# Patient Record
Sex: FEMALE | Race: WHITE | NOT HISPANIC OR LATINO | Employment: OTHER | URBAN - METROPOLITAN AREA
[De-identification: names, ages, dates, MRNs, and addresses within clinical notes are randomized per-mention and may not be internally consistent; named-entity substitution may affect disease eponyms.]

---

## 2017-07-11 ENCOUNTER — HISTORICAL (OUTPATIENT)
Dept: ADMINISTRATIVE | Facility: HOSPITAL | Age: 57
End: 2017-07-11

## 2017-07-11 LAB
ABS NEUT (OLG): 7.18 X10(3)/MCL (ref 2.1–9.2)
ALBUMIN SERPL-MCNC: 3.9 GM/DL (ref 3.4–5)
ALBUMIN/GLOB SERPL: 1.1 RATIO (ref 1.1–2)
ALP SERPL-CCNC: 88 UNIT/L (ref 38–126)
ALT SERPL-CCNC: 22 UNIT/L (ref 12–78)
AST SERPL-CCNC: 12 UNIT/L (ref 15–37)
BASOPHILS # BLD AUTO: 0 X10(3)/MCL (ref 0–0.2)
BASOPHILS NFR BLD AUTO: 0 %
BILIRUB SERPL-MCNC: 0.4 MG/DL (ref 0.2–1)
BILIRUBIN DIRECT+TOT PNL SERPL-MCNC: 0.1 MG/DL (ref 0–0.5)
BILIRUBIN DIRECT+TOT PNL SERPL-MCNC: 0.3 MG/DL (ref 0–0.8)
BUN SERPL-MCNC: 19 MG/DL (ref 7–18)
CALCIUM SERPL-MCNC: 9.5 MG/DL (ref 8.5–10.1)
CHLORIDE SERPL-SCNC: 103 MMOL/L (ref 98–107)
CHOLEST SERPL-MCNC: 184 MG/DL (ref 0–200)
CHOLEST/HDLC SERPL: 4.6 {RATIO} (ref 0–4)
CO2 SERPL-SCNC: 31 MMOL/L (ref 21–32)
CREAT SERPL-MCNC: 0.72 MG/DL (ref 0.55–1.02)
EOSINOPHIL # BLD AUTO: 0.1 X10(3)/MCL (ref 0–0.9)
EOSINOPHIL NFR BLD AUTO: 1 %
ERYTHROCYTE [DISTWIDTH] IN BLOOD BY AUTOMATED COUNT: 13.4 % (ref 11.5–17)
GLOBULIN SER-MCNC: 3.6 GM/DL (ref 2.4–3.5)
GLUCOSE SERPL-MCNC: 111 MG/DL (ref 74–106)
HCT VFR BLD AUTO: 44.3 % (ref 37–47)
HDLC SERPL-MCNC: 40 MG/DL (ref 35–60)
HGB BLD-MCNC: 14.6 GM/DL (ref 12–16)
LDLC SERPL CALC-MCNC: 103 MG/DL (ref 0–129)
LYMPHOCYTES # BLD AUTO: 2.5 X10(3)/MCL (ref 0.6–4.6)
LYMPHOCYTES NFR BLD AUTO: 24 %
MCH RBC QN AUTO: 31 PG (ref 27–31)
MCHC RBC AUTO-ENTMCNC: 33 GM/DL (ref 33–36)
MCV RBC AUTO: 94.1 FL (ref 80–94)
MONOCYTES # BLD AUTO: 0.8 X10(3)/MCL (ref 0.1–1.3)
MONOCYTES NFR BLD AUTO: 7 %
NEUTROPHILS # BLD AUTO: 7.18 X10(3)/MCL (ref 1.4–7.9)
NEUTROPHILS NFR BLD AUTO: 67 %
PLATELET # BLD AUTO: 277 X10(3)/MCL (ref 130–400)
PMV BLD AUTO: 9.4 FL (ref 9.4–12.4)
POTASSIUM SERPL-SCNC: 3.8 MMOL/L (ref 3.5–5.1)
PROT SERPL-MCNC: 7.5 GM/DL (ref 6.4–8.2)
RBC # BLD AUTO: 4.71 X10(6)/MCL (ref 4.2–5.4)
SODIUM SERPL-SCNC: 145 MMOL/L (ref 136–145)
TRIGL SERPL-MCNC: 203 MG/DL (ref 30–150)
VLDLC SERPL CALC-MCNC: 41 MG/DL
WBC # SPEC AUTO: 10.6 X10(3)/MCL (ref 4.5–11.5)

## 2017-10-25 ENCOUNTER — HISTORICAL (OUTPATIENT)
Dept: RADIOLOGY | Facility: HOSPITAL | Age: 57
End: 2017-10-25

## 2017-11-08 ENCOUNTER — HISTORICAL (OUTPATIENT)
Dept: RADIOLOGY | Facility: HOSPITAL | Age: 57
End: 2017-11-08

## 2018-04-06 ENCOUNTER — HISTORICAL (OUTPATIENT)
Dept: RADIOLOGY | Facility: HOSPITAL | Age: 58
End: 2018-04-06

## 2019-02-05 ENCOUNTER — HISTORICAL (OUTPATIENT)
Dept: RADIOLOGY | Facility: HOSPITAL | Age: 59
End: 2019-02-05

## 2019-03-19 ENCOUNTER — HISTORICAL (OUTPATIENT)
Dept: ADMINISTRATIVE | Facility: HOSPITAL | Age: 59
End: 2019-03-19

## 2019-03-19 LAB
ABS NEUT (OLG): 5.68 X10(3)/MCL (ref 2.1–9.2)
ALBUMIN SERPL-MCNC: 3.9 GM/DL (ref 3.4–5)
ALBUMIN/GLOB SERPL: 1.1 RATIO (ref 1.1–2)
ALP SERPL-CCNC: 104 UNIT/L (ref 38–126)
ALT SERPL-CCNC: 23 UNIT/L (ref 12–78)
AST SERPL-CCNC: 12 UNIT/L (ref 15–37)
BASOPHILS # BLD AUTO: 0 X10(3)/MCL (ref 0–0.2)
BASOPHILS NFR BLD AUTO: 0 %
BILIRUB SERPL-MCNC: 0.4 MG/DL (ref 0.2–1)
BILIRUBIN DIRECT+TOT PNL SERPL-MCNC: 0.1 MG/DL (ref 0–0.5)
BILIRUBIN DIRECT+TOT PNL SERPL-MCNC: 0.3 MG/DL (ref 0–0.8)
BUN SERPL-MCNC: 16 MG/DL (ref 7–18)
CALCIUM SERPL-MCNC: 9.4 MG/DL (ref 8.5–10.1)
CHLORIDE SERPL-SCNC: 99 MMOL/L (ref 98–107)
CHOLEST SERPL-MCNC: 184 MG/DL (ref 0–200)
CHOLEST/HDLC SERPL: 4.4 {RATIO} (ref 0–4)
CO2 SERPL-SCNC: 32 MMOL/L (ref 21–32)
CREAT SERPL-MCNC: 0.72 MG/DL (ref 0.55–1.02)
EOSINOPHIL # BLD AUTO: 0.1 X10(3)/MCL (ref 0–0.9)
EOSINOPHIL NFR BLD AUTO: 1 %
ERYTHROCYTE [DISTWIDTH] IN BLOOD BY AUTOMATED COUNT: 13.5 % (ref 11.5–17)
GLOBULIN SER-MCNC: 3.4 GM/DL (ref 2.4–3.5)
GLUCOSE SERPL-MCNC: 115 MG/DL (ref 74–106)
HCT VFR BLD AUTO: 44 % (ref 37–47)
HDLC SERPL-MCNC: 42 MG/DL (ref 35–60)
HGB BLD-MCNC: 15.1 GM/DL (ref 12–16)
LDLC SERPL CALC-MCNC: 104 MG/DL (ref 0–129)
LYMPHOCYTES # BLD AUTO: 2.7 X10(3)/MCL (ref 0.6–4.6)
LYMPHOCYTES NFR BLD AUTO: 29 %
MCH RBC QN AUTO: 31.1 PG (ref 27–31)
MCHC RBC AUTO-ENTMCNC: 34.3 GM/DL (ref 33–36)
MCV RBC AUTO: 90.7 FL (ref 80–94)
MONOCYTES # BLD AUTO: 0.8 X10(3)/MCL (ref 0.1–1.3)
MONOCYTES NFR BLD AUTO: 8 %
NEUTROPHILS # BLD AUTO: 5.68 X10(3)/MCL (ref 2.1–9.2)
NEUTROPHILS NFR BLD AUTO: 61 %
PLATELET # BLD AUTO: 277 X10(3)/MCL (ref 130–400)
PMV BLD AUTO: 9.3 FL (ref 9.4–12.4)
POTASSIUM SERPL-SCNC: 3.4 MMOL/L (ref 3.5–5.1)
PROT SERPL-MCNC: 7.3 GM/DL (ref 6.4–8.2)
RBC # BLD AUTO: 4.85 X10(6)/MCL (ref 4.2–5.4)
SODIUM SERPL-SCNC: 139 MMOL/L (ref 136–145)
TRIGL SERPL-MCNC: 188 MG/DL (ref 30–150)
VLDLC SERPL CALC-MCNC: 38 MG/DL
WBC # SPEC AUTO: 9.3 X10(3)/MCL (ref 4.5–11.5)

## 2019-09-04 ENCOUNTER — HISTORICAL (OUTPATIENT)
Dept: RADIOLOGY | Facility: HOSPITAL | Age: 59
End: 2019-09-04

## 2019-09-04 LAB — POC CREATININE: 0.7 MG/DL (ref 0.6–1.3)

## 2019-10-07 LAB
ABS NEUT (OLG): 6.1 X10(3)/MCL (ref 1.5–6.9)
ALBUMIN SERPL-MCNC: 3.5 GM/DL (ref 3.4–5)
ALBUMIN/GLOB SERPL: 0.8 RATIO
ALP SERPL-CCNC: 98 UNIT/L (ref 30–113)
ALT SERPL-CCNC: 16 UNIT/L (ref 10–45)
APTT PPP: 26 SECOND(S) (ref 24.5–32.8)
AST SERPL-CCNC: 11 UNIT/L (ref 15–37)
BASOPHILS # BLD AUTO: 0 X10(3)/MCL (ref 0–0.1)
BASOPHILS NFR BLD AUTO: 0 % (ref 0–1)
BILIRUB SERPL-MCNC: 0.3 MG/DL (ref 0.1–0.9)
BILIRUBIN DIRECT+TOT PNL SERPL-MCNC: 0.1 MG/DL (ref 0–0.3)
BILIRUBIN DIRECT+TOT PNL SERPL-MCNC: 0.2 MG/DL
BUN SERPL-MCNC: 13 MG/DL (ref 10–20)
CALCIUM SERPL-MCNC: 9.4 MG/DL (ref 8–10.5)
CHLORIDE SERPL-SCNC: 105 MMOL/L (ref 100–108)
CO2 SERPL-SCNC: 30 MMOL/L (ref 21–35)
CREAT SERPL-MCNC: 0.66 MG/DL (ref 0.7–1.3)
EOSINOPHIL # BLD AUTO: 0 X10(3)/MCL (ref 0–0.6)
EOSINOPHIL NFR BLD AUTO: 0 % (ref 0–5)
ERYTHROCYTE [DISTWIDTH] IN BLOOD BY AUTOMATED COUNT: 13.2 % (ref 11.5–17)
GLOBULIN SER-MCNC: 4.2 GM/DL
GLUCOSE SERPL-MCNC: 110 MG/DL (ref 75–116)
HCT VFR BLD AUTO: 42.7 % (ref 36–48)
HGB BLD-MCNC: 14.2 GM/DL (ref 12–16)
IMM GRANULOCYTES # BLD AUTO: 0.03 10*3/UL (ref 0–0.02)
IMM GRANULOCYTES NFR BLD AUTO: 0.3 % (ref 0–0.43)
INR PPP: 1
LYMPHOCYTES # BLD AUTO: 2.7 X10(3)/MCL (ref 0.5–4.1)
LYMPHOCYTES NFR BLD AUTO: 28 % (ref 15–40)
MCH RBC QN AUTO: 31 PG (ref 27–34)
MCHC RBC AUTO-ENTMCNC: 33 GM/DL (ref 31–36)
MCV RBC AUTO: 93 FL (ref 80–99)
MONOCYTES # BLD AUTO: 0.8 X10(3)/MCL (ref 0–1.1)
MONOCYTES NFR BLD AUTO: 8 % (ref 4–12)
NEUTROPHILS # BLD AUTO: 6.1 X10(3)/MCL (ref 1.5–6.9)
NEUTROPHILS NFR BLD AUTO: 63 % (ref 43–75)
PLATELET # BLD AUTO: 274 X10(3)/MCL (ref 140–400)
PMV BLD AUTO: 9 FL (ref 6.8–10)
POTASSIUM SERPL-SCNC: 3.9 MMOL/L (ref 3.6–5.2)
PROT SERPL-MCNC: 7.7 GM/DL (ref 6.4–8.2)
PROTHROMBIN TIME: 9.7 SECOND(S) (ref 8.6–10.1)
RBC # BLD AUTO: 4.59 X10(6)/MCL (ref 4.2–5.4)
SODIUM SERPL-SCNC: 143 MMOL/L (ref 135–145)
WBC # SPEC AUTO: 9.7 X10(3)/MCL (ref 4.5–11.5)

## 2019-10-14 ENCOUNTER — HISTORICAL (OUTPATIENT)
Dept: ANESTHESIOLOGY | Facility: HOSPITAL | Age: 59
End: 2019-10-14

## 2019-10-28 ENCOUNTER — HISTORICAL (OUTPATIENT)
Dept: ANESTHESIOLOGY | Facility: HOSPITAL | Age: 59
End: 2019-10-28

## 2019-11-11 LAB
ABS NEUT (OLG): 5.9 X10(3)/MCL (ref 1.5–6.9)
ALBUMIN SERPL-MCNC: 3.4 GM/DL (ref 3.4–5)
ALBUMIN/GLOB SERPL: 0.8 RATIO
ALP SERPL-CCNC: 115 UNIT/L (ref 30–113)
ALT SERPL-CCNC: 19 UNIT/L (ref 10–45)
APTT PPP: 29.2 SECOND(S) (ref 25–35)
AST SERPL-CCNC: 11 UNIT/L (ref 15–37)
BASOPHILS # BLD AUTO: 0 X10(3)/MCL (ref 0–0.1)
BASOPHILS NFR BLD AUTO: 0 % (ref 0–1)
BILIRUB SERPL-MCNC: 0.2 MG/DL (ref 0.1–0.9)
BILIRUBIN DIRECT+TOT PNL SERPL-MCNC: 0.1 MG/DL
BILIRUBIN DIRECT+TOT PNL SERPL-MCNC: 0.1 MG/DL (ref 0–0.3)
BUN SERPL-MCNC: 14 MG/DL (ref 10–20)
CALCIUM SERPL-MCNC: 9.3 MG/DL (ref 8–10.5)
CHLORIDE SERPL-SCNC: 105 MMOL/L (ref 100–108)
CO2 SERPL-SCNC: 32 MMOL/L (ref 21–35)
CREAT SERPL-MCNC: 0.89 MG/DL (ref 0.7–1.3)
EOSINOPHIL # BLD AUTO: 0.1 X10(3)/MCL (ref 0–0.6)
EOSINOPHIL NFR BLD AUTO: 1 % (ref 0–5)
ERYTHROCYTE [DISTWIDTH] IN BLOOD BY AUTOMATED COUNT: 13.2 % (ref 11.5–17)
GLOBULIN SER-MCNC: 4.2 GM/DL
GLUCOSE SERPL-MCNC: 126 MG/DL (ref 75–116)
HCT VFR BLD AUTO: 44.7 % (ref 36–48)
HGB BLD-MCNC: 14.5 GM/DL (ref 12–16)
IMM GRANULOCYTES # BLD AUTO: 0.03 10*3/UL (ref 0–0.02)
IMM GRANULOCYTES NFR BLD AUTO: 0.3 % (ref 0–0.43)
INR PPP: 0.9 (ref 0–1.2)
LYMPHOCYTES # BLD AUTO: 3 X10(3)/MCL (ref 0.5–4.1)
LYMPHOCYTES NFR BLD AUTO: 30 % (ref 15–40)
MCH RBC QN AUTO: 30 PG (ref 27–34)
MCHC RBC AUTO-ENTMCNC: 32 GM/DL (ref 31–36)
MCV RBC AUTO: 92 FL (ref 80–99)
MONOCYTES # BLD AUTO: 0.7 X10(3)/MCL (ref 0–1.1)
MONOCYTES NFR BLD AUTO: 7 % (ref 4–12)
NEUTROPHILS # BLD AUTO: 5.9 X10(3)/MCL (ref 1.5–6.9)
NEUTROPHILS NFR BLD AUTO: 61 % (ref 43–75)
PLATELET # BLD AUTO: 294 X10(3)/MCL (ref 140–400)
PMV BLD AUTO: 8.9 FL (ref 6.8–10)
POTASSIUM SERPL-SCNC: 4.3 MMOL/L (ref 3.6–5.2)
PROT SERPL-MCNC: 7.6 GM/DL (ref 6.4–8.2)
PROTHROMBIN TIME: 9.7 SECOND(S) (ref 9–12)
RBC # BLD AUTO: 4.85 X10(6)/MCL (ref 4.2–5.4)
SODIUM SERPL-SCNC: 144 MMOL/L (ref 135–145)
WBC # SPEC AUTO: 9.8 X10(3)/MCL (ref 4.5–11.5)

## 2019-11-21 ENCOUNTER — HISTORICAL (OUTPATIENT)
Dept: ANESTHESIOLOGY | Facility: HOSPITAL | Age: 59
End: 2019-11-21

## 2020-02-03 ENCOUNTER — HISTORICAL (OUTPATIENT)
Dept: ADMINISTRATIVE | Facility: HOSPITAL | Age: 60
End: 2020-02-03

## 2020-02-03 LAB
ABS NEUT (OLG): 4.69 X10(3)/MCL (ref 2.1–9.2)
ALBUMIN SERPL-MCNC: 3.7 GM/DL (ref 3.4–5)
ALBUMIN/GLOB SERPL: 1.1 RATIO (ref 1.1–2)
ALP SERPL-CCNC: 110 UNIT/L (ref 38–126)
ALT SERPL-CCNC: 14 UNIT/L (ref 12–78)
AST SERPL-CCNC: 9 UNIT/L (ref 15–37)
BASOPHILS # BLD AUTO: 0 X10(3)/MCL (ref 0–0.2)
BASOPHILS NFR BLD AUTO: 1 %
BILIRUB SERPL-MCNC: 0.6 MG/DL (ref 0.2–1)
BILIRUBIN DIRECT+TOT PNL SERPL-MCNC: 0.2 MG/DL (ref 0–0.5)
BILIRUBIN DIRECT+TOT PNL SERPL-MCNC: 0.4 MG/DL (ref 0–0.8)
BUN SERPL-MCNC: 12 MG/DL (ref 7–18)
CALCIUM SERPL-MCNC: 8.8 MG/DL (ref 8.5–10.1)
CHLORIDE SERPL-SCNC: 108 MMOL/L (ref 98–107)
CHOLEST SERPL-MCNC: 155 MG/DL (ref 0–200)
CHOLEST/HDLC SERPL: 3.4 {RATIO} (ref 0–4)
CO2 SERPL-SCNC: 29 MMOL/L (ref 21–32)
CREAT SERPL-MCNC: 0.72 MG/DL (ref 0.55–1.02)
DEPRECATED CALCIDIOL+CALCIFEROL SERPL-MC: 24.68 NG/ML (ref 30–80)
EOSINOPHIL # BLD AUTO: 0.1 X10(3)/MCL (ref 0–0.9)
EOSINOPHIL NFR BLD AUTO: 1 %
ERYTHROCYTE [DISTWIDTH] IN BLOOD BY AUTOMATED COUNT: 14.5 % (ref 11.5–17)
EST. AVERAGE GLUCOSE BLD GHB EST-MCNC: 123 MG/DL
GLOBULIN SER-MCNC: 3.4 GM/DL (ref 2.4–3.5)
GLUCOSE SERPL-MCNC: 103 MG/DL (ref 74–106)
HBA1C MFR BLD: 5.9 % (ref 4.2–6.3)
HCT VFR BLD AUTO: 43.3 % (ref 37–47)
HDLC SERPL-MCNC: 46 MG/DL (ref 35–60)
HGB BLD-MCNC: 13.9 GM/DL (ref 12–16)
LDLC SERPL CALC-MCNC: 91 MG/DL (ref 0–129)
LYMPHOCYTES # BLD AUTO: 2 X10(3)/MCL (ref 0.6–4.6)
LYMPHOCYTES NFR BLD AUTO: 27 %
MCH RBC QN AUTO: 29.3 PG (ref 27–31)
MCHC RBC AUTO-ENTMCNC: 32.1 GM/DL (ref 33–36)
MCV RBC AUTO: 91.4 FL (ref 80–94)
MONOCYTES # BLD AUTO: 0.6 X10(3)/MCL (ref 0.1–1.3)
MONOCYTES NFR BLD AUTO: 8 %
NEUTROPHILS # BLD AUTO: 4.69 X10(3)/MCL (ref 2.1–9.2)
NEUTROPHILS NFR BLD AUTO: 63 %
PLATELET # BLD AUTO: 267 X10(3)/MCL (ref 130–400)
PMV BLD AUTO: 9.1 FL (ref 9.4–12.4)
POTASSIUM SERPL-SCNC: 3.9 MMOL/L (ref 3.5–5.1)
PROT SERPL-MCNC: 7.1 GM/DL (ref 6.4–8.2)
RBC # BLD AUTO: 4.74 X10(6)/MCL (ref 4.2–5.4)
SODIUM SERPL-SCNC: 143 MMOL/L (ref 136–145)
T3FREE SERPL-MCNC: 2.87 PG/ML (ref 2.18–3.98)
T4 FREE SERPL-MCNC: 1.14 NG/DL (ref 0.76–1.46)
TRIGL SERPL-MCNC: 91 MG/DL (ref 30–150)
TSH SERPL-ACNC: 1.09 MIU/L (ref 0.36–3.74)
VLDLC SERPL CALC-MCNC: 18 MG/DL
WBC # SPEC AUTO: 7.4 X10(3)/MCL (ref 4.5–11.5)

## 2020-02-06 ENCOUNTER — HISTORICAL (OUTPATIENT)
Dept: RADIOLOGY | Facility: HOSPITAL | Age: 60
End: 2020-02-06

## 2020-02-18 ENCOUNTER — HISTORICAL (OUTPATIENT)
Dept: RADIOLOGY | Facility: HOSPITAL | Age: 60
End: 2020-02-18

## 2020-09-24 ENCOUNTER — HISTORICAL (OUTPATIENT)
Dept: RADIOLOGY | Facility: HOSPITAL | Age: 60
End: 2020-09-24

## 2020-11-18 ENCOUNTER — HISTORICAL (OUTPATIENT)
Dept: ADMINISTRATIVE | Facility: HOSPITAL | Age: 60
End: 2020-11-18

## 2020-11-18 LAB
ABS NEUT (OLG): 3.84 X10(3)/MCL (ref 2.1–9.2)
ALBUMIN SERPL-MCNC: 3.9 GM/DL (ref 3.4–4.8)
ALBUMIN/GLOB SERPL: 1.2 RATIO (ref 1.1–2)
ALP SERPL-CCNC: 113 UNIT/L (ref 40–150)
ALT SERPL-CCNC: 14 UNIT/L (ref 0–55)
AST SERPL-CCNC: 15 UNIT/L (ref 5–34)
BASOPHILS # BLD AUTO: 0 X10(3)/MCL (ref 0–0.2)
BASOPHILS NFR BLD AUTO: 0 %
BILIRUB SERPL-MCNC: 0.6 MG/DL
BILIRUBIN DIRECT+TOT PNL SERPL-MCNC: 0.3 MG/DL (ref 0–0.5)
BILIRUBIN DIRECT+TOT PNL SERPL-MCNC: 0.3 MG/DL (ref 0–0.8)
BUN SERPL-MCNC: 14.7 MG/DL (ref 9.8–20.1)
CALCIUM SERPL-MCNC: 9.6 MG/DL (ref 8.4–10.2)
CHLORIDE SERPL-SCNC: 100 MMOL/L (ref 98–107)
CHOLEST SERPL-MCNC: 168 MG/DL
CHOLEST/HDLC SERPL: 4 {RATIO} (ref 0–5)
CO2 SERPL-SCNC: 31 MMOL/L (ref 23–31)
CREAT SERPL-MCNC: 0.79 MG/DL (ref 0.55–1.02)
EOSINOPHIL # BLD AUTO: 0.1 X10(3)/MCL (ref 0–0.9)
EOSINOPHIL NFR BLD AUTO: 1 %
ERYTHROCYTE [DISTWIDTH] IN BLOOD BY AUTOMATED COUNT: 16.7 % (ref 11.5–17)
EST. AVERAGE GLUCOSE BLD GHB EST-MCNC: 114 MG/DL
GLOBULIN SER-MCNC: 3.2 GM/DL (ref 2.4–3.5)
GLUCOSE SERPL-MCNC: 102 MG/DL (ref 82–115)
HBA1C MFR BLD: 5.6 %
HCT VFR BLD AUTO: 49.1 % (ref 37–47)
HDLC SERPL-MCNC: 48 MG/DL (ref 35–60)
HGB BLD-MCNC: 15.9 GM/DL (ref 12–16)
LDLC SERPL CALC-MCNC: 98 MG/DL (ref 50–140)
LYMPHOCYTES # BLD AUTO: 2.1 X10(3)/MCL (ref 0.6–4.6)
LYMPHOCYTES NFR BLD AUTO: 32 %
MCH RBC QN AUTO: 28.4 PG (ref 27–31)
MCHC RBC AUTO-ENTMCNC: 32.4 GM/DL (ref 33–36)
MCV RBC AUTO: 87.7 FL (ref 80–94)
MONOCYTES # BLD AUTO: 0.6 X10(3)/MCL (ref 0.1–1.3)
MONOCYTES NFR BLD AUTO: 9 %
NEUTROPHILS # BLD AUTO: 3.84 X10(3)/MCL (ref 2.1–9.2)
NEUTROPHILS NFR BLD AUTO: 58 %
PLATELET # BLD AUTO: 200 X10(3)/MCL (ref 130–400)
PMV BLD AUTO: 9.4 FL (ref 9.4–12.4)
POTASSIUM SERPL-SCNC: 4.7 MMOL/L (ref 3.5–5.1)
PROT SERPL-MCNC: 7.1 GM/DL (ref 5.8–7.6)
RBC # BLD AUTO: 5.6 X10(6)/MCL (ref 4.2–5.4)
SODIUM SERPL-SCNC: 141 MMOL/L (ref 136–145)
TRIGL SERPL-MCNC: 109 MG/DL (ref 37–140)
TSH SERPL-ACNC: 1.59 UIU/ML (ref 0.35–4.94)
VLDLC SERPL CALC-MCNC: 22 MG/DL
WBC # SPEC AUTO: 6.6 X10(3)/MCL (ref 4.5–11.5)

## 2020-11-20 LAB — FINAL CULTURE: NO GROWTH

## 2021-02-22 ENCOUNTER — HISTORICAL (OUTPATIENT)
Dept: ADMINISTRATIVE | Facility: HOSPITAL | Age: 61
End: 2021-02-22

## 2021-02-25 ENCOUNTER — HISTORICAL (OUTPATIENT)
Dept: RADIOLOGY | Facility: HOSPITAL | Age: 61
End: 2021-02-25

## 2022-04-27 ENCOUNTER — HISTORICAL (OUTPATIENT)
Dept: RADIOLOGY | Facility: HOSPITAL | Age: 62
End: 2022-04-27

## 2022-04-27 ENCOUNTER — HISTORICAL (OUTPATIENT)
Dept: ADMINISTRATIVE | Facility: HOSPITAL | Age: 62
End: 2022-04-27
Payer: MEDICAID

## 2022-04-29 NOTE — OP NOTE
ADMITTING DIAGNOSIS:  Ventral incisional hernia, incarcerated, not reducible, not obstructed.    PROCEDURE:  Laparoscopic ventral incisional hernia repair with a 9 cm Symbotex mesh.  The hernia defect measured about 2 cm in size.    BRIEF HISTORY:  The patient is a 59-year-old  female with a history of hypertension, hypercholesterolemia, morbidly obese at 5 feet 10 inches, __________ pounds.  Smokes about 1-1/2 packs a day for about 35 years.  The patient had a ventral supraumbilical hernia that was not reducible, but not obstructed.  It had incarcerated omentum within it.  It appeared to be incisional from previous trocar sites, probably from a laparoscopic appendectomy or from previous C-sections.    DESCRIPTION OF PROCEDURE:  The patient was brought to the OR in supine position, prepped and draped in a sterile fashion.  Had a Visiport placed in the left upper quadrant with insertion of a 5 mm trocar along the left mid abdomen and another 5 mm trocar just to the right of falciform ligament.  The incarcerated omentum that was within the abdomen was reduced with lysis of adhesions and then the peritoneum was released and  from the posterior rectus fascia.  The patient had a 9 cm Symbotex mesh used to cover about a 2 cm defect.  It was centered around the defect.  The nonadhesive side was placed toward the bowel.  It was, of course, soaked before placement of the mesh.  The patient had the adhesive side toward the fascia.  The patient underwent tacking with Securestrap initially and then a Bard more permanent tacker was used for the final circumferential tacking.  The patient did have part of the peritoneum reapproximated over the mesh for a faster incorporation and further adhesional protection against the bowel.  Overall, the patient did very well, had no problems or difficulties, was awakened and     sent to Recovery in good condition.  I appreciate the consultation referral from   Giovanni and will notify him of my findings.      BBS/MODL   DD: 11/21/2019 1203   DT: 11/21/2019 1228  Job # [id number]    cc: Clifford Davila M.D.

## 2022-04-29 NOTE — OP NOTE
REFERRING PHYSICIAN:  Dr. Murphy    ADMITTING DIAGNOSIS:  Need for age-appropriate screening colonoscopy.    PROCEDURE:  Colonoscopy to the cecum with examination of ileocecal valve.    POSTOPERATIVE DIAGNOSES:    1. Normal ileocecal valve without intubation of the terminal ileum.  2. Normal cecum, ascending colon, transverse colon, and descending colon.  3. Diverticulosis of the sigmoid colon.  4. Grade 2 internal hemorrhoids with good tone.  No masses.  No other abnormalities seen.    PLAN:    1. Follow up in the office to discuss results.  2. Follow up in 2 years to recheck stools for blood.  3. Follow up in 10 years for repeat screening colonoscopy.    INDICATIONS:  The patient is a 59-year-old  female with anxiety, hypertension, hypercholesterolemia, morbidly obese at 5 foot 10, 172 pounds, smokes about 1-1/2 packs a day for 35 years.  The patient had open appendectomy at [facility], a  and never had a colonoscopy before.  She had a ventral hernia that was supraumbilical with associated incarceration of the omentum, no obstruction.  The patient was scheduled for a colonoscopy screening on the .  She also, on that CT, had gallstones.  The patient underwent colonoscopy to the cecum with examination of ileocecal valve under sedation using a flexible Olympus scope.    FINDINGS:  The ileocecal valve was normal.  I could not intubate the terminal ileum after multiple passes.  Cecum, ascending colon, transverse colon, and descending colon were normal with multiple passes noted.  The patient had no polyps or masses noted.  The patient's rectosigmoid colon had some diverticulosis with no polyps seen, and digital exam revealed good tone, no masses.  Grade 2 internal hemorrhoids were seen.  No other pathology was visualized.     Overall, the patient did very well and had no problems or difficulties.  Was awakened and sent to recovery in good condition.    PLAN:  My plan is cardiac check and  clearance for ventral hernia repair.  Prior to this, laparoscopic cholecystectomy will be done.  A set of procedures will be staged.  I appreciate the consultation referral from Dr. Murphy and will notify him of my findings.        BBS/SUDHEER   DD: 10/14/2019 1313   DT: 10/14/2019 1339  Job # [id number]    cc: Dr. Murphy

## 2022-04-29 NOTE — OP NOTE
ADMITTING DIAGNOSIS:  Chronic cholecystitis.    PROCEDURE:  Laparoscopic cholecystectomy with intraoperative cholangiography under fluoroscopy.    POSTOPERATIVE DIAGNOSIS:    1. Chronic cholecystitis with gallstones in the gallbladder.  2. No pancreatitis.  3. Common duct without any obstruction.    INDICATIONS:  The patient is a 59-year-old  female with a history of hypertension, hypercholesterolemia.  Morbidly obese at 5 feet 10 inches, 172 pounds.  Smokes 1-1/2 packs a day for about 35 years and has anxiety issues.  The patient was noted to have a previous appendectomy and .  She has a ventral hernia that is noted and needs to be repaired.  It is supraumbilical, incarcerated with omentum and not obstructed.  Patient had a colonoscopy that was consistent with diverticulosis on 10/14/2019, and a CT scan verified gallstones within the gallbladder.  I wanted to do a cholecystectomy before performing her ventral hernia repair.    DESCRIPTION OF PROCEDURE:  The patient was brought to the operating room in supine position.  General anesthetic given.  Prepped and draped in a sterile fashion.  A supraumbilical incision was made with insertion of Chetan needle, insufflation of abdomen to 14 mmHg with insertion of a 5 mm trocar.  The patient then underwent insertion of two lateral 5 mm trocars under direct vision and one 5 mm trocar just to the right of falciform ligament.  The gallbladder was grasped and mobilized.  Cystic duct and artery were isolated.  [place] was clearly dissected.  Intraoperative cholangiogram was obtained.  The patient underwent clipping of the cystic duct and artery, cauterization of gallbladder off the liver bed and removed through the epigastric trocar site.  The 5 mm trocar site aponeurosis was closed with 0 Vicryl on a  needle.  Subcu was closed with 4-0 plain gut suture.  Dermabond was used for the skin.  Sterile dressings were applied.  No problems were  encountered.  Patient tolerated the procedure well.  Local anesthetic was injected.       I appreciate the consultation referral from Dr. Santos out of [place] and will notify him of my findings.        NICOLAS/SUDHEER   DD: 10/28/2019 1328   DT: 10/28/2019 1348  Job # [id number]    cc: Dr. Santos

## 2022-07-27 DIAGNOSIS — Z87.891 PERSONAL HISTORY OF TOBACCO USE, PRESENTING HAZARDS TO HEALTH: Primary | ICD-10-CM

## 2022-08-12 ENCOUNTER — HOSPITAL ENCOUNTER (OUTPATIENT)
Dept: RADIOLOGY | Facility: HOSPITAL | Age: 62
Discharge: HOME | End: 2022-08-12
Attending: NURSE PRACTITIONER
Payer: MEDICAID

## 2022-08-12 DIAGNOSIS — Z87.891 PERSONAL HISTORY OF TOBACCO USE, PRESENTING HAZARDS TO HEALTH: ICD-10-CM

## 2022-08-12 PROCEDURE — 71271 CT THORAX LUNG CANCER SCR C-: CPT | Mod: TC

## 2023-08-17 ENCOUNTER — HOSPITAL ENCOUNTER (OUTPATIENT)
Dept: RADIOLOGY | Facility: HOSPITAL | Age: 63
Discharge: HOME | End: 2023-08-17
Payer: MEDICAID

## 2023-08-17 DIAGNOSIS — Z12.31 BREAST CANCER SCREENING BY MAMMOGRAM: ICD-10-CM

## 2023-08-17 PROCEDURE — 77067 MAMMO DIGITAL SCREENING BILAT WITH TOMO: ICD-10-PCS | Mod: 26,,, | Performed by: STUDENT IN AN ORGANIZED HEALTH CARE EDUCATION/TRAINING PROGRAM

## 2023-08-17 PROCEDURE — 77063 MAMMO DIGITAL SCREENING BILAT WITH TOMO: ICD-10-PCS | Mod: 26,,, | Performed by: STUDENT IN AN ORGANIZED HEALTH CARE EDUCATION/TRAINING PROGRAM

## 2023-08-17 PROCEDURE — 77067 SCR MAMMO BI INCL CAD: CPT | Mod: 26,,, | Performed by: STUDENT IN AN ORGANIZED HEALTH CARE EDUCATION/TRAINING PROGRAM

## 2023-08-17 PROCEDURE — 77067 SCR MAMMO BI INCL CAD: CPT | Mod: TC

## 2023-08-17 PROCEDURE — 77063 BREAST TOMOSYNTHESIS BI: CPT | Mod: 26,,, | Performed by: STUDENT IN AN ORGANIZED HEALTH CARE EDUCATION/TRAINING PROGRAM

## 2023-08-31 DIAGNOSIS — Z87.891 HISTORY OF TOBACCO USE: Primary | ICD-10-CM

## 2023-09-11 ENCOUNTER — HOSPITAL ENCOUNTER (OUTPATIENT)
Dept: RADIOLOGY | Facility: HOSPITAL | Age: 63
Discharge: HOME | End: 2023-09-11
Payer: MEDICAID

## 2023-09-11 DIAGNOSIS — Z87.891 HISTORY OF TOBACCO USE: ICD-10-CM

## 2023-09-11 PROCEDURE — 71271 CT THORAX LUNG CANCER SCR C-: CPT | Mod: TC

## 2023-09-14 ENCOUNTER — DOCUMENTATION ONLY (OUTPATIENT)
Dept: HEMATOLOGY/ONCOLOGY | Facility: CLINIC | Age: 63
End: 2023-09-14
Payer: MEDICAID

## 2023-09-14 NOTE — PROGRESS NOTES
Spoke with Deanna at Tristen Montoya's NP office regarding the LDCT scan results. She stated that Tristen will order a follow up scan when patient is due in 3 months.

## 2023-11-07 DIAGNOSIS — R19.5 POSITIVE COLORECTAL CANCER SCREENING USING COLOGUARD TEST: Primary | ICD-10-CM

## 2023-11-09 DIAGNOSIS — R91.1 LUNG NODULE: Primary | ICD-10-CM

## 2023-11-27 ENCOUNTER — OFFICE VISIT (OUTPATIENT)
Dept: PULMONOLOGY | Facility: CLINIC | Age: 63
End: 2023-11-27
Payer: MEDICAID

## 2023-11-27 VITALS
BODY MASS INDEX: 30.95 KG/M2 | SYSTOLIC BLOOD PRESSURE: 108 MMHG | HEART RATE: 75 BPM | OXYGEN SATURATION: 98 % | DIASTOLIC BLOOD PRESSURE: 69 MMHG | HEIGHT: 60 IN | RESPIRATION RATE: 18 BRPM | TEMPERATURE: 98 F | WEIGHT: 157.63 LBS

## 2023-11-27 DIAGNOSIS — R91.8 MULTIPLE LUNG NODULES: Primary | ICD-10-CM

## 2023-11-27 DIAGNOSIS — R91.1 LUNG NODULE: ICD-10-CM

## 2023-11-27 DIAGNOSIS — R91.1 SOLITARY PULMONARY NODULE: ICD-10-CM

## 2023-11-27 PROCEDURE — 3008F BODY MASS INDEX DOCD: CPT | Mod: CPTII,,, | Performed by: HOSPITALIST

## 2023-11-27 PROCEDURE — 99204 OFFICE O/P NEW MOD 45 MIN: CPT | Mod: S$PBB,,, | Performed by: HOSPITALIST

## 2023-11-27 PROCEDURE — 99204 PR OFFICE/OUTPT VISIT, NEW, LEVL IV, 45-59 MIN: ICD-10-PCS | Mod: S$PBB,,, | Performed by: HOSPITALIST

## 2023-11-27 PROCEDURE — 1159F MED LIST DOCD IN RCRD: CPT | Mod: CPTII,,, | Performed by: HOSPITALIST

## 2023-11-27 PROCEDURE — 3008F PR BODY MASS INDEX (BMI) DOCUMENTED: ICD-10-PCS | Mod: CPTII,,, | Performed by: HOSPITALIST

## 2023-11-27 PROCEDURE — 3074F SYST BP LT 130 MM HG: CPT | Mod: CPTII,,, | Performed by: HOSPITALIST

## 2023-11-27 PROCEDURE — 3078F PR MOST RECENT DIASTOLIC BLOOD PRESSURE < 80 MM HG: ICD-10-PCS | Mod: CPTII,,, | Performed by: HOSPITALIST

## 2023-11-27 PROCEDURE — 4010F PR ACE/ARB THEARPY RXD/TAKEN: ICD-10-PCS | Mod: CPTII,,, | Performed by: HOSPITALIST

## 2023-11-27 PROCEDURE — 3078F DIAST BP <80 MM HG: CPT | Mod: CPTII,,, | Performed by: HOSPITALIST

## 2023-11-27 PROCEDURE — 99214 OFFICE O/P EST MOD 30 MIN: CPT | Mod: PBBFAC

## 2023-11-27 PROCEDURE — 4010F ACE/ARB THERAPY RXD/TAKEN: CPT | Mod: CPTII,,, | Performed by: HOSPITALIST

## 2023-11-27 PROCEDURE — 3074F PR MOST RECENT SYSTOLIC BLOOD PRESSURE < 130 MM HG: ICD-10-PCS | Mod: CPTII,,, | Performed by: HOSPITALIST

## 2023-11-27 PROCEDURE — 1159F PR MEDICATION LIST DOCUMENTED IN MEDICAL RECORD: ICD-10-PCS | Mod: CPTII,,, | Performed by: HOSPITALIST

## 2023-11-27 RX ORDER — ERGOCALCIFEROL 1.25 MG/1
50000 CAPSULE ORAL
COMMUNITY
Start: 2023-11-05

## 2023-11-27 RX ORDER — IBUPROFEN 200 MG
1 TABLET ORAL DAILY
Status: ON HOLD | COMMUNITY
Start: 2023-10-31 | End: 2024-03-12 | Stop reason: HOSPADM

## 2023-11-27 RX ORDER — ASPIRIN 81 MG/1
81 TABLET ORAL DAILY
COMMUNITY

## 2023-11-27 RX ORDER — PANTOPRAZOLE SODIUM 40 MG/1
40 TABLET, DELAYED RELEASE ORAL DAILY
COMMUNITY
Start: 2023-11-15

## 2023-11-27 RX ORDER — FUROSEMIDE 40 MG/1
40 TABLET ORAL DAILY
COMMUNITY

## 2023-11-27 RX ORDER — MIRTAZAPINE 30 MG/1
30 TABLET, FILM COATED ORAL NIGHTLY
COMMUNITY

## 2023-11-27 RX ORDER — IBUPROFEN 800 MG/1
TABLET ORAL
COMMUNITY

## 2023-11-27 RX ORDER — CARVEDILOL 12.5 MG/1
12.5 TABLET ORAL 2 TIMES DAILY
COMMUNITY

## 2023-11-27 RX ORDER — SACUBITRIL AND VALSARTAN 24; 26 MG/1; MG/1
1 TABLET, FILM COATED ORAL 2 TIMES DAILY
COMMUNITY

## 2023-11-27 RX ORDER — ATORVASTATIN CALCIUM 40 MG/1
40 TABLET, FILM COATED ORAL DAILY
COMMUNITY
Start: 2023-09-20 | End: 2024-03-18

## 2023-11-27 RX ORDER — MECLIZINE HYDROCHLORIDE 25 MG/1
25 TABLET ORAL DAILY
COMMUNITY
Start: 2023-11-05

## 2023-11-27 RX ORDER — POTASSIUM CHLORIDE 1500 MG/1
20 TABLET, EXTENDED RELEASE ORAL DAILY
COMMUNITY

## 2023-11-27 RX ORDER — MONTELUKAST SODIUM 10 MG/1
10 TABLET ORAL DAILY
COMMUNITY

## 2023-11-27 RX ORDER — NORTRIPTYLINE HYDROCHLORIDE 25 MG/1
25 CAPSULE ORAL DAILY
COMMUNITY

## 2023-11-27 RX ORDER — GABAPENTIN 800 MG/1
800 TABLET ORAL 2 TIMES DAILY
COMMUNITY

## 2023-11-27 NOTE — PROGRESS NOTES
Subjective:     Chief Complaint: New pt/ lung nodule; LDCT 2023      HPI: Nadja Geronimo is a 63 y.o. female referred to [facility] lung mass clinic 2023 for abnormal CT chest.    Diagnostics:  CT Chest:  . 3mm LETI nodule and 5mm RUL  . 3mm LETI nodule and 7mm RUL    Today's visit:  Patient comes to the office today for initial visit.  She has no respiratory complaints.  No shortness of breath.  No chest pain.  No hemoptysis.  No lack of appetite or weight loss.  No other malignancy.    Past Medical History:   Diagnosis Date    Acid reflux     Dizziness     High cholesterol     Hypertension     Low vitamin D level     Neuropathy     Seasonal allergies          Review of Systems   Constitutional:  Negative for chills, fever and malaise/fatigue.   HENT:  Negative for sinus pain.    Respiratory:  Negative for cough, hemoptysis, sputum production, shortness of breath, wheezing and stridor.    Cardiovascular:  Negative for chest pain, palpitations, orthopnea, claudication and leg swelling.   Gastrointestinal:  Negative for constipation, heartburn and vomiting.   Musculoskeletal:  Negative for falls, joint pain, myalgias and neck pain.   Skin:  Negative for rash.   Neurological:  Negative for dizziness, speech change, focal weakness, seizures, loss of consciousness and weakness.   Psychiatric/Behavioral:  Negative for depression and suicidal ideas. The patient is not nervous/anxious.            Social History     Socioeconomic History    Marital status:    Tobacco Use    Smoking status: Former     Types: Cigarettes     Start date: 2023     Quit date: 1972     Years since quittin.9    Smokeless tobacco: Never    Tobacco comments:     Smoked since age 12; quit 2023   Substance and Sexual Activity    Alcohol use: Not Currently    Drug use: Never         Current Outpatient Medications   Medication Instructions    aspirin (ECOTRIN) 81 mg, Oral, Daily    atorvastatin (LIPITOR) 40 mg,  Oral, Daily    carvediloL (COREG) 12.5 mg, Oral, 2 times daily    ENTRESTO 24-26 mg per tablet 1 tablet, Oral, 2 times daily    ergocalciferol (ERGOCALCIFEROL) 50,000 Units, Oral, Every 7 days    furosemide (LASIX) 40 mg, Oral, Daily    gabapentin (NEURONTIN) 800 mg, Oral, 2 times daily    ibuprofen (ADVIL,MOTRIN) 800 MG tablet 1 tablet with food or milk as needed Orally Three times a day    linaCLOtide (LINZESS) 290 mcg, Oral, As needed (PRN)    meclizine (ANTIVERT) 25 mg, Oral, Daily PRN    mirtazapine (REMERON) 30 mg, Oral, Nightly    montelukast (SINGULAIR) 10 mg, Oral, Daily    nicotine (NICODERM CQ) 21 mg/24 hr 1 patch, Transdermal, Daily    nortriptyline (PAMELOR) 25 mg, Oral, Daily    pantoprazole (PROTONIX) 40 mg, Oral, Daily    potassium chloride (K-TAB) 20 mEq 20 mEq, Oral, Daily             Objective:   /69 (BP Location: Left arm)   Pulse 75   Temp 98 °F (36.7 °C) (Oral)   Resp 18   Ht 5' (1.524 m)   Wt 71.5 kg (157 lb 9.6 oz)   SpO2 98% Comment: room air  BMI 30.78 kg/m²     Physical Exam  Constitutional:       General: She is not in acute distress.     Appearance: Normal appearance. She is normal weight. She is not ill-appearing, toxic-appearing or diaphoretic.   HENT:      Head: Normocephalic and atraumatic.      Right Ear: External ear normal.      Left Ear: External ear normal.      Nose: No congestion or rhinorrhea.      Mouth/Throat:      Mouth: Mucous membranes are moist.      Pharynx: Oropharynx is clear. No oropharyngeal exudate or posterior oropharyngeal erythema.   Eyes:      General: No scleral icterus.        Right eye: No discharge.         Left eye: No discharge.      Extraocular Movements: Extraocular movements intact.      Pupils: Pupils are equal, round, and reactive to light.   Neck:      Vascular: No carotid bruit.   Cardiovascular:      Rate and Rhythm: Normal rate and regular rhythm.      Heart sounds: Normal heart sounds. No murmur heard.     No gallop.   Pulmonary:       Effort: No respiratory distress.      Breath sounds: Normal breath sounds. No stridor. No wheezing, rhonchi or rales.   Chest:      Chest wall: No tenderness.   Abdominal:      General: Abdomen is flat. Bowel sounds are normal. There is no distension.      Palpations: Abdomen is soft. There is no mass.      Tenderness: There is no abdominal tenderness. There is no guarding or rebound.   Musculoskeletal:         General: No swelling, tenderness, deformity or signs of injury. Normal range of motion.      Cervical back: No rigidity or tenderness.      Right lower leg: No edema.      Left lower leg: No edema.   Lymphadenopathy:      Cervical: No cervical adenopathy.   Skin:     Coloration: Skin is not jaundiced.      Findings: No bruising, lesion or rash.   Neurological:      General: No focal deficit present.      Mental Status: She is alert and oriented to person, place, and time.      Cranial Nerves: No cranial nerve deficit.      Sensory: No sensory deficit.      Motor: No weakness.      Coordination: Coordination normal.      Gait: Gait normal.      Deep Tendon Reflexes: Reflexes normal.   Psychiatric:         Mood and Affect: Mood normal.         Behavior: Behavior normal.         Thought Content: Thought content normal.         Judgment: Judgment normal.           Imaging:  I have personally reviewed the pertinent recent imaging.  CT 9/23 Lungs: There is a punctate 3 mm nodule in the left upper lobe which is nearly  pleural based on image number 54 series 2.  It is stable.  There is some calcified granulomas seen bilaterally.  There is a lobulated nodule seen in the right upper lobe on image number 119 series 2.  It measures 7 mm x 6.7 mm.  It is larger than the prior examination.  Previously the lesion and only measured 5 mm and was not lobulated.  No other nodules are seen.   Assessment:     3mm pleural based LETI nodule.  Unchanged in comparison to August of 2022.  7mm RUL nearly pleural based nodule where  as in August of 2022 it measured 5 mm.  Chronic tobacco use    Plan:     CT of the chest in 3 months.  Follow-up lung mass clinic in 3 months    Deonte Méndez MD

## 2023-12-06 DIAGNOSIS — R91.8 LUNG NODULES: Primary | ICD-10-CM

## 2023-12-06 DIAGNOSIS — R09.89 DECREASED PEDAL PULSES: Primary | ICD-10-CM

## 2023-12-28 ENCOUNTER — TELEPHONE (OUTPATIENT)
Dept: PULMONOLOGY | Facility: CLINIC | Age: 63
End: 2023-12-28
Payer: MEDICAID

## 2023-12-28 DIAGNOSIS — R91.1 LUNG NODULE: Primary | ICD-10-CM

## 2023-12-28 NOTE — TELEPHONE ENCOUNTER
Attempted to return Aiyana from [facility]'s call. No answer. No option to leave voicemail.

## 2023-12-28 NOTE — TELEPHONE ENCOUNTER
----- Message from Chinyere Morfin sent at 12/28/2023 11:51 AM CST -----  Aiyana with [facility] ( [phone])left voicemail needing to get sooner appt. because pt had CT and it showed findings.  Please advise

## 2024-01-03 ENCOUNTER — TELEPHONE (OUTPATIENT)
Dept: PULMONOLOGY | Facility: CLINIC | Age: 64
End: 2024-01-03
Payer: MEDICAID

## 2024-01-03 NOTE — TELEPHONE ENCOUNTER
Dr. Marquez reviewed CT chest results that were done on 12/26/2023. He recommended for patient to complete PET/CT for further evaluation. Understanding voiced.    Called and spoke with patient to inform her of Dr. Marquez's recommendation. Patient voiced understanding and will await Scheduling's call to set up PET/CT appt. Patient appreciative of the call.     Called [facility]. Informed of what Dr. Marquez recommended. Understanding voiced. Message will be sent to provider to inform of the update.

## 2024-01-29 ENCOUNTER — HOSPITAL ENCOUNTER (OUTPATIENT)
Dept: RADIOLOGY | Facility: HOSPITAL | Age: 64
Discharge: HOME | End: 2024-01-29
Attending: INTERNAL MEDICINE
Payer: MEDICAID

## 2024-01-29 DIAGNOSIS — R91.1 LUNG NODULE: ICD-10-CM

## 2024-01-29 PROCEDURE — A9552 F18 FDG: HCPCS

## 2024-01-29 PROCEDURE — 78815 PET IMAGE W/CT SKULL-THIGH: CPT | Mod: TC

## 2024-01-30 ENCOUNTER — TELEPHONE (OUTPATIENT)
Dept: PULMONOLOGY | Facility: CLINIC | Age: 64
End: 2024-01-30
Payer: MEDICAID

## 2024-01-30 NOTE — TELEPHONE ENCOUNTER
----- Message from Chinyere Morfin sent at 1/29/2024  3:08 PM CST -----  Regarding: Ct results  Emilia with [facility] called to get CT results faxed to them.  Fax # [phone]  Phone # [phone]

## 2024-01-30 NOTE — TELEPHONE ENCOUNTER
Returned Emilia's call. She asked if the PET/CT results could be faxed to their office. Informed Emilia that patient completed PET/CT yesterday 01/29/2024 and the results have not been released yet. Emilia voiced understanding and will reach out to the clinic later on in the week to see if the results have been released.

## 2024-02-01 ENCOUNTER — HOSPITAL ENCOUNTER (OUTPATIENT)
Dept: RADIOLOGY | Facility: HOSPITAL | Age: 64
Discharge: HOME | End: 2024-02-01
Attending: HOSPITALIST
Payer: MEDICAID

## 2024-02-01 DIAGNOSIS — R91.8 MULTIPLE LUNG NODULES: ICD-10-CM

## 2024-02-01 PROCEDURE — 71250 CT THORAX DX C-: CPT | Mod: TC

## 2024-02-06 NOTE — TELEPHONE ENCOUNTER
Called [facility]. Informed that results were faxed to their office and if they do not receive them to call the clinic back so they can be re-faxed. Also informed that patient is scheduled for 02/19/2024 at 11am for results of the PET/CT scans. Understanding voiced.

## 2024-02-19 ENCOUNTER — OFFICE VISIT (OUTPATIENT)
Dept: PULMONOLOGY | Facility: CLINIC | Age: 64
End: 2024-02-19
Payer: MEDICAID

## 2024-02-19 VITALS
TEMPERATURE: 98 F | DIASTOLIC BLOOD PRESSURE: 67 MMHG | HEIGHT: 60 IN | BODY MASS INDEX: 32.91 KG/M2 | RESPIRATION RATE: 18 BRPM | OXYGEN SATURATION: 97 % | WEIGHT: 167.63 LBS | SYSTOLIC BLOOD PRESSURE: 109 MMHG | HEART RATE: 74 BPM

## 2024-02-19 DIAGNOSIS — R91.1 LUNG NODULE: Primary | ICD-10-CM

## 2024-02-19 DIAGNOSIS — R91.8 MULTIPLE LUNG NODULES: Primary | ICD-10-CM

## 2024-02-19 PROCEDURE — 3074F SYST BP LT 130 MM HG: CPT | Mod: CPTII,,, | Performed by: HOSPITALIST

## 2024-02-19 PROCEDURE — 3078F DIAST BP <80 MM HG: CPT | Mod: CPTII,,, | Performed by: HOSPITALIST

## 2024-02-19 PROCEDURE — 99214 OFFICE O/P EST MOD 30 MIN: CPT | Mod: PBBFAC

## 2024-02-19 PROCEDURE — 99214 OFFICE O/P EST MOD 30 MIN: CPT | Mod: S$PBB,,, | Performed by: HOSPITALIST

## 2024-02-19 PROCEDURE — 4010F ACE/ARB THERAPY RXD/TAKEN: CPT | Mod: CPTII,,, | Performed by: HOSPITALIST

## 2024-02-19 PROCEDURE — 1159F MED LIST DOCD IN RCRD: CPT | Mod: CPTII,,, | Performed by: HOSPITALIST

## 2024-02-19 PROCEDURE — 3008F BODY MASS INDEX DOCD: CPT | Mod: CPTII,,, | Performed by: HOSPITALIST

## 2024-02-19 RX ORDER — TIOTROPIUM BROMIDE INHALATION SPRAY 3.12 UG/1
5 SPRAY, METERED RESPIRATORY (INHALATION) DAILY
Qty: 4 G | Refills: 11 | Status: SHIPPED | OUTPATIENT
Start: 2024-02-19 | End: 2024-02-19

## 2024-02-19 NOTE — PROGRESS NOTES
Subjective:     Chief Complaint: Est pt/ lung nodule; CT 2024, PET 2024 (LV 2023)      HPI: Nadja Geronimo is a 63 y.o. female  referred to [facility] lung mass clinic 2023 for abnormal CT chest.     Diagnostics:  PFT:  :   FEV1 (%),  FVC (%).  Ratio . TLC .  RV . DL/VA .       CT Chest:  8/. 3mm LETI nodule and 5mm RUL  . 3mm LETI nodule and 7mm RUL  :  1.2 x 1.1 cm subpleural right upper lung nodule with an SUV of 14    Today's visit:  Patient comes to the office today for three-month follow-up.  She is essentially asymptomatic from a pulmonary standpoint.  She is having some difficulty with her hands and skin. She denies shortness of breath.  No chest pain.  No hemoptysis.    Past Medical History:   Diagnosis Date    Acid reflux     Dizziness     High cholesterol     Hypertension     Low vitamin D level     Neuropathy     Seasonal allergies          Review of Systems   Constitutional:  Negative for chills, fever and malaise/fatigue.   HENT:  Negative for sinus pain.    Respiratory:  Negative for cough, hemoptysis, sputum production, shortness of breath, wheezing and stridor.    Cardiovascular:  Negative for chest pain, palpitations, orthopnea, claudication and leg swelling.   Gastrointestinal:  Negative for constipation, heartburn and vomiting.   Musculoskeletal:  Negative for falls, joint pain, myalgias and neck pain.   Skin:  Positive for rash.   Neurological:  Negative for dizziness, speech change, focal weakness, seizures, loss of consciousness and weakness.   Psychiatric/Behavioral:  Negative for depression and suicidal ideas. The patient is not nervous/anxious.            Social History     Socioeconomic History    Marital status:    Tobacco Use    Smoking status: Former     Types: Cigarettes     Start date: 2023     Quit date: 1972     Years since quittin.1    Smokeless tobacco: Never    Tobacco comments:     Smoked since age 12; quit 2023    Substance and Sexual Activity    Alcohol use: Not Currently    Drug use: Never         Current Outpatient Medications   Medication Instructions    aspirin (ECOTRIN) 81 mg, Oral, Daily    atorvastatin (LIPITOR) 40 mg, Oral, Daily    carvediloL (COREG) 12.5 mg, Oral, 2 times daily    ENTRESTO 24-26 mg per tablet 1 tablet, Oral, 2 times daily    ergocalciferol (ERGOCALCIFEROL) 50,000 Units, Oral, Every 7 days    furosemide (LASIX) 40 mg, Oral, Daily    gabapentin (NEURONTIN) 800 mg, Oral, 2 times daily    ibuprofen (ADVIL,MOTRIN) 800 MG tablet 1 tablet with food or milk as needed Orally Three times a day    linaCLOtide (LINZESS) 290 mcg, Oral, As needed (PRN)    meclizine (ANTIVERT) 25 mg, Oral, Daily PRN    mirtazapine (REMERON) 30 mg, Oral, Nightly    montelukast (SINGULAIR) 10 mg, Oral, Daily    nicotine (NICODERM CQ) 21 mg/24 hr 1 patch, Transdermal, Daily    nortriptyline (PAMELOR) 25 mg, Oral, Daily    pantoprazole (PROTONIX) 40 mg, Oral, Daily    potassium chloride (K-TAB) 20 mEq 20 mEq, Oral, Daily             Objective:   /67 (BP Location: Right arm)   Pulse 74   Temp 97.6 °F (36.4 °C) (Oral)   Resp 18   Ht 5' (1.524 m)   Wt 76 kg (167 lb 9.6 oz)   SpO2 97% Comment: room air  BMI 32.73 kg/m²     Physical Exam  Constitutional:       General: She is not in acute distress.     Appearance: Normal appearance. She is normal weight. She is not ill-appearing, toxic-appearing or diaphoretic.   HENT:      Head: Normocephalic and atraumatic.      Right Ear: External ear normal.      Left Ear: External ear normal.      Nose: No congestion or rhinorrhea.      Mouth/Throat:      Mouth: Mucous membranes are moist.      Pharynx: Oropharynx is clear. No oropharyngeal exudate or posterior oropharyngeal erythema.   Eyes:      General: No scleral icterus.        Right eye: No discharge.         Left eye: No discharge.      Extraocular Movements: Extraocular movements intact.      Pupils: Pupils are equal, round,  and reactive to light.   Neck:      Vascular: No carotid bruit.   Cardiovascular:      Rate and Rhythm: Normal rate and regular rhythm.      Heart sounds: Normal heart sounds. No murmur heard.     No gallop.   Pulmonary:      Effort: No respiratory distress.      Breath sounds: Normal breath sounds. No stridor. No wheezing, rhonchi or rales.   Chest:      Chest wall: No tenderness.   Abdominal:      General: Abdomen is flat. Bowel sounds are normal. There is no distension.      Palpations: Abdomen is soft. There is no mass.      Tenderness: There is no abdominal tenderness. There is no guarding or rebound.   Musculoskeletal:         General: No swelling, tenderness, deformity or signs of injury. Normal range of motion.      Cervical back: No rigidity or tenderness.      Right lower leg: No edema.      Left lower leg: No edema.   Lymphadenopathy:      Cervical: No cervical adenopathy.   Skin:     Coloration: Skin is not jaundiced.      Findings: No bruising, lesion or rash.   Neurological:      General: No focal deficit present.      Mental Status: She is alert and oriented to person, place, and time.      Cranial Nerves: No cranial nerve deficit.      Sensory: No sensory deficit.      Motor: No weakness.      Coordination: Coordination normal.      Gait: Gait normal.      Deep Tendon Reflexes: Reflexes normal.   Psychiatric:         Mood and Affect: Mood normal.         Behavior: Behavior normal.         Thought Content: Thought content normal.         Judgment: Judgment normal.           Imaging:  I have personally reviewed the pertinent recent imaging.  CT and PET shows highly avid right upper lung nodule which is increasing in size.    Assessment:     3mm pleural based LETI nodule.  Unchanged in comparison to August of 2022.  RUL nearly pleural based nodule. In  August of 2022 it measured 5 mm.  In February 2024 measures 1.2 x 1.1 cm.  SUV is at 14.  This is high-risk of underlying primary pulmonary  malignancy.  Chronic tobacco use    Plan:     Encouraged tobacco cessation.  Refer to CT surgery for consideration of surgical resection.  Baseline pulmonary function tests.    Deonte Méndez MD

## 2024-02-27 ENCOUNTER — HOSPITAL ENCOUNTER (OUTPATIENT)
Dept: PULMONOLOGY | Facility: HOSPITAL | Age: 64
Discharge: HOME | End: 2024-02-27
Attending: HOSPITALIST
Payer: MEDICAID

## 2024-02-27 DIAGNOSIS — R91.1 LUNG NODULE: ICD-10-CM

## 2024-02-27 PROCEDURE — 94726 PLETHYSMOGRAPHY LUNG VOLUMES: CPT

## 2024-02-27 PROCEDURE — 94060 EVALUATION OF WHEEZING: CPT

## 2024-02-27 PROCEDURE — 94640 AIRWAY INHALATION TREATMENT: CPT | Mod: 59

## 2024-02-27 PROCEDURE — 94729 DIFFUSING CAPACITY: CPT

## 2024-02-28 ENCOUNTER — OFFICE VISIT (OUTPATIENT)
Dept: CARDIAC SURGERY | Facility: CLINIC | Age: 64
End: 2024-02-28
Payer: MEDICAID

## 2024-02-28 VITALS
SYSTOLIC BLOOD PRESSURE: 121 MMHG | HEART RATE: 76 BPM | OXYGEN SATURATION: 98 % | BODY MASS INDEX: 32.79 KG/M2 | HEIGHT: 60 IN | WEIGHT: 167 LBS | DIASTOLIC BLOOD PRESSURE: 64 MMHG

## 2024-02-28 DIAGNOSIS — R91.1 LUNG NODULE: ICD-10-CM

## 2024-02-28 LAB
DLCO SINGLE BREATH LLN: 14.09
DLCO SINGLE BREATH PRE REF: 33.5 %
DLCO SINGLE BREATH REF: 19.83
DLCOC SBVA LLN: 2.92
DLCOC SBVA REF: 4.64
DLCOC SINGLE BREATH LLN: 14.09
DLCOC SINGLE BREATH REF: 19.83
DLCOVA LLN: 2.92
DLCOVA PRE REF: 50 %
DLCOVA PRE: 2.32 ML/(MIN*MMHG*L) (ref 2.92–6.37)
DLCOVA REF: 4.64
ERV LLN: -16449.29
ERV PRE REF: 117.7 %
ERV REF: 0.71
FEF 25 75 CHG: -21.2 %
FEF 25 75 LLN: 0.96
FEF 25 75 POST REF: 37.4 %
FEF 25 75 PRE REF: 47.5 %
FEF 25 75 REF: 1.94
FET100 CHG: 5.1 %
FEV1 CHG: -2.7 %
FEV1 FVC CHG: -5.8 %
FEV1 FVC LLN: 67
FEV1 FVC POST REF: 86.6 %
FEV1 FVC PRE REF: 91.9 %
FEV1 FVC REF: 79
FEV1 LLN: 1.56
FEV1 POST REF: 58.8 %
FEV1 PRE REF: 60.4 %
FEV1 REF: 2.09
FRCPLETH LLN: 1.65
FRCPLETH PREREF: 114.7 %
FRCPLETH REF: 2.48
FVC CHG: 3.4 %
FVC LLN: 1.98
FVC POST REF: 67.6 %
FVC PRE REF: 65.4 %
FVC REF: 2.65
IVC PRE: 1.91 L (ref 1.98–3.34)
IVC SINGLE BREATH LLN: 1.98
IVC SINGLE BREATH PRE REF: 72.2 %
IVC SINGLE BREATH REF: 2.65
MVV LLN: 63
MVV PRE REF: 69.4 %
MVV REF: 74
PEF CHG: -10.7 %
PEF LLN: 4.02
PEF POST REF: 39.5 %
PEF PRE REF: 44.2 %
PEF REF: 5.53
POST FEF 25 75: 0.73 L/S (ref 0.96–3.29)
POST FET 100: 6.69 SEC
POST FEV1 FVC: 68.84 % (ref 67–90.19)
POST FEV1: 1.23 L (ref 1.56–2.6)
POST FVC: 1.79 L (ref 1.98–3.34)
POST PEF: 2.18 L/S (ref 4.02–7.03)
PRE DLCO: 6.64 ML/(MIN*MMHG) (ref 14.09–25.56)
PRE ERV: 0.84 L (ref -16449.29–16450.71)
PRE FEF 25 75: 0.92 L/S (ref 0.96–3.29)
PRE FET 100: 6.37 SEC
PRE FEV1 FVC: 73.09 % (ref 67–90.19)
PRE FEV1: 1.26 L (ref 1.56–2.6)
PRE FRC PL: 2.84 L (ref 1.65–3.3)
PRE FVC: 1.73 L (ref 1.98–3.34)
PRE MVV: 51.21 L/MIN (ref 62.72–84.86)
PRE PEF: 2.44 L/S (ref 4.02–7.03)
PRE RV: 2 L (ref 1.19–2.34)
PRE TLC: 3.73 L (ref 3.28–5.26)
RAW LLN: 3.06
RAW PRE REF: 107.3 %
RAW PRE: 3.28 CMH2O*S/L (ref 3.06–3.06)
RAW REF: 3.06
RV LLN: 1.19
RV PRE REF: 113.4 %
RV REF: 1.77
RVTLC LLN: 31
RVTLC PRE REF: 132.9 %
RVTLC PRE: 53.67 % (ref 30.79–49.97)
RVTLC REF: 40
TLC LLN: 3.28
TLC PRE REF: 87.5 %
TLC REF: 4.27
VA PRE: 2.86 L (ref 4.12–4.12)
VA SINGLE BREATH LLN: 4.12
VA SINGLE BREATH PRE REF: 69.5 %
VA SINGLE BREATH REF: 4.12
VC LLN: 1.98
VC PRE REF: 65.4 %
VC PRE: 1.73 L (ref 1.98–3.34)
VC REF: 2.65

## 2024-02-28 PROCEDURE — 3008F BODY MASS INDEX DOCD: CPT | Mod: CPTII,,, | Performed by: THORACIC SURGERY (CARDIOTHORACIC VASCULAR SURGERY)

## 2024-02-28 PROCEDURE — 94726 PLETHYSMOGRAPHY LUNG VOLUMES: CPT | Mod: 26,,, | Performed by: INTERNAL MEDICINE

## 2024-02-28 PROCEDURE — 1159F MED LIST DOCD IN RCRD: CPT | Mod: CPTII,,, | Performed by: THORACIC SURGERY (CARDIOTHORACIC VASCULAR SURGERY)

## 2024-02-28 PROCEDURE — 94729 DIFFUSING CAPACITY: CPT | Mod: 26,,, | Performed by: INTERNAL MEDICINE

## 2024-02-28 PROCEDURE — 4010F ACE/ARB THERAPY RXD/TAKEN: CPT | Mod: CPTII,,, | Performed by: THORACIC SURGERY (CARDIOTHORACIC VASCULAR SURGERY)

## 2024-02-28 PROCEDURE — 3074F SYST BP LT 130 MM HG: CPT | Mod: CPTII,,, | Performed by: THORACIC SURGERY (CARDIOTHORACIC VASCULAR SURGERY)

## 2024-02-28 PROCEDURE — 3078F DIAST BP <80 MM HG: CPT | Mod: CPTII,,, | Performed by: THORACIC SURGERY (CARDIOTHORACIC VASCULAR SURGERY)

## 2024-02-28 PROCEDURE — 94060 EVALUATION OF WHEEZING: CPT | Mod: 26,,, | Performed by: INTERNAL MEDICINE

## 2024-02-28 PROCEDURE — 99204 OFFICE O/P NEW MOD 45 MIN: CPT | Mod: ,,, | Performed by: THORACIC SURGERY (CARDIOTHORACIC VASCULAR SURGERY)

## 2024-02-28 RX ORDER — ALLOPURINOL 100 MG/1
100 TABLET ORAL DAILY
COMMUNITY

## 2024-02-28 RX ORDER — COLCHICINE 0.6 MG/1
0.6 TABLET ORAL DAILY
COMMUNITY

## 2024-02-28 NOTE — PROGRESS NOTES
History & Physical    SUBJECTIVE:     History of Present Illness:  The patient is presenting for evaluation for right upper lobe lung mass found on surveillance CT.  Workup have included a nuclear scan with a PET scan revealing uptake only in the mass.  She is presenting here for possible biopsies resection.      Chief Complaint   Patient presents with    Pre-op Exam     REFERRAL-DR. HAYS-CUONG LUNG NODULE 1.2 X 1.1 CM, SUV 14, FROM RECENT PET 1/29/24,POSSIBLE MALIGNANCY, PREVIOUS 5-7 MM. PMH: CHRONIC TOBACCO USE,HTN, HIGH CHOL, REFLUX, VIT D DEF, NEUROPATHY. PFTS ORDERED 2/27/24-NEED RESULTS FOR APPT.       Review of patient's allergies indicates:   Allergen Reactions    Codeine Anxiety and Other (See Comments)       Current Outpatient Medications   Medication Sig Dispense Refill    aspirin (ECOTRIN) 81 MG EC tablet Take 81 mg by mouth once daily.      atorvastatin (LIPITOR) 40 MG tablet Take 40 mg by mouth once daily.      carvediloL (COREG) 12.5 MG tablet Take 12.5 mg by mouth 2 (two) times daily.      ENTRESTO 24-26 mg per tablet Take 1 tablet by mouth 2 (two) times daily.      ergocalciferol (ERGOCALCIFEROL) 50,000 unit Cap Take 50,000 Units by mouth every 7 days.      furosemide (LASIX) 40 MG tablet Take 40 mg by mouth once daily.      gabapentin (NEURONTIN) 800 MG tablet Take 800 mg by mouth 2 (two) times daily.      ibuprofen (ADVIL,MOTRIN) 800 MG tablet 1 tablet with food or milk as needed Orally Three times a day      linaCLOtide (LINZESS) 145 mcg Cap capsule Take 290 mcg by mouth as needed.      meclizine (ANTIVERT) 25 mg tablet Take 25 mg by mouth daily as needed for Dizziness.      mirtazapine (REMERON) 30 MG tablet Take 30 mg by mouth every evening.      montelukast (SINGULAIR) 10 mg tablet Take 10 mg by mouth once daily.      nicotine (NICODERM CQ) 21 mg/24 hr Place 1 patch onto the skin once daily.      nortriptyline (PAMELOR) 25 MG capsule Take 25 mg by mouth Daily.      pantoprazole (PROTONIX) 40 MG  tablet Take 40 mg by mouth once daily.      potassium chloride (K-TAB) 20 mEq Take 20 mEq by mouth Daily.       No current facility-administered medications for this visit.       Past Medical History:   Diagnosis Date    Acid reflux     Dizziness     High cholesterol     Hypertension     Low vitamin D level     Neuropathy     Seasonal allergies      Past Surgical History:   Procedure Laterality Date    APPENDECTOMY       SECTION      CHOLECYSTECTOMY       Family History   Problem Relation Age of Onset    Cancer Mother     Heart disease Mother     Breast cancer Mother     Cancer Father      Social History     Tobacco Use    Smoking status: Former     Types: Cigarettes     Start date: 2023     Quit date: 1972     Years since quittin.1    Smokeless tobacco: Never    Tobacco comments:     Smoked since age 12; quit 2023   Substance Use Topics    Alcohol use: Not Currently    Drug use: Never        Review of Systems:  Review of Systems   Constitutional: Negative.    HENT: Negative.     Eyes: Negative.    Respiratory: Negative.     Cardiovascular: Negative.    Gastrointestinal: Negative.    Endocrine: Negative.    Genitourinary: Negative.    Musculoskeletal: Negative.         Claudications   Skin: Negative.    Allergic/Immunologic: Negative.    Neurological: Negative.    Hematological: Negative.    Psychiatric/Behavioral: Negative.         OBJECTIVE:     Vital Signs (Most Recent)  BP: (!) 130/59 (24 1042)  SpO2: 98 % (24 1042)  5' (1.524 m)  75.8 kg (167 lb)     Physical Exam:  Physical Exam  Vitals reviewed.   Constitutional:       Appearance: Normal appearance.   HENT:      Head: Normocephalic and atraumatic.      Nose: Nose normal.      Mouth/Throat:      Mouth: Mucous membranes are dry.      Pharynx: Oropharynx is clear.   Eyes:      Extraocular Movements: Extraocular movements intact.      Conjunctiva/sclera: Conjunctivae normal.      Pupils: Pupils are equal, round, and reactive to  light.   Cardiovascular:      Rate and Rhythm: Normal rate and regular rhythm.      Pulses: Normal pulses.   Pulmonary:      Effort: Pulmonary effort is normal.      Breath sounds: Normal breath sounds.   Abdominal:      General: Abdomen is flat.      Palpations: Abdomen is soft.   Musculoskeletal:         General: Normal range of motion.      Cervical back: Neck supple.   Skin:     General: Skin is warm and dry.   Neurological:      General: No focal deficit present.   Psychiatric:         Mood and Affect: Mood normal.         Laboratory:  None      Diagnostic Results:  Chest CT and nuclear scan reviewed.  PFTs pending.      ASSESSMENT/PLAN:     Right upper lobe lung mass PET positive.  Suspicious for cancer.  The patient will benefit from right VATS wedge resection possible lobectomy if turns out to be malignant.  We will get pulmonary function tests and cardiac clearance.  The risks and benefits of the procedure have been explained to the patient including risks of bleeding infection and prolonged air leak.  She elected to proceed

## 2024-03-04 ENCOUNTER — HOSPITAL ENCOUNTER (OUTPATIENT)
Dept: RADIOLOGY | Facility: HOSPITAL | Age: 64
Discharge: HOME | End: 2024-03-04
Attending: THORACIC SURGERY (CARDIOTHORACIC VASCULAR SURGERY)
Payer: MEDICAID

## 2024-03-04 ENCOUNTER — ANESTHESIA EVENT (OUTPATIENT)
Dept: SURGERY | Facility: HOSPITAL | Age: 64
DRG: 165 | End: 2024-03-04
Payer: MEDICAID

## 2024-03-04 DIAGNOSIS — R91.1 LUNG NODULE: ICD-10-CM

## 2024-03-04 PROCEDURE — 71046 X-RAY EXAM CHEST 2 VIEWS: CPT | Mod: TC

## 2024-03-06 NOTE — PRE-PROCEDURE INSTRUCTIONS
"[facility]: Outpatient Surgery  Preprocedure Instructions    Your physician's office will be calling you with your arrival time.      Expectations: "Because of inconsistent procedure completion times, an unexpected wait may occur. The Physicians would like you to be here to prepare in the event they run ahead of time. We will make you as comfortable as possible and keep you informed. We apologize in advance if this happens."    You will arrive at [facility], [address], [place].  Enter through the [address] entrance next to the Emergency Room, and come to the 6th floor to the Outpatient Surgery Department.     Visitory Policy:  You are allowed 2 adult visitors to be with you in the hospital. Please, no switching visitors in pre-op area. All hospital visitors should be in good current health.  No small children.     What to Bring:  Please have your ID, insurance cards, and all home medication bottles with you at check in.  Bring your CPAP machine if one is used at home.     Fasting:  Nothing to eat or drink after midnight the night before your procedure. This includes no ice, gum, hard candies, and/or tobacco products.    Medications:  Follow your doctor's instructions for taking any medications on the morning of your procedure.  If no instructions for taking medications were given, do not take any medications but bring your medications in their bottles to your procedure check in.     Follow your doctor's preoperative instructions regarding skin prep, bowel prep, bathing, or showering prior to your procedure.  If any special soaps were provided to you, please use according to your doctor's instructions. If no instructions were given from your doctor, take a good bath or shower with antibacterial soap the night before and the morning of your procedure.  On the morning of procedure, wear loose, comfortable clothing.  No lotions, makeup, perfumes, colognes, deodorant, or " jewelry to your procedure.  Removable items (glasses, contact lenses, dentures, retainers, hearing aids) need to be removed for your procedure.  Bring your storage containers for these items if you wear them.     Artificial nails, body jewelry, eyelash extensions, and/or hair extensions with metal clips are not allowed during your surgery.  If you currently wear any of these items, please arrange for them to be removed prior to your arrival to the hospital.     Outpatient or Same Day Surgeries:  Any patients receiving sedation/anesthesia are advised not to drive for 24 hours after their procedure.  We do not allow patients to drive themselves home after discharge.  If you are going home after your procedure, please have someone available to drive you home from the hospital.        You may call the Outpatient Surgery Department at [phone] with any questions or concerns.  We are looking forward to meeting you and taking great care of you for your procedure.  Thank you for choosing [facility] for your surgical needs.

## 2024-03-07 ENCOUNTER — HOSPITAL ENCOUNTER (INPATIENT)
Facility: HOSPITAL | Age: 64
LOS: 5 days | Discharge: HOME HEALTH | DRG: 165 | End: 2024-03-12
Attending: THORACIC SURGERY (CARDIOTHORACIC VASCULAR SURGERY) | Admitting: THORACIC SURGERY (CARDIOTHORACIC VASCULAR SURGERY)
Payer: MEDICAID

## 2024-03-07 ENCOUNTER — ANESTHESIA (OUTPATIENT)
Dept: SURGERY | Facility: HOSPITAL | Age: 64
DRG: 165 | End: 2024-03-07
Payer: MEDICAID

## 2024-03-07 DIAGNOSIS — R91.1 LUNG NODULE: ICD-10-CM

## 2024-03-07 PROCEDURE — 37000008 HC ANESTHESIA 1ST 15 MINUTES: Performed by: THORACIC SURGERY (CARDIOTHORACIC VASCULAR SURGERY)

## 2024-03-07 PROCEDURE — 71000033 HC RECOVERY, INTIAL HOUR: Performed by: THORACIC SURGERY (CARDIOTHORACIC VASCULAR SURGERY)

## 2024-03-07 PROCEDURE — 36000710: Performed by: THORACIC SURGERY (CARDIOTHORACIC VASCULAR SURGERY)

## 2024-03-07 PROCEDURE — 32674 THORACOSCOPY LYMPH NODE EXC: CPT | Mod: RT,,, | Performed by: THORACIC SURGERY (CARDIOTHORACIC VASCULAR SURGERY)

## 2024-03-07 PROCEDURE — 63600175 PHARM REV CODE 636 W HCPCS: Mod: JZ,JG | Performed by: ANESTHESIOLOGY

## 2024-03-07 PROCEDURE — 32670 THORACOSCOPY BILOBECTOMY: CPT | Mod: RT,,, | Performed by: THORACIC SURGERY (CARDIOTHORACIC VASCULAR SURGERY)

## 2024-03-07 PROCEDURE — 88341 IMHCHEM/IMCYTCHM EA ADD ANTB: CPT

## 2024-03-07 PROCEDURE — 0BTD0ZZ RESECTION OF RIGHT MIDDLE LUNG LOBE, OPEN APPROACH: ICD-10-PCS | Performed by: THORACIC SURGERY (CARDIOTHORACIC VASCULAR SURGERY)

## 2024-03-07 PROCEDURE — 71000039 HC RECOVERY, EACH ADD'L HOUR: Performed by: THORACIC SURGERY (CARDIOTHORACIC VASCULAR SURGERY)

## 2024-03-07 PROCEDURE — 36000711: Performed by: THORACIC SURGERY (CARDIOTHORACIC VASCULAR SURGERY)

## 2024-03-07 PROCEDURE — 27201423 OPTIME MED/SURG SUP & DEVICES STERILE SUPPLY: Performed by: THORACIC SURGERY (CARDIOTHORACIC VASCULAR SURGERY)

## 2024-03-07 PROCEDURE — 99900031 HC PATIENT EDUCATION (STAT)

## 2024-03-07 PROCEDURE — 88305 TISSUE EXAM BY PATHOLOGIST: CPT | Performed by: THORACIC SURGERY (CARDIOTHORACIC VASCULAR SURGERY)

## 2024-03-07 PROCEDURE — 32670 THORACOSCOPY BILOBECTOMY: CPT | Mod: AS,RT,, | Performed by: PHYSICIAN ASSISTANT

## 2024-03-07 PROCEDURE — 25000003 PHARM REV CODE 250: Performed by: PHYSICIAN ASSISTANT

## 2024-03-07 PROCEDURE — 32674 THORACOSCOPY LYMPH NODE EXC: CPT | Mod: AS,RT,, | Performed by: PHYSICIAN ASSISTANT

## 2024-03-07 PROCEDURE — C1894 INTRO/SHEATH, NON-LASER: HCPCS | Performed by: THORACIC SURGERY (CARDIOTHORACIC VASCULAR SURGERY)

## 2024-03-07 PROCEDURE — P9047 ALBUMIN (HUMAN), 25%, 50ML: HCPCS | Mod: JZ,JG | Performed by: ANESTHESIOLOGY

## 2024-03-07 PROCEDURE — 37000009 HC ANESTHESIA EA ADD 15 MINS: Performed by: THORACIC SURGERY (CARDIOTHORACIC VASCULAR SURGERY)

## 2024-03-07 PROCEDURE — 0BTC0ZZ RESECTION OF RIGHT UPPER LUNG LOBE, OPEN APPROACH: ICD-10-PCS | Performed by: THORACIC SURGERY (CARDIOTHORACIC VASCULAR SURGERY)

## 2024-03-07 PROCEDURE — 27000221 HC OXYGEN, UP TO 24 HOURS

## 2024-03-07 PROCEDURE — 63600175 PHARM REV CODE 636 W HCPCS

## 2024-03-07 PROCEDURE — 32668 THORACOSCOPY W/W RESECT DIAG: CPT | Mod: AS,RT,, | Performed by: PHYSICIAN ASSISTANT

## 2024-03-07 PROCEDURE — 99900035 HC TECH TIME PER 15 MIN (STAT)

## 2024-03-07 PROCEDURE — 63600175 PHARM REV CODE 636 W HCPCS: Performed by: THORACIC SURGERY (CARDIOTHORACIC VASCULAR SURGERY)

## 2024-03-07 PROCEDURE — D9220A PRA ANESTHESIA: Mod: CRNA,,,

## 2024-03-07 PROCEDURE — 0BBC4ZZ EXCISION OF RIGHT UPPER LUNG LOBE, PERCUTANEOUS ENDOSCOPIC APPROACH: ICD-10-PCS | Performed by: THORACIC SURGERY (CARDIOTHORACIC VASCULAR SURGERY)

## 2024-03-07 PROCEDURE — 32668 THORACOSCOPY W/W RESECT DIAG: CPT | Mod: RT,,, | Performed by: THORACIC SURGERY (CARDIOTHORACIC VASCULAR SURGERY)

## 2024-03-07 PROCEDURE — C1729 CATH, DRAINAGE: HCPCS | Performed by: THORACIC SURGERY (CARDIOTHORACIC VASCULAR SURGERY)

## 2024-03-07 PROCEDURE — 71000015 HC POSTOP RECOV 1ST HR: Performed by: THORACIC SURGERY (CARDIOTHORACIC VASCULAR SURGERY)

## 2024-03-07 PROCEDURE — 88307 TISSUE EXAM BY PATHOLOGIST: CPT

## 2024-03-07 PROCEDURE — 21400001 HC TELEMETRY ROOM

## 2024-03-07 PROCEDURE — A4306 DRUG DELIVERY SYSTEM <=50 ML: HCPCS | Performed by: THORACIC SURGERY (CARDIOTHORACIC VASCULAR SURGERY)

## 2024-03-07 PROCEDURE — 88309 TISSUE EXAM BY PATHOLOGIST: CPT

## 2024-03-07 PROCEDURE — 25000003 PHARM REV CODE 250

## 2024-03-07 PROCEDURE — 11000001 HC ACUTE MED/SURG PRIVATE ROOM

## 2024-03-07 PROCEDURE — 25000003 PHARM REV CODE 250: Performed by: THORACIC SURGERY (CARDIOTHORACIC VASCULAR SURGERY)

## 2024-03-07 PROCEDURE — 36620 INSERTION CATHETER ARTERY: CPT | Performed by: ANESTHESIOLOGY

## 2024-03-07 PROCEDURE — D9220A PRA ANESTHESIA: Mod: ANES,,, | Performed by: ANESTHESIOLOGY

## 2024-03-07 PROCEDURE — 88331 PATH CONSLTJ SURG 1 BLK 1SPC: CPT

## 2024-03-07 PROCEDURE — 88342 IMHCHEM/IMCYTCHM 1ST ANTB: CPT

## 2024-03-07 PROCEDURE — 63600175 PHARM REV CODE 636 W HCPCS: Mod: JZ,JG | Performed by: THORACIC SURGERY (CARDIOTHORACIC VASCULAR SURGERY)

## 2024-03-07 RX ORDER — HYDROCODONE BITARTRATE AND ACETAMINOPHEN 5; 325 MG/1; MG/1
1 TABLET ORAL EVERY 4 HOURS PRN
Status: DISCONTINUED | OUTPATIENT
Start: 2024-03-07 | End: 2024-03-12 | Stop reason: HOSPADM

## 2024-03-07 RX ORDER — HYDROMORPHONE HYDROCHLORIDE 2 MG/ML
INJECTION, SOLUTION INTRAMUSCULAR; INTRAVENOUS; SUBCUTANEOUS
Status: DISCONTINUED | OUTPATIENT
Start: 2024-03-07 | End: 2024-03-07

## 2024-03-07 RX ORDER — BUPIVACAINE HYDROCHLORIDE 5 MG/ML
INJECTION, SOLUTION EPIDURAL; INTRACAUDAL
Status: DISCONTINUED | OUTPATIENT
Start: 2024-03-07 | End: 2024-03-07 | Stop reason: HOSPADM

## 2024-03-07 RX ORDER — MIDAZOLAM HYDROCHLORIDE 1 MG/ML
INJECTION INTRAMUSCULAR; INTRAVENOUS
Status: COMPLETED
Start: 2024-03-07 | End: 2024-03-07

## 2024-03-07 RX ORDER — METOCLOPRAMIDE HYDROCHLORIDE 5 MG/ML
5 INJECTION INTRAMUSCULAR; INTRAVENOUS EVERY 6 HOURS PRN
Status: DISCONTINUED | OUTPATIENT
Start: 2024-03-07 | End: 2024-03-12 | Stop reason: HOSPADM

## 2024-03-07 RX ORDER — ALBUMIN HUMAN 250 G/1000ML
25 SOLUTION INTRAVENOUS ONCE
Status: COMPLETED | OUTPATIENT
Start: 2024-03-07 | End: 2024-03-07

## 2024-03-07 RX ORDER — MIDAZOLAM HYDROCHLORIDE 1 MG/ML
1 INJECTION INTRAMUSCULAR; INTRAVENOUS ONCE
Status: COMPLETED | OUTPATIENT
Start: 2024-03-07 | End: 2024-03-07

## 2024-03-07 RX ORDER — ONDANSETRON HYDROCHLORIDE 2 MG/ML
4 INJECTION, SOLUTION INTRAVENOUS DAILY PRN
Status: DISCONTINUED | OUTPATIENT
Start: 2024-03-07 | End: 2024-03-07 | Stop reason: HOSPADM

## 2024-03-07 RX ORDER — HYDROMORPHONE HYDROCHLORIDE 2 MG/ML
0.4 INJECTION, SOLUTION INTRAMUSCULAR; INTRAVENOUS; SUBCUTANEOUS EVERY 5 MIN PRN
Status: DISCONTINUED | OUTPATIENT
Start: 2024-03-07 | End: 2024-03-07 | Stop reason: HOSPADM

## 2024-03-07 RX ORDER — ROCURONIUM BROMIDE 10 MG/ML
INJECTION, SOLUTION INTRAVENOUS
Status: DISCONTINUED | OUTPATIENT
Start: 2024-03-07 | End: 2024-03-07

## 2024-03-07 RX ORDER — VASOPRESSIN 20 [USP'U]/ML
INJECTION, SOLUTION INTRAMUSCULAR; SUBCUTANEOUS
Status: DISCONTINUED | OUTPATIENT
Start: 2024-03-07 | End: 2024-03-07

## 2024-03-07 RX ORDER — ONDANSETRON 4 MG/1
8 TABLET, ORALLY DISINTEGRATING ORAL EVERY 8 HOURS PRN
Status: DISCONTINUED | OUTPATIENT
Start: 2024-03-07 | End: 2024-03-12 | Stop reason: HOSPADM

## 2024-03-07 RX ORDER — MIDAZOLAM HYDROCHLORIDE 1 MG/ML
INJECTION INTRAMUSCULAR; INTRAVENOUS
Status: DISCONTINUED | OUTPATIENT
Start: 2024-03-07 | End: 2024-03-07

## 2024-03-07 RX ORDER — HALOPERIDOL 5 MG/ML
0.5 INJECTION INTRAMUSCULAR EVERY 10 MIN PRN
Status: DISCONTINUED | OUTPATIENT
Start: 2024-03-07 | End: 2024-03-07 | Stop reason: HOSPADM

## 2024-03-07 RX ORDER — ETOMIDATE 2 MG/ML
INJECTION INTRAVENOUS
Status: DISCONTINUED | OUTPATIENT
Start: 2024-03-07 | End: 2024-03-07

## 2024-03-07 RX ORDER — EPHEDRINE SULFATE 50 MG/ML
INJECTION, SOLUTION INTRAVENOUS
Status: DISCONTINUED | OUTPATIENT
Start: 2024-03-07 | End: 2024-03-07

## 2024-03-07 RX ORDER — PHENYLEPHRINE HCL IN 0.9% NACL 1 MG/10 ML
SYRINGE (ML) INTRAVENOUS
Status: DISCONTINUED | OUTPATIENT
Start: 2024-03-07 | End: 2024-03-07

## 2024-03-07 RX ORDER — SODIUM CHLORIDE 0.9 % (FLUSH) 0.9 %
10 SYRINGE (ML) INJECTION
Status: DISCONTINUED | OUTPATIENT
Start: 2024-03-07 | End: 2024-03-07 | Stop reason: HOSPADM

## 2024-03-07 RX ORDER — LIDOCAINE HYDROCHLORIDE 20 MG/ML
INJECTION INTRAVENOUS
Status: DISCONTINUED | OUTPATIENT
Start: 2024-03-07 | End: 2024-03-07

## 2024-03-07 RX ORDER — DIPHENHYDRAMINE HYDROCHLORIDE 50 MG/ML
25 INJECTION INTRAMUSCULAR; INTRAVENOUS EVERY 6 HOURS PRN
Status: DISCONTINUED | OUTPATIENT
Start: 2024-03-07 | End: 2024-03-07 | Stop reason: HOSPADM

## 2024-03-07 RX ORDER — MUPIROCIN 20 MG/G
1 OINTMENT TOPICAL 2 TIMES DAILY
Status: DISPENSED | OUTPATIENT
Start: 2024-03-07 | End: 2024-03-12

## 2024-03-07 RX ORDER — FENTANYL CITRATE 50 UG/ML
INJECTION, SOLUTION INTRAMUSCULAR; INTRAVENOUS
Status: DISCONTINUED | OUTPATIENT
Start: 2024-03-07 | End: 2024-03-07

## 2024-03-07 RX ORDER — DEXAMETHASONE SODIUM PHOSPHATE 4 MG/ML
INJECTION, SOLUTION INTRA-ARTICULAR; INTRALESIONAL; INTRAMUSCULAR; INTRAVENOUS; SOFT TISSUE
Status: DISCONTINUED | OUTPATIENT
Start: 2024-03-07 | End: 2024-03-07

## 2024-03-07 RX ADMIN — ALBUMIN (HUMAN) 25 G: 0.25 INJECTION, SOLUTION INTRAVENOUS at 12:03

## 2024-03-07 RX ADMIN — HYDROCODONE BITARTRATE AND ACETAMINOPHEN 1 TABLET: 5; 325 TABLET ORAL at 10:03

## 2024-03-07 RX ADMIN — VASOPRESSIN 1 UNITS: 20 INJECTION INTRAVENOUS at 11:03

## 2024-03-07 RX ADMIN — HYDROMORPHONE HYDROCHLORIDE 0.5 MG: 2 INJECTION, SOLUTION INTRAMUSCULAR; INTRAVENOUS; SUBCUTANEOUS at 10:03

## 2024-03-07 RX ADMIN — MIDAZOLAM HYDROCHLORIDE 1 MG: 1 INJECTION INTRAMUSCULAR; INTRAVENOUS at 08:03

## 2024-03-07 RX ADMIN — PHENYLEPHRINE HYDROCHLORIDE 0.2 MCG/KG/MIN: 10 INJECTION INTRAVENOUS at 09:03

## 2024-03-07 RX ADMIN — ROCURONIUM BROMIDE 25 MG: 10 SOLUTION INTRAVENOUS at 10:03

## 2024-03-07 RX ADMIN — VASOPRESSIN 1 UNITS: 20 INJECTION INTRAVENOUS at 10:03

## 2024-03-07 RX ADMIN — EPHEDRINE SULFATE 10 MG: 50 INJECTION INTRAVENOUS at 11:03

## 2024-03-07 RX ADMIN — HYDROMORPHONE HYDROCHLORIDE 0.4 MG: 2 INJECTION INTRAMUSCULAR; INTRAVENOUS; SUBCUTANEOUS at 12:03

## 2024-03-07 RX ADMIN — ROCURONIUM BROMIDE 50 MG: 10 SOLUTION INTRAVENOUS at 08:03

## 2024-03-07 RX ADMIN — DEXAMETHASONE SODIUM PHOSPHATE 8 MG: 4 INJECTION, SOLUTION INTRA-ARTICULAR; INTRALESIONAL; INTRAMUSCULAR; INTRAVENOUS; SOFT TISSUE at 09:03

## 2024-03-07 RX ADMIN — SUGAMMADEX 200 MG: 100 INJECTION, SOLUTION INTRAVENOUS at 11:03

## 2024-03-07 RX ADMIN — HYDROCODONE BITARTRATE AND ACETAMINOPHEN 1 TABLET: 5; 325 TABLET ORAL at 05:03

## 2024-03-07 RX ADMIN — SODIUM CHLORIDE, SODIUM GLUCONATE, SODIUM ACETATE, POTASSIUM CHLORIDE AND MAGNESIUM CHLORIDE: 526; 502; 368; 37; 30 INJECTION, SOLUTION INTRAVENOUS at 08:03

## 2024-03-07 RX ADMIN — MIDAZOLAM HYDROCHLORIDE 1 MG: 1 INJECTION, SOLUTION INTRAMUSCULAR; INTRAVENOUS at 08:03

## 2024-03-07 RX ADMIN — DEXTROSE MONOHYDRATE 1.5 G: 5 INJECTION INTRAVENOUS at 09:03

## 2024-03-07 RX ADMIN — Medication 100 MCG: at 09:03

## 2024-03-07 RX ADMIN — ROCURONIUM BROMIDE 25 MG: 10 SOLUTION INTRAVENOUS at 09:03

## 2024-03-07 RX ADMIN — FENTANYL CITRATE 100 MCG: 50 INJECTION, SOLUTION INTRAMUSCULAR; INTRAVENOUS at 08:03

## 2024-03-07 RX ADMIN — HYDROMORPHONE HYDROCHLORIDE 0.5 MG: 2 INJECTION, SOLUTION INTRAMUSCULAR; INTRAVENOUS; SUBCUTANEOUS at 09:03

## 2024-03-07 RX ADMIN — ETOMIDATE 20 MG: 2 INJECTION INTRAVENOUS at 08:03

## 2024-03-07 RX ADMIN — MIDAZOLAM HYDROCHLORIDE 2 MG: 1 INJECTION, SOLUTION INTRAMUSCULAR; INTRAVENOUS at 08:03

## 2024-03-07 RX ADMIN — Medication 100 MCG: at 10:03

## 2024-03-07 RX ADMIN — LIDOCAINE HYDROCHLORIDE 80 MG: 20 INJECTION INTRAVENOUS at 08:03

## 2024-03-07 NOTE — ANESTHESIA POSTPROCEDURE EVALUATION
Anesthesia Post Evaluation    Patient: Nadja Geronimo    Procedure(s) Performed: Procedure(s) (LRB):  VATS, WITH WEDGE RESECTION, LUNG (Right)  THORACOTOMY (Right)  LOBECTOMY (Right)    Final Anesthesia Type: general      Patient location during evaluation: PACU  Patient participation: Yes- Able to Participate  Level of consciousness: responds to stimulation  Post-procedure vital signs: reviewed and stable  Pain management: adequate  Airway patency: patent  MACY mitigation strategies: Extubation while patient is awake  PONV status at discharge: No PONV  Anesthetic complications: no      Cardiovascular status: blood pressure returned to baseline  Respiratory status: unassisted  Hydration status: euvolemic  Follow-up not needed.              Vitals Value Taken Time   BP 95/47 03/07/24 1241   Temp 36.3 °C (97.3 °F) 03/07/24 1220   Pulse 62 03/07/24 1243   Resp 11 03/07/24 1243   SpO2 94 % 03/07/24 1243   Vitals shown include unvalidated device data.      No case tracking events are documented in the log.      Pain/Solitario Score: Solitario Score: 8 (3/7/2024 12:30 PM)

## 2024-03-07 NOTE — ANESTHESIA PREPROCEDURE EVALUATION
03/07/2024  Nadja Geronimo is a 63 y.o., female.    The patient is presenting for evaluation for right upper lobe lung mass found on surveillance CT. Workup have included a nuclear scan with a PET scan revealing uptake only in the mass. She is presenting here for possible biopsies resection.     Pre-op Assessment    I have reviewed the Patient Summary Reports.     I have reviewed the Nursing Notes. I have reviewed the NPO Status.   I have reviewed the Medications.     Review of Systems  Social:  Former Smoker       Hematology/Oncology:       -- Anemia (Hgb 11.5):                                  Cardiovascular:  Exercise tolerance: good              hyperlipidemia    9/11/20 Cardiac cath:  1.  The left main coronary artery is normal.   2.  The left anterior descending artery is normal-caliber vessel.   3.  The left circumflex artery is normal.   4.  The right coronary artery is normal.   5.  Left ventricle is mildly dilated.  Ejection fraction 30%, consistent   with severe       nonischemic dilated cardiomyopathy.                            Pulmonary:        Sleep Apnea RUL mass on surveillance CT               Hepatic/GI:     GERD             Neurological:   CVA              Peripheral Neuropathy                              Physical Exam  General: Cooperative, Alert and Oriented    Airway:  Mallampati: II   Mouth Opening: Small, but > 3cm  TM Distance: > 6 cm  Tongue: Normal  Neck ROM: Extension Decreased    Dental:  Dentures        Anesthesia Plan  Type of Anesthesia, risks & benefits discussed:    Anesthesia Type: Gen ETT  Intra-op Monitoring Plan: Standard ASA Monitors and Art Line  Post Op Pain Control Plan: multimodal analgesia  Induction:  IV  Airway Plan: Direct, Post-Induction  Informed Consent: Informed consent signed with the Patient and all parties understand the risks and agree with  anesthesia plan.  All questions answered. Patient consented to blood products? Yes  ASA Score: 3  Day of Surgery Review of History & Physical: H&P Update referred to the surgeon/provider.I have interviewed and examined the patient. I have reviewed the patient's H&P dated: There are no significant changes.     Ready For Surgery From Anesthesia Perspective.     .

## 2024-03-07 NOTE — TRANSFER OF CARE
Anesthesia Transfer of Care Note    Patient: Nadja Geronimo    Procedure(s) Performed: Procedure(s) (LRB):  VATS, WITH WEDGE RESECTION, LUNG (Right)  THORACOTOMY (Right)  LOBECTOMY (Right)    Patient location: PACU    Anesthesia Type: general    Transport from OR: Transported from OR on room air with adequate spontaneous ventilation    Post pain: adequate analgesia    Post assessment: no apparent anesthetic complications    Post vital signs: stable    Level of consciousness: awake, alert and oriented    Nausea/Vomiting: no nausea/vomiting    Complications: none    Transfer of care protocol was followed      Last vitals: Visit Vitals  BP (!) 122/94 (BP Location: Right arm, Patient Position: Lying)   Pulse 68   Temp 36.4 °C (97.5 °F) (Oral)   Resp 17   Ht 5' (1.524 m)   Wt 75 kg (165 lb 5.5 oz)   SpO2 97%   Breastfeeding No   BMI 32.29 kg/m²

## 2024-03-07 NOTE — OP NOTE
[facility]                       [address] [address]    PATIENT NAME:      CHAPINCITO KUHN  YOB: 1960  CSN:               [id number]  MRN:               [id number]  ADMIT DATE:        03/07/2024 07:04:00  PHYSICIAN:         Naty Middleton MD                          OPERATIVE REPORT      DATE OF SURGERY:    03/07/2024 00:00:00    SURGEON:  Naty Middleton MD    PREOPERATIVE DIAGNOSIS:  Right upper lobe lung mass.    PROCEDURE:    1. Right VATS, right upper lobe wedge resection.  2. Right upper lobectomy, right middle lobectomy.  3. Regional lymph node dissection.  4. On-Q pump placement.    POSTOPERATIVE DIAGNOSIS:  The patient has no anatomic middle lobe.  No middle   lobe artery necessitating removal of both upper and middle lobes for this   procedure.    ASSISTANT:  ASHLY Aiken    BLOOD LOSS:  Minimal.    ANESTHESIA:  General.    TECHNIQUE:  Under informed consent, the patient was taken to the OR in supine   position.  General anesthesia was induced and therefore maintained for remainder   of procedure.  Double-lumen endotracheal tube was placed.  The patient was   turned in lateral decubitus position exposing the right chest skin.  The right   chest was prepped and draped in usual sterile fashion and IV antibiotics   administered.  Eighth intercostal space anterior axillary line incision was made   followed by introduction of a trocar.  Two working ports were placed in the   subscapular space and anterior in the 4th intercostal space.  I was able to feel   the mass and a wedge resection was done.  This came back as non-small cell lung   cancer favor squamous.  At this time, I elected to proceed with a lobectomy.  A   right muscle sparing thoracotomy incision was performed.  Small piece of rib #6   was resected.  Thorough examination revealed no other big mass in the chest.    No masses  on the pleura.  The fissures were completed using stapler.  I started   dissection of the pulmonary artery around fissure and there is really no right   middle lobe pulmonary artery and most of the middle lobe is part of the upper   lobe with very minimal minor fissure posteriorly.  I did not think there was   good way to preserve any of that small middle lobe.  The apical posterior   segment of the pulmonary artery was cut between staples.  The posterior   ascending branch was cut between staples.  The superior pulmonary vein was cut   between staples.  At this time, the right middle lobe and right upper lobe   bronchi were both cut using endoscopic staplers, making sure the right lower   lobe came up very nicely prior to firing the staplers.  Regional lymph node   dissection was performed from level 4R, 7 and 8R, 11R, and 12R.  Chest was   filled with water and there was no evidence of any type of air leak.  Chest tube   was placed and positioned in the apex.  On-Q pump was placed in the subpleural   space.  This was secured to skin.  Pericostal sutures were placed and wounds   were closed in layers of absorbable sutures.  The patient tolerated procedure   well.        ______________________________  MD ESTEPHANIA Gudino/CHELSY  DD:  03/07/2024  Time:  11:45AM  DT:  03/07/2024  Time:  01:46PM  Job #:  [id number]      OPERATIVE REPORT

## 2024-03-07 NOTE — NURSING
Nurses Note -- 4 Eyes      3/7/2024   4:08 PM      Skin assessed during: Admit      [x] No Altered Skin Integrity Present    [x]Prevention Measures Documented      [] Yes- Altered Skin Integrity Present or Discovered   [] LDA Added if Not in Epic (Describe Wound)   [] New Altered Skin Integrity was Present on Admit and Documented in LDA   [] Wound Image Taken    Wound Care Consulted? No    Attending Nurse:  Amalia Santamaria RN/Staff Member:   nilsa

## 2024-03-07 NOTE — ANESTHESIA PROCEDURE NOTES
Arterial    Diagnosis: Lung mass  Doctor requesting consult: Emi    Patient location during procedure: pre-op  Timeout: 3/7/2024 8:20 AM  Procedure end time: 3/7/2024 8:30 AM    Staffing  Authorizing Provider: Dale Yu MD  Performing Provider: Dale Yu MD    Staffing  Performed by: Dale Yu MD  Authorized by: Dale Yu MD    Anesthesiologist was present at the time of the procedure.    Preanesthetic Checklist  Completed: patient identified, IV checked, site marked, risks and benefits discussed, surgical consent, monitors and equipment checked, pre-op evaluation, timeout performed and anesthesia consent givenArterial  Skin Prep: chlorhexidine gluconate  Local Infiltration: lidocaine  Orientation: left  Location: radial    Catheter Size: 20 G  Catheter placement by Ultrasound guidance. Heme positive aspiration all ports.   Vessel Caliber: small, patent, compressibility poor  Needle advanced into vessel with real time Ultrasound guidance.  Guidewire confirmed in vessel.Insertion Attempts: 1  Assessment  Dressing: secured with tape and tegaderm  Patient: Tolerated well

## 2024-03-08 PROCEDURE — 25000003 PHARM REV CODE 250: Performed by: PHYSICIAN ASSISTANT

## 2024-03-08 PROCEDURE — 63600175 PHARM REV CODE 636 W HCPCS

## 2024-03-08 PROCEDURE — 99024 POSTOP FOLLOW-UP VISIT: CPT | Mod: ,,, | Performed by: PHYSICIAN ASSISTANT

## 2024-03-08 PROCEDURE — 27000221 HC OXYGEN, UP TO 24 HOURS

## 2024-03-08 PROCEDURE — 11000001 HC ACUTE MED/SURG PRIVATE ROOM

## 2024-03-08 PROCEDURE — 94760 N-INVAS EAR/PLS OXIMETRY 1: CPT

## 2024-03-08 PROCEDURE — 21400001 HC TELEMETRY ROOM

## 2024-03-08 PROCEDURE — 25000003 PHARM REV CODE 250

## 2024-03-08 RX ORDER — FUROSEMIDE 40 MG/1
40 TABLET ORAL DAILY
Status: DISCONTINUED | OUTPATIENT
Start: 2024-03-08 | End: 2024-03-12 | Stop reason: HOSPADM

## 2024-03-08 RX ORDER — CARVEDILOL 12.5 MG/1
12.5 TABLET ORAL 2 TIMES DAILY
Status: DISCONTINUED | OUTPATIENT
Start: 2024-03-08 | End: 2024-03-12 | Stop reason: HOSPADM

## 2024-03-08 RX ORDER — MECLIZINE HYDROCHLORIDE 25 MG/1
25 TABLET ORAL DAILY
Status: DISCONTINUED | OUTPATIENT
Start: 2024-03-08 | End: 2024-03-12 | Stop reason: HOSPADM

## 2024-03-08 RX ORDER — MIRTAZAPINE 15 MG/1
30 TABLET, FILM COATED ORAL NIGHTLY
Status: DISCONTINUED | OUTPATIENT
Start: 2024-03-08 | End: 2024-03-12 | Stop reason: HOSPADM

## 2024-03-08 RX ORDER — ALLOPURINOL 100 MG/1
100 TABLET ORAL DAILY
Status: DISCONTINUED | OUTPATIENT
Start: 2024-03-08 | End: 2024-03-12 | Stop reason: HOSPADM

## 2024-03-08 RX ORDER — MONTELUKAST SODIUM 10 MG/1
10 TABLET ORAL DAILY
Status: DISCONTINUED | OUTPATIENT
Start: 2024-03-08 | End: 2024-03-12 | Stop reason: HOSPADM

## 2024-03-08 RX ORDER — GABAPENTIN 400 MG/1
800 CAPSULE ORAL 2 TIMES DAILY
Status: DISCONTINUED | OUTPATIENT
Start: 2024-03-08 | End: 2024-03-12 | Stop reason: HOSPADM

## 2024-03-08 RX ORDER — PANTOPRAZOLE SODIUM 40 MG/1
40 TABLET, DELAYED RELEASE ORAL DAILY
Status: DISCONTINUED | OUTPATIENT
Start: 2024-03-08 | End: 2024-03-12 | Stop reason: HOSPADM

## 2024-03-08 RX ORDER — ATORVASTATIN CALCIUM 40 MG/1
40 TABLET, FILM COATED ORAL DAILY
Status: DISCONTINUED | OUTPATIENT
Start: 2024-03-08 | End: 2024-03-12 | Stop reason: HOSPADM

## 2024-03-08 RX ORDER — ALUMINUM HYDROXIDE, MAGNESIUM HYDROXIDE, AND SIMETHICONE 1200; 120; 1200 MG/30ML; MG/30ML; MG/30ML
30 SUSPENSION ORAL
Status: DISCONTINUED | OUTPATIENT
Start: 2024-03-08 | End: 2024-03-12 | Stop reason: HOSPADM

## 2024-03-08 RX ORDER — NORTRIPTYLINE HYDROCHLORIDE 25 MG/1
25 CAPSULE ORAL DAILY
Status: DISCONTINUED | OUTPATIENT
Start: 2024-03-08 | End: 2024-03-12 | Stop reason: HOSPADM

## 2024-03-08 RX ORDER — ASPIRIN 81 MG/1
81 TABLET ORAL DAILY
Status: DISCONTINUED | OUTPATIENT
Start: 2024-03-08 | End: 2024-03-12 | Stop reason: HOSPADM

## 2024-03-08 RX ORDER — POTASSIUM CHLORIDE 20 MEQ/1
20 TABLET, EXTENDED RELEASE ORAL DAILY
Status: DISCONTINUED | OUTPATIENT
Start: 2024-03-08 | End: 2024-03-12 | Stop reason: HOSPADM

## 2024-03-08 RX ORDER — ENOXAPARIN SODIUM 100 MG/ML
40 INJECTION SUBCUTANEOUS EVERY 24 HOURS
Status: DISCONTINUED | OUTPATIENT
Start: 2024-03-08 | End: 2024-03-12 | Stop reason: HOSPADM

## 2024-03-08 RX ADMIN — GABAPENTIN 800 MG: 400 CAPSULE ORAL at 08:03

## 2024-03-08 RX ADMIN — PANTOPRAZOLE SODIUM 40 MG: 40 TABLET, DELAYED RELEASE ORAL at 12:03

## 2024-03-08 RX ADMIN — CARVEDILOL 12.5 MG: 12.5 TABLET, FILM COATED ORAL at 08:03

## 2024-03-08 RX ADMIN — MONTELUKAST 10 MG: 10 TABLET, FILM COATED ORAL at 12:03

## 2024-03-08 RX ADMIN — MUPIROCIN 1 G: 20 OINTMENT TOPICAL at 08:03

## 2024-03-08 RX ADMIN — NORTRIPTYLINE HYDROCHLORIDE 25 MG: 25 CAPSULE ORAL at 04:03

## 2024-03-08 RX ADMIN — ASPIRIN 81 MG: 81 TABLET, COATED ORAL at 12:03

## 2024-03-08 RX ADMIN — HYDROCODONE BITARTRATE AND ACETAMINOPHEN 1 TABLET: 5; 325 TABLET ORAL at 08:03

## 2024-03-08 RX ADMIN — POTASSIUM CHLORIDE 20 MEQ: 1500 TABLET, EXTENDED RELEASE ORAL at 04:03

## 2024-03-08 RX ADMIN — ENOXAPARIN SODIUM 40 MG: 40 INJECTION SUBCUTANEOUS at 04:03

## 2024-03-08 RX ADMIN — SACUBITRIL AND VALSARTAN 1 TABLET: 24; 26 TABLET, FILM COATED ORAL at 08:03

## 2024-03-08 RX ADMIN — ALLOPURINOL 100 MG: 100 TABLET ORAL at 12:03

## 2024-03-08 RX ADMIN — ALUMINUM HYDROXIDE, MAGNESIUM HYDROXIDE, AND SIMETHICONE 30 ML: 200; 200; 20 SUSPENSION ORAL at 08:03

## 2024-03-08 RX ADMIN — MIRTAZAPINE 30 MG: 15 TABLET, FILM COATED ORAL at 08:03

## 2024-03-08 RX ADMIN — MECLIZINE HYDROCHLORIDE 25 MG: 25 TABLET ORAL at 04:03

## 2024-03-08 RX ADMIN — FUROSEMIDE 40 MG: 40 TABLET ORAL at 12:03

## 2024-03-08 RX ADMIN — HYDROCODONE BITARTRATE AND ACETAMINOPHEN 1 TABLET: 5; 325 TABLET ORAL at 12:03

## 2024-03-08 RX ADMIN — ATORVASTATIN CALCIUM 40 MG: 40 TABLET, FILM COATED ORAL at 12:03

## 2024-03-08 NOTE — PROGRESS NOTES
Pod 1  Pain controlled  Vss  Heart sinus  Wounds c/d/I  R ct 80cc no air leak  Waterseal, ambulate

## 2024-03-08 NOTE — PLAN OF CARE
03/08/24 1353   Discharge Assessment   Assessment Type Discharge Planning Assessment   Confirmed/corrected address, phone number and insurance Yes   Source of Information patient;family   When was your last doctors appointment?   (PCP is NATHANIEL Starr)   Reason For Admission Rt VATS   People in Home grandchild(katelyn);spouse;child(katelyn), adult   Do you expect to return to your current living situation? Yes   Do you have help at home or someone to help you manage your care at home? Yes   Who are your caregiver(s) and their phone number(s)? Joseluis/Spouse/[phone]   Current cognitive status: Alert/Oriented   Walking or Climbing Stairs Difficulty no   Dressing/Bathing Difficulty no   Home Accessibility stairs to enter home   Number of Stairs, Main Entrance seven   Home Layout Able to live on 1st floor   Equipment Currently Used at Home none   Readmission within 30 days? No   Do you currently have service(s) that help you manage your care at home? No   Do you take prescription medications? Yes   Do you have prescription coverage? Yes   Do you have any problems affording any of your prescribed medications? No   Who is going to help you get home at discharge? Spouse/Family   How do you get to doctors appointments? family or friend will provide   Are you on dialysis? No   Do you take coumadin? No   Discharge Plan A Home Health   Discharge Plan B Home Health   Discharge Plan discussed with: Spouse/sig other;Patient     Spouse/daughters will assist during recovery. Medicaid home health referral will go to [facility]. Patient is in agreement. FOC obtianed. Referral sent via [facility].

## 2024-03-09 PROCEDURE — 21400001 HC TELEMETRY ROOM

## 2024-03-09 PROCEDURE — 25000003 PHARM REV CODE 250

## 2024-03-09 PROCEDURE — 25000003 PHARM REV CODE 250: Performed by: PHYSICIAN ASSISTANT

## 2024-03-09 PROCEDURE — 63600175 PHARM REV CODE 636 W HCPCS

## 2024-03-09 PROCEDURE — 27000221 HC OXYGEN, UP TO 24 HOURS

## 2024-03-09 PROCEDURE — 11000001 HC ACUTE MED/SURG PRIVATE ROOM

## 2024-03-09 PROCEDURE — 94760 N-INVAS EAR/PLS OXIMETRY 1: CPT

## 2024-03-09 RX ADMIN — MECLIZINE HYDROCHLORIDE 25 MG: 25 TABLET ORAL at 04:03

## 2024-03-09 RX ADMIN — CARVEDILOL 12.5 MG: 12.5 TABLET, FILM COATED ORAL at 09:03

## 2024-03-09 RX ADMIN — FUROSEMIDE 40 MG: 40 TABLET ORAL at 10:03

## 2024-03-09 RX ADMIN — MIRTAZAPINE 30 MG: 15 TABLET, FILM COATED ORAL at 09:03

## 2024-03-09 RX ADMIN — GABAPENTIN 800 MG: 400 CAPSULE ORAL at 09:03

## 2024-03-09 RX ADMIN — NORTRIPTYLINE HYDROCHLORIDE 25 MG: 25 CAPSULE ORAL at 04:03

## 2024-03-09 RX ADMIN — SACUBITRIL AND VALSARTAN 1 TABLET: 24; 26 TABLET, FILM COATED ORAL at 09:03

## 2024-03-09 RX ADMIN — MUPIROCIN 1 G: 20 OINTMENT TOPICAL at 09:03

## 2024-03-09 RX ADMIN — HYDROCODONE BITARTRATE AND ACETAMINOPHEN 1 TABLET: 5; 325 TABLET ORAL at 07:03

## 2024-03-09 RX ADMIN — MONTELUKAST 10 MG: 10 TABLET, FILM COATED ORAL at 09:03

## 2024-03-09 RX ADMIN — ATORVASTATIN CALCIUM 40 MG: 40 TABLET, FILM COATED ORAL at 09:03

## 2024-03-09 RX ADMIN — ALLOPURINOL 100 MG: 100 TABLET ORAL at 09:03

## 2024-03-09 RX ADMIN — ALUMINUM HYDROXIDE, MAGNESIUM HYDROXIDE, AND SIMETHICONE 30 ML: 200; 200; 20 SUSPENSION ORAL at 04:03

## 2024-03-09 RX ADMIN — ALUMINUM HYDROXIDE, MAGNESIUM HYDROXIDE, AND SIMETHICONE 30 ML: 200; 200; 20 SUSPENSION ORAL at 11:03

## 2024-03-09 RX ADMIN — ENOXAPARIN SODIUM 40 MG: 40 INJECTION SUBCUTANEOUS at 04:03

## 2024-03-09 RX ADMIN — PANTOPRAZOLE SODIUM 40 MG: 40 TABLET, DELAYED RELEASE ORAL at 09:03

## 2024-03-09 RX ADMIN — ASPIRIN 81 MG: 81 TABLET, COATED ORAL at 09:03

## 2024-03-09 RX ADMIN — POTASSIUM CHLORIDE 20 MEQ: 1500 TABLET, EXTENDED RELEASE ORAL at 04:03

## 2024-03-09 NOTE — PROGRESS NOTES
Nadja Geronimo is a 63 y.o. female patient.   1. Lung nodule      Past Medical History:   Diagnosis Date    Acid reflux     Dizziness     Gout     High cholesterol     Insomnia     Irregular heart rhythm     Low vitamin D level     Lung nodule     Neuropathy     Obesity     Seasonal allergies     Sleep apnea     cipap    Stroke 2012    Vertigo      No past surgical history pertinent negatives on file.  Scheduled Meds:   allopurinoL  100 mg Oral Daily    aluminum-magnesium hydroxide-simethicone  30 mL Oral QID (AC & HS)    aspirin  81 mg Oral Daily    atorvastatin  40 mg Oral Daily    carvediloL  12.5 mg Oral BID    enoxparin  40 mg Subcutaneous Q24H (prophylaxis, 1700)    furosemide  40 mg Oral Daily    gabapentin  800 mg Oral BID    meclizine  25 mg Oral Daily    mirtazapine  30 mg Oral QHS    montelukast  10 mg Oral Daily    mupirocin  1 g Nasal BID    nortriptyline  25 mg Oral Daily    pantoprazole  40 mg Oral Daily    potassium chloride  20 mEq Oral Daily    sacubitriL-valsartan  1 tablet Oral BID     Continuous Infusions:   ON-Q PAIN PUMP 1 each with BUPivacaine 2,000 mg infusion       PRN Meds:HYDROcodone-acetaminophen, metoclopramide, ondansetron    Review of patient's allergies indicates:   Allergen Reactions    Codeine Anxiety and Other (See Comments)     There are no hospital problems to display for this patient.    Blood pressure (!) 103/59, pulse 72, temperature 97.8 °F (36.6 °C), temperature source Oral, resp. rate 18, height 5' (1.524 m), weight 83.6 kg (184 lb 4.9 oz), SpO2 96 %, not currently breastfeeding.    Subjective:    POD #2  Awake. Alert.  Sitting up in chair      Objective:   AFVSS. 96% on 3L NC  Heart: RRR  Lungs: respirations nonlabored, clear  Incision: c/d/I  CT draining, no air leak appreciated  Cxr:  stable    Assesment/Plan:    S/p RUL, RML  CT clamped  Final path pending  Ambulate  IS  Plan d/w ASHLY Soto  3/9/2024

## 2024-03-10 PROCEDURE — 25000003 PHARM REV CODE 250: Performed by: PHYSICIAN ASSISTANT

## 2024-03-10 PROCEDURE — 63600175 PHARM REV CODE 636 W HCPCS

## 2024-03-10 PROCEDURE — 25000003 PHARM REV CODE 250

## 2024-03-10 PROCEDURE — 21400001 HC TELEMETRY ROOM

## 2024-03-10 RX ADMIN — MONTELUKAST 10 MG: 10 TABLET, FILM COATED ORAL at 09:03

## 2024-03-10 RX ADMIN — HYDROCODONE BITARTRATE AND ACETAMINOPHEN 1 TABLET: 5; 325 TABLET ORAL at 10:03

## 2024-03-10 RX ADMIN — SACUBITRIL AND VALSARTAN 1 TABLET: 24; 26 TABLET, FILM COATED ORAL at 08:03

## 2024-03-10 RX ADMIN — SACUBITRIL AND VALSARTAN 1 TABLET: 24; 26 TABLET, FILM COATED ORAL at 09:03

## 2024-03-10 RX ADMIN — MECLIZINE HYDROCHLORIDE 25 MG: 25 TABLET ORAL at 04:03

## 2024-03-10 RX ADMIN — ALLOPURINOL 100 MG: 100 TABLET ORAL at 09:03

## 2024-03-10 RX ADMIN — CARVEDILOL 12.5 MG: 12.5 TABLET, FILM COATED ORAL at 09:03

## 2024-03-10 RX ADMIN — PANTOPRAZOLE SODIUM 40 MG: 40 TABLET, DELAYED RELEASE ORAL at 09:03

## 2024-03-10 RX ADMIN — GABAPENTIN 800 MG: 400 CAPSULE ORAL at 08:03

## 2024-03-10 RX ADMIN — ASPIRIN 81 MG: 81 TABLET, COATED ORAL at 09:03

## 2024-03-10 RX ADMIN — FUROSEMIDE 40 MG: 40 TABLET ORAL at 09:03

## 2024-03-10 RX ADMIN — HYDROCODONE BITARTRATE AND ACETAMINOPHEN 1 TABLET: 5; 325 TABLET ORAL at 08:03

## 2024-03-10 RX ADMIN — POTASSIUM CHLORIDE 20 MEQ: 1500 TABLET, EXTENDED RELEASE ORAL at 04:03

## 2024-03-10 RX ADMIN — MUPIROCIN 1 G: 20 OINTMENT TOPICAL at 09:03

## 2024-03-10 RX ADMIN — ALUMINUM HYDROXIDE, MAGNESIUM HYDROXIDE, AND SIMETHICONE 30 ML: 200; 200; 20 SUSPENSION ORAL at 08:03

## 2024-03-10 RX ADMIN — MUPIROCIN 1 G: 20 OINTMENT TOPICAL at 08:03

## 2024-03-10 RX ADMIN — ATORVASTATIN CALCIUM 40 MG: 40 TABLET, FILM COATED ORAL at 09:03

## 2024-03-10 RX ADMIN — MIRTAZAPINE 30 MG: 15 TABLET, FILM COATED ORAL at 08:03

## 2024-03-10 RX ADMIN — NORTRIPTYLINE HYDROCHLORIDE 25 MG: 25 CAPSULE ORAL at 04:03

## 2024-03-10 RX ADMIN — CARVEDILOL 12.5 MG: 12.5 TABLET, FILM COATED ORAL at 08:03

## 2024-03-10 RX ADMIN — GABAPENTIN 800 MG: 400 CAPSULE ORAL at 09:03

## 2024-03-10 RX ADMIN — ENOXAPARIN SODIUM 40 MG: 40 INJECTION SUBCUTANEOUS at 04:03

## 2024-03-10 NOTE — PROGRESS NOTES
Nadja Geronimo is a 63 y.o. female patient.   1. Lung nodule      Past Medical History:   Diagnosis Date    Acid reflux     Dizziness     Gout     High cholesterol     Insomnia     Irregular heart rhythm     Low vitamin D level     Lung nodule     Neuropathy     Obesity     Seasonal allergies     Sleep apnea     cipap    Stroke 2012    Vertigo      No past surgical history pertinent negatives on file.  Scheduled Meds:   allopurinoL  100 mg Oral Daily    aluminum-magnesium hydroxide-simethicone  30 mL Oral QID (AC & HS)    aspirin  81 mg Oral Daily    atorvastatin  40 mg Oral Daily    carvediloL  12.5 mg Oral BID    enoxparin  40 mg Subcutaneous Q24H (prophylaxis, 1700)    furosemide  40 mg Oral Daily    gabapentin  800 mg Oral BID    meclizine  25 mg Oral Daily    mirtazapine  30 mg Oral QHS    montelukast  10 mg Oral Daily    mupirocin  1 g Nasal BID    nortriptyline  25 mg Oral Daily    pantoprazole  40 mg Oral Daily    potassium chloride  20 mEq Oral Daily    sacubitriL-valsartan  1 tablet Oral BID     Continuous Infusions:   ON-Q PAIN PUMP 1 each with BUPivacaine 2,000 mg infusion       PRN Meds:HYDROcodone-acetaminophen, metoclopramide, ondansetron    Review of patient's allergies indicates:   Allergen Reactions    Codeine Anxiety and Other (See Comments)     There are no hospital problems to display for this patient.    Blood pressure (!) 93/53, pulse 68, temperature 98.2 °F (36.8 °C), temperature source Oral, resp. rate 18, height 5' (1.524 m), weight 83.6 kg (184 lb 4.9 oz), SpO2 96 %, not currently breastfeeding.    Subjective:    POD #3  Awake. Alert.  Sitting up in chair     Objective:   AFVSS. 96% on 2L NC  Heart: RRR  Lungs: respirations nonlabored, clear  Incision: c/d/I  CT clamped  Cxr: . Right chest tube in place without pneumothorax or significant pleural fluid identified.      Assesment/Plan:    S/p RUL, RML    D/c CT  Final path pending  Ambulate  IS  Wean O2 as tolerated    Paula Lagos,  PA  3/10/2024

## 2024-03-11 LAB
ANION GAP SERPL CALC-SCNC: 11 MEQ/L
BASOPHILS # BLD AUTO: 0.04 X10(3)/MCL
BASOPHILS NFR BLD AUTO: 0.4 %
BUN SERPL-MCNC: 16.9 MG/DL (ref 9.8–20.1)
CALCIUM SERPL-MCNC: 9.9 MG/DL (ref 8.4–10.2)
CHLORIDE SERPL-SCNC: 95 MMOL/L (ref 98–107)
CO2 SERPL-SCNC: 37 MMOL/L (ref 23–31)
CREAT SERPL-MCNC: 0.76 MG/DL (ref 0.55–1.02)
CREAT/UREA NIT SERPL: 22
EOSINOPHIL # BLD AUTO: 0.11 X10(3)/MCL (ref 0–0.9)
EOSINOPHIL NFR BLD AUTO: 1.1 %
ERYTHROCYTE [DISTWIDTH] IN BLOOD BY AUTOMATED COUNT: 13.6 % (ref 11.5–17)
GFR SERPLBLD CREATININE-BSD FMLA CKD-EPI: >60 MLS/MIN/1.73/M2
GLUCOSE SERPL-MCNC: 179 MG/DL (ref 82–115)
HCT VFR BLD AUTO: 35.3 % (ref 37–47)
HGB BLD-MCNC: 11.1 G/DL (ref 12–16)
IMM GRANULOCYTES # BLD AUTO: 0.08 X10(3)/MCL (ref 0–0.04)
IMM GRANULOCYTES NFR BLD AUTO: 0.8 %
LYMPHOCYTES # BLD AUTO: 1.82 X10(3)/MCL (ref 0.6–4.6)
LYMPHOCYTES NFR BLD AUTO: 17.8 %
MCH RBC QN AUTO: 29.9 PG (ref 27–31)
MCHC RBC AUTO-ENTMCNC: 31.4 G/DL (ref 33–36)
MCV RBC AUTO: 95.1 FL (ref 80–94)
MONOCYTES # BLD AUTO: 0.62 X10(3)/MCL (ref 0.1–1.3)
MONOCYTES NFR BLD AUTO: 6.1 %
NEUTROPHILS # BLD AUTO: 7.57 X10(3)/MCL (ref 2.1–9.2)
NEUTROPHILS NFR BLD AUTO: 73.8 %
NRBC BLD AUTO-RTO: 0 %
PLATELET # BLD AUTO: 349 X10(3)/MCL (ref 130–400)
PMV BLD AUTO: 8.7 FL (ref 7.4–10.4)
POTASSIUM SERPL-SCNC: 4.1 MMOL/L (ref 3.5–5.1)
RBC # BLD AUTO: 3.71 X10(6)/MCL (ref 4.2–5.4)
SODIUM SERPL-SCNC: 143 MMOL/L (ref 136–145)
WBC # SPEC AUTO: 10.24 X10(3)/MCL (ref 4.5–11.5)

## 2024-03-11 PROCEDURE — 63600175 PHARM REV CODE 636 W HCPCS

## 2024-03-11 PROCEDURE — 99024 POSTOP FOLLOW-UP VISIT: CPT | Mod: ,,, | Performed by: PHYSICIAN ASSISTANT

## 2024-03-11 PROCEDURE — 25000003 PHARM REV CODE 250

## 2024-03-11 PROCEDURE — 94760 N-INVAS EAR/PLS OXIMETRY 1: CPT

## 2024-03-11 PROCEDURE — 80048 BASIC METABOLIC PNL TOTAL CA: CPT | Performed by: PHYSICIAN ASSISTANT

## 2024-03-11 PROCEDURE — 85025 COMPLETE CBC W/AUTO DIFF WBC: CPT | Performed by: PHYSICIAN ASSISTANT

## 2024-03-11 PROCEDURE — 0W9930Z DRAINAGE OF RIGHT PLEURAL CAVITY WITH DRAINAGE DEVICE, PERCUTANEOUS APPROACH: ICD-10-PCS | Performed by: THORACIC SURGERY (CARDIOTHORACIC VASCULAR SURGERY)

## 2024-03-11 PROCEDURE — 27000221 HC OXYGEN, UP TO 24 HOURS

## 2024-03-11 PROCEDURE — 25000003 PHARM REV CODE 250: Performed by: PHYSICIAN ASSISTANT

## 2024-03-11 PROCEDURE — 99900035 HC TECH TIME PER 15 MIN (STAT)

## 2024-03-11 PROCEDURE — 21400001 HC TELEMETRY ROOM

## 2024-03-11 PROCEDURE — 63600175 PHARM REV CODE 636 W HCPCS: Performed by: PHYSICIAN ASSISTANT

## 2024-03-11 RX ORDER — FUROSEMIDE 10 MG/ML
20 INJECTION INTRAMUSCULAR; INTRAVENOUS ONCE
Status: COMPLETED | OUTPATIENT
Start: 2024-03-11 | End: 2024-03-11

## 2024-03-11 RX ADMIN — ATORVASTATIN CALCIUM 40 MG: 40 TABLET, FILM COATED ORAL at 10:03

## 2024-03-11 RX ADMIN — MONTELUKAST 10 MG: 10 TABLET, FILM COATED ORAL at 10:03

## 2024-03-11 RX ADMIN — MUPIROCIN 1 G: 20 OINTMENT TOPICAL at 10:03

## 2024-03-11 RX ADMIN — HYDROCODONE BITARTRATE AND ACETAMINOPHEN 1 TABLET: 5; 325 TABLET ORAL at 04:03

## 2024-03-11 RX ADMIN — PANTOPRAZOLE SODIUM 40 MG: 40 TABLET, DELAYED RELEASE ORAL at 10:03

## 2024-03-11 RX ADMIN — FUROSEMIDE 20 MG: 10 INJECTION, SOLUTION INTRAMUSCULAR; INTRAVENOUS at 04:03

## 2024-03-11 RX ADMIN — SACUBITRIL AND VALSARTAN 1 TABLET: 24; 26 TABLET, FILM COATED ORAL at 08:03

## 2024-03-11 RX ADMIN — CARVEDILOL 12.5 MG: 12.5 TABLET, FILM COATED ORAL at 10:03

## 2024-03-11 RX ADMIN — ALLOPURINOL 100 MG: 100 TABLET ORAL at 10:03

## 2024-03-11 RX ADMIN — FUROSEMIDE 40 MG: 40 TABLET ORAL at 10:03

## 2024-03-11 RX ADMIN — CARVEDILOL 12.5 MG: 12.5 TABLET, FILM COATED ORAL at 08:03

## 2024-03-11 RX ADMIN — NORTRIPTYLINE HYDROCHLORIDE 25 MG: 25 CAPSULE ORAL at 04:03

## 2024-03-11 RX ADMIN — ASPIRIN 81 MG: 81 TABLET, COATED ORAL at 10:03

## 2024-03-11 RX ADMIN — SACUBITRIL AND VALSARTAN 1 TABLET: 24; 26 TABLET, FILM COATED ORAL at 10:03

## 2024-03-11 RX ADMIN — GABAPENTIN 800 MG: 400 CAPSULE ORAL at 10:03

## 2024-03-11 RX ADMIN — MECLIZINE HYDROCHLORIDE 25 MG: 25 TABLET ORAL at 04:03

## 2024-03-11 RX ADMIN — MIRTAZAPINE 30 MG: 15 TABLET, FILM COATED ORAL at 08:03

## 2024-03-11 RX ADMIN — HYDROCODONE BITARTRATE AND ACETAMINOPHEN 1 TABLET: 5; 325 TABLET ORAL at 05:03

## 2024-03-11 RX ADMIN — POTASSIUM CHLORIDE 20 MEQ: 1500 TABLET, EXTENDED RELEASE ORAL at 04:03

## 2024-03-11 RX ADMIN — MUPIROCIN 1 G: 20 OINTMENT TOPICAL at 09:03

## 2024-03-11 RX ADMIN — GABAPENTIN 800 MG: 400 CAPSULE ORAL at 08:03

## 2024-03-11 RX ADMIN — ENOXAPARIN SODIUM 40 MG: 40 INJECTION SUBCUTANEOUS at 04:03

## 2024-03-11 NOTE — PROGRESS NOTES
Pod 4  Doing good  Vss  Heart sinus  Wounds c/d/I  Cxr shows an effusion this am  Will discuss c dr ayala

## 2024-03-12 VITALS
HEART RATE: 75 BPM | WEIGHT: 184.31 LBS | RESPIRATION RATE: 18 BRPM | OXYGEN SATURATION: 97 % | TEMPERATURE: 98 F | BODY MASS INDEX: 36.18 KG/M2 | HEIGHT: 60 IN | DIASTOLIC BLOOD PRESSURE: 56 MMHG | SYSTOLIC BLOOD PRESSURE: 99 MMHG

## 2024-03-12 PROCEDURE — 99024 POSTOP FOLLOW-UP VISIT: CPT | Mod: ,,, | Performed by: PHYSICIAN ASSISTANT

## 2024-03-12 PROCEDURE — 63600175 PHARM REV CODE 636 W HCPCS

## 2024-03-12 PROCEDURE — 27000221 HC OXYGEN, UP TO 24 HOURS

## 2024-03-12 PROCEDURE — 25000003 PHARM REV CODE 250: Performed by: PHYSICIAN ASSISTANT

## 2024-03-12 PROCEDURE — 94760 N-INVAS EAR/PLS OXIMETRY 1: CPT

## 2024-03-12 PROCEDURE — 25000003 PHARM REV CODE 250

## 2024-03-12 RX ORDER — HYDROCODONE BITARTRATE AND ACETAMINOPHEN 5; 325 MG/1; MG/1
1 TABLET ORAL EVERY 4 HOURS PRN
Qty: 30 TABLET | Refills: 0 | Status: ON HOLD | OUTPATIENT
Start: 2024-03-12 | End: 2024-04-26 | Stop reason: HOSPADM

## 2024-03-12 RX ADMIN — MECLIZINE HYDROCHLORIDE 25 MG: 25 TABLET ORAL at 05:03

## 2024-03-12 RX ADMIN — ATORVASTATIN CALCIUM 40 MG: 40 TABLET, FILM COATED ORAL at 09:03

## 2024-03-12 RX ADMIN — ENOXAPARIN SODIUM 40 MG: 40 INJECTION SUBCUTANEOUS at 05:03

## 2024-03-12 RX ADMIN — PANTOPRAZOLE SODIUM 40 MG: 40 TABLET, DELAYED RELEASE ORAL at 09:03

## 2024-03-12 RX ADMIN — NORTRIPTYLINE HYDROCHLORIDE 25 MG: 25 CAPSULE ORAL at 05:03

## 2024-03-12 RX ADMIN — CARVEDILOL 12.5 MG: 12.5 TABLET, FILM COATED ORAL at 09:03

## 2024-03-12 RX ADMIN — GABAPENTIN 800 MG: 400 CAPSULE ORAL at 09:03

## 2024-03-12 RX ADMIN — ASPIRIN 81 MG: 81 TABLET, COATED ORAL at 09:03

## 2024-03-12 RX ADMIN — MONTELUKAST 10 MG: 10 TABLET, FILM COATED ORAL at 09:03

## 2024-03-12 RX ADMIN — HYDROCODONE BITARTRATE AND ACETAMINOPHEN 1 TABLET: 5; 325 TABLET ORAL at 11:03

## 2024-03-12 RX ADMIN — HYDROCODONE BITARTRATE AND ACETAMINOPHEN 1 TABLET: 5; 325 TABLET ORAL at 12:03

## 2024-03-12 RX ADMIN — HYDROCODONE BITARTRATE AND ACETAMINOPHEN 1 TABLET: 5; 325 TABLET ORAL at 06:03

## 2024-03-12 RX ADMIN — FUROSEMIDE 40 MG: 40 TABLET ORAL at 11:03

## 2024-03-12 RX ADMIN — POTASSIUM CHLORIDE 20 MEQ: 1500 TABLET, EXTENDED RELEASE ORAL at 05:03

## 2024-03-12 RX ADMIN — ALLOPURINOL 100 MG: 100 TABLET ORAL at 09:03

## 2024-03-12 NOTE — PLAN OF CARE
03/12/24 1635   Discharge Reassessment   Assessment Type Discharge Planning Reassessment   Discharge Plan discussed with: Spouse/sig other;Patient   Discharge Plan A Home Health   Discharge Plan B Home Health   DME Needed Upon Discharge  oxygen   Post-Acute Status   Post-Acute Authorization Home Health     Spouse/family will assist during recovery. Plan [facility]. List of home oxygen providers given. FOC obtained for [facility]. Referral sent via [employer]. Portable oxygen will be delivered to patient's home.

## 2024-03-12 NOTE — NURSING
Patient's O2 sat on room air at rest while sitting up in bedside chair measured at 85% with shortness of breath reported. Placed on O2 at 2L nasal cannula with O2 sat increase to 96-98%.

## 2024-03-13 PROCEDURE — G0180 MD CERTIFICATION HHA PATIENT: HCPCS | Mod: ,,, | Performed by: THORACIC SURGERY (CARDIOTHORACIC VASCULAR SURGERY)

## 2024-03-13 NOTE — PLAN OF CARE
03/13/24 0853   Final Note   Assessment Type Final Discharge Note   Anticipated Discharge Disposition Home-Health   Post-Acute Status   Post-Acute Authorization Home Health     Patient discharged last night. Nurse contacted office to notify discharge and faxed paperwork. Discharge documentation sent to [facility] via "[employer].

## 2024-03-14 NOTE — DISCHARGE SUMMARY
[facility] 6th Floor Medical Telemetry  Cardiothoracic Surgery  Discharge Summary      Patient Name: Nadja Geronimo  MRN: [id number]  Admission Date: 3/7/2024  Hospital Length of Stay: 5 days  Discharge Date and Time: 3/12/2024  6:32 PM  Attending Physician: No att. providers found   Discharging Provider: ASHLY Zamorano  Primary Care Provider: Tristen Montoya FNP    HPI:   No notes on file    Procedure(s) (LRB):  VATS, WITH WEDGE RESECTION, LUNG (Right)  THORACOTOMY (Right)  LOBECTOMY (Right)      Indwelling Lines/Drains at time of discharge:   Lines/Drains/Airways       None                 Hospital Course: No notes on file    Goals of Care Treatment Preferences:             Significant Diagnostic Studies: Labs: All labs within the past 24 hours have been reviewed    Pending Diagnostic Studies:       Procedure Component Value Units Date/Time    Specimen to Pathology [[id number]] Collected: 03/07/24 1010    Order Status: Sent Lab Status: In process Updated: 03/07/24 1544    Specimen: Tissue from Lung, RUL; Tissue from Lung, RUL; Tissue from Lung, RUL; Tissue from Lung, RUL; Tissue from Lung, RUL; Tissue from Lung, RUL; Tissue from Lung, RUL             No new Assessment & Plan notes have been filed under this hospital service since the last note was generated.  Service: Cardiothoracic Surgery    Final Active Diagnoses:    Diagnosis Date Noted POA    PRINCIPAL PROBLEM:  Lung nodule [R91.1] 02/27/2024 Yes      Problems Resolved During this Admission:      Discharged Condition: good    Disposition: Home or Self Care    Follow Up:   Follow-up Information       Naty Middleton MD Follow up on 4/3/2024.    Specialty: Cardiothoracic Surgery  Why: @11:50  Contact information:  [address] [place] [address] [address]  [facility] [address]  [phone]               [facility] Follow up.    Specialties: Home Health Services, Home Therapy Services, Home Living Aide Services  Why: This is your home  health agency.  Contact information:  [place] [place] [address]  [phone]             [facility] Follow up.    Why: This is your home oxygen providers.  Contact information:  [address]  [place] [address] [phone]                           Patient Instructions:      OXYGEN FOR HOME USE   Order Comments: Dx:R91.1 S/P Right Upper Lobectomy/Right Middle Lobectomy     Order Specific Question Answer Comments   Liter Flow 2    Duration Continuous    Qualifying Test Performed at: Rest    Oxygen saturation: 85    Portable mode: pulse dose acceptable    Mode: Portable concentrator    Route nasal cannula    Device: home concentrator with portable concentrator    Length of need (in months): 99 mos    Patient condition with qualifying saturation Other - List qualifying diagnosis and code    Select a diagnosis & list the code in the comments Lung mass [100698]    Height: 5' (1.524 m)    Weight: 83.6 kg (184 lb 4.9 oz)    Alternative treatment measures have been tried or considered and deemed clinically ineffective. Yes      HOME HEALTH ORDERS   Order Comments: Home Health:Eval and Treat All Services     Order Specific Question Answer Comments   What Home Health Agency is the patient currently using? Other/External      Medications:  Reconciled Home Medications:      Medication List        START taking these medications      HYDROcodone-acetaminophen 5-325 mg per tablet  Commonly known as: NORCO  Take 1 tablet by mouth every 4 (four) hours as needed for Pain.            CONTINUE taking these medications      allopurinoL 100 MG tablet  Commonly known as: ZYLOPRIM  Take 100 mg by mouth once daily.     aspirin 81 MG EC tablet  Commonly known as: ECOTRIN  Take 81 mg by mouth once daily.     atorvastatin 40 MG tablet  Commonly known as: LIPITOR  Take 40 mg by mouth once daily.     carvediloL 12.5 MG tablet  Commonly known as: COREG  Take 12.5 mg by mouth 2 (two) times daily.     colchicine 0.6  mg tablet  Commonly known as: COLCRYS  Take 0.6 mg by mouth once daily.     ENTRESTO 24-26 mg per tablet  Generic drug: sacubitriL-valsartan  Take 1 tablet by mouth 2 (two) times daily.     ergocalciferol 50,000 unit Cap  Commonly known as: ERGOCALCIFEROL  Take 50,000 Units by mouth every 7 days.     furosemide 40 MG tablet  Commonly known as: LASIX  Take 40 mg by mouth once daily.     gabapentin 800 MG tablet  Commonly known as: NEURONTIN  Take 800 mg by mouth 2 (two) times daily.     ibuprofen 800 MG tablet  Commonly known as: ADVIL,MOTRIN  1 tablet with food or milk as needed Orally Three times a day     LINZESS 145 mcg Cap capsule  Generic drug: linaCLOtide  Take 290 mcg by mouth as needed.     meclizine 25 mg tablet  Commonly known as: ANTIVERT  Take 25 mg by mouth Daily.     mirtazapine 30 MG tablet  Commonly known as: REMERON  Take 30 mg by mouth every evening.     montelukast 10 mg tablet  Commonly known as: SINGULAIR  Take 10 mg by mouth once daily.     nortriptyline 25 MG capsule  Commonly known as: PAMELOR  Take 25 mg by mouth Daily.     pantoprazole 40 MG tablet  Commonly known as: PROTONIX  Take 40 mg by mouth once daily.     potassium chloride 20 mEq  Commonly known as: K-TAB  Take 20 mEq by mouth Daily.            STOP taking these medications      nicotine 21 mg/24 hr  Commonly known as: NICODERM CECILIA            Time spent on the discharge of patient: 33 minutes    ASHLY Zamorano  Cardiothoracic Surgery  [facility] - 6th Floor Medical Telemetry

## 2024-03-18 DIAGNOSIS — C34.11 ADENOCARCINOMA OF UPPER LOBE OF RIGHT LUNG: Primary | ICD-10-CM

## 2024-03-30 LAB
DNA RANGE(S) EXAMINED NAR: NORMAL
GENE DIS ANL INTERP-IMP: NORMAL
GENE DIS ASSESSED: NORMAL
GENE MUT TESTED BLD/T: 41.6 M/MB
MSI CA SPEC-IMP: NORMAL
PD-L1 BY 22C3 TISS IMSTN DOC: NORMAL
REASON FOR STUDY: NORMAL
TEMPUS FUSIONADDENDUM: NORMAL
TEMPUS LCA: NORMAL
TEMPUS PD-L1 (22C3) COMBINED POSITIVE SCORE: 100
TEMPUS PD-L1 (22C3) TUMOR PROPORTION SCORE: 95 %
TEMPUS PERTINENTNEGATIVES: NORMAL
TEMPUS PORTAL: NORMAL
TEMPUS TRIAL1: NORMAL
TEMPUS TRIAL2: NORMAL
TEMPUS TRIAL3: NORMAL
TEMPUS TRIALCOUNT: 3

## 2024-04-01 LAB — PSYCHE PATHOLOGY RESULT: NORMAL

## 2024-04-03 ENCOUNTER — TELEPHONE (OUTPATIENT)
Dept: CARDIAC SURGERY | Facility: CLINIC | Age: 64
End: 2024-04-03

## 2024-04-03 ENCOUNTER — OFFICE VISIT (OUTPATIENT)
Dept: CARDIAC SURGERY | Facility: CLINIC | Age: 64
End: 2024-04-03
Payer: MEDICAID

## 2024-04-03 VITALS
HEIGHT: 60 IN | SYSTOLIC BLOOD PRESSURE: 123 MMHG | WEIGHT: 163 LBS | HEART RATE: 72 BPM | OXYGEN SATURATION: 98 % | RESPIRATION RATE: 20 BRPM | DIASTOLIC BLOOD PRESSURE: 65 MMHG | BODY MASS INDEX: 32 KG/M2

## 2024-04-03 DIAGNOSIS — R91.1 LUNG NODULE: Primary | ICD-10-CM

## 2024-04-03 PROCEDURE — 3078F DIAST BP <80 MM HG: CPT | Mod: CPTII,,, | Performed by: THORACIC SURGERY (CARDIOTHORACIC VASCULAR SURGERY)

## 2024-04-03 PROCEDURE — 3074F SYST BP LT 130 MM HG: CPT | Mod: CPTII,,, | Performed by: THORACIC SURGERY (CARDIOTHORACIC VASCULAR SURGERY)

## 2024-04-03 PROCEDURE — 1159F MED LIST DOCD IN RCRD: CPT | Mod: CPTII,,, | Performed by: THORACIC SURGERY (CARDIOTHORACIC VASCULAR SURGERY)

## 2024-04-03 PROCEDURE — 4010F ACE/ARB THERAPY RXD/TAKEN: CPT | Mod: CPTII,,, | Performed by: THORACIC SURGERY (CARDIOTHORACIC VASCULAR SURGERY)

## 2024-04-03 PROCEDURE — 99024 POSTOP FOLLOW-UP VISIT: CPT | Mod: ,,, | Performed by: THORACIC SURGERY (CARDIOTHORACIC VASCULAR SURGERY)

## 2024-04-03 PROCEDURE — 1160F RVW MEDS BY RX/DR IN RCRD: CPT | Mod: CPTII,,, | Performed by: THORACIC SURGERY (CARDIOTHORACIC VASCULAR SURGERY)

## 2024-04-03 NOTE — TELEPHONE ENCOUNTER
Alma and NIOKLAIM for [facility] Pulmonology for Dr. Méndez office to call pt to schedule f/u appt per request of Dr. Middleton.

## 2024-04-03 NOTE — PROGRESS NOTES
Nadja Geronimo is a 63 y.o. female patient.   No diagnosis found.  Past Medical History:   Diagnosis Date    Acid reflux     Dizziness     Gout     High cholesterol     Insomnia     Irregular heart rhythm     Low vitamin D level     Lung nodule     Neuropathy     Obesity     Seasonal allergies     Sleep apnea     cipap    Stroke 2012    Vertigo      No past surgical history pertinent negatives on file.  Scheduled Meds:  Continuous Infusions:  PRN Meds:    Review of patient's allergies indicates:   Allergen Reactions    Codeine Anxiety and Other (See Comments)     There are no hospital problems to display for this patient.    Blood pressure 123/65, pulse 72, resp. rate 20, height 5' (1.524 m), weight 73.9 kg (163 lb), SpO2 98 %.    Subjective:  The patient has clinic status post right upper lobectomy.  She has been doing well and is still on oxygen.      Objective:  Her wounds have healed well.  Final pathology T3 N0 M0      Assessment & Plan:  Overall doing very well.  We will arrange for her to see oncology and pulmonary      Naty Middleton MD  4/3/2024

## 2024-04-10 ENCOUNTER — EXTERNAL HOME HEALTH (OUTPATIENT)
Dept: HOME HEALTH SERVICES | Facility: HOSPITAL | Age: 64
End: 2024-04-10
Payer: MEDICAID

## 2024-04-15 ENCOUNTER — TELEPHONE (OUTPATIENT)
Dept: HEMATOLOGY/ONCOLOGY | Facility: CLINIC | Age: 64
End: 2024-04-15
Payer: MEDICAID

## 2024-04-15 ENCOUNTER — DOCUMENTATION ONLY (OUTPATIENT)
Dept: HEMATOLOGY/ONCOLOGY | Facility: CLINIC | Age: 64
End: 2024-04-15
Payer: MEDICAID

## 2024-04-15 PROBLEM — C34.11 PRIMARY ADENOCARCINOMA OF UPPER LOBE OF RIGHT LUNG: Status: ACTIVE | Noted: 2024-04-15

## 2024-04-15 PROBLEM — R19.5 POSITIVE COLORECTAL CANCER SCREENING USING COLOGUARD TEST: Status: ACTIVE | Noted: 2024-04-15

## 2024-04-15 NOTE — NURSING
HERO Goodwin spoke with patient for pre-visit introduction and to assist with any questions or concerns. Discussed arrival time, location of clinic, and what to expect at consult visit. Provided contact number to call. No concerns or needs at this time. Confirmed that patient plans to attend appointment for 4/16/24 at 9:40 am.

## 2024-04-16 ENCOUNTER — OFFICE VISIT (OUTPATIENT)
Dept: HEMATOLOGY/ONCOLOGY | Facility: CLINIC | Age: 64
End: 2024-04-16
Attending: INTERNAL MEDICINE
Payer: MEDICAID

## 2024-04-16 ENCOUNTER — DOCUMENTATION ONLY (OUTPATIENT)
Dept: HEMATOLOGY/ONCOLOGY | Facility: CLINIC | Age: 64
End: 2024-04-16

## 2024-04-16 VITALS
WEIGHT: 166.38 LBS | HEART RATE: 81 BPM | HEIGHT: 60 IN | TEMPERATURE: 98 F | SYSTOLIC BLOOD PRESSURE: 118 MMHG | RESPIRATION RATE: 19 BRPM | BODY MASS INDEX: 32.67 KG/M2 | OXYGEN SATURATION: 97 % | DIASTOLIC BLOOD PRESSURE: 65 MMHG

## 2024-04-16 DIAGNOSIS — R91.1 LUNG NODULE: ICD-10-CM

## 2024-04-16 DIAGNOSIS — C34.11 PRIMARY ADENOCARCINOMA OF UPPER LOBE OF RIGHT LUNG: Primary | ICD-10-CM

## 2024-04-16 DIAGNOSIS — R19.5 POSITIVE COLORECTAL CANCER SCREENING USING COLOGUARD TEST: ICD-10-CM

## 2024-04-16 DIAGNOSIS — C34.11 PRIMARY ADENOCARCINOMA OF UPPER LOBE OF RIGHT LUNG: ICD-10-CM

## 2024-04-16 LAB
ALBUMIN SERPL-MCNC: 3.9 G/DL (ref 3.4–4.8)
ALBUMIN/GLOB SERPL: 1 RATIO (ref 1.1–2)
ALP SERPL-CCNC: 130 UNIT/L (ref 40–150)
ALT SERPL-CCNC: 10 UNIT/L (ref 0–55)
AST SERPL-CCNC: 12 UNIT/L (ref 5–34)
BASOPHILS # BLD AUTO: 0.03 X10(3)/MCL
BASOPHILS NFR BLD AUTO: 0.3 %
BILIRUB SERPL-MCNC: 0.5 MG/DL
BUN SERPL-MCNC: 10.5 MG/DL (ref 9.8–20.1)
CALCIUM SERPL-MCNC: 10 MG/DL (ref 8.4–10.2)
CHLORIDE SERPL-SCNC: 97 MMOL/L (ref 98–107)
CO2 SERPL-SCNC: 35 MMOL/L (ref 23–31)
CREAT SERPL-MCNC: 0.84 MG/DL (ref 0.55–1.02)
EOSINOPHIL # BLD AUTO: 0.09 X10(3)/MCL (ref 0–0.9)
EOSINOPHIL NFR BLD AUTO: 1 %
ERYTHROCYTE [DISTWIDTH] IN BLOOD BY AUTOMATED COUNT: 12.9 % (ref 11.5–17)
GFR SERPLBLD CREATININE-BSD FMLA CKD-EPI: >60 MLS/MIN/1.73/M2
GLOBULIN SER-MCNC: 3.9 GM/DL (ref 2.4–3.5)
GLUCOSE SERPL-MCNC: 89 MG/DL (ref 82–115)
HCT VFR BLD AUTO: 35.3 % (ref 37–47)
HGB BLD-MCNC: 11.8 G/DL (ref 12–16)
IMM GRANULOCYTES # BLD AUTO: 0.03 X10(3)/MCL (ref 0–0.04)
IMM GRANULOCYTES NFR BLD AUTO: 0.3 %
LYMPHOCYTES # BLD AUTO: 1.71 X10(3)/MCL (ref 0.6–4.6)
LYMPHOCYTES NFR BLD AUTO: 18.2 %
MCH RBC QN AUTO: 30 PG (ref 27–31)
MCHC RBC AUTO-ENTMCNC: 33.4 G/DL (ref 33–36)
MCV RBC AUTO: 89.8 FL (ref 80–94)
MONOCYTES # BLD AUTO: 0.86 X10(3)/MCL (ref 0.1–1.3)
MONOCYTES NFR BLD AUTO: 9.2 %
NEUTROPHILS # BLD AUTO: 6.65 X10(3)/MCL (ref 2.1–9.2)
NEUTROPHILS NFR BLD AUTO: 71 %
NRBC BLD AUTO-RTO: 0 %
PLATELET # BLD AUTO: 301 X10(3)/MCL (ref 130–400)
PMV BLD AUTO: 9.1 FL (ref 7.4–10.4)
POTASSIUM SERPL-SCNC: 3.7 MMOL/L (ref 3.5–5.1)
PROT SERPL-MCNC: 7.8 GM/DL (ref 5.8–7.6)
RBC # BLD AUTO: 3.93 X10(6)/MCL (ref 4.2–5.4)
SODIUM SERPL-SCNC: 142 MMOL/L (ref 136–145)
WBC # SPEC AUTO: 9.37 X10(3)/MCL (ref 4.5–11.5)

## 2024-04-16 PROCEDURE — 4010F ACE/ARB THERAPY RXD/TAKEN: CPT | Mod: CPTII,,, | Performed by: INTERNAL MEDICINE

## 2024-04-16 PROCEDURE — 3078F DIAST BP <80 MM HG: CPT | Mod: CPTII,,, | Performed by: INTERNAL MEDICINE

## 2024-04-16 PROCEDURE — 85025 COMPLETE CBC W/AUTO DIFF WBC: CPT | Performed by: INTERNAL MEDICINE

## 2024-04-16 PROCEDURE — 99215 OFFICE O/P EST HI 40 MIN: CPT | Mod: PBBFAC | Performed by: INTERNAL MEDICINE

## 2024-04-16 PROCEDURE — 1159F MED LIST DOCD IN RCRD: CPT | Mod: CPTII,,, | Performed by: INTERNAL MEDICINE

## 2024-04-16 PROCEDURE — 99205 OFFICE O/P NEW HI 60 MIN: CPT | Mod: S$PBB,,, | Performed by: INTERNAL MEDICINE

## 2024-04-16 PROCEDURE — 3074F SYST BP LT 130 MM HG: CPT | Mod: CPTII,,, | Performed by: INTERNAL MEDICINE

## 2024-04-16 PROCEDURE — 36415 COLL VENOUS BLD VENIPUNCTURE: CPT | Performed by: INTERNAL MEDICINE

## 2024-04-16 PROCEDURE — 1160F RVW MEDS BY RX/DR IN RCRD: CPT | Mod: CPTII,,, | Performed by: INTERNAL MEDICINE

## 2024-04-16 PROCEDURE — 3008F BODY MASS INDEX DOCD: CPT | Mod: CPTII,,, | Performed by: INTERNAL MEDICINE

## 2024-04-16 PROCEDURE — 80053 COMPREHEN METABOLIC PANEL: CPT | Performed by: INTERNAL MEDICINE

## 2024-04-16 NOTE — Clinical Note
Check CBC and CMP  Stage with contrast-enhanced CT scans of C/A/P Stage with brain MRI with and without contrast Leads adjuvant chemotherapy; orders are in  Chemo teaching with nursing staff within a week In 6 months, repeat contrast-enhanced CT scan of chest for surveillance Needs colonoscopy for positive Cologuard test 08/17/2023 Chemo teaching with nursing staff within a week  Follow-up visit with me in 2 weeks, with scans and labs

## 2024-04-16 NOTE — PROGRESS NOTES
History:  Past Medical History:   Diagnosis Date    Acid reflux     Dizziness     Gout     High cholesterol     Insomnia     Irregular heart rhythm     Low vitamin D level     Lung nodule     Neuropathy     Obesity     Seasonal allergies     Sleep apnea     cipap    Stroke 2012    Vertigo    Social history:  .  Lives in [place].  Has 3 children.  Does not work.  Has been smoking 1-2 pack of cigarettes daily for 51 years, since age 12; discontinued recently.  No alcohol or illicit drug abuse.      Family history:   Mother experienced some kind of intrathoracic malignancy at age 83;  from MI at age 83   Father  from prostate cancer) experienced at age 83 and probably, sarcoma) experienced at age 83)    Health maintenance:   -PCP in [place]  -says that she had screening colonoscopy performed in [place] in , and that it was unremarkable  -says that now, for positive Cologuard test, she is scheduled for colonoscopy in May 2024  -2023:  Cologuard positive  -2023:  Bilateral digital screening mammogram with tomosynthesis (comparison:  2022 mammogram, etc.):  BI-RADS: 1 negative  Past Surgical History:   Procedure Laterality Date    APPENDECTOMY       SECTION      CHOLECYSTECTOMY      COLONOSCOPY      HERNIA REPAIR      LOBECTOMY Right 3/7/2024    Procedure: LOBECTOMY;  Surgeon: Naty Middleton MD;  Location: [facility];  Service: Thoracic;  Laterality: Right;  upper and middle lobectomy    THORACOSCOPIC WEDGE RESECTION OF LUNG Right 3/7/2024    Procedure: VATS, WITH WEDGE RESECTION, LUNG;  Surgeon: Naty Middleton MD;  Location: [facility] OR;  Service: Thoracic;  Laterality: Right;  RIGHT VATS // POSS LOBECTOMY    THORACOTOMY Right 3/7/2024    Procedure: THORACOTOMY;  Surgeon: Naty Middleton MD;  Location: [facility] OR;  Service: Thoracic;  Laterality: Right;  converted to open at 1011      Social History     Socioeconomic History    Marital status:    Tobacco  Use    Smoking status: Former     Types: Cigarettes     Start date: 2023     Quit date: 1972     Years since quittin.3    Smokeless tobacco: Never    Tobacco comments:     Smoked since age 12; quit 2023   Substance and Sexual Activity    Alcohol use: Not Currently    Drug use: Never    Sexual activity: Yes     Partners: Male      Family History   Problem Relation Name Age of Onset    Cancer Mother      Heart disease Mother      Breast cancer Mother      Cancer Father          Reason for Follow-up:  Reason for consultation:  -adenocarcinoma right upper lung lobe, right VATS/right upper lobe wedge resection/completion bright upper lobectomy and right middle lobectomy and regional lymph node dissection 2024, G3, pT3 pN0  -Cologuard positive    History of Present Illness:   Poorly differentiated adenocarcinoma of right lung        Oncologic/Hematologic History:  Oncology History   Primary adenocarcinoma of upper lobe of right lung   3/7/2024 Cancer Staged    Staging form: Lung, AJCC 8th Edition  - Pathologic stage from 3/7/2024: Stage IIB (pT3, pN0, cM0)     4/15/2024 Initial Diagnosis    Primary adenocarcinoma of upper lobe of right lung     2024 -  Chemotherapy    Treatment Summary   Plan Name: OP NSCLC PEMETREXED + CISPLATIN Q3W  Treatment Goal: Curative  Status: Active  Start Date: 2024 (Planned)  End Date: 2024 (Planned)  Provider: Emery Mahoney MD  Chemotherapy: CISPLATIN CHEMO INFUSION, 75 mg/m2, Intravenous, Clinic/HOD 1 time, 0 of 6 cycles  PEMETREXED CHEMO INFUSION, 500 mg/m2, Intravenous, Clinic/HOD 1 time, 0 of 6 cycles     63-year-old female, referred from [facility] Cardiovascular surgery, Dr. Arnulfo Middleton, with poorly-differentiated adenocarcinoma of right lung.    -2022: CT chest lung screening low-dose (comparison:  2020):  Lung rads 2: Benign appearance or behavior:  Continue annual screening with LDCT in 12 months  -2023:  CT chest lung screening  low-dose without contrast (comparison:  08/12/2022):  Lung rads category 4A: Suspicious; enlarging nodule right upper lobe, lobulated, < 8 mm, i.e., 7 x 6.7 mm, previously 5 mm, previously not lobulated, too small for adequate detection on PET-CT; recommend three-month follow-up)  -12/26/2023: CT chest with contrast (comparison:  09/11/2023):  Lung rads 4A: Very suspicious; continued enlargement of right upper lobe lung nodule of concern (9.5 x 8.9 mm)  -01/29/2024: FDG PET-CT (comparison:  Chest CT 12/26/2023; CT abdomen pelvis 09/04/2019):  1. Markedly FDG-avid right upper lobe soft tissue nodule, increased in size in the interval, raising concern for malignancy (smoothly marginated noncalcified soft tissue nodule lateral subpleural right upper lobe, 1.2 x 1.1 cm, maximum SUV 14, previously 1 cm x 0.9 cm).  2. No definite PET-CT findings to suggest additional right hemithoracic or more distant metastatic disease.  -02/01/2024: CT chest without contrast (comparison:  12/26/2023): Lateral right upper lobe nodule continues to enlarge in size currently measuring 11 mm concerning for malignancy. Recommend further evaluation.   -03/07/2024:  Right VATS, right upper lobe wedge resection; right upper lobectomy, right middle lobectomy, regional lymph node dissection:  1. Right lung, upper lobe, wedge resection:  Poorly-differentiated adenocarcinoma, 0.6 cm, clear margins of resection  2. Level 8R lymph node (paraesophageal), biopsy:  3 lymph nodes, negative for metastatic carcinoma    3. Level 4R lymph node (lower paratracheal), biopsy: 2 lymph nodes, negative for metastatic carcinoma  4. Level 7R lymph node (subcarinal), biopsy:  1 lymph node, negative for metastatic carcinoma  5. Level 11R lymph node (interlobar), biopsy:  2 lymph nodes, negative for metastatic carcinoma    6. Right lung, upper lobe, completion lobectomy:  -poorly-differentiated adenocarcinoma, 0.8 cm  -bronchial and vascular margins of resection  negative   -1 hilar lymph node with no evidence of metastatic carcinoma  7. Level 12R lymph node (lobar), lymphadenectomy:  1 lymph node, negative for metastatic carcinoma    Synoptic report:  Total number of primary tumors: 2  S/P wedge resection  Completion lobectomy  Right lung  Separate tumor nodules (metastases) in same lobe, therefore, pT3  number of intrapulmonary metastases:  2   Tumor site:  Upper lobe of lung  Tumor size:  Total tumor size:  Greatest dimension:  0.8 cm  Invasive acinar adenocarcinoma; G3, poorly-differentiated; spread through airspaces (CHAMP), present; no visceral pleural invasion; no adjacent structures present; no known pre-surgical therapy; no LVI  All margins negative for invasive carcinoma; closest margin invasive carcinoma, bronchial vascular; distance from invasive carcinoma closest margin,> 2 cm; margins status for noninvasive tumor, all margins negative for noninvasive tumor  Number of lymph nodes examined, 10; all regional lymph nodes negative for tumor  >>>  pT3 pN0    PD-L1 expression:  Positive: High (TPS 95%; )  Negative for EGFR, KRAS, BRAF, AL K, ROS1, RET, MET, HER2  No gene rearrangement no reportable altered splicing events identified from RNA sequencing  No reportable pathogenic variants found  MSI stable  TMB:  41.6m/MB  Fusions: Negative    04/16/2024:  Pleasant lady who presents for initial medical oncology consultation, accompanied by .  In no acute discomfort.  After lobectomy, has required supplemental oxygen via nasal cannula at 2 liters/minute.  This is being handled by home healthcare team.  Prior to surgery, she never required supplemental oxygen.  Prior to surgery, she never experienced significant cough, sputum production, or dyspnea.  Great appetite.  Mild exertional dyspnea.  ECOG 1.    No unusual headaches or focal neurological symptoms.  No hemoptysis, fevers, or chills.  No abdominal pain, nausea, vomiting, change in bowel habits, or GI  bleeding.  No anorexia or unintentional weight loss.  Smoked 1-2 packs of cigarettes daily for 51 years since age 12; says that she discontinued smoking after lung surgery.    Interval History:  [No matching plan found]   OP NSCLC PEMETREXED + CISPLATIN Q3W     Medications:  Current Outpatient Medications on File Prior to Visit   Medication Sig Dispense Refill    allopurinoL (ZYLOPRIM) 100 MG tablet Take 100 mg by mouth once daily.      aspirin (ECOTRIN) 81 MG EC tablet Take 81 mg by mouth once daily.      carvediloL (COREG) 12.5 MG tablet Take 12.5 mg by mouth 2 (two) times daily.      colchicine (COLCRYS) 0.6 mg tablet Take 0.6 mg by mouth once daily.      ENTRESTO 24-26 mg per tablet Take 1 tablet by mouth 2 (two) times daily.      ergocalciferol (ERGOCALCIFEROL) 50,000 unit Cap Take 50,000 Units by mouth every 7 days.      furosemide (LASIX) 40 MG tablet Take 40 mg by mouth once daily.      gabapentin (NEURONTIN) 800 MG tablet Take 800 mg by mouth 2 (two) times daily.      HYDROcodone-acetaminophen (NORCO) 5-325 mg per tablet Take 1 tablet by mouth every 4 (four) hours as needed for Pain. 30 tablet 0    ibuprofen (ADVIL,MOTRIN) 800 MG tablet 1 tablet with food or milk as needed Orally Three times a day      linaCLOtide (LINZESS) 145 mcg Cap capsule Take 290 mcg by mouth as needed.      meclizine (ANTIVERT) 25 mg tablet Take 25 mg by mouth Daily.      mirtazapine (REMERON) 30 MG tablet Take 30 mg by mouth every evening.      montelukast (SINGULAIR) 10 mg tablet Take 10 mg by mouth once daily.      nortriptyline (PAMELOR) 25 MG capsule Take 25 mg by mouth Daily.      pantoprazole (PROTONIX) 40 MG tablet Take 40 mg by mouth once daily.      potassium chloride (K-TAB) 20 mEq Take 20 mEq by mouth Daily.      atorvastatin (LIPITOR) 40 MG tablet Take 40 mg by mouth once daily.       No current facility-administered medications on file prior to visit.       Review of Systems:   All systems reviewed and found to be  negative except for the symptoms detailed above    Physical Examination:   VITAL SIGNS:   Vitals:    04/16/24 1005   BP: 118/65   Pulse: 81   Resp: 19   Temp: 98.3 °F (36.8 °C)     GENERAL:  In no apparent distress.    HEAD:  No signs of head trauma.  EYES:  Pupils are equal.  Extraocular motions intact.    EARS:  Hearing grossly intact.  MOUTH:  Oropharynx is normal.   NECK:  No adenopathy, no JVD.     CHEST:  Chest with clear breath sounds bilaterally.  No wheezes, rales, rhonchi.    CARDIAC:  Regular rate and rhythm.  S1 and S2, without murmurs, gallops, rubs.  VASCULAR:  No Edema.  Peripheral pulses normal and equal in all extremities.  ABDOMEN:  Soft, without detectable tenderness.  No sign of distention.  No   rebound or guarding, and no masses palpated.   Bowel Sounds normal.  MUSCULOSKELETAL:  Good range of motion of all major joints. Extremities without clubbing, cyanosis or edema.    NEUROLOGIC EXAM:  Alert and oriented x 3.  No focal sensory or strength deficits.   Speech normal.  Follows commands.  PSYCHIATRIC:  Mood normal.  -04/16/2024:  In no acute discomfort.  On supplemental oxygen via nasal cannula at 2 liters/minute.  Has required supplemental oxygen after lobectomy.  Prior to lobectomy, never required supplemental oxygen.  Oxygen is being handled by home healthcare team.  No cervical lymphadenopathy.    Results for orders placed or performed during the hospital encounter of 03/07/24   CBC Auto Differential    Narrative    The following orders were created for panel order CBC Auto Differential.  Procedure                               Abnormality         Status                     ---------                               -----------         ------                     CBC with Differential[[id number]]       Abnormal            Final result                 Please view results for these tests on the individual orders.   CBC with Differential   Result Value Ref Range    WBC 10.24 4.50 - 11.50  x10(3)/mcL    RBC 3.71 (L) 4.20 - 5.40 x10(6)/mcL    Hgb 11.1 (L) 12.0 - 16.0 g/dL    Hct 35.3 (L) 37.0 - 47.0 %    MCV 95.1 (H) 80.0 - 94.0 fL    MCH 29.9 27.0 - 31.0 pg    MCHC 31.4 (L) 33.0 - 36.0 g/dL    RDW 13.6 11.5 - 17.0 %    Platelet 349 130 - 400 x10(3)/mcL    MPV 8.7 7.4 - 10.4 fL    Neut % 73.8 %    Lymph % 17.8 %    Mono % 6.1 %    Eos % 1.1 %    Basophil % 0.4 %    Lymph # 1.82 0.6 - 4.6 x10(3)/mcL    Neut # 7.57 2.1 - 9.2 x10(3)/mcL    Mono # 0.62 0.1 - 1.3 x10(3)/mcL    Eos # 0.11 0 - 0.9 x10(3)/mcL    Baso # 0.04 <=0.2 x10(3)/mcL    IG# 0.08 (H) 0 - 0.04 x10(3)/mcL    IG% 0.8 %    NRBC% 0.0 %     Results for orders placed or performed in visit on 03/04/24   Comprehensive metabolic panel   Result Value Ref Range    Sodium Level 141 136 - 145 mmol/L    Potassium Level 3.1 (L) 3.5 - 5.1 mmol/L    Chloride 98 98 - 107 mmol/L    Carbon Dioxide 31 23 - 31 mmol/L    Glucose Level 159 (H) 82 - 115 mg/dL    Blood Urea Nitrogen 11.6 9.8 - 20.1 mg/dL    Creatinine 0.85 0.55 - 1.02 mg/dL    Calcium Level Total 9.2 8.4 - 10.2 mg/dL    Protein Total 6.8 5.8 - 7.6 gm/dL    Albumin Level 3.4 3.4 - 4.8 g/dL    Globulin 3.4 2.4 - 3.5 gm/dL    Albumin/Globulin Ratio 1.0 (L) 1.1 - 2.0 ratio    Bilirubin Total 0.5 <=1.5 mg/dL    Alkaline Phosphatase 135 40 - 150 unit/L    Alanine Aminotransferase 9 0 - 55 unit/L    Aspartate Aminotransferase 10 5 - 34 unit/L    eGFR >60 mls/min/1.73/m2       Assessment:  Problem List Items Addressed This Visit          Pulmonary    Lung nodule       Oncology    Primary adenocarcinoma of upper lobe of right lung - Primary    Positive colorectal cancer screening using Cologuard test     Adenocarcinoma right upper lung lobe:   -LDCT chest without contrast 08/12/2022:  Lung rads 2  -LDCT chest without contrast 09/11/2023:  Lung rads 4A  -noncontrast chest CT 12/26/2023:  Lung rads 4A  -FDG PET-CT 01/29/2024:  Hypermetabolic right upper lobe nodule, 1.2 x 1.1 cm, maximum SUV 14, previously 1  cm x 0.9 cm; no distant metastases   -noncontrast chest CT 02/01/2024:  Right upper lobe nodule continues to enlarge, now 11 mm   -03/07/2024:  Right VATS, right upper lobe wedge resection, right upper lobectomy, right middle lobectomy, regional lymph node dissection:  -separate tumor nodules (metastases) in same lobe, right upper lung lobe, therefore, pT3 (0.8 cm and 0.6 cm, respectively); invasive acinar adenocarcinoma; G3, poorly-differentiated; no visceral pleural invasion; no LVI; margins negative; 10 regional lymph nodes negative  -pT3 pN0  -PD-L1 expression high positive (TPS 95%; )  -negative for EGFR, K-kathleen, BRAF, AL K, ROS1, RET, met, HER2  >>>  -pT3 pN0, most likely stage IIB (pending restaging imaging studies including PET-CT, or CTs C/A/P with contrast, whole-body nuclear medicine bone scan, and brain MRI with contrast)      Plan:   Check CBC and CMP   Stage with contrast-enhanced CT scans of C/A/P  Stage with brain MRI with and without contrast  Leads adjuvant chemotherapy; orders are in   Chemo teaching with nursing staff within a week  In 6 months, repeat contrast-enhanced CT scan of chest for surveillance  Needs colonoscopy for positive Cologuard test 08/17/2023  Chemo teaching with nursing staff within a week   Refer to surgery for MediPort placement for chemotherapy.  Follow-up visit with me in 2 weeks, with scans and labs  ----------------------------------      -check CBC and CMP  -she was last staged with PET-CT 01/29/2024, almost 3 months prior  -at this time, stage with contrast-enhanced CT scans of C/A/P  -also need brain MRI with contrast to rule out brain metastases    If no metastases on imaging studies, then, pT3 pN0 M0, stage IIB    Adjuvant therapy:  (Patient did not receive neoadjuvant systemic therapy)  -for stage IIB (pT3 pN0 M0), adjuvant chemotherapy is recommended as category 1  -PD-L1 positive (TPS 95%; )  - mutations negative    Preferred adjuvant  chemotherapy regimens for adenocarcinoma:  -cisplatin +pemetrexed every 21 days x4 cycles    Cisplatin 75 mg per m2 day 1   Pemetrexed 500 mg per m2 day 1   Every 21 days x4 cycles  >>>  Subsequently:  Atezolizumab 840 mg every 2 weeks, 1200 mg every 3 weeks, or 1680 mg every 4 weeks for up to 1 year    Refer to surgery for MediPort placement for chemotherapy.     Cisplatin +pemetrexed:  -emesis risk: High  -premedicate with dexamethasone 4 mg oral every twice daily x3 days starting the day before drug administration reduced cutaneous toxicity  -primary prophylaxis with hematopoietic growth factor is not recommended) incidence of neutropenic fever is approximately 2%)  -with pemetrexed, avoid use if creatinine clearance is < 45 mL per minute  -vitamin supplementation with folic acid and intramuscular B12 prior to administration of pemetrexed and during treatment reduced both hematologic and non hematologic side effects  -check CBC weekly during treatment  -check CMP weekly during treatment  -monitor for hearing loss prior to each dose of cisplatin; audiometry as clinically indicated  -Each cycle should not begin until the ANC count is >1500/microL and platelet count is >100,000/microL.  During treatment, if devyn ANC is <500/microL and deyvn platelet count is ?50,000/microL, reduce dose of both pemetrexed and cisplatin by 25%.   Regardless of the ANC, reduce dose of both pemetrexed and cisplatin by 25% if devyn platelet count is <50,000/microL without bleeding.   If devyn platelet count is <50,000/microL with bleeding, reduce dose of both pemetrexed and cisplatin by 50%  -Neuropathy usually is seen only after cumulative doses of cisplatin beyond 400 mg/m2, although there is marked interindividual variation.  -The dose of pemetrexed should be reduced 50% for grade 3 or 4 mucositis.   -The doses of pemetrexed and cisplatin should be decreased to 75% of previous level for any other grade 3 or 4 toxicities or for any  diarrhea requiring hospitalization.    Surveillance  -history and physical and chest CT +/- contrast every 6 months X 3 years (03/2024-03/2027), then history and physical and low-dose noncontrast chest CT annually  -smoking cessation advice and counseling   -FDG PET-CT or brain MRI is not routinely indicated    -08/17/2023: Cologuard positive  -needs evaluation with colonoscopy rule out colon cancer    Chemo teaching with nursing staff within a week   Follow-up visit with me in 2 weeks, with scans and labs    Above discussed at length with the patient.  All questions answered.    Discussed labs, scans, pathology report, and staging of lung cancer, and gave her copies of relevant records.  Plan of investigations discussed in detail.  Plan of adjuvant therapy including chemotherapy, followed by immunotherapy, discussed.    Potential side effects of chemotherapy discussed.  Formal chemotherapy teaching with nursing staff to follow.           Follow-up:  No follow-ups on file.

## 2024-04-16 NOTE — NURSING
"Met with patient today after her consult appointment with Dr. Mahoney. Patient attended appointment accompanied by her spouse. Provided patient with RN Buddy contact and explained role in patient's care. NCCN Distress Screen completed with a reported distress score of 0. Social support reported as good.Provided information to apply for SSDI. Patient is on supplemental oxygen provided through [employer] [facility]. Patient says she also has home health services with [facility].  Answered all patient questions regarding treatment and expectations. Provided cancer center packet with clinic team, contact numbers, and information on cancer treatment. Patient verbalized understanding agrees to contact [facility]win if any needs or concerns arise.     Oncology Navigation   Intake  Cancer Type: LDCT/Incidental Lung Finding  Initial Nurse Navigator Contact: 04/15/24  Date Worked: 24  Appointment Date: 24     Treatment  Current Status: Active    Type of Surgery: right VATS/right upper lobe wedge resection/completion bright upper lobectomy and right middle lobectomy and regional lymph node dissection  Surgery Schedule Date: 24    Medical Oncologist: Emery Mahoney MD  Consult Date: 24  Chemotherapy: Planned  Chemotherapy Regimen: Cisplatin 75 mg per m2 day 1   Pemetrexed 500 mg per m2 day 1   Every 21 days x4 cycles  Immunotherapy: Planned  Immunotherapy Name: >>> Subsequently: Atezolizumab 840 mg every 2 weeks, 1200 mg every 3 weeks, or 1680 mg every 4 weeks for up to 1 year                   Support Systems: Spouse/significant other; Family members; Friends / neighbors; Home care staff  Barriers of Care: Barriers to Care "Assessment completed-no barriers noted"     Acuity  Stage: 1  Systemic Treatment - predicted or initiated: Chemotherapy Regimen with Multiple drugs (+1)  Treatment Tolerability: Has not started treatment yet/treatment fully completed and side effects resolved  ECO  Comorbidities in " Medical History: 0  Hospitalization Within the Past Month: 1  Other Medical Factors (+2 each): Home medical equipment required   Needed: 0  Support: 0  Verbalizes Financial Concerns: 1  Transportation: 0  Mental Health: PHQ Score: 0  History of noncompliance/frequent no shows and cancellations: 0  Verbalizes the need for more education: 0  Navigation Acuity: 6     Follow Up  No follow-ups on file.

## 2024-04-18 ENCOUNTER — ANESTHESIA EVENT (OUTPATIENT)
Dept: SURGERY | Facility: HOSPITAL | Age: 64
End: 2024-04-18
Payer: MEDICAID

## 2024-04-18 ENCOUNTER — OFFICE VISIT (OUTPATIENT)
Dept: SURGERY | Facility: CLINIC | Age: 64
End: 2024-04-18
Attending: INTERNAL MEDICINE
Payer: MEDICAID

## 2024-04-18 ENCOUNTER — TELEPHONE (OUTPATIENT)
Dept: PULMONOLOGY | Facility: CLINIC | Age: 64
End: 2024-04-18
Payer: MEDICAID

## 2024-04-18 VITALS
DIASTOLIC BLOOD PRESSURE: 75 MMHG | SYSTOLIC BLOOD PRESSURE: 123 MMHG | OXYGEN SATURATION: 96 % | HEIGHT: 60 IN | WEIGHT: 166 LBS | HEART RATE: 82 BPM | BODY MASS INDEX: 32.59 KG/M2 | TEMPERATURE: 98 F | RESPIRATION RATE: 18 BRPM

## 2024-04-18 DIAGNOSIS — C34.11 PRIMARY ADENOCARCINOMA OF UPPER LOBE OF RIGHT LUNG: ICD-10-CM

## 2024-04-18 DIAGNOSIS — R91.1 LUNG NODULE: ICD-10-CM

## 2024-04-18 DIAGNOSIS — R19.5 POSITIVE COLORECTAL CANCER SCREENING USING COLOGUARD TEST: ICD-10-CM

## 2024-04-18 DIAGNOSIS — C34.11 PRIMARY ADENOCARCINOMA OF UPPER LOBE OF RIGHT LUNG: Primary | ICD-10-CM

## 2024-04-18 PROCEDURE — 99215 OFFICE O/P EST HI 40 MIN: CPT | Mod: PBBFAC

## 2024-04-18 RX ORDER — SODIUM CHLORIDE 9 MG/ML
INJECTION, SOLUTION INTRAVENOUS CONTINUOUS
Status: CANCELLED | OUTPATIENT
Start: 2024-04-18

## 2024-04-18 RX ORDER — HEPARIN SODIUM 5000 [USP'U]/ML
5000 INJECTION, SOLUTION INTRAVENOUS; SUBCUTANEOUS
Status: CANCELLED | OUTPATIENT
Start: 2024-04-18 | End: 2024-04-19

## 2024-04-18 NOTE — PROGRESS NOTES
Patient seen by Dr. Mal Hamptno. Surgery scheduled for 4/26/2024 pending cardiac clearance. RTC PRN

## 2024-04-18 NOTE — TELEPHONE ENCOUNTER
----- Message from Bertin Curran sent at 4/18/2024 11:44 AM CDT -----  Regarding: Follow up appt.  Pt was seen on 2/19 and was to follow up after surgery. Pt is requesting follow up appt. [phone]

## 2024-04-18 NOTE — ANESTHESIA PREPROCEDURE EVALUATION
Nadja Geronimo is a 63 y.o. female PRESENTING FOR [id number] (Chest) with a history of   -Primary adenocarcinoma of the right lung Stage IIB (pT3, pN0, cM0) s/p right upper lobe wedge resection, right upper lobectomy, right middle lobectomy and regional lymph node dissection 3/7/24  -GERD  -HLD  -NEUROPATHY  -SLEEP APNEA  -H/O CVA  -OBESITY, BMI 32  -CHF W/ EF 30% ON LHC 9/2020  -POSSIBLE EMPHYSEMA 2/2024 PFTS--ON HOME O2 SINCE ABOVE PROCEDURE  -SMOKER 1-2 PPD X 51 Y      BETA-BLOCKER: COREG  Last dose: 4/26/24 @0315    New Orders for Anesthesia: NONE      Patient Active Problem List   Diagnosis    Lung nodule    Primary adenocarcinoma of upper lobe of right lung    Positive colorectal cancer screening using Cologuard test       Pre-op Assessment    I have reviewed the NPO Status.      Review of Systems  Social:  Smoker, Former Smoker       Hematology/Oncology:                      Current/Recent Cancer.  Other (see Oncology comments)       surgery       Cardiovascular:            CHF    hyperlipidemia                             Pulmonary:   COPD, severe     Sleep Apnea, CPAP                Renal/:  Renal/ Normal                 Hepatic/GI:     GERD, well controlled             Neurological:   CVA, no residual symptoms                                    Endocrine:        Obesity / BMI > 30    Vitals:    04/26/24 0503 04/26/24 0505 04/26/24 0606   BP: 109/69  110/72   Pulse: 75  72   Resp:   20   Temp: 36.6 °C (97.8 °F)  36 °C (96.8 °F)   TempSrc: Oral  Temporal   SpO2: (!) 91%  100%  Comment: 2 liters Oxygen in progress   Weight:  74.1 kg (163 lb 6.4 oz)          Physical Exam  General: Alert, Cooperative and Well nourished    Airway:  Mallampati: I   Mouth Opening: Normal  TM Distance: Normal  Tongue: Normal  Neck ROM: Normal ROM    Dental:  Dentures    Chest/Lungs:  Normal Respiratory Rate    Heart:  Rate: Normal  Rhythm: Regular Rhythm  Sounds: Normal      Lab Results   Component Value Date     WBC 9.37 04/16/2024    HGB 11.8 (L) 04/16/2024    HCT 35.3 (L) 04/16/2024    MCV 89.8 04/16/2024     04/16/2024       CMP  Sodium   Date Value Ref Range Status   09/11/2020 141 136 - 145 mmol/L Final     Sodium Level   Date Value Ref Range Status   04/16/2024 142 136 - 145 mmol/L Final     Potassium   Date Value Ref Range Status   09/11/2020 3.9 3.5 - 5.1 mmol/L Final     Potassium Level   Date Value Ref Range Status   04/16/2024 3.7 3.5 - 5.1 mmol/L Final     Chloride   Date Value Ref Range Status   09/11/2020 100 100 - 109 mmol/L Final     Carbon Dioxide   Date Value Ref Range Status   04/16/2024 35 (H) 23 - 31 mmol/L Final   09/11/2020 30 22 - 33 mmol/L Final     Blood Urea Nitrogen   Date Value Ref Range Status   04/16/2024 10.5 9.8 - 20.1 mg/dL Final   09/11/2020 11 5 - 25 mg/dL Final     Creatinine   Date Value Ref Range Status   04/16/2024 0.84 0.55 - 1.02 mg/dL Final   09/11/2020 0.73 0.57 - 1.25 mg/dL Final     Calcium   Date Value Ref Range Status   09/11/2020 8.9 8.8 - 10.6 mg/dL Final     Calcium Level Total   Date Value Ref Range Status   04/16/2024 10.0 8.4 - 10.2 mg/dL Final     Albumin Level   Date Value Ref Range Status   04/16/2024 3.9 3.4 - 4.8 g/dL Final     Bilirubin Total   Date Value Ref Range Status   04/16/2024 0.5 <=1.5 mg/dL Final     Alkaline Phosphatase   Date Value Ref Range Status   04/16/2024 130 40 - 150 unit/L Final     Aspartate Aminotransferase   Date Value Ref Range Status   04/16/2024 12 5 - 34 unit/L Final     Alanine Aminotransferase   Date Value Ref Range Status   04/16/2024 10 0 - 55 unit/L Final     Anion Gap   Date Value Ref Range Status   09/11/2020 11 8 - 16 mmol/L Final     eGFR   Date Value Ref Range Status   04/16/2024 >60 mls/min/1.73/m2 Final     EKG 3/4/24    ECHO 2/15/24    CARDIAC CATH 9/11/20    CARDIAC PROGRESS NOTE 2/26/24      PFTS 2/28/24    Past anesthesia records 3/7/24:           Anesthesia Plan  Type of Anesthesia, risks & benefits  discussed:    Anesthesia Type: MAC  Intra-op Monitoring Plan: Standard ASA Monitors  Post Op Pain Control Plan: IV/PO Opioids PRN  Induction:  IV  Informed Consent: Informed consent signed with the Patient and all parties understand the risks and agree with anesthesia plan.  All questions answered.   ASA Score: 3  Day of Surgery Review of History & Physical: H&P Update referred to the surgeon/provider.    Ready For Surgery From Anesthesia Perspective.     .

## 2024-04-18 NOTE — H&P (VIEW-ONLY)
[facility] General Surgery Service  Clinic Note  Date: 2024    CC:   Mediport placement    HPI:   Nadja Geronimo is a 63 y.o. female with history of primary adenocarcinoma of the right lung Stage IIB (pT3, pN0, cM0) s/p right upper lobe wedge resection, right upper lobectomy, right middle lobectomy and regional lymph node dissection 3/7 (Dr. Middleton). Patient is being referred to Surgery clinic for Mediport placement by Dr. Mahoney who plans to start chemotherapy on . Patient has not been able to follow up with her Pulmonologist since her procedure but will plan to follow up with them as soon as possible. Since her procedure patient has required home oxygen for exertional dyspnea. She reports oxygen saturations dropping when she walks to the kitchen. Patient has never had any lines before and takes ASA daily. Denies additional blood thinning medication.     PMH:   Past Medical History:   Diagnosis Date    Acid reflux     Dizziness     Gout     High cholesterol     Insomnia     Irregular heart rhythm     Low vitamin D level     Lung nodule     Neuropathy     Obesity     Seasonal allergies     Sleep apnea     cipap    Stroke 2012    Vertigo        PSH:   Past Surgical History:   Procedure Laterality Date    APPENDECTOMY       SECTION      CHOLECYSTECTOMY      COLONOSCOPY      HERNIA REPAIR      LOBECTOMY Right 3/7/2024    Procedure: LOBECTOMY;  Surgeon: Naty Middleton MD;  Location: [facility] OR;  Service: Thoracic;  Laterality: Right;  upper and middle lobectomy    THORACOSCOPIC WEDGE RESECTION OF LUNG Right 3/7/2024    Procedure: VATS, WITH WEDGE RESECTION, LUNG;  Surgeon: Naty Middleton MD;  Location: [facility] OR;  Service: Thoracic;  Laterality: Right;  RIGHT VATS // POSS LOBECTOMY    THORACOTOMY Right 3/7/2024    Procedure: THORACOTOMY;  Surgeon: Naty Middleton MD;  Location: [facility] OR;  Service: Thoracic;  Laterality: Right;  converted to open at 1011       FamHx:   Family History   Problem Relation  Name Age of Onset    Cancer Mother      Heart disease Mother      Breast cancer Mother      Cancer Father         SocHx:  Social History     Socioeconomic History    Marital status:    Tobacco Use    Smoking status: Former     Types: Cigarettes     Start date: 2023     Quit date: 1972     Years since quittin.3    Smokeless tobacco: Never    Tobacco comments:     Smoked since age 12; quit 2023   Substance and Sexual Activity    Alcohol use: Not Currently    Drug use: Never    Sexual activity: Yes     Partners: Male       Allergies:   Review of patient's allergies indicates:   Allergen Reactions    Codeine Anxiety and Other (See Comments)       Medications:  Current Outpatient Medications on File Prior to Visit   Medication Sig Dispense Refill    allopurinoL (ZYLOPRIM) 100 MG tablet Take 100 mg by mouth once daily.      aspirin (ECOTRIN) 81 MG EC tablet Take 81 mg by mouth once daily.      carvediloL (COREG) 12.5 MG tablet Take 12.5 mg by mouth 2 (two) times daily.      colchicine (COLCRYS) 0.6 mg tablet Take 0.6 mg by mouth once daily.      ENTRESTO 24-26 mg per tablet Take 1 tablet by mouth 2 (two) times daily.      ergocalciferol (ERGOCALCIFEROL) 50,000 unit Cap Take 50,000 Units by mouth every 7 days.      furosemide (LASIX) 40 MG tablet Take 40 mg by mouth once daily.      gabapentin (NEURONTIN) 800 MG tablet Take 800 mg by mouth 2 (two) times daily.      HYDROcodone-acetaminophen (NORCO) 5-325 mg per tablet Take 1 tablet by mouth every 4 (four) hours as needed for Pain. 30 tablet 0    ibuprofen (ADVIL,MOTRIN) 800 MG tablet 1 tablet with food or milk as needed Orally Three times a day      linaCLOtide (LINZESS) 145 mcg Cap capsule Take 290 mcg by mouth as needed.      meclizine (ANTIVERT) 25 mg tablet Take 25 mg by mouth Daily.      mirtazapine (REMERON) 30 MG tablet Take 30 mg by mouth every evening.      montelukast (SINGULAIR) 10 mg tablet Take 10 mg by mouth once daily.       nortriptyline (PAMELOR) 25 MG capsule Take 25 mg by mouth Daily.      pantoprazole (PROTONIX) 40 MG tablet Take 40 mg by mouth once daily.      potassium chloride (K-TAB) 20 mEq Take 20 mEq by mouth Daily.      atorvastatin (LIPITOR) 40 MG tablet Take 40 mg by mouth once daily.       No current facility-administered medications on file prior to visit.         ROS:   Gen: Denies any weight change, fatigue, fever, or chills  Skin: No new rashes or other skin changes  Head: No new headaches  Eyes: Denies any recent changes in vision or blurry vision  Ears: Denies any changes in hearing, tinnitus, or vertigo  Throat: Denies any dysphagia or hoarseness  Cardiac: Denies any orthopnea or palpitations  Resp: Denies any cough, wheezing  Urinary: Denies any pain or difficulty urinating  Neuro: No pain or tingling in extremities.  No new onset weakness.  Psychiatric: No changes in mood or memory    Objective:    VITAL SIGNS: 24 HR MIN & MAX LAST    @FLOWSTAT(6:24::1)@  97.9 °F (36.6 °C)        @FLOWSTAT(5:24::1)@  123/75     @FLOWSTAT(8:24::1)@  82     @FLOWSTAT(9:24::1)@  18    @FLOWSTAT(10:24::1)@  96 %      HT: 5' (152.4 cm)  WT: 75.3 kg (166 lb)  BMI: 32.4       Physical Exam:  GENERAL: NAD  NEURO: awake, alert, oriented x3  HEENT: Atraumatic,   NECK: trachea midline, no masses  CARDIO: regular rate,   CHEST: Non-labored breathing on home oxygen   ABD: S/NT/ND,  SKIN: clean dry and intact right back incisions     Results  None new    Imaging:  None new    A/P:   63 y.o. female with history of primary adenocarcinoma of the right lung s/p right lobectomy and wedge resection 3/7 (Dr. Middleton). Patient is being referred to Surgery clinic for Mediport placement by Dr. Mahoney who plans to start chemotherapy on 4/29. Ok to continue ASA prior to procedure. No other blood thinners. No history of previous lines or central access.     - Plan for mediport placement 4/26  - Will need cardiac clearance prior to procedure, last seen by  Nelson Charles with [facility] 10/2022  - Continue to follow up with Pulmonology     Mal Hampton MD MPH  [employer] General Surgery PGY1  04/18/2024

## 2024-04-18 NOTE — TELEPHONE ENCOUNTER
Attempted to return patient's call. No answer. Not able to leave voicemail due to mailbox is not set up.

## 2024-04-18 NOTE — PROGRESS NOTES
I have reviewed the notes, assessments, and/or procedures performed by the resident, I concur with her/his documentation of Nadja Geronimo.     Luz Elena Elder MD

## 2024-04-18 NOTE — TELEPHONE ENCOUNTER
Called and spoke with patient. Informed that the Pulm MD will have to review her chart and determine as to when she will need to be seen in clinic. Patient voiced understanding and stated that surgery is needing a clearance for her mediport placement on 04/26/2024. Informed her that the Pulm MD will review her chart to make the determination. Patient voiced understanding.

## 2024-04-18 NOTE — PROGRESS NOTES
[facility] General Surgery Service  Clinic Note  Date: 2024    CC:   Mediport placement    HPI:   Nadja Geronimo is a 63 y.o. female with history of primary adenocarcinoma of the right lung Stage IIB (pT3, pN0, cM0) s/p right upper lobe wedge resection, right upper lobectomy, right middle lobectomy and regional lymph node dissection 3/7 (Dr. Middleton). Patient is being referred to Surgery clinic for Mediport placement by Dr. Mahoney who plans to start chemotherapy on . Patient has not been able to follow up with her Pulmonologist since her procedure but will plan to follow up with them as soon as possible. Since her procedure patient has required home oxygen for exertional dyspnea. She reports oxygen saturations dropping when she walks to the kitchen. Patient has never had any lines before and takes ASA daily. Denies additional blood thinning medication.     PMH:   Past Medical History:   Diagnosis Date    Acid reflux     Dizziness     Gout     High cholesterol     Insomnia     Irregular heart rhythm     Low vitamin D level     Lung nodule     Neuropathy     Obesity     Seasonal allergies     Sleep apnea     cipap    Stroke 2012    Vertigo        PSH:   Past Surgical History:   Procedure Laterality Date    APPENDECTOMY       SECTION      CHOLECYSTECTOMY      COLONOSCOPY      HERNIA REPAIR      LOBECTOMY Right 3/7/2024    Procedure: LOBECTOMY;  Surgeon: Naty Middleton MD;  Location: [facility] OR;  Service: Thoracic;  Laterality: Right;  upper and middle lobectomy    THORACOSCOPIC WEDGE RESECTION OF LUNG Right 3/7/2024    Procedure: VATS, WITH WEDGE RESECTION, LUNG;  Surgeon: Naty Middleton MD;  Location: [facility] OR;  Service: Thoracic;  Laterality: Right;  RIGHT VATS // POSS LOBECTOMY    THORACOTOMY Right 3/7/2024    Procedure: THORACOTOMY;  Surgeon: Naty Middleton MD;  Location: [facility] OR;  Service: Thoracic;  Laterality: Right;  converted to open at 1011       FamHx:   Family History   Problem Relation  Name Age of Onset    Cancer Mother      Heart disease Mother      Breast cancer Mother      Cancer Father         SocHx:  Social History     Socioeconomic History    Marital status:    Tobacco Use    Smoking status: Former     Types: Cigarettes     Start date: 2023     Quit date: 1972     Years since quittin.3    Smokeless tobacco: Never    Tobacco comments:     Smoked since age 12; quit 2023   Substance and Sexual Activity    Alcohol use: Not Currently    Drug use: Never    Sexual activity: Yes     Partners: Male       Allergies:   Review of patient's allergies indicates:   Allergen Reactions    Codeine Anxiety and Other (See Comments)       Medications:  Current Outpatient Medications on File Prior to Visit   Medication Sig Dispense Refill    allopurinoL (ZYLOPRIM) 100 MG tablet Take 100 mg by mouth once daily.      aspirin (ECOTRIN) 81 MG EC tablet Take 81 mg by mouth once daily.      carvediloL (COREG) 12.5 MG tablet Take 12.5 mg by mouth 2 (two) times daily.      colchicine (COLCRYS) 0.6 mg tablet Take 0.6 mg by mouth once daily.      ENTRESTO 24-26 mg per tablet Take 1 tablet by mouth 2 (two) times daily.      ergocalciferol (ERGOCALCIFEROL) 50,000 unit Cap Take 50,000 Units by mouth every 7 days.      furosemide (LASIX) 40 MG tablet Take 40 mg by mouth once daily.      gabapentin (NEURONTIN) 800 MG tablet Take 800 mg by mouth 2 (two) times daily.      HYDROcodone-acetaminophen (NORCO) 5-325 mg per tablet Take 1 tablet by mouth every 4 (four) hours as needed for Pain. 30 tablet 0    ibuprofen (ADVIL,MOTRIN) 800 MG tablet 1 tablet with food or milk as needed Orally Three times a day      linaCLOtide (LINZESS) 145 mcg Cap capsule Take 290 mcg by mouth as needed.      meclizine (ANTIVERT) 25 mg tablet Take 25 mg by mouth Daily.      mirtazapine (REMERON) 30 MG tablet Take 30 mg by mouth every evening.      montelukast (SINGULAIR) 10 mg tablet Take 10 mg by mouth once daily.       nortriptyline (PAMELOR) 25 MG capsule Take 25 mg by mouth Daily.      pantoprazole (PROTONIX) 40 MG tablet Take 40 mg by mouth once daily.      potassium chloride (K-TAB) 20 mEq Take 20 mEq by mouth Daily.      atorvastatin (LIPITOR) 40 MG tablet Take 40 mg by mouth once daily.       No current facility-administered medications on file prior to visit.         ROS:   Gen: Denies any weight change, fatigue, fever, or chills  Skin: No new rashes or other skin changes  Head: No new headaches  Eyes: Denies any recent changes in vision or blurry vision  Ears: Denies any changes in hearing, tinnitus, or vertigo  Throat: Denies any dysphagia or hoarseness  Cardiac: Denies any orthopnea or palpitations  Resp: Denies any cough, wheezing  Urinary: Denies any pain or difficulty urinating  Neuro: No pain or tingling in extremities.  No new onset weakness.  Psychiatric: No changes in mood or memory    Objective:    VITAL SIGNS: 24 HR MIN & MAX LAST    @FLOWSTAT(6:24::1)@  97.9 °F (36.6 °C)        @FLOWSTAT(5:24::1)@  123/75     @FLOWSTAT(8:24::1)@  82     @FLOWSTAT(9:24::1)@  18    @FLOWSTAT(10:24::1)@  96 %      HT: 5' (152.4 cm)  WT: 75.3 kg (166 lb)  BMI: 32.4       Physical Exam:  GENERAL: NAD  NEURO: awake, alert, oriented x3  HEENT: Atraumatic,   NECK: trachea midline, no masses  CARDIO: regular rate,   CHEST: Non-labored breathing on home oxygen   ABD: S/NT/ND,  SKIN: clean dry and intact right back incisions     Results  None new    Imaging:  None new    A/P:   63 y.o. female with history of primary adenocarcinoma of the right lung s/p right lobectomy and wedge resection 3/7 (Dr. Middleton). Patient is being referred to Surgery clinic for Mediport placement by Dr. Mahoney who plans to start chemotherapy on 4/29. Ok to continue ASA prior to procedure. No other blood thinners. No history of previous lines or central access.     - Plan for mediport placement 4/26  - Will need cardiac clearance prior to procedure, last seen by  Nelson Charles with [facility] 10/2022  - Continue to follow up with Pulmonology     Mal Hampton MD MPH  [employer] General Surgery PGY1  04/18/2024

## 2024-04-22 NOTE — TELEPHONE ENCOUNTER
Missed patient's return call.    Called and spoke with patient. Informed her of what Dr. Geronimo stated and recommended. Also informed that Pulm clearance is not needed at this time for her mediport procedure, just cardiac clearance. Patient voiced understanding and stated that they had wanted her to follow with Pulm because she continues to be on 2L oxygen post surgery. Informed patient that I will let Dr. Geronimo know and see if he would like her to be scheduled in clinic. Patient voiced understanding.

## 2024-04-22 NOTE — TELEPHONE ENCOUNTER
Dr. Geronimo reviewed patient's chart. He stated that Pulmonology follow up is not needed at this time. She needs to continue to follow with Oncology and they will let us know if she needs to follow with Pulm clinic in the future. He also stated that he would like the surgery staff physician to contact him directly if Pulm clearance is needed for upcoming procedure. Understanding voiced.    Spoke with LEON Ordoñez LPN in surgery clinic to verify if pulm clearance is needed for upcoming procedure for mediport placement. She stated that only cardiac clearance was requested, not Pulmonary clearance. Understanding voiced.    Attempted to contact patient. Person that answered stated that she is unavailable at this time. Message left for patient to call the clinic.

## 2024-04-23 ENCOUNTER — PATIENT MESSAGE (OUTPATIENT)
Dept: PREADMISSION TESTING | Facility: HOSPITAL | Age: 64
End: 2024-04-23
Payer: MEDICAID

## 2024-04-23 RX ORDER — DEXAMETHASONE 4 MG/1
TABLET ORAL
Qty: 120 TABLET | Refills: 0 | Status: CANCELLED | OUTPATIENT
Start: 2024-04-23

## 2024-04-23 NOTE — TELEPHONE ENCOUNTER
Spoke with Dr. Geronimo to inform him that patient is on home oxygen after surgery. Dr. Geronimo stated that she will need to be scheduled an appt next available. Understanding voiced.     Called and spoke with patient. Informed her that Dr. Geronimo would like for her to be scheduled next available. Patient in agreement to be scheduled on 11/26/2024 at 1:30pm in Pulmonology clinic.

## 2024-04-24 ENCOUNTER — OFFICE VISIT (OUTPATIENT)
Dept: HEMATOLOGY/ONCOLOGY | Facility: CLINIC | Age: 64
End: 2024-04-24
Payer: MEDICAID

## 2024-04-24 VITALS
RESPIRATION RATE: 20 BRPM | BODY MASS INDEX: 32.12 KG/M2 | HEIGHT: 60 IN | WEIGHT: 163.63 LBS | OXYGEN SATURATION: 96 % | SYSTOLIC BLOOD PRESSURE: 121 MMHG | TEMPERATURE: 98 F | DIASTOLIC BLOOD PRESSURE: 76 MMHG | HEART RATE: 84 BPM

## 2024-04-24 DIAGNOSIS — R11.2 NAUSEA AND VOMITING, UNSPECIFIED VOMITING TYPE: ICD-10-CM

## 2024-04-24 DIAGNOSIS — Z71.9 ENCOUNTER FOR EDUCATION: ICD-10-CM

## 2024-04-24 DIAGNOSIS — C34.11 PRIMARY ADENOCARCINOMA OF UPPER LOBE OF RIGHT LUNG: Primary | ICD-10-CM

## 2024-04-24 PROCEDURE — 1159F MED LIST DOCD IN RCRD: CPT | Mod: CPTII,,,

## 2024-04-24 PROCEDURE — 3008F BODY MASS INDEX DOCD: CPT | Mod: CPTII,,,

## 2024-04-24 PROCEDURE — 3074F SYST BP LT 130 MM HG: CPT | Mod: CPTII,,,

## 2024-04-24 PROCEDURE — 99215 OFFICE O/P EST HI 40 MIN: CPT | Mod: S$PBB,,,

## 2024-04-24 PROCEDURE — 4010F ACE/ARB THERAPY RXD/TAKEN: CPT | Mod: CPTII,,,

## 2024-04-24 PROCEDURE — 99213 OFFICE O/P EST LOW 20 MIN: CPT | Mod: PBBFAC

## 2024-04-24 PROCEDURE — 3078F DIAST BP <80 MM HG: CPT | Mod: CPTII,,,

## 2024-04-24 PROCEDURE — 1160F RVW MEDS BY RX/DR IN RCRD: CPT | Mod: CPTII,,,

## 2024-04-24 RX ORDER — ONDANSETRON 4 MG/1
4 TABLET, FILM COATED ORAL DAILY PRN
Qty: 30 TABLET | Refills: 1 | Status: SHIPPED | OUTPATIENT
Start: 2024-04-24 | End: 2024-05-06 | Stop reason: SDUPTHER

## 2024-04-24 RX ORDER — HEPARIN 100 UNIT/ML
500 SYRINGE INTRAVENOUS
Status: CANCELLED | OUTPATIENT
Start: 2024-05-01

## 2024-04-24 RX ORDER — HEPARIN 100 UNIT/ML
500 SYRINGE INTRAVENOUS
Status: CANCELLED | OUTPATIENT
Start: 2024-04-30

## 2024-04-24 RX ORDER — SODIUM CHLORIDE 0.9 % (FLUSH) 0.9 %
10 SYRINGE (ML) INJECTION
Status: CANCELLED | OUTPATIENT
Start: 2024-05-01

## 2024-04-24 RX ORDER — DEXAMETHASONE 4 MG/1
TABLET ORAL
Qty: 120 TABLET | Refills: 3 | Status: SHIPPED | OUTPATIENT
Start: 2024-04-24

## 2024-04-24 RX ORDER — SODIUM CHLORIDE 0.9 % (FLUSH) 0.9 %
10 SYRINGE (ML) INJECTION
Status: CANCELLED | OUTPATIENT
Start: 2024-04-30

## 2024-04-24 NOTE — PROGRESS NOTES
Reason for Follow-up:  Reason for consultation:  -adenocarcinoma right upper lung lobe, right VATS/right upper lobe wedge resection/completion bright upper lobectomy and right middle lobectomy and regional lymph node dissection 2024, G3, pT3 pN0  -Cologuard positive    History:  Social history:  .  Lives in [place].  Has 3 children.  Does not work.  Has been smoking 1-2 pack of cigarettes daily for 51 years, since age 12; discontinued recently.  No alcohol or illicit drug abuse.      Family history:   Mother experienced some kind of intrathoracic malignancy at age 83;  from MI at age 83   Father  from prostate cancer) experienced at age 83 and probably, sarcoma) experienced at age 83)    Health maintenance:   -PCP in [place]  -says that she had screening colonoscopy performed in [place] in , and that it was unremarkable  -says that now, for positive Cologuard test, she is scheduled for colonoscopy in May 2024  -2023:  Cologuard positive  -2023:  Bilateral digital screening mammogram with tomosynthesis (comparison:  2022 mammogram, etc.):  BI-RADS: 1 negative        History of Present Illness:   No chief complaint on file.        Oncologic/Hematologic History:  Oncology History   Primary adenocarcinoma of upper lobe of right lung   3/7/2024 Cancer Staged    Staging form: Lung, AJCC 8th Edition  - Pathologic stage from 3/7/2024: Stage IIB (pT3, pN0, cM0)     4/15/2024 Initial Diagnosis    Primary adenocarcinoma of upper lobe of right lung     2024 -  Chemotherapy    Treatment Summary   Plan Name: OP NSCLC PEMETREXED + CISPLATIN Q3W  Treatment Goal: Curative  Status: Active  Start Date: 2024 (Planned)  End Date: 2024 (Planned)  Provider: Emery Mahoney MD  Chemotherapy: CISplatin (Platinol) 75 mg/m2 = 141 mg in sodium chloride 0.9% 641 mL chemo infusion, 75 mg/m2 = 141 mg, Intravenous, Clinic/HOD 1 time, 0 of 4 cycles  PEMEtrexed disodium  (ALIMTA) 950 mg in sodium chloride 0.9% SolP 100 mL chemo infusion, 500 mg/m2 = 950 mg, Intravenous, Clinic/HOD 1 time, 0 of 4 cycles     63-year-old female, referred from [facility], Dr. Arnulfo Middleton, with poorly-differentiated adenocarcinoma of right lung.    -08/12/2022: CT chest lung screening low-dose (comparison:  09/24/2020):  Lung rads 2: Benign appearance or behavior:  Continue annual screening with LDCT in 12 months  -09/11/2023:  CT chest lung screening low-dose without contrast (comparison:  08/12/2022):  Lung rads category 4A: Suspicious; enlarging nodule right upper lobe, lobulated, < 8 mm, i.e., 7 x 6.7 mm, previously 5 mm, previously not lobulated, too small for adequate detection on PET-CT; recommend three-month follow-up)  -12/26/2023: CT chest with contrast (comparison:  09/11/2023):  Lung rads 4A: Very suspicious; continued enlargement of right upper lobe lung nodule of concern (9.5 x 8.9 mm)  -01/29/2024: FDG PET-CT (comparison:  Chest CT 12/26/2023; CT abdomen pelvis 09/04/2019):  1. Markedly FDG-avid right upper lobe soft tissue nodule, increased in size in the interval, raising concern for malignancy (smoothly marginated noncalcified soft tissue nodule lateral subpleural right upper lobe, 1.2 x 1.1 cm, maximum SUV 14, previously 1 cm x 0.9 cm).  2. No definite PET-CT findings to suggest additional right hemithoracic or more distant metastatic disease.  -02/01/2024: CT chest without contrast (comparison:  12/26/2023): Lateral right upper lobe nodule continues to enlarge in size currently measuring 11 mm concerning for malignancy. Recommend further evaluation.   -03/07/2024:  Right VATS, right upper lobe wedge resection; right upper lobectomy, right middle lobectomy, regional lymph node dissection:  1. Right lung, upper lobe, wedge resection:  Poorly-differentiated adenocarcinoma, 0.6 cm, clear margins of resection  2. Level 8R lymph node (paraesophageal), biopsy:  3 lymph  nodes, negative for metastatic carcinoma    3. Level 4R lymph node (lower paratracheal), biopsy: 2 lymph nodes, negative for metastatic carcinoma  4. Level 7R lymph node (subcarinal), biopsy:  1 lymph node, negative for metastatic carcinoma  5. Level 11R lymph node (interlobar), biopsy:  2 lymph nodes, negative for metastatic carcinoma    6. Right lung, upper lobe, completion lobectomy:  -poorly-differentiated adenocarcinoma, 0.8 cm  -bronchial and vascular margins of resection negative   -1 hilar lymph node with no evidence of metastatic carcinoma  7. Level 12R lymph node (lobar), lymphadenectomy:  1 lymph node, negative for metastatic carcinoma    Synoptic report:  Total number of primary tumors: 2  S/P wedge resection  Completion lobectomy  Right lung  Separate tumor nodules (metastases) in same lobe, therefore, pT3  number of intrapulmonary metastases:  2   Tumor site:  Upper lobe of lung  Tumor size:  Total tumor size:  Greatest dimension:  0.8 cm  Invasive acinar adenocarcinoma; G3, poorly-differentiated; spread through airspaces (CHAMP), present; no visceral pleural invasion; no adjacent structures present; no known pre-surgical therapy; no LVI  All margins negative for invasive carcinoma; closest margin invasive carcinoma, bronchial vascular; distance from invasive carcinoma closest margin,> 2 cm; margins status for noninvasive tumor, all margins negative for noninvasive tumor  Number of lymph nodes examined, 10; all regional lymph nodes negative for tumor  >>>  pT3 pN0    PD-L1 expression:  Positive: High (TPS 95%; )  Negative for EGFR, KRAS, BRAF, AL K, ROS1, RET, MET, HER2  No gene rearrangement no reportable altered splicing events identified from RNA sequencing  No reportable pathogenic variants found  MSI stable  TMB:  41.6m/MB  Fusions: Negative      Interval History 4/24/24:  Patient presented to the clinic today for a scheduled chemotherapy teaching on Cisplatin and Che. All risk and  benefits were discussed with the patient.     Consented the patient to the treatment plan and the patient was educated on the planned duration of the treatment and schedule of the treatment administration.       Review of Systems:   All systems reviewed and found to be negative except for the symptoms detailed above  Review of Systems   Constitutional: Negative.    HENT: Negative.     Eyes: Negative.    Respiratory: Negative.          On 2 L NS    Cardiovascular: Negative.    Gastrointestinal: Negative.    Genitourinary: Negative.    Musculoskeletal: Negative.    Skin: Negative.    Neurological: Negative.    Endo/Heme/Allergies: Negative.    Psychiatric/Behavioral: Negative.     All other systems reviewed and are negative.         Physical Examination:   VITAL SIGNS:   Vitals:    04/24/24 1315   BP: 121/76   Pulse: 84   Resp: 20   Temp: 98.1 °F (36.7 °C)             Assessment:  Adenocarcinoma right upper lung lobe:   -LDCT chest without contrast 08/12/2022:  Lung rads 2  -LDCT chest without contrast 09/11/2023:  Lung rads 4A  -noncontrast chest CT 12/26/2023:  Lung rads 4A  -FDG PET-CT 01/29/2024:  Hypermetabolic right upper lobe nodule, 1.2 x 1.1 cm, maximum SUV 14, previously 1 cm x 0.9 cm; no distant metastases   -noncontrast chest CT 02/01/2024:  Right upper lobe nodule continues to enlarge, now 11 mm   -03/07/2024:  Right VATS, right upper lobe wedge resection, right upper lobectomy, right middle lobectomy, regional lymph node dissection:  -separate tumor nodules (metastases) in same lobe, right upper lung lobe, therefore, pT3 (0.8 cm and 0.6 cm, respectively); invasive acinar adenocarcinoma; G3, poorly-differentiated; no visceral pleural invasion; no LVI; margins negative; 10 regional lymph nodes negative  -pT3 pN0  -PD-L1 expression high positive (TPS 95%; )  -negative for EGFR, K-kathleen, BRAF, AL K, ROS1, RET, met, HER2  >>>  -pT3 pN0, most likely stage IIB (pending restaging imaging studies including  PET-CT, or CTs C/A/P with contrast, whole-body nuclear medicine bone scan, and brain MRI with contrast)      Plan:   Primary Adenocarcinoma of upper lobe right lung:   Stage with contrast-enhanced CT scans of C/A/P  Stage with brain MRI with and without contrast  In 6 months, repeat contrast-enhanced CT scan of chest for surveillance  Needs colonoscopy for positive Cologuard test 08/17/2023  Refer to surgery for MediPort placement for chemotherapy- scheduled for 4/26  Start chemotherapy on Monday 4/29 with labs prior (CBC/CMP/Mag level)   RTC with me for a toxicity check on 5/6 with labs prior (CBC/CMP)   ----------------------------------      -check CBC and CMP  -she was last staged with PET-CT 01/29/2024, almost 3 months prior  -at this time, stage with contrast-enhanced CT scans of C/A/P  -also need brain MRI with contrast to rule out brain metastases    If no metastases on imaging studies, then, pT3 pN0 M0, stage IIB    Adjuvant therapy:  (Patient did not receive neoadjuvant systemic therapy)  -for stage IIB (pT3 pN0 M0), adjuvant chemotherapy is recommended as category 1  -PD-L1 positive (TPS 95%; )  - mutations negative    Preferred adjuvant chemotherapy regimens for adenocarcinoma:  -cisplatin +pemetrexed every 21 days x4 cycles    Cisplatin 75 mg per m2 day 1   Pemetrexed 500 mg per m2 day 1   Every 21 days x4 cycles  >>>  Subsequently:  Atezolizumab 840 mg every 2 weeks, 1200 mg every 3 weeks, or 1680 mg every 4 weeks for up to 1 year    Refer to surgery for MediPort placement for chemotherapy.     Cisplatin +pemetrexed:  -emesis risk: High  -premedicate with dexamethasone 4 mg oral every twice daily x3 days starting the day before drug administration reduced cutaneous toxicity  -primary prophylaxis with hematopoietic growth factor is not recommended) incidence of neutropenic fever is approximately 2%)  -with pemetrexed, avoid use if creatinine clearance is < 45 mL per minute  -vitamin  supplementation with folic acid and intramuscular B12 prior to administration of pemetrexed and during treatment reduced both hematologic and non hematologic side effects  -check CBC weekly during treatment  -check CMP weekly during treatment  -monitor for hearing loss prior to each dose of cisplatin; audiometry as clinically indicated  -Each cycle should not begin until the ANC count is >1500/microL and platelet count is >100,000/microL.  During treatment, if devyn ANC is <500/microL and devyn platelet count is ?50,000/microL, reduce dose of both pemetrexed and cisplatin by 25%.   Regardless of the ANC, reduce dose of both pemetrexed and cisplatin by 25% if devyn platelet count is <50,000/microL without bleeding.   If devyn platelet count is <50,000/microL with bleeding, reduce dose of both pemetrexed and cisplatin by 50%  -Neuropathy usually is seen only after cumulative doses of cisplatin beyond 400 mg/m2, although there is marked interindividual variation.  -The dose of pemetrexed should be reduced 50% for grade 3 or 4 mucositis.   -The doses of pemetrexed and cisplatin should be decreased to 75% of previous level for any other grade 3 or 4 toxicities or for any diarrhea requiring hospitalization.    Surveillance  -history and physical and chest CT +/- contrast every 6 months X 3 years (03/2024-03/2027), then history and physical and low-dose noncontrast chest CT annually  -smoking cessation advice and counseling   -FDG PET-CT or brain MRI is not routinely indicated    -08/17/2023: Cologuard positive  -needs evaluation with colonoscopy rule out colon cancer    Above discussed at length with the patient.  All questions answered.    Discussed labs, scans, pathology report, and staging of lung cancer, and gave her copies of relevant records.  Plan of investigations discussed in detail.  Plan of adjuvant therapy including chemotherapy, followed by immunotherapy, discussed.    Potential side effects of chemotherapy  discussed.  Formal chemotherapy teaching with nursing staff to follow.           Follow-up:  No follow-ups on file.    DISCUSSION:    1.  A total of 60 minutes were spent in counseling today, in which 100% were face-to-face.  At today's chemotherapy teaching session, we discussed the patient's cancer diagnosis as well as planned therapy regimen, protocol, side effects and toxicities.  A handout of each therapeutic agent in the regimen was provided and reviewed in detail.    2.  The following side effects for chemotherapy were discussed but not limited to:                a.  Discussed the risk of infection while on chemotherapy related to pancytopenia, specifically a decrease in their white blood cell count.  Instructed to contact our office for temperature >100.5 F, chills, sudden onset cough or shortness of breath, symptoms of a urinary tract infection.                b.  Discussed the risk of anemia. Instructed to contact our office for dizziness, heart palpitations, or extreme or sudden changes in weakness.                c.  Discussed the risk of thrombocytopenia, which increases the risk of bruising or bleeding.  Instructed the patient to contact our office for spontaneous signs of bleeding, including nose bleeds, bleeding from the gums or mouth, blood in sputum, urine or stool and unusual or excessive bruising or rash.                d.  Discussed GI side effects including weight changes, changes in appetite, altered sense of taste, stomatitis, nausea, vomiting, diarrhea, constipation, and heartburn.                e.  Discussed  side effects including painful urination, changes in the amount of urination, possible urine color changes.  Discussed fertility issues and to prevent  pregnancy if of child bearing age.                f.  Discussed neurological side effects including the risk of peripheral neuropathy, either temporary or permanent.                g.  Discussed the potential for skin, hair, and nail  changes.       3.  Instructed to contact our office for discussion of medication changes, the addition of vitamin and/or herbal supplementation as they may interact with some chemotherapy agents.    4.  Discussed dietary modifications and the need to maintain adequate caloric intake and proper oral hydration.  Recommended 64 ounces of fluid per day.    5.  Discussed anti-emetic protocol and bowel regimen protocol.      6. The following side effects for immunotherapy were discussed but not limited to:    ·       We reviewed that side effects and toxicities typically occur after 4-10 weeks of treatment, and include but are not limited to:    ·       Discussed the risk of immune-mediated pneumonitis, including interstitial lung disease.  Instructed to contact our office for new or worsening chest pain, severe cough or shortness of breath.    ·       Discussed the risk of immune-mediated colitis and diarrhea. Instructed to contact our office for diarrhea she cannot control or that results in =4 bowel movements per day.    ·       Discussed the risk of immune mediated hepatitis. Instructed to contact office for right upper quadrant pain, yellowing of skin or eyes, concentrated yellow urine, severe nausea and vomiting, or severe abdominal pain.    ·       Discussed the risk of immune-mediated nephritis. Instructed patient on possible symptoms of decreased urine output, blood in the urine or very dark urine, overall swelling, or very high blood pressure.    ·       Discussed the risk of immune-mediated cardiomyopathy, including symptoms of edema, including periorbital edema, generalized edema, peripheral edema, and pulmonary edema.    ·       Discussed the risk of immune-mediated adrenal insufficiency or hypophysitis, including symptoms of headache, visual disturbances, fatigue, weight loss, and hypotension.    ·       Discussed immune-mediated thyroiditis, which can result in either hypothyroid or hyperthyroid. These will  be managed according to symptoms, without a dose adjustment needed.    ·       Discussed the risk of rashes including an immune-mediated rash, Morales-Sujit syndrome (SJS), and toxic epidermal necrolysis (TENS). Also possible are vitiligo/skin hypopigmentation.  Instructed to contact our office for any new onset moderate to severe rash.    ·       Discussed the risk of immune-mediated encephalitis, including symptoms of altered mental status, headache, fever, confusion, agitation or hallucinations, seizures, loss of sensation or paralysis, muscle weakness, double vision, problems with speech or hearing, or loss on consciousness.    ·       Discussed musculoskeletal side effects of muscle and joint pain.    ·       Discussed respiratory side effects of cough and dyspnea, increased risk of upper respiratory infections.    ·       Discussed GI side effects of nausea and vomiting, and to a lesser extent abdominal pain, decreased appetite, wt loss, and constipation.    ·       Discussed possible side effects of edema, including periorbital edema, generalized edema, peripheral edema, and pulmonary edema.    ·       Discussed general side effects of fatigue and fever.    ·       Discussed possible electrolyte abnormalities.    ·       Discussed possible hematologic toxicities.    ·       Instructed to contact our office for discussion of medication changes, vaccination, the addition of vitamin and/or herbal supplementation as they may interact with some immunotherapy agents.    ·       Discussed dietary modifications and the need to maintain adequate caloric intake and proper oral hydration.  Recommended at least 64-80 oz of fluid per day.    ·       Discussed anti-emetic protocol and bowel regimen protocol.    ·       Office contact information given including after hours number.  Discussed there is an oncologist on call 24/7, 365 days, including weekends.  Provided business card with direct telephone number  to use as  necessary during business hours and after hours    ·       In summary, the patient is in agreement with the treatment plan.  Questions appeared to be answered to their satisfaction. Consented the patient to the treatment plan and the patient was educated on the planned duration of the treatment and schedule of the treatment administration.  Copy scanned into the chart.    Office contact information given including after hours number.  Discussed there is an oncologist on call 24/7, 365 days including weekends.  Provided primary nurse's information .    All questions answered to the satisfaction of the patient and family.     Follow up appointments given to patient.

## 2024-04-24 NOTE — Clinical Note
Schedule C1D1 of Cisplatin/Alimta in infusion on 4/29 Schedule C1D2 of Fluids on 4/30 RTC with me for toxicity check on 5/6 with labs prior (CBC/CMP/Mag level)  Keep appt with MD on 5/9

## 2024-04-25 ENCOUNTER — CLINICAL SUPPORT (OUTPATIENT)
Dept: AUDIOLOGY | Facility: HOSPITAL | Age: 64
End: 2024-04-25
Payer: MEDICAID

## 2024-04-25 DIAGNOSIS — R19.5 POSITIVE COLORECTAL CANCER SCREENING USING COLOGUARD TEST: ICD-10-CM

## 2024-04-25 DIAGNOSIS — H90.3 SENSORINEURAL HEARING LOSS (SNHL) OF BOTH EARS: Primary | ICD-10-CM

## 2024-04-25 DIAGNOSIS — C34.11 PRIMARY ADENOCARCINOMA OF UPPER LOBE OF RIGHT LUNG: ICD-10-CM

## 2024-04-25 PROCEDURE — 92567 TYMPANOMETRY: CPT | Performed by: AUDIOLOGIST-HEARING AID FITTER

## 2024-04-25 PROCEDURE — 92557 COMPREHENSIVE HEARING TEST: CPT | Performed by: AUDIOLOGIST-HEARING AID FITTER

## 2024-04-25 NOTE — PROGRESS NOTES
Audiological Evaluation    Patient History:    Patient evaluated today to assess hearing for the purpose of ototoxic monitoring. She reportedly does not perceive any difficulties with hearing at the present time.  Tinnitus, otalgia, otorrhea and a history of middle ear involvement/otologic procedures have been denied at this time.  Patient's medical history has reportedly not changed since most recent medical evaluation.       Pure Tone Testing:    Right ear:       Normal to mild, SNHL    Left ear:          Normal to mild, SNHL        Tympanometry:      Right ear:   Type 'A' tympanogram    Left ear: Type 'A' tympanogram           Acoustic Reflex Testing    Right ear:   Did not test     Left ear: Did not test        Interpretations:    Pure tone testing revealed normal to mild, sensorineural hearing loss, bilaterally. Speech reception thresholds were obtained at 20 dB HL consistent with pure tone results, bilaterally.  Word recognition scores were excellent, bilaterally.  Immittance testing revealed Type A tympanograms, bilaterally, indicative of normal middle ear function. DPOAE testing revealed present emissions for the test frequencies noted above indicative of normal cochlear physiology, bilaterally.  Otoscopy revealed clear EACs, bilaterally.      Recommendations:     Audiological testing as needed per chemotherapy schedule/oncologist recommendation  ENT evaluation per ENT  Hearing protection when exposed to hazardous noise    Caryn Currie.  Clinical Audiologist

## 2024-04-26 ENCOUNTER — ANESTHESIA (OUTPATIENT)
Dept: SURGERY | Facility: HOSPITAL | Age: 64
End: 2024-04-26
Payer: MEDICAID

## 2024-04-26 ENCOUNTER — HOSPITAL ENCOUNTER (OUTPATIENT)
Facility: HOSPITAL | Age: 64
Discharge: HOME | End: 2024-04-26
Attending: SURGERY | Admitting: SURGERY
Payer: MEDICAID

## 2024-04-26 DIAGNOSIS — C34.11 PRIMARY ADENOCARCINOMA OF UPPER LOBE OF RIGHT LUNG: ICD-10-CM

## 2024-04-26 PROCEDURE — 37000009 HC ANESTHESIA EA ADD 15 MINS: Performed by: SURGERY

## 2024-04-26 PROCEDURE — 63600175 PHARM REV CODE 636 W HCPCS: Performed by: ANESTHESIOLOGY

## 2024-04-26 PROCEDURE — 63600175 PHARM REV CODE 636 W HCPCS

## 2024-04-26 PROCEDURE — 25000003 PHARM REV CODE 250: Performed by: NURSE ANESTHETIST, CERTIFIED REGISTERED

## 2024-04-26 PROCEDURE — 63600175 PHARM REV CODE 636 W HCPCS: Performed by: NURSE ANESTHETIST, CERTIFIED REGISTERED

## 2024-04-26 PROCEDURE — 71000015 HC POSTOP RECOV 1ST HR: Performed by: SURGERY

## 2024-04-26 PROCEDURE — 71000016 HC POSTOP RECOV ADDL HR: Performed by: SURGERY

## 2024-04-26 PROCEDURE — 36000707: Performed by: SURGERY

## 2024-04-26 PROCEDURE — 36000706: Performed by: SURGERY

## 2024-04-26 PROCEDURE — 63600175 PHARM REV CODE 636 W HCPCS: Mod: JZ,JG | Performed by: SURGERY

## 2024-04-26 PROCEDURE — C1788 PORT, INDWELLING, IMP: HCPCS | Performed by: SURGERY

## 2024-04-26 PROCEDURE — D9220A PRA ANESTHESIA: Mod: ,,, | Performed by: NURSE ANESTHETIST, CERTIFIED REGISTERED

## 2024-04-26 PROCEDURE — 37000008 HC ANESTHESIA 1ST 15 MINUTES: Performed by: SURGERY

## 2024-04-26 DEVICE — PORT POWER CLEAR VIEW: Type: IMPLANTABLE DEVICE | Site: CHEST | Status: FUNCTIONAL

## 2024-04-26 RX ORDER — MIDAZOLAM HYDROCHLORIDE 2 MG/2ML
2 INJECTION, SOLUTION INTRAMUSCULAR; INTRAVENOUS ONCE AS NEEDED
Status: COMPLETED | OUTPATIENT
Start: 2024-04-26 | End: 2024-04-26

## 2024-04-26 RX ORDER — LIDOCAINE HYDROCHLORIDE 20 MG/ML
INJECTION INTRAVENOUS
Status: DISCONTINUED | OUTPATIENT
Start: 2024-04-26 | End: 2024-04-26

## 2024-04-26 RX ORDER — KETAMINE HCL IN 0.9 % NACL 50 MG/5 ML
SYRINGE (ML) INTRAVENOUS
Status: DISCONTINUED | OUTPATIENT
Start: 2024-04-26 | End: 2024-04-26

## 2024-04-26 RX ORDER — CEFAZOLIN SODIUM 1 G/3ML
2 INJECTION, POWDER, FOR SOLUTION INTRAMUSCULAR; INTRAVENOUS
Status: COMPLETED | OUTPATIENT
Start: 2024-04-26 | End: 2024-04-26

## 2024-04-26 RX ORDER — HYDROCODONE BITARTRATE AND ACETAMINOPHEN 5; 325 MG/1; MG/1
1 TABLET ORAL EVERY 6 HOURS PRN
Qty: 6 TABLET | Refills: 0 | Status: SHIPPED | OUTPATIENT
Start: 2024-04-26

## 2024-04-26 RX ORDER — GLYCOPYRROLATE 0.2 MG/ML
INJECTION INTRAMUSCULAR; INTRAVENOUS
Status: DISCONTINUED | OUTPATIENT
Start: 2024-04-26 | End: 2024-04-26

## 2024-04-26 RX ORDER — HEPARIN SOD,PORCINE/0.9 % NACL 1000/500ML
INTRAVENOUS SOLUTION INTRAVENOUS
Status: DISCONTINUED | OUTPATIENT
Start: 2024-04-26 | End: 2024-04-26 | Stop reason: HOSPADM

## 2024-04-26 RX ORDER — HEPARIN 100 UNIT/ML
SYRINGE INTRAVENOUS
Status: DISCONTINUED | OUTPATIENT
Start: 2024-04-26 | End: 2024-04-26 | Stop reason: HOSPADM

## 2024-04-26 RX ORDER — HEPARIN SODIUM 5000 [USP'U]/ML
5000 INJECTION, SOLUTION INTRAVENOUS; SUBCUTANEOUS
Status: COMPLETED | OUTPATIENT
Start: 2024-04-26 | End: 2024-04-26

## 2024-04-26 RX ORDER — PHENYLEPHRINE HYDROCHLORIDE 10 MG/ML
INJECTION INTRAVENOUS
Status: DISCONTINUED | OUTPATIENT
Start: 2024-04-26 | End: 2024-04-26

## 2024-04-26 RX ORDER — SODIUM CHLORIDE, SODIUM LACTATE, POTASSIUM CHLORIDE, CALCIUM CHLORIDE 600; 310; 30; 20 MG/100ML; MG/100ML; MG/100ML; MG/100ML
INJECTION, SOLUTION INTRAVENOUS CONTINUOUS
Status: ACTIVE | OUTPATIENT
Start: 2024-04-26

## 2024-04-26 RX ORDER — PROPOFOL 10 MG/ML
VIAL (ML) INTRAVENOUS CONTINUOUS PRN
Status: DISCONTINUED | OUTPATIENT
Start: 2024-04-26 | End: 2024-04-26

## 2024-04-26 RX ORDER — BUPIVACAINE HYDROCHLORIDE 2.5 MG/ML
INJECTION, SOLUTION EPIDURAL; INFILTRATION; INTRACAUDAL
Status: DISCONTINUED | OUTPATIENT
Start: 2024-04-26 | End: 2024-04-26 | Stop reason: HOSPADM

## 2024-04-26 RX ORDER — SODIUM CHLORIDE 9 MG/ML
INJECTION, SOLUTION INTRAVENOUS CONTINUOUS
Status: DISCONTINUED | OUTPATIENT
Start: 2024-04-26 | End: 2024-04-26 | Stop reason: HOSPADM

## 2024-04-26 RX ADMIN — Medication 10 MG: at 07:04

## 2024-04-26 RX ADMIN — LIDOCAINE HYDROCHLORIDE 50 MG: 20 INJECTION INTRAVENOUS at 07:04

## 2024-04-26 RX ADMIN — PHENYLEPHRINE HYDROCHLORIDE 200 MCG: 10 INJECTION INTRAVENOUS at 07:04

## 2024-04-26 RX ADMIN — MIDAZOLAM HYDROCHLORIDE 2 MG: 1 INJECTION, SOLUTION INTRAMUSCULAR; INTRAVENOUS at 06:04

## 2024-04-26 RX ADMIN — PHENYLEPHRINE HYDROCHLORIDE 100 MCG: 10 INJECTION INTRAVENOUS at 07:04

## 2024-04-26 RX ADMIN — PROPOFOL 100 MCG/KG/MIN: 10 INJECTION, EMULSION INTRAVENOUS at 07:04

## 2024-04-26 RX ADMIN — CEFAZOLIN 2 G: 330 INJECTION, POWDER, FOR SOLUTION INTRAMUSCULAR; INTRAVENOUS at 07:04

## 2024-04-26 RX ADMIN — HEPARIN SODIUM 5000 UNITS: 5000 INJECTION, SOLUTION INTRAVENOUS; SUBCUTANEOUS at 05:04

## 2024-04-26 RX ADMIN — SODIUM CHLORIDE, POTASSIUM CHLORIDE, SODIUM LACTATE AND CALCIUM CHLORIDE: 600; 310; 30; 20 INJECTION, SOLUTION INTRAVENOUS at 06:04

## 2024-04-26 RX ADMIN — GLYCOPYRROLATE 0.2 MG: 0.2 INJECTION INTRAMUSCULAR; INTRAVENOUS at 07:04

## 2024-04-26 NOTE — DISCHARGE SUMMARY
[facility]  Discharge Note  Short Stay    Procedure(s) (LRB):  [id number] (N/A)      OUTCOME: Patient tolerated treatment/procedure well without complication and is now ready for discharge.    DISPOSITION: Home or Self Care    FINAL DIAGNOSIS:  <principal problem not specified>    FOLLOWUP: with your oncologist    DISCHARGE INSTRUCTIONS:    Discharge Procedure Orders   Diet Adult Regular     Notify your health care provider if you experience any of the following:  temperature >100.4     Notify your health care provider if you experience any of the following:  severe uncontrolled pain     Notify your health care provider if you experience any of the following:  redness, tenderness, or signs of infection (pain, swelling, redness, odor or green/yellow discharge around incision site)     No dressing needed     Leave dressing on - Keep it clean, dry, and intact until clinic visit     Activity as tolerated         Clinical Reference Documents Added to Patient Instructions         Document    [facility] DISCHARGE INSTRUCTIONS (ENGLISH)            TIME SPENT ON DISCHARGE: 15 minutes

## 2024-04-26 NOTE — TRANSFER OF CARE
Anesthesia Transfer of Care Note    Patient: Nadja Geronimo    Procedure(s) Performed: Procedure(s) (LRB):  [id number] (N/A)    Patient location: OPS    Anesthesia Type: MAC    Transport from OR: Transported from OR on room air with adequate spontaneous ventilation    Post pain: adequate analgesia    Post assessment: no apparent anesthetic complications and tolerated procedure well    Post vital signs: stable    Level of consciousness: awake    Nausea/Vomiting: no nausea/vomiting    Complications: none    Transfer of care protocol was followed

## 2024-04-26 NOTE — INTERVAL H&P NOTE
H&P Update    Patient seen and examined this morning. There are no changes to the documented H&P.    Patient has held home blood thinners for appropriate amount of time: N/A    Planned procedure: [id number]    Positioning: Supine    Pre-operative Heparin Ordered: Yes    Pre-operative Antibiotics Ordered: Yes: Ancef    Laterality Marked: N/A    Helga Rajan MD  5:50 AM  04/26/2024

## 2024-04-26 NOTE — OP NOTE
[facility] - Periop Services  Operative Note    Surgery Date: 4/26/2024     Surgeons and Role:     * Luz Elena Elder MD - Primary     * Solange Estevez MD - PGY-5     * Vivian Ha MD - PGY-3     * Helga Rajan MD - PGY-1    Assisting Surgeon: None    Pre-op Diagnosis:  mediport insertion for lung adenocarcinoma    Post-op Diagnosis:  same as above    Procedure(s) (LRB):  [id number] (N/A)    Anesthesia: General/MAC    Operative Findings:  Placement of Mediport to right deltopectoral groove   Intraoperative confirmation of appropriate central venous catheter placement by fluoroscopy     Technique:  Patient was evaluated and examined preoperatively by myself. Risks, benefits, and alternatives discussed with the patient. Questions answered and patient was consented for surgery. Patient was prepped and draped in the standard fashion. Timeout was completed.     The area over the right internal jugular vein as locally anesthetized. The right internal jugular vein was located and successfully accessed with 16 gauge micropuncture needle under ultrasound guidance. A guidewire was then advanced easily under fluoroscopy, the needle removed.      A 3 cm transverse incision at the deltopectoral groove two finger breadths inferior to the clavicle was made using 15 blade. Using blunt and electrocautery dissection, a [id number] subdermal tissue pocket was created in the inferior aspect of the incision. An 11 blade was then used to extend the the skin nick was made at the exit site of the guidewire. A harpoon with flushed vascath in-tow was tunneled just deep to the dermis from the deltopectoral incision in curvilinear fashion to the skin above the RIJ venotomy site. The vascath was secured with rubber shod at proximal end and securely attached to the Mediport distally.      Then using fluoroscopy, the appropriate length of catheter was determined using the guidewire and cavoatrial junction as  landmarks and subsequently trimmed. A peel-away sheath was gently advanced over the guidewire ensuring free movement of the guidewire within the sheath at each advancement. Once the sheath was hubbed, the wire and obturator were removed and the catheter was advanced through the sheath. Fluoroscopy confirmed appropriate placement within central venous system and no kinks in tunneled segment of the catheter. Then, the sheath was peeled away while simultaneously passing the remainder of the catheter through. Fluoroscopy again confirmed appropriate placement and no kinks. The Mediport was then flushed with heparinized saline.      The deltopectoral incision was closed with 3 interrupted 2-0 vicryl deep dermal sutures and a running subcuticular 4-0 monocryl suture. A single subcuticular 4-0 monocryl suture was used to close the skin nick over the venotomy site taking care not to puncture the cathter. The incision at the neck and the deltopectoral incision was coated with dermabond. Patient tolerated the procedure well without complications. Patient was extubated and taken to the postoperative care area.     Dr. Elder was present for critical portions and available throughout the case.     Estimated Blood Loss: * No values recorded between 4/26/2024  7:28 AM and 4/26/2024  8:18 AM *    Estimated Blood Loss has been documented.         Specimens:   Specimen (24h ago, onward)      None            [id number]    Helga Rajan MD  04/26/2024 8:18 AM

## 2024-04-26 NOTE — DISCHARGE INSTRUCTIONS
· Keep all scheduled appointments. Your port may be used immediately. If you have any problems with the site (redness, swelling, drainage, increasing pain, site hot to touch, port not working) CALL THE SURGERY CLINIC at [phone].    · No heavy lifting or straining for 2 weeks.    · You may take a shower tomorrow. Wash gently at incision sites- do not scrub or peel skin glue.    · Do not soak your wound in water for two weeks. Do not take baths, swim, or use a hot tub until your doctor says it is okay.    · Alternate Tylenol and Ibuprofen as needed for pain every 3-4 hours. Take NORCO in place of Tylenol for any severe pain. Do not take Tylenol (acetaminophen) with your NORCO, since NORCO contains Tylenol as well.    · You may use an ice pack as needed for 20 minutes at a time over your incision site to minimize swelling and help relieve pain.    · Do not drink alcohol or drive today, or as long as you are on pain medication.    · Notify MD of any moderate to severe pain unrelieved by pain medicine, if your incision opens and/or bleeds, or for any signs of infection including fever above 100.4, excessive redness or swelling, yellow/green foul- smelling drainage, nausea or vomiting. Clinics number is [phone]. If it is after business hours or emergency call [phone] and state Im having post op complications and need to speak to the surgeon on call.    · Thanks for choosing [facility]! Have a smooth recovery!

## 2024-04-26 NOTE — ANESTHESIA POSTPROCEDURE EVALUATION
Anesthesia Post Evaluation    Patient: Nadja Geronimo    Procedure(s) Performed: Procedure(s) (LRB):  [id number] (N/A)    Final Anesthesia Type: MAC      Patient location during evaluation: OPS  Patient participation: Yes- Able to Participate  Level of consciousness: awake and alert  Post-procedure vital signs: reviewed and stable  Pain management: adequate  Airway patency: patent    PONV status at discharge: No PONV  Anesthetic complications: no      Cardiovascular status: hemodynamically stable  Respiratory status: unassisted, room air and spontaneous ventilation  Hydration status: euvolemic  Follow-up not needed.                  Pain/Solitario Score: Solitario Score: 9 (4/26/2024  8:30 AM)

## 2024-04-29 ENCOUNTER — INFUSION (OUTPATIENT)
Dept: INFUSION THERAPY | Facility: HOSPITAL | Age: 64
End: 2024-04-29
Attending: INTERNAL MEDICINE
Payer: MEDICAID

## 2024-04-29 ENCOUNTER — DOCUMENTATION ONLY (OUTPATIENT)
Dept: HEMATOLOGY/ONCOLOGY | Facility: CLINIC | Age: 64
End: 2024-04-29
Payer: MEDICAID

## 2024-04-29 VITALS
RESPIRATION RATE: 20 BRPM | WEIGHT: 167.56 LBS | BODY MASS INDEX: 33.78 KG/M2 | HEART RATE: 89 BPM | SYSTOLIC BLOOD PRESSURE: 146 MMHG | DIASTOLIC BLOOD PRESSURE: 80 MMHG | OXYGEN SATURATION: 98 % | TEMPERATURE: 98 F | HEIGHT: 59 IN

## 2024-04-29 VITALS
HEART RATE: 71 BPM | TEMPERATURE: 97 F | RESPIRATION RATE: 20 BRPM | OXYGEN SATURATION: 99 % | SYSTOLIC BLOOD PRESSURE: 119 MMHG | DIASTOLIC BLOOD PRESSURE: 75 MMHG | WEIGHT: 163.38 LBS | BODY MASS INDEX: 32.08 KG/M2

## 2024-04-29 DIAGNOSIS — C34.11 PRIMARY ADENOCARCINOMA OF UPPER LOBE OF RIGHT LUNG: Primary | ICD-10-CM

## 2024-04-29 DIAGNOSIS — F41.9 ANXIETY IN CANCER PATIENT: ICD-10-CM

## 2024-04-29 PROCEDURE — 96367 TX/PROPH/DG ADDL SEQ IV INF: CPT

## 2024-04-29 PROCEDURE — 96375 TX/PRO/DX INJ NEW DRUG ADDON: CPT

## 2024-04-29 PROCEDURE — 96368 THER/DIAG CONCURRENT INF: CPT

## 2024-04-29 PROCEDURE — A4216 STERILE WATER/SALINE, 10 ML: HCPCS | Performed by: INTERNAL MEDICINE

## 2024-04-29 PROCEDURE — 25000003 PHARM REV CODE 250: Performed by: INTERNAL MEDICINE

## 2024-04-29 PROCEDURE — 96413 CHEMO IV INFUSION 1 HR: CPT

## 2024-04-29 PROCEDURE — 96372 THER/PROPH/DIAG INJ SC/IM: CPT | Mod: 59

## 2024-04-29 PROCEDURE — 63600175 PHARM REV CODE 636 W HCPCS: Mod: JZ,JG | Performed by: INTERNAL MEDICINE

## 2024-04-29 PROCEDURE — 96366 THER/PROPH/DIAG IV INF ADDON: CPT

## 2024-04-29 PROCEDURE — 96411 CHEMO IV PUSH ADDL DRUG: CPT

## 2024-04-29 RX ORDER — SODIUM CHLORIDE 0.9 % (FLUSH) 0.9 %
10 SYRINGE (ML) INJECTION
Status: DISCONTINUED | OUTPATIENT
Start: 2024-04-29 | End: 2024-04-29 | Stop reason: HOSPADM

## 2024-04-29 RX ORDER — ALPRAZOLAM 0.5 MG/1
0.5 TABLET ORAL ONCE AS NEEDED
COMMUNITY
End: 2024-04-29 | Stop reason: SDUPTHER

## 2024-04-29 RX ORDER — ALPRAZOLAM 0.5 MG/1
0.5 TABLET ORAL ONCE AS NEEDED
Qty: 1 TABLET | Refills: 0 | Status: SHIPPED | OUTPATIENT
Start: 2024-04-29 | End: 2024-05-06 | Stop reason: SDUPTHER

## 2024-04-29 RX ORDER — HEPARIN 100 UNIT/ML
500 SYRINGE INTRAVENOUS
Status: DISCONTINUED | OUTPATIENT
Start: 2024-04-29 | End: 2024-04-29 | Stop reason: HOSPADM

## 2024-04-29 RX ORDER — CYANOCOBALAMIN 1000 UG/ML
1000 INJECTION, SOLUTION INTRAMUSCULAR; SUBCUTANEOUS
Status: COMPLETED | OUTPATIENT
Start: 2024-04-29 | End: 2024-04-29

## 2024-04-29 RX ADMIN — MAGNESIUM SULFATE HEPTAHYDRATE 535 ML/HR: 500 INJECTION, SOLUTION INTRAMUSCULAR; INTRAVENOUS at 11:04

## 2024-04-29 RX ADMIN — DEXAMETHASONE SODIUM PHOSPHATE 0.25 MG: 4 INJECTION, SOLUTION INTRA-ARTICULAR; INTRALESIONAL; INTRAMUSCULAR; INTRAVENOUS; SOFT TISSUE at 08:04

## 2024-04-29 RX ADMIN — MAGNESIUM SULFATE HEPTAHYDRATE 535 ML/HR: 500 INJECTION, SOLUTION INTRAMUSCULAR; INTRAVENOUS at 09:04

## 2024-04-29 RX ADMIN — CISPLATIN 132 MG: 1 INJECTION, SOLUTION INTRAVENOUS at 11:04

## 2024-04-29 RX ADMIN — CYANOCOBALAMIN 1000 MCG: 1000 INJECTION, SOLUTION INTRAMUSCULAR; SUBCUTANEOUS at 08:04

## 2024-04-29 RX ADMIN — APREPITANT 130 MG: 130 INJECTION, EMULSION INTRAVENOUS at 08:04

## 2024-04-29 RX ADMIN — Medication 10 ML: at 01:04

## 2024-04-29 RX ADMIN — SODIUM CHLORIDE: 9 INJECTION, SOLUTION INTRAVENOUS at 08:04

## 2024-04-29 RX ADMIN — PEMETREXED DISODIUM 900 MG: 500 INJECTION, POWDER, LYOPHILIZED, FOR SOLUTION INTRAVENOUS at 11:04

## 2024-04-29 NOTE — NURSING
0806  Pt did labs and is here for C 1 D 1 cisplatin.  Pt accomp by her  and her sister.  Pt denies any pain or complaints.  Pt is on portable oxygen NC.  Pt had MP placed Friday and states it is very tender.  Rt breast is very bruised.  Pt scheduled to see nutritionist and nurse navigator today.  Pt scheduled for fluids tomorrow.

## 2024-04-30 ENCOUNTER — INFUSION (OUTPATIENT)
Dept: INFUSION THERAPY | Facility: HOSPITAL | Age: 64
End: 2024-04-30
Attending: INTERNAL MEDICINE
Payer: MEDICAID

## 2024-04-30 ENCOUNTER — DOCUMENTATION ONLY (OUTPATIENT)
Dept: HEMATOLOGY/ONCOLOGY | Facility: CLINIC | Age: 64
End: 2024-04-30
Payer: MEDICAID

## 2024-04-30 VITALS
RESPIRATION RATE: 20 BRPM | SYSTOLIC BLOOD PRESSURE: 136 MMHG | DIASTOLIC BLOOD PRESSURE: 63 MMHG | TEMPERATURE: 98 F | HEART RATE: 90 BPM | OXYGEN SATURATION: 97 %

## 2024-04-30 DIAGNOSIS — R19.5 POSITIVE COLORECTAL CANCER SCREENING USING COLOGUARD TEST: Primary | ICD-10-CM

## 2024-04-30 DIAGNOSIS — C34.11 PRIMARY ADENOCARCINOMA OF UPPER LOBE OF RIGHT LUNG: Primary | ICD-10-CM

## 2024-04-30 PROCEDURE — 63600175 PHARM REV CODE 636 W HCPCS: Performed by: INTERNAL MEDICINE

## 2024-04-30 PROCEDURE — 25000003 PHARM REV CODE 250: Performed by: INTERNAL MEDICINE

## 2024-04-30 PROCEDURE — 96360 HYDRATION IV INFUSION INIT: CPT

## 2024-04-30 RX ORDER — SODIUM CHLORIDE 0.9 % (FLUSH) 0.9 %
10 SYRINGE (ML) INJECTION
Status: DISCONTINUED | OUTPATIENT
Start: 2024-04-30 | End: 2024-04-30 | Stop reason: HOSPADM

## 2024-04-30 RX ORDER — POLYETHYLENE GLYCOL 3350, SODIUM SULFATE, SODIUM CHLORIDE, POTASSIUM CHLORIDE, SODIUM ASCORBATE, AND ASCORBIC ACID 7.5-2.691G
KIT ORAL
Qty: 1 KIT | Refills: 0 | Status: SHIPPED | OUTPATIENT
Start: 2024-04-30

## 2024-04-30 RX ORDER — HEPARIN 100 UNIT/ML
500 SYRINGE INTRAVENOUS
Status: DISCONTINUED | OUTPATIENT
Start: 2024-04-30 | End: 2024-04-30 | Stop reason: HOSPADM

## 2024-04-30 RX ADMIN — SODIUM CHLORIDE 1000 ML: 9 INJECTION, SOLUTION INTRAVENOUS at 10:04

## 2024-04-30 RX ADMIN — HEPARIN 500 UNITS: 100 SYRINGE at 11:04

## 2024-04-30 NOTE — NURSING
HERO Goodwin met with patient for resource session in Infusion Clinic. Reviewed RN Buddy contact information and role in patient's care. Patient reports she is not distressed. Reports she has resources of Medicaid and SSI/SSDI benefits. Social support reported as good with family. Provided information on community counseling services. Resource folder with written information of community and cancer resources, disability benefits, nutritional hints during cancer treatment, and chemo side effect guide given to patient. Spent additional time on signs of infection, infection prevention through good hand hygiene and wearing mask, adequate nutrition/hydration, skin care along with sunscreen, and oral care. Discussed communication process for some common scenarios in which patient should call provider for guidance vs. immediately report to the emergency room. Informed of [facility] and [employer] referral that she may need to utilize during the course of her treatment. Patient verbalized understanding and agreed to be referred. Patient agrees to contact HERO Goodwin if any needs or concerns arise.     Oncology Navigation   Intake  Cancer Type: LDCT/Incidental Lung Finding  Initial Nurse Navigator Contact: 04/15/24  Date Worked: 04/29/24  Appointment Date: 04/16/24  Start of Treatment: 04/29/24     Treatment  Current Status: Active    Type of Surgery: right VATS/right upper lobe wedge resection/completion bright upper lobectomy and right middle lobectomy and regional lymph node dissection  Surgery Schedule Date: 03/07/24    Medical Oncologist: Emery Mahoney MD  Consult Date: 04/16/24  Chemotherapy: Planned  Chemotherapy Regimen: Cisplatin 75 mg per m2 day 1   Pemetrexed 500 mg per m2 day 1   Every 21 days x4 cycles  Immunotherapy: Planned  Immunotherapy Name: >>> Subsequently: Atezolizumab 840 mg every 2 weeks, 1200 mg every 3 weeks, or 1680 mg every 4 weeks for up to 1 year                   Support  "Systems: Spouse/significant other; Family members; Friends / neighbors; Home care staff  Barriers of Care: Barriers to Care "Assessment completed-no barriers noted"     Acuity  Stage: 1  Systemic Treatment - predicted or initiated: Chemotherapy Regimen with Multiple drugs (+1)  Treatment Tolerability: Has not started treatment yet/treatment fully completed and side effects resolved  ECO  Comorbidities in Medical History: 0  Hospitalization Within the Past Month: 1  Other Medical Factors (+2 each): Home medical equipment required   Needed: 0  Support: 0  Verbalizes Financial Concerns: 1  Transportation: 0  Mental Health: PHQ Score: 0  History of noncompliance/frequent no shows and cancellations: 0  Verbalizes the need for more education: 0  Navigation Acuity: 6     Follow Up  No follow-ups on file.       "

## 2024-04-30 NOTE — NURSING
Met with patient in Infusion clinic for follow up. Patient reports that the pain to her mediport site on right chest wall has improved greatly. States that she does take IBU as needed. Informed patient of Rx for Xanax 0.5 mg one time use for scans on 5/6/24 sent to her pharmacy. [facility]. Provided directions and contact number. Patient voiced understanding. Encouraged patient to contact HERO Goodwin if has any other questions/needs.

## 2024-05-01 ENCOUNTER — TELEPHONE (OUTPATIENT)
Dept: PULMONOLOGY | Facility: CLINIC | Age: 64
End: 2024-05-01
Payer: MEDICAID

## 2024-05-01 NOTE — TELEPHONE ENCOUNTER
----- Message from Viridiana Cantrell sent at 4/30/2024  4:00 PM CDT -----  Pt caregiver Sosa called stating patient is having trouble with her oxygen and is requesting a call back, Sosa can be reached @ [phone]

## 2024-05-01 NOTE — TELEPHONE ENCOUNTER
Spoke with Dr. Dao in regards to patient's request for rollator walker. He recommended patient reach out to Dr. Mahoney's office for assistance. Understanding voiced.    Called and spoke with Makayla at [facility]. Informed her of what Dr. Dao stated and recommended. Makayla voiced understanding and recommended patient reach out to Anna at Dr. Mahoney's office. Understanding voiced.    Called and spoke with patient. Informed her that she will need to reach out to Anna at Dr. Mahoney's office for assistance with the rollator walker. Patient voiced understanding.    Jeremi Neville)

## 2024-05-01 NOTE — TELEPHONE ENCOUNTER
Returned patient's call. Patient stated that the oxygen tank she has is too heavy for her to carry and would like something smaller for her to carry. Patient stated that she already spoke with [facility] and that a letter is needed stating that she needs the portable oxygen sent to her Medicaid plan. Informed patient that Medicaid does not cover portable tanks but I will reach out to [facility] to see if I could assist. Patient voiced understanding.    Attempted to reach [facility]. No answer. Not able to leave voicemail.

## 2024-05-01 NOTE — TELEPHONE ENCOUNTER
Called and spoke with Sarita at [facility]. Informed her of what patient stated. Sarita stated that patient's insurance, Medicaid, will not approve for portable oxygen even if a PA would be completed. Sarita also stated that the M9 oxygen tank the patient has is the same weight as the portable oxygen that they would give the patient. Understanding voiced.    Called and spoke with patient. Informed her of what Sarita with [facility] stated. Patient mentioned that she spoke to Makayla at [facility] and they have a rollator walker available but a letter will need to be written stating that she is in need of it. Informed patient that I will call Makayla to see how I can assist. Patient voiced understanding.

## 2024-05-01 NOTE — TELEPHONE ENCOUNTER
Called and spoke with Makayla at [facility]. She stated the MD will have to complete a script for the rollator walker. Makayla stated that those types of scripts usually come from Dr. Mahoney but the Pulm MD can also complete if approved. Informed Makayla that Pulm MD will be available tomorrow to review request. Makayla voiced understanding.    Phone number is [phone]  Fax number is [phone]

## 2024-05-03 ENCOUNTER — INFUSION (OUTPATIENT)
Dept: INFUSION THERAPY | Facility: HOSPITAL | Age: 64
End: 2024-05-03
Attending: INTERNAL MEDICINE
Payer: MEDICAID

## 2024-05-03 ENCOUNTER — TELEPHONE (OUTPATIENT)
Dept: HEMATOLOGY/ONCOLOGY | Facility: CLINIC | Age: 64
End: 2024-05-03
Payer: MEDICAID

## 2024-05-03 VITALS
HEART RATE: 85 BPM | DIASTOLIC BLOOD PRESSURE: 69 MMHG | SYSTOLIC BLOOD PRESSURE: 111 MMHG | RESPIRATION RATE: 18 BRPM | OXYGEN SATURATION: 99 % | TEMPERATURE: 99 F

## 2024-05-03 DIAGNOSIS — R11.2 NAUSEA AND VOMITING, UNSPECIFIED VOMITING TYPE: ICD-10-CM

## 2024-05-03 DIAGNOSIS — E86.0 DEHYDRATION: Primary | ICD-10-CM

## 2024-05-03 DIAGNOSIS — R19.7 DIARRHEA, UNSPECIFIED TYPE: ICD-10-CM

## 2024-05-03 DIAGNOSIS — C34.11 PRIMARY ADENOCARCINOMA OF UPPER LOBE OF RIGHT LUNG: Primary | ICD-10-CM

## 2024-05-03 PROCEDURE — 96361 HYDRATE IV INFUSION ADD-ON: CPT

## 2024-05-03 PROCEDURE — 96374 THER/PROPH/DIAG INJ IV PUSH: CPT

## 2024-05-03 PROCEDURE — 63600175 PHARM REV CODE 636 W HCPCS

## 2024-05-03 PROCEDURE — 25000003 PHARM REV CODE 250

## 2024-05-03 PROCEDURE — 96375 TX/PRO/DX INJ NEW DRUG ADDON: CPT

## 2024-05-03 RX ORDER — ONDANSETRON HYDROCHLORIDE 2 MG/ML
8 INJECTION, SOLUTION INTRAVENOUS ONCE
Status: COMPLETED | OUTPATIENT
Start: 2024-05-03 | End: 2024-05-03

## 2024-05-03 RX ORDER — HEPARIN 100 UNIT/ML
500 SYRINGE INTRAVENOUS
Status: DISCONTINUED | OUTPATIENT
Start: 2024-05-03 | End: 2024-05-03 | Stop reason: HOSPADM

## 2024-05-03 RX ORDER — ATROPINE SULFATE 0.4 MG/ML
0.4 INJECTION, SOLUTION ENDOTRACHEAL; INTRAMEDULLARY; INTRAMUSCULAR; INTRAVENOUS; SUBCUTANEOUS
Status: CANCELLED
Start: 2024-05-03

## 2024-05-03 RX ORDER — ATROPINE SULFATE 0.4 MG/ML
0.4 INJECTION, SOLUTION ENDOTRACHEAL; INTRAMEDULLARY; INTRAMUSCULAR; INTRAVENOUS; SUBCUTANEOUS
Status: COMPLETED | OUTPATIENT
Start: 2024-05-03 | End: 2024-05-03

## 2024-05-03 RX ORDER — HEPARIN 100 UNIT/ML
500 SYRINGE INTRAVENOUS
Status: CANCELLED | OUTPATIENT
Start: 2024-05-03

## 2024-05-03 RX ORDER — SODIUM CHLORIDE 0.9 % (FLUSH) 0.9 %
10 SYRINGE (ML) INJECTION
Status: CANCELLED | OUTPATIENT
Start: 2024-05-03

## 2024-05-03 RX ORDER — ONDANSETRON HYDROCHLORIDE 2 MG/ML
8 INJECTION, SOLUTION INTRAVENOUS ONCE
Status: CANCELLED
Start: 2024-05-03 | End: 2024-05-03

## 2024-05-03 RX ORDER — SODIUM CHLORIDE 0.9 % (FLUSH) 0.9 %
10 SYRINGE (ML) INJECTION
Status: DISCONTINUED | OUTPATIENT
Start: 2024-05-03 | End: 2024-05-03 | Stop reason: HOSPADM

## 2024-05-03 RX ADMIN — ONDANSETRON 8 MG: 2 INJECTION INTRAMUSCULAR; INTRAVENOUS at 12:05

## 2024-05-03 RX ADMIN — SODIUM CHLORIDE 1000 ML: 9 INJECTION, SOLUTION INTRAVENOUS at 12:05

## 2024-05-03 RX ADMIN — ATROPINE SULFATE 0.4 MG: 0.4 INJECTION, SOLUTION INTRAVENOUS at 12:05

## 2024-05-03 NOTE — PROGRESS NOTES
Patient was called to confirm appointment today.  And she states that she has been nauseated and vomiting and having diarrhea since her last chemotherapy.  She states that she has not been eating or drinking well and she feels dehydrated.    Plan:     Instructed STEVE Jin to inform the patient that she can take Imodium AD over-the-counter 2 tablets with each diarrheal stool for a maximum of 8 tablets per day.  Instructed patient to take (2) Zofran 4 mg every 4-6 hours as needed for nausea  Instructed patient to report to infusion clinic today to receive 1 Liter of normal saline and Zofran IV and atropine.

## 2024-05-03 NOTE — TELEPHONE ENCOUNTER
Called patient to confirm appointment for 5/6/24. Patient confirmed and verbalized understanding. Patient stated she has been having diarrheal stool since treatment and nausea. The medication given for nausea is not working. Spoke with Ace Chaney NP patient can take Imodium AD 2 tablets with each diarrheal stool for a maximum of 8 tablets per day. Zofran 2 tablets for 8 mg every 4 to 6 hours. Per Ace Chaney, NP  Instructed patient to come in for IV fluids because patient was not eating or drinking.

## 2024-05-03 NOTE — PLAN OF CARE
1L NS/Atopine/Zofran given  C/O N/V and diarrhea since Monday 4/29/24    Patrient feeling much better after the meds and fluids

## 2024-05-06 ENCOUNTER — HOSPITAL ENCOUNTER (OUTPATIENT)
Dept: RADIOLOGY | Facility: HOSPITAL | Age: 64
Discharge: HOME | End: 2024-05-06
Attending: INTERNAL MEDICINE
Payer: MEDICAID

## 2024-05-06 ENCOUNTER — OFFICE VISIT (OUTPATIENT)
Dept: HEMATOLOGY/ONCOLOGY | Facility: CLINIC | Age: 64
End: 2024-05-06
Attending: INTERNAL MEDICINE
Payer: MEDICAID

## 2024-05-06 VITALS
HEART RATE: 89 BPM | BODY MASS INDEX: 32.22 KG/M2 | TEMPERATURE: 98 F | HEIGHT: 59 IN | OXYGEN SATURATION: 100 % | WEIGHT: 159.81 LBS | DIASTOLIC BLOOD PRESSURE: 75 MMHG | SYSTOLIC BLOOD PRESSURE: 115 MMHG

## 2024-05-06 DIAGNOSIS — F41.9 ANXIETY IN CANCER PATIENT: ICD-10-CM

## 2024-05-06 DIAGNOSIS — E87.6 HYPOKALEMIA: Primary | ICD-10-CM

## 2024-05-06 DIAGNOSIS — C34.11 PRIMARY ADENOCARCINOMA OF UPPER LOBE OF RIGHT LUNG: ICD-10-CM

## 2024-05-06 DIAGNOSIS — R11.2 NAUSEA AND VOMITING, UNSPECIFIED VOMITING TYPE: ICD-10-CM

## 2024-05-06 DIAGNOSIS — R91.1 LUNG NODULE: ICD-10-CM

## 2024-05-06 PROCEDURE — 99215 OFFICE O/P EST HI 40 MIN: CPT | Mod: PBBFAC,25

## 2024-05-06 PROCEDURE — A9698 NON-RAD CONTRAST MATERIALNOC: HCPCS

## 2024-05-06 PROCEDURE — 1159F MED LIST DOCD IN RCRD: CPT | Mod: CPTII,,,

## 2024-05-06 PROCEDURE — 74177 CT ABD & PELVIS W/CONTRAST: CPT | Mod: TC

## 2024-05-06 PROCEDURE — 3008F BODY MASS INDEX DOCD: CPT | Mod: CPTII,,,

## 2024-05-06 PROCEDURE — 25500020 PHARM REV CODE 255

## 2024-05-06 PROCEDURE — 1160F RVW MEDS BY RX/DR IN RCRD: CPT | Mod: CPTII,,,

## 2024-05-06 PROCEDURE — 4010F ACE/ARB THERAPY RXD/TAKEN: CPT | Mod: CPTII,,,

## 2024-05-06 PROCEDURE — 3074F SYST BP LT 130 MM HG: CPT | Mod: CPTII,,,

## 2024-05-06 PROCEDURE — 99215 OFFICE O/P EST HI 40 MIN: CPT | Mod: S$PBB,,,

## 2024-05-06 PROCEDURE — 3078F DIAST BP <80 MM HG: CPT | Mod: CPTII,,,

## 2024-05-06 RX ORDER — ONDANSETRON 4 MG/1
4 TABLET, FILM COATED ORAL DAILY PRN
Qty: 30 TABLET | Refills: 1 | Status: SHIPPED | OUTPATIENT
Start: 2024-05-06 | End: 2025-05-06

## 2024-05-06 RX ORDER — POTASSIUM CHLORIDE 1500 MG/1
20 TABLET, EXTENDED RELEASE ORAL 2 TIMES DAILY
Qty: 28 TABLET | Refills: 0 | Status: SHIPPED | OUTPATIENT
Start: 2024-05-06

## 2024-05-06 RX ORDER — ALPRAZOLAM 0.5 MG/1
0.5 TABLET ORAL ONCE AS NEEDED
Qty: 1 TABLET | Refills: 0 | Status: SHIPPED | OUTPATIENT
Start: 2024-05-06 | End: 2024-05-21

## 2024-05-06 RX ADMIN — BARIUM SULFATE 450 ML: 20 SUSPENSION ORAL at 11:05

## 2024-05-06 RX ADMIN — IOHEXOL 100 ML: 350 INJECTION, SOLUTION INTRAVENOUS at 11:05

## 2024-05-06 NOTE — PROGRESS NOTES
Reason for Follow-up:  Reason for consultation:  -adenocarcinoma right upper lung lobe, right VATS/right upper lobe wedge resection/completion bright upper lobectomy and right middle lobectomy and regional lymph node dissection 2024, G3, pT3 pN0  -Cologuard positive    History:  Social history:  .  Lives in [place].  Has 3 children.  Does not work.  Has been smoking 1-2 pack of cigarettes daily for 51 years, since age 12; discontinued recently.  No alcohol or illicit drug abuse.      Family history:   Mother experienced some kind of intrathoracic malignancy at age 83;  from MI at age 83   Father  from prostate cancer) experienced at age 83 and probably, sarcoma) experienced at age 83)    Health maintenance:   -PCP in [place]  -says that she had screening colonoscopy performed in [place] in , and that it was unremarkable  -says that now, for positive Cologuard test, she is scheduled for colonoscopy in May 2024  -2023:  Cologuard positive  -2023:  Bilateral digital screening mammogram with tomosynthesis (comparison:  2022 mammogram, etc.):  BI-RADS: 1 negative        History of Present Illness:   Follow-up (Follow-up/Diarrhea)        Oncologic/Hematologic History:  Oncology History   Primary adenocarcinoma of upper lobe of right lung   3/7/2024 Cancer Staged    Staging form: Lung, AJCC 8th Edition  - Pathologic stage from 3/7/2024: Stage IIB (pT3, pN0, cM0)     4/15/2024 Initial Diagnosis    Primary adenocarcinoma of upper lobe of right lung     2024 -  Chemotherapy    Treatment Summary   Plan Name: OP NSCLC PEMETREXED + CISPLATIN Q3W  Treatment Goal: Curative  Status: Active  Start Date: 2024  End Date: 7/3/2024 (Planned)  Provider: Emery Mahoney MD  Chemotherapy: CISplatin (Platinol) 132 mg in sodium chloride 0.9% 697 mL chemo infusion, 141 mg, Intravenous, Clinic/HOD 1 time, 1 of 4 cycles  Administration: 132 mg (2024)  PEMEtrexed  disodium (ALIMTA) 900 mg in sodium chloride 0.9% SolP 100 mL chemo infusion, 950 mg, Intravenous, Clinic/HOD 1 time, 1 of 4 cycles  Administration: 900 mg (4/29/2024)     63-year-old female, referred from [facility], Dr. Arnulfo Middleton, with poorly-differentiated adenocarcinoma of right lung.    -08/12/2022: CT chest lung screening low-dose (comparison:  09/24/2020):  Lung rads 2: Benign appearance or behavior:  Continue annual screening with LDCT in 12 months  -09/11/2023:  CT chest lung screening low-dose without contrast (comparison:  08/12/2022):  Lung rads category 4A: Suspicious; enlarging nodule right upper lobe, lobulated, < 8 mm, i.e., 7 x 6.7 mm, previously 5 mm, previously not lobulated, too small for adequate detection on PET-CT; recommend three-month follow-up)  -12/26/2023: CT chest with contrast (comparison:  09/11/2023):  Lung rads 4A: Very suspicious; continued enlargement of right upper lobe lung nodule of concern (9.5 x 8.9 mm)  -01/29/2024: FDG PET-CT (comparison:  Chest CT 12/26/2023; CT abdomen pelvis 09/04/2019):  1. Markedly FDG-avid right upper lobe soft tissue nodule, increased in size in the interval, raising concern for malignancy (smoothly marginated noncalcified soft tissue nodule lateral subpleural right upper lobe, 1.2 x 1.1 cm, maximum SUV 14, previously 1 cm x 0.9 cm).  2. No definite PET-CT findings to suggest additional right hemithoracic or more distant metastatic disease.  -02/01/2024: CT chest without contrast (comparison:  12/26/2023): Lateral right upper lobe nodule continues to enlarge in size currently measuring 11 mm concerning for malignancy. Recommend further evaluation.   -03/07/2024:  Right VATS, right upper lobe wedge resection; right upper lobectomy, right middle lobectomy, regional lymph node dissection:  1. Right lung, upper lobe, wedge resection:  Poorly-differentiated adenocarcinoma, 0.6 cm, clear margins of resection  2. Level 8R lymph node  (paraesophageal), biopsy:  3 lymph nodes, negative for metastatic carcinoma    3. Level 4R lymph node (lower paratracheal), biopsy: 2 lymph nodes, negative for metastatic carcinoma  4. Level 7R lymph node (subcarinal), biopsy:  1 lymph node, negative for metastatic carcinoma  5. Level 11R lymph node (interlobar), biopsy:  2 lymph nodes, negative for metastatic carcinoma    6. Right lung, upper lobe, completion lobectomy:  -poorly-differentiated adenocarcinoma, 0.8 cm  -bronchial and vascular margins of resection negative   -1 hilar lymph node with no evidence of metastatic carcinoma  7. Level 12R lymph node (lobar), lymphadenectomy:  1 lymph node, negative for metastatic carcinoma    Synoptic report:  Total number of primary tumors: 2  S/P wedge resection  Completion lobectomy  Right lung  Separate tumor nodules (metastases) in same lobe, therefore, pT3  number of intrapulmonary metastases:  2   Tumor site:  Upper lobe of lung  Tumor size:  Total tumor size:  Greatest dimension:  0.8 cm  Invasive acinar adenocarcinoma; G3, poorly-differentiated; spread through airspaces (CHAMP), present; no visceral pleural invasion; no adjacent structures present; no known pre-surgical therapy; no LVI  All margins negative for invasive carcinoma; closest margin invasive carcinoma, bronchial vascular; distance from invasive carcinoma closest margin,> 2 cm; margins status for noninvasive tumor, all margins negative for noninvasive tumor  Number of lymph nodes examined, 10; all regional lymph nodes negative for tumor  >>>  pT3 pN0    PD-L1 expression:  Positive: High (TPS 95%; )  Negative for EGFR, KRAS, BRAF, AL K, ROS1, RET, MET, HER2  No gene rearrangement no reportable altered splicing events identified from RNA sequencing  No reportable pathogenic variants found  MSI stable  TMB:  41.6m/MB  Fusions: Negative      Interval History 5/6/24:  Patient presents to the clinic today for a scheduled clinic visit to follow up  regarding her diagnosis of adenocarcinoma of the upper lobe of the right lung.  She received cycle 1 day 1 of Alimta/cisplatin in infusion on 04/29.  She reports having nausea in which she had not taking her Zofran that was prescribed to her on the 24th.  She reports having diarrhea in which she did not take over-the-counter Imodium AD. She reports not eating as well. She states that she only eats about 1 meal a day. Labwork was reviewed by the patient. All future appointments were discussed with the patient.       Review of Systems:   All systems reviewed and found to be negative except for the symptoms detailed above  Review of Systems   Constitutional: Negative.    HENT: Negative.     Eyes: Negative.    Respiratory: Negative.          On 2 L NS    Cardiovascular: Negative.    Gastrointestinal: Negative.    Genitourinary: Negative.    Musculoskeletal: Negative.    Skin: Negative.    Neurological: Negative.    Endo/Heme/Allergies: Negative.    Psychiatric/Behavioral: Negative.     All other systems reviewed and are negative.         Physical Examination:   VITAL SIGNS:   Vitals:    05/06/24 1327   BP: 115/75   Pulse: 89   Temp: 98.2 °F (36.8 °C)     Physical Exam  Vitals reviewed.   Constitutional:       Appearance: Normal appearance.   HENT:      Head: Normocephalic and atraumatic.      Mouth/Throat:      Mouth: Mucous membranes are moist.   Cardiovascular:      Rate and Rhythm: Normal rate and regular rhythm.      Pulses: Normal pulses.      Heart sounds: Normal heart sounds.   Pulmonary:      Effort: Pulmonary effort is normal.      Breath sounds: Normal breath sounds.   Abdominal:      General: Bowel sounds are normal.      Palpations: Abdomen is soft.   Skin:     General: Skin is warm and dry.   Neurological:      Mental Status: She is alert and oriented to person, place, and time.   Psychiatric:         Mood and Affect: Mood normal.         Behavior: Behavior normal.         Thought Content: Thought content  normal.         Judgment: Judgment normal.          Assessment:  Adenocarcinoma right upper lung lobe:   -LDCT chest without contrast 08/12/2022:  Lung rads 2  -LDCT chest without contrast 09/11/2023:  Lung rads 4A  -noncontrast chest CT 12/26/2023:  Lung rads 4A  -FDG PET-CT 01/29/2024:  Hypermetabolic right upper lobe nodule, 1.2 x 1.1 cm, maximum SUV 14, previously 1 cm x 0.9 cm; no distant metastases   -noncontrast chest CT 02/01/2024:  Right upper lobe nodule continues to enlarge, now 11 mm   -03/07/2024:  Right VATS, right upper lobe wedge resection, right upper lobectomy, right middle lobectomy, regional lymph node dissection:  -separate tumor nodules (metastases) in same lobe, right upper lung lobe, therefore, pT3 (0.8 cm and 0.6 cm, respectively); invasive acinar adenocarcinoma; G3, poorly-differentiated; no visceral pleural invasion; no LVI; margins negative; 10 regional lymph nodes negative  -pT3 pN0  -PD-L1 expression high positive (TPS 95%; )  -negative for EGFR, K-kathleen, BRAF, AL K, ROS1, RET, met, HER2  >>>  -pT3 pN0, most likely stage IIB (pending restaging imaging studies including PET-CT, or CTs C/A/P with contrast, whole-body nuclear medicine bone scan, and brain MRI with contrast)      Plan:   Primary Adenocarcinoma of upper lobe right lung:   Stage with contrast-enhanced CT scans of C/A/P-completed   Stage with brain MRI with and without contrast (R/S due to MRI machine being down at [facility])   In 6 months, repeat contrast-enhanced CT scan of chest for surveillance  Needs colonoscopy for positive Cologuard test 08/17/2023  Keep scheduled appt with MD to review scans on 5/9  RTC with me on 5/20 with labs prior (CBC/CMP/Mag level) followed by infusion C2D1 of Alimta/Cisplatin    Hypokalemia:   Potassium 3.4  Increase potassium to BID x 14 days   Recheck on 5/20 at the next clinic visit.     -check CBC and CMP  -she was last staged with PET-CT 01/29/2024, almost 3 months prior  -at this time,  stage with contrast-enhanced CT scans of C/A/P  -also need brain MRI with contrast to rule out brain metastases    If no metastases on imaging studies, then, pT3 pN0 M0, stage IIB    Adjuvant therapy:  (Patient did not receive neoadjuvant systemic therapy)  -for stage IIB (pT3 pN0 M0), adjuvant chemotherapy is recommended as category 1  -PD-L1 positive (TPS 95%; )  - mutations negative    Preferred adjuvant chemotherapy regimens for adenocarcinoma:  -cisplatin +pemetrexed every 21 days x4 cycles    Cisplatin 75 mg per m2 day 1   Pemetrexed 500 mg per m2 day 1   Every 21 days x4 cycles  >>>  Subsequently:  Atezolizumab 840 mg every 2 weeks, 1200 mg every 3 weeks, or 1680 mg every 4 weeks for up to 1 year    Refer to surgery for MediPort placement for chemotherapy.     Cisplatin +pemetrexed:  -emesis risk: High  -premedicate with dexamethasone 4 mg oral every twice daily x3 days starting the day before drug administration reduced cutaneous toxicity  -primary prophylaxis with hematopoietic growth factor is not recommended) incidence of neutropenic fever is approximately 2%)  -with pemetrexed, avoid use if creatinine clearance is < 45 mL per minute  -vitamin supplementation with folic acid and intramuscular B12 prior to administration of pemetrexed and during treatment reduced both hematologic and non hematologic side effects  -check CBC weekly during treatment  -check CMP weekly during treatment  -monitor for hearing loss prior to each dose of cisplatin; audiometry as clinically indicated  -Each cycle should not begin until the ANC count is >1500/microL and platelet count is >100,000/microL.  During treatment, if devyn ANC is <500/microL and devyn platelet count is ?50,000/microL, reduce dose of both pemetrexed and cisplatin by 25%.   Regardless of the ANC, reduce dose of both pemetrexed and cisplatin by 25% if devyn platelet count is <50,000/microL without bleeding.   If devyn platelet count is  <50,000/microL with bleeding, reduce dose of both pemetrexed and cisplatin by 50%  -Neuropathy usually is seen only after cumulative doses of cisplatin beyond 400 mg/m2, although there is marked interindividual variation.  -The dose of pemetrexed should be reduced 50% for grade 3 or 4 mucositis.   -The doses of pemetrexed and cisplatin should be decreased to 75% of previous level for any other grade 3 or 4 toxicities or for any diarrhea requiring hospitalization.    Surveillance  -history and physical and chest CT +/- contrast every 6 months X 3 years (03/2024-03/2027), then history and physical and low-dose noncontrast chest CT annually  -smoking cessation advice and counseling   -FDG PET-CT or brain MRI is not routinely indicated    -08/17/2023: Cologuard positive  -needs evaluation with colonoscopy rule out colon cancer    Above discussed at length with the patient.  All questions answered.    Discussed labs, scans, pathology report, and staging of lung cancer, and gave her copies of relevant records.  Plan of investigations discussed in detail.  Plan of adjuvant therapy including chemotherapy, followed by immunotherapy, discussed.    Potential side effects of chemotherapy discussed.  Formal chemotherapy teaching with nursing staff to follow.           Follow-up:  Follow up in about 2 weeks (around 5/20/2024) for Labs, With NP -Ace, Infusion C2D1  Cisplatin/Alimta.

## 2024-05-08 ENCOUNTER — HOSPITAL ENCOUNTER (OUTPATIENT)
Dept: RADIOLOGY | Facility: HOSPITAL | Age: 64
Discharge: HOME | End: 2024-05-08
Payer: MEDICAID

## 2024-05-08 DIAGNOSIS — E87.6 HYPOKALEMIA: Primary | ICD-10-CM

## 2024-05-08 DIAGNOSIS — C34.11 PRIMARY ADENOCARCINOMA OF UPPER LOBE OF RIGHT LUNG: ICD-10-CM

## 2024-05-08 PROCEDURE — 25500020 PHARM REV CODE 255

## 2024-05-08 PROCEDURE — A9577 INJ MULTIHANCE: HCPCS

## 2024-05-08 PROCEDURE — 70553 MRI BRAIN STEM W/O & W/DYE: CPT | Mod: TC

## 2024-05-08 RX ADMIN — GADOBENATE DIMEGLUMINE 14 ML: 529 INJECTION, SOLUTION INTRAVENOUS at 01:05

## 2024-05-09 ENCOUNTER — OFFICE VISIT (OUTPATIENT)
Dept: HEMATOLOGY/ONCOLOGY | Facility: CLINIC | Age: 64
End: 2024-05-09
Attending: INTERNAL MEDICINE
Payer: MEDICAID

## 2024-05-09 VITALS
RESPIRATION RATE: 19 BRPM | TEMPERATURE: 98 F | OXYGEN SATURATION: 100 % | HEIGHT: 59 IN | HEART RATE: 80 BPM | SYSTOLIC BLOOD PRESSURE: 103 MMHG | WEIGHT: 159.38 LBS | BODY MASS INDEX: 32.13 KG/M2 | DIASTOLIC BLOOD PRESSURE: 65 MMHG

## 2024-05-09 DIAGNOSIS — R63.0 ANOREXIA: ICD-10-CM

## 2024-05-09 DIAGNOSIS — R19.5 POSITIVE COLORECTAL CANCER SCREENING USING COLOGUARD TEST: ICD-10-CM

## 2024-05-09 DIAGNOSIS — Z90.2 STATUS POST LOBECTOMY OF LUNG: ICD-10-CM

## 2024-05-09 DIAGNOSIS — C34.11 PRIMARY ADENOCARCINOMA OF UPPER LOBE OF RIGHT LUNG: Primary | ICD-10-CM

## 2024-05-09 DIAGNOSIS — R63.0 ANOREXIA: Primary | ICD-10-CM

## 2024-05-09 DIAGNOSIS — K12.31 MUCOSITIS DUE TO CHEMOTHERAPY: ICD-10-CM

## 2024-05-09 DIAGNOSIS — E86.0 DEHYDRATION: ICD-10-CM

## 2024-05-09 DIAGNOSIS — C34.11 PRIMARY ADENOCARCINOMA OF UPPER LOBE OF RIGHT LUNG: ICD-10-CM

## 2024-05-09 DIAGNOSIS — E87.6 HYPOKALEMIA: ICD-10-CM

## 2024-05-09 DIAGNOSIS — R19.5 POSITIVE COLORECTAL CANCER SCREENING USING COLOGUARD TEST: Primary | ICD-10-CM

## 2024-05-09 DIAGNOSIS — K12.30 ORAL MUCOSITIS: ICD-10-CM

## 2024-05-09 DIAGNOSIS — R11.2 NAUSEA AND VOMITING, UNSPECIFIED VOMITING TYPE: ICD-10-CM

## 2024-05-09 LAB — MAGNESIUM SERPL-MCNC: 1.8 MG/DL (ref 1.6–2.6)

## 2024-05-09 PROCEDURE — 1160F RVW MEDS BY RX/DR IN RCRD: CPT | Mod: CPTII,,, | Performed by: INTERNAL MEDICINE

## 2024-05-09 PROCEDURE — 3078F DIAST BP <80 MM HG: CPT | Mod: CPTII,,, | Performed by: INTERNAL MEDICINE

## 2024-05-09 PROCEDURE — 83735 ASSAY OF MAGNESIUM: CPT | Performed by: INTERNAL MEDICINE

## 2024-05-09 PROCEDURE — 99215 OFFICE O/P EST HI 40 MIN: CPT | Mod: PBBFAC | Performed by: INTERNAL MEDICINE

## 2024-05-09 PROCEDURE — 3074F SYST BP LT 130 MM HG: CPT | Mod: CPTII,,, | Performed by: INTERNAL MEDICINE

## 2024-05-09 PROCEDURE — 4010F ACE/ARB THERAPY RXD/TAKEN: CPT | Mod: CPTII,,, | Performed by: INTERNAL MEDICINE

## 2024-05-09 PROCEDURE — 1159F MED LIST DOCD IN RCRD: CPT | Mod: CPTII,,, | Performed by: INTERNAL MEDICINE

## 2024-05-09 PROCEDURE — 99215 OFFICE O/P EST HI 40 MIN: CPT | Mod: S$PBB,,, | Performed by: INTERNAL MEDICINE

## 2024-05-09 PROCEDURE — 3008F BODY MASS INDEX DOCD: CPT | Mod: CPTII,,, | Performed by: INTERNAL MEDICINE

## 2024-05-09 RX ORDER — SODIUM CHLORIDE 0.9 % (FLUSH) 0.9 %
10 SYRINGE (ML) INJECTION
Status: CANCELLED | OUTPATIENT
Start: 2024-05-21

## 2024-05-09 RX ORDER — SODIUM CHLORIDE 0.9 % (FLUSH) 0.9 %
10 SYRINGE (ML) INJECTION
OUTPATIENT
Start: 2024-07-02

## 2024-05-09 RX ORDER — HEPARIN 100 UNIT/ML
500 SYRINGE INTRAVENOUS
Status: CANCELLED | OUTPATIENT
Start: 2024-05-22

## 2024-05-09 RX ORDER — HEPARIN 100 UNIT/ML
500 SYRINGE INTRAVENOUS
OUTPATIENT
Start: 2024-07-02

## 2024-05-09 RX ORDER — SODIUM CHLORIDE 0.9 % (FLUSH) 0.9 %
10 SYRINGE (ML) INJECTION
OUTPATIENT
Start: 2024-07-03

## 2024-05-09 RX ORDER — MEGESTROL ACETATE 40 MG/ML
800 SUSPENSION ORAL DAILY
Qty: 300 ML | Refills: 2 | Status: SHIPPED | OUTPATIENT
Start: 2024-05-09 | End: 2024-05-20

## 2024-05-09 RX ORDER — CYANOCOBALAMIN 1000 UG/ML
1000 INJECTION, SOLUTION INTRAMUSCULAR; SUBCUTANEOUS
Status: CANCELLED | OUTPATIENT
Start: 2024-06-11

## 2024-05-09 RX ORDER — CYANOCOBALAMIN 1000 UG/ML
1000 INJECTION, SOLUTION INTRAMUSCULAR; SUBCUTANEOUS
Start: 2024-07-02

## 2024-05-09 RX ORDER — SODIUM CHLORIDE 0.9 % (FLUSH) 0.9 %
10 SYRINGE (ML) INJECTION
Status: CANCELLED | OUTPATIENT
Start: 2024-05-22

## 2024-05-09 RX ORDER — HEPARIN 100 UNIT/ML
500 SYRINGE INTRAVENOUS
OUTPATIENT
Start: 2024-07-03

## 2024-05-09 RX ORDER — SODIUM CHLORIDE 0.9 % (FLUSH) 0.9 %
10 SYRINGE (ML) INJECTION
Status: CANCELLED | OUTPATIENT
Start: 2024-06-12

## 2024-05-09 RX ORDER — SODIUM CHLORIDE 0.9 % (FLUSH) 0.9 %
10 SYRINGE (ML) INJECTION
Status: CANCELLED | OUTPATIENT
Start: 2024-06-11

## 2024-05-09 RX ORDER — HEPARIN 100 UNIT/ML
500 SYRINGE INTRAVENOUS
Status: CANCELLED | OUTPATIENT
Start: 2024-05-21

## 2024-05-09 RX ORDER — HEPARIN 100 UNIT/ML
500 SYRINGE INTRAVENOUS
Status: CANCELLED | OUTPATIENT
Start: 2024-06-11

## 2024-05-09 RX ORDER — HEPARIN 100 UNIT/ML
500 SYRINGE INTRAVENOUS
Status: CANCELLED | OUTPATIENT
Start: 2024-06-12

## 2024-05-09 NOTE — PROGRESS NOTES
History:  Past Medical History:   Diagnosis Date    Acid reflux     Dizziness     Gout     High cholesterol     Insomnia     Irregular heart rhythm     Low vitamin D level     Lung nodule     Neuropathy     Obesity     Seasonal allergies     Sleep apnea     cipap    Stroke 2012    Vertigo    Social history:  .  Lives in [place].  Has 3 children.  Does not work.  Has been smoking 1-2 pack of cigarettes daily for 51 years, since age 12; discontinued recently.  No alcohol or illicit drug abuse.      Family history:   Mother experienced some kind of intrathoracic malignancy at age 83;  from MI at age 83   Father  from prostate cancer) experienced at age 83 and probably, sarcoma) experienced at age 83)    Health maintenance:   -PCP in [place]  -says that she had screening colonoscopy performed in [place] in , and that it was unremarkable  -says that now, for positive Cologuard test, she is scheduled for colonoscopy in May 2024  -2023:  Cologuard positive  -2023:  Bilateral digital screening mammogram with tomosynthesis (comparison:  2022 mammogram, etc.):  BI-RADS: 1 negative  Past Surgical History:   Procedure Laterality Date    APPENDECTOMY       SECTION      CHOLECYSTECTOMY      COLONOSCOPY      HERNIA REPAIR      INSERTION OF TUNNELED CENTRAL VENOUS CATHETER (CVC) WITH SUBCUTANEOUS PORT N/A 2024    Procedure: [id number];  Surgeon: Luz Elena Elder MD;  Location: [facility];  Service: General;  Laterality: N/A;    LOBECTOMY Right 3/7/2024    Procedure: LOBECTOMY;  Surgeon: Naty Middleton MD;  Location: [facility] OR;  Service: Thoracic;  Laterality: Right;  upper and middle lobectomy    THORACOSCOPIC WEDGE RESECTION OF LUNG Right 3/7/2024    Procedure: VATS, WITH WEDGE RESECTION, LUNG;  Surgeon: Naty Middleton MD;  Location: [facility] OR;  Service: Thoracic;  Laterality: Right;  RIGHT VATS // POSS LOBECTOMY    THORACOTOMY Right 3/7/2024    Procedure:  THORACOTOMY;  Surgeon: Naty Middleton MD;  Location: [facility] OR;  Service: Thoracic;  Laterality: Right;  converted to open at 1011      Social History     Socioeconomic History    Marital status:    Tobacco Use    Smoking status: Former     Types: Cigarettes     Start date: 2023     Quit date: 1972     Years since quittin.3    Smokeless tobacco: Never    Tobacco comments:     Smoked since age 12; quit 2023   Substance and Sexual Activity    Alcohol use: Not Currently    Drug use: Never    Sexual activity: Yes     Partners: Male      Family History   Problem Relation Name Age of Onset    Cancer Mother      Heart disease Mother      Breast cancer Mother      Cancer Father          Reason for Follow-up:  -adenocarcinoma right upper lung lobe, S/P right VATS/right upper lobe wedge resection/completion right upper lobectomy and right middle lobectomy and regional lymph node dissection 2024, G3, pT3 pN0  -Cologuard positive    History of Present Illness:   Primary adenocarcinoma of upper lobe of right lung        Oncologic/Hematologic History:  Oncology History   Primary adenocarcinoma of upper lobe of right lung   3/7/2024 Cancer Staged    Staging form: Lung, AJCC 8th Edition  - Pathologic stage from 3/7/2024: Stage IIB (pT3, pN0, cM0)     4/15/2024 Initial Diagnosis    Primary adenocarcinoma of upper lobe of right lung     2024 -  Chemotherapy    Treatment Summary   Plan Name: OP NSCLC PEMETREXED + CISPLATIN Q3W  Treatment Goal: Curative  Status: Active  Start Date: 2024  End Date: 7/3/2024 (Planned)  Provider: Emery Mahoney MD  Chemotherapy: CISplatin (Platinol) 132 mg in sodium chloride 0.9% 697 mL chemo infusion, 141 mg, Intravenous, Clinic/HOD 1 time, 1 of 4 cycles  Administration: 132 mg (2024)  PEMEtrexed disodium (ALIMTA) 900 mg in sodium chloride 0.9% SolP 100 mL chemo infusion, 950 mg, Intravenous, Clinic/HOD 1 time, 1 of 4 cycles  Administration: 900 mg  (4/29/2024)     63-year-old female, referred from [facility], Dr. Arnulfo Middleton, with poorly-differentiated adenocarcinoma of right lung.    -08/12/2022: CT chest lung screening low-dose (comparison:  09/24/2020):  Lung rads 2: Benign appearance or behavior:  Continue annual screening with LDCT in 12 months  -09/11/2023:  CT chest lung screening low-dose without contrast (comparison:  08/12/2022):  Lung rads category 4A: Suspicious; enlarging nodule right upper lobe, lobulated, < 8 mm, i.e., 7 x 6.7 mm, previously 5 mm, previously not lobulated, too small for adequate detection on PET-CT; recommend three-month follow-up)  -12/26/2023: CT chest with contrast (comparison:  09/11/2023):  Lung rads 4A: Very suspicious; continued enlargement of right upper lobe lung nodule of concern (9.5 x 8.9 mm)  -01/29/2024: FDG PET-CT (comparison:  Chest CT 12/26/2023; CT abdomen pelvis 09/04/2019):  1. Markedly FDG-avid right upper lobe soft tissue nodule, increased in size in the interval, raising concern for malignancy (smoothly marginated noncalcified soft tissue nodule lateral subpleural right upper lobe, 1.2 x 1.1 cm, maximum SUV 14, previously 1 cm x 0.9 cm).  2. No definite PET-CT findings to suggest additional right hemithoracic or more distant metastatic disease.  -02/01/2024: CT chest without contrast (comparison:  12/26/2023): Lateral right upper lobe nodule continues to enlarge in size currently measuring 11 mm concerning for malignancy. Recommend further evaluation.   -03/07/2024:  Right VATS, right upper lobe wedge resection; right upper lobectomy, right middle lobectomy, regional lymph node dissection:  1. Right lung, upper lobe, wedge resection:  Poorly-differentiated adenocarcinoma, 0.6 cm, clear margins of resection  2. Level 8R lymph node (paraesophageal), biopsy:  3 lymph nodes, negative for metastatic carcinoma    3. Level 4R lymph node (lower paratracheal), biopsy: 2 lymph nodes, negative for  metastatic carcinoma  4. Level 7R lymph node (subcarinal), biopsy:  1 lymph node, negative for metastatic carcinoma  5. Level 11R lymph node (interlobar), biopsy:  2 lymph nodes, negative for metastatic carcinoma    6. Right lung, upper lobe, completion lobectomy:  -poorly-differentiated adenocarcinoma, 0.8 cm  -bronchial and vascular margins of resection negative   -1 hilar lymph node with no evidence of metastatic carcinoma  7. Level 12R lymph node (lobar), lymphadenectomy:  1 lymph node, negative for metastatic carcinoma    Synoptic report:  Total number of primary tumors: 2  S/P wedge resection  Completion lobectomy  Right lung  Separate tumor nodules (metastases) in same lobe, therefore, pT3  number of intrapulmonary metastases:  2   Tumor site:  Upper lobe of lung  Tumor size:  Total tumor size:  Greatest dimension:  0.8 cm  Invasive acinar adenocarcinoma; G3, poorly-differentiated; spread through airspaces (CHAMP), present; no visceral pleural invasion; no adjacent structures present; no known pre-surgical therapy; no LVI  All margins negative for invasive carcinoma; closest margin invasive carcinoma, bronchial vascular; distance from invasive carcinoma closest margin,> 2 cm; margins status for noninvasive tumor, all margins negative for noninvasive tumor  Number of lymph nodes examined, 10; all regional lymph nodes negative for tumor  >>>  pT3 pN0    PD-L1 expression:  Positive: High (TPS 95%; )  Negative for EGFR, KRAS, BRAF, AL K, ROS1, RET, MET, HER2  No gene rearrangement no reportable altered splicing events identified from RNA sequencing  No reportable pathogenic variants found  MSI stable  TMB:  41.6m/MB  Fusions: Negative    04/16/2024:  Pleasant lady who presents for initial medical oncology consultation, accompanied by .  In no acute discomfort.  After lobectomy, has required supplemental oxygen via nasal cannula at 2 liters/minute.  This is being handled by home healthcare team.   Prior to surgery, she never required supplemental oxygen.  Prior to surgery, she never experienced significant cough, sputum production, or dyspnea.  Great appetite.  Mild exertional dyspnea.  ECOG 1.    No unusual headaches or focal neurological symptoms.  No hemoptysis, fevers, or chills.  No abdominal pain, nausea, vomiting, change in bowel habits, or GI bleeding.  No anorexia or unintentional weight loss.  Smoked 1-2 packs of cigarettes daily for 51 years since age 12; says that she discontinued smoking after lung surgery.    Interval History:  [No matching plan found]   OP NSCLC PEMETREXED + CISPLATIN Q3W     05/09/2024:   -right-sided Port-A-Cath placed 04/26/2024  -adjuvant chemotherapy with cisplatin/Alimta started 04/29/2024  -05/06/2024:  Staging CTs C/A/P with contrast (comparison: CT chest 02/01/2024):  No recurrent malignancy or metastatic disease in C/A/P  -05/08/2024:  Staging brain MRI with and without contrast:  No intracranial metastases  -05/09/2024:  Labs reviewed; WBC 2.94; hemoglobin 11.1; platelets 111 K; ANC 1.6, grade 1 neutropenia secondary to chemotherapy; potassium 3.3, low; CO2 34, elevated; albumin 3.3  Presents for a follow-up visit, accompanied by family.  On supplemental oxygen via nasal cannula.  Says that pulmonologist will not see her until 11/05/2024.  Experienced some nausea and vomiting with chemotherapy.  Appetite is down.  Some blisters in mouth.  No bleeding from mouth.  Hypokalemic.  No severe cytopenia.  No unusual headaches, focal neurological symptoms, chest pain, cough, hemoptysis, fevers, chills, any new lumps or lymphadenopathy, abdominal pain, nausea, vomiting change in bowel habits, etc..  ECOG 2.    Medications:  Current Outpatient Medications on File Prior to Visit   Medication Sig Dispense Refill    allopurinoL (ZYLOPRIM) 100 MG tablet Take 100 mg by mouth once daily.      aspirin (ECOTRIN) 81 MG EC tablet Take 81 mg by mouth once daily.      carvediloL (COREG)  12.5 MG tablet Take 12.5 mg by mouth 2 (two) times daily.      colchicine (COLCRYS) 0.6 mg tablet Take 0.6 mg by mouth once daily.      dexAMETHasone (DECADRON) 4 MG Tab Take Dexamethasone 8mg daily the day before treatment, treatment day, and 3 days after. 120 tablet 3    ENTRESTO 24-26 mg per tablet Take 1 tablet by mouth 2 (two) times daily.      ergocalciferol (ERGOCALCIFEROL) 50,000 unit Cap Take 50,000 Units by mouth every 7 days.      furosemide (LASIX) 40 MG tablet Take 40 mg by mouth once daily.      gabapentin (NEURONTIN) 800 MG tablet Take 800 mg by mouth 2 (two) times daily.      HYDROcodone-acetaminophen (NORCO) 5-325 mg per tablet Take 1 tablet by mouth every 6 (six) hours as needed for Pain. 6 tablet 0    ibuprofen (ADVIL,MOTRIN) 800 MG tablet 1 tablet with food or milk as needed Orally Three times a day      linaCLOtide (LINZESS) 145 mcg Cap capsule Take 290 mcg by mouth as needed.      meclizine (ANTIVERT) 25 mg tablet Take 25 mg by mouth Daily.      mirtazapine (REMERON) 30 MG tablet Take 30 mg by mouth every evening.      montelukast (SINGULAIR) 10 mg tablet Take 10 mg by mouth once daily.      nortriptyline (PAMELOR) 25 MG capsule Take 25 mg by mouth Daily.      ondansetron (ZOFRAN) 4 MG tablet Take 1 tablet (4 mg total) by mouth daily as needed for Nausea. 30 tablet 1    pantoprazole (PROTONIX) 40 MG tablet Take 40 mg by mouth once daily.      polyethylene glycol (MOVIPREP) 100-7.5-2.691 gram solution Take as directed prior to colonoscopy 1 kit 0    potassium chloride (K-TAB) 20 mEq Take 1 tablet (20 mEq total) by mouth 2 (two) times daily. 28 tablet 0    ALPRAZolam (XANAX) 0.5 MG tablet Take 1 tablet (0.5 mg total) by mouth once as needed for Anxiety. Take at 8:30 am on day of scans 1 tablet 0    atorvastatin (LIPITOR) 40 MG tablet Take 40 mg by mouth once daily.       Current Facility-Administered Medications on File Prior to Visit   Medication Dose Route Frequency Provider Last Rate Last  Admin    lactated ringers infusion   Intravenous Continuous Aline Muniz MD 10 mL/hr at 04/26/24 0608 New Bag at 04/26/24 0608     Review of Systems:   All systems reviewed and found to be negative except for the symptoms detailed above    Physical Examination:   VITAL SIGNS:   Vitals:    05/09/24 1355   BP: 103/65   Pulse: 80   Resp: 19   Temp: 98.3 °F (36.8 °C)       GENERAL:  In no apparent distress.    HEAD:  No signs of head trauma.  EYES:  Pupils are equal.  Extraocular motions intact.    EARS:  Hearing grossly intact.  MOUTH:  Oropharynx is normal.   NECK:  No adenopathy, no JVD.     CHEST:  Chest with clear breath sounds bilaterally.  No wheezes, rales, rhonchi.    CARDIAC:  Regular rate and rhythm.  S1 and S2, without murmurs, gallops, rubs.  VASCULAR:  No Edema.  Peripheral pulses normal and equal in all extremities.  ABDOMEN:  Soft, without detectable tenderness.  No sign of distention.  No   rebound or guarding, and no masses palpated.   Bowel Sounds normal.  MUSCULOSKELETAL:  Good range of motion of all major joints. Extremities without clubbing, cyanosis or edema.    NEUROLOGIC EXAM:  Alert and oriented x 3.  No focal sensory or strength deficits.   Speech normal.  Follows commands.  PSYCHIATRIC:  Mood normal.  -04/16/2024:  In no acute discomfort.  On supplemental oxygen via nasal cannula at 2 liters/minute.  Has required supplemental oxygen after lobectomy.  Prior to lobectomy, never required supplemental oxygen.  Oxygen is being handled by home healthcare team.  No cervical lymphadenopathy.    Results for orders placed or performed during the hospital encounter of 03/07/24   CBC Auto Differential    Narrative    The following orders were created for panel order CBC Auto Differential.  Procedure                               Abnormality         Status                     ---------                               -----------         ------                     CBC with Differential[[id number]]        Abnormal            Final result                 Please view results for these tests on the individual orders.   CBC with Differential   Result Value Ref Range    WBC 10.24 4.50 - 11.50 x10(3)/mcL    RBC 3.71 (L) 4.20 - 5.40 x10(6)/mcL    Hgb 11.1 (L) 12.0 - 16.0 g/dL    Hct 35.3 (L) 37.0 - 47.0 %    MCV 95.1 (H) 80.0 - 94.0 fL    MCH 29.9 27.0 - 31.0 pg    MCHC 31.4 (L) 33.0 - 36.0 g/dL    RDW 13.6 11.5 - 17.0 %    Platelet 349 130 - 400 x10(3)/mcL    MPV 8.7 7.4 - 10.4 fL    Neut % 73.8 %    Lymph % 17.8 %    Mono % 6.1 %    Eos % 1.1 %    Basophil % 0.4 %    Lymph # 1.82 0.6 - 4.6 x10(3)/mcL    Neut # 7.57 2.1 - 9.2 x10(3)/mcL    Mono # 0.62 0.1 - 1.3 x10(3)/mcL    Eos # 0.11 0 - 0.9 x10(3)/mcL    Baso # 0.04 <=0.2 x10(3)/mcL    IG# 0.08 (H) 0 - 0.04 x10(3)/mcL    IG% 0.8 %    NRBC% 0.0 %     Results for orders placed or performed in visit on 03/04/24   Comprehensive metabolic panel   Result Value Ref Range    Sodium Level 141 136 - 145 mmol/L    Potassium Level 3.1 (L) 3.5 - 5.1 mmol/L    Chloride 98 98 - 107 mmol/L    Carbon Dioxide 31 23 - 31 mmol/L    Glucose Level 159 (H) 82 - 115 mg/dL    Blood Urea Nitrogen 11.6 9.8 - 20.1 mg/dL    Creatinine 0.85 0.55 - 1.02 mg/dL    Calcium Level Total 9.2 8.4 - 10.2 mg/dL    Protein Total 6.8 5.8 - 7.6 gm/dL    Albumin Level 3.4 3.4 - 4.8 g/dL    Globulin 3.4 2.4 - 3.5 gm/dL    Albumin/Globulin Ratio 1.0 (L) 1.1 - 2.0 ratio    Bilirubin Total 0.5 <=1.5 mg/dL    Alkaline Phosphatase 135 40 - 150 unit/L    Alanine Aminotransferase 9 0 - 55 unit/L    Aspartate Aminotransferase 10 5 - 34 unit/L    eGFR >60 mls/min/1.73/m2       Assessment:  Problem List Items Addressed This Visit          Pulmonary    Status post lobectomy of lung       Oncology    Primary adenocarcinoma of upper lobe of right lung    Positive colorectal cancer screening using Cologuard test - Primary       Adenocarcinoma right upper lung lobe:   -LDCT chest without contrast 08/12/2022:  Lung rads  2  -LDCT chest without contrast 09/11/2023:  Lung rads 4A  -noncontrast chest CT 12/26/2023:  Lung rads 4A  -FDG PET-CT 01/29/2024:  Hypermetabolic right upper lobe nodule, 1.2 x 1.1 cm, maximum SUV 14, previously 1 cm x 0.9 cm; no distant metastases   -noncontrast chest CT 02/01/2024:  Right upper lobe nodule continues to enlarge, now 11 mm   -03/07/2024:  Right VATS, right upper lobe wedge resection, right upper lobectomy, right middle lobectomy, regional lymph node dissection:  -separate tumor nodules (metastases) in same lobe, right upper lung lobe, therefore, pT3 (0.8 cm and 0.6 cm, respectively); invasive acinar adenocarcinoma; G3, poorly-differentiated; no visceral pleural invasion; no LVI; margins negative; 10 regional lymph nodes negative  -pT3 pN0 M0, stage IIB  -PD-L1 expression high positive (TPS 95%; )  -negative for EGFR, KRAS, BRAF, AL K, ROS1, RET, met, HER2  -MediPort placed 04/25/2024  -Adjuvant cisplatin/Alimta started 04/29/2024  -restaging CTs C/A/P 05/06/2024:  No metastases   -staging brain MRI 05/08/2024: No metastases      Plan:  -check magnesium level  -start potassium chloride 40 mEq p.o. q.day x2 weeks; no refills  -in 2 weeks, recheck CMP and magnesium level  -continue chemotherapy every 3 weeks x4 cycles   Check CBC and CMP weekly  For surveillance, CT chest with contrast in September  Colonoscopy to rule out colon cancer (patient is Cologuard positive on 08/17/2023)  Anorexic; not eating a whole lot; refer to my) to enable her to get protein shakes  Start Megace 800 mg p.o. q.day to boost appetite   Oral mucositis secondary to chemotherapy; start Magic mouthwashes, swish and spit 5-6 X daily  Follow-up with NP in 3 weeks  --------------------------------------      -05/09/2024:  Potassium 3.3, low  >>>  -check magnesium level  -start potassium chloride 40 mEq p.o. q.day x2 weeks; no refills  -in 2 weeks, recheck CMP and magnesium level  Anorexic; not eating a whole lot; refer to  my) to enable her to get protein shakes  Start Megace 800 mg p.o. q.day to boost appetite   Oral mucositis secondary to chemotherapy; start Magic mouthwashes, swish and spit 5-6 X daily    -adenocarcinoma right upper lung lobe  -right VATS and right upper and middle lobectomy 03/07/2024  -pT3 pN0 M0, stage IIB  -PD-L1 expression high positive (TPS 95%; )  -negative for EGFR, KRAS, BRAF, AL K, ROS1, RET, met, HER2  -MediPort placed 04/25/2024  -Adjuvant cisplatin/Alimta started 04/29/2024  -restaging CTs 05/06/2024:  No metastases   -staging brain MRI 05/08/2024: No metastases  >>>  Plan:  (Patient did not receive neoadjuvant chemotherapy)  -cisplatin +pemetrexed (adjuvant) every 21 days x4 cycles; followed by  Atezolizumab 840 mg every 2 weeks, 1200 mg every 3 weeks, or 1680 mg every 4 weeks for up to 1 year  >>>  -adjuvant cisplatin/Alimta started 04/29/2024; continue every 3 weeks x4 cycles  -emesis prophylaxis per protocol  -vitamin supplementation with folic acid and intramuscular B12 while on pemetrexed  -check CBC and CMP weekly  -monitor for hearing loss prior to each dose of cisplatin; audiometry PRN  -after 4 cycles of chemotherapy, we will proceed with adjuvant atezolizumab (see below)  -for surveillance, CT chest with contrast 6 months (September) after lobectomy    Surveillance:  -history and physical and chest CT +/- contrast every 6 months X 3 years (03/2024-03/2027), then history and physical and low-dose noncontrast chest CT annually  -smoking cessation advice and counseling   -FDG PET-CT or brain MRI is not routinely indicated    -08/17/2023: Cologuard positive  -needs evaluation with colonoscopy rule out colon cancer    Follow-up with NP in 3 weeks    Above discussed at length with the patient.  All questions answered.    Discussed labs and scans and gave her copies of relevant records.  Lung cancer staging discussed.  Plan of adjuvant chemotherapy discussed once again.  Potential side  effects of adjuvant chemotherapy visited once again.  She understands and agrees with this plan.       Follow-up:  No follow-ups on file.

## 2024-05-09 NOTE — Clinical Note
Refer to my) to enable her to get protein shakes Start Megace 800 mg p.o. q.day to boost appetite  Magic mouthwashes, swish and spit 5-6 X daily

## 2024-05-09 NOTE — Clinical Note
-check magnesium level -start potassium chloride 40 mEq p.o. q.day x2 weeks; no refills -in 2 weeks, recheck CMP and magnesium level -continue chemotherapy every 3 weeks x4 cycles  Check CBC and CMP weekly For surveillance, CT chest with contrast in September Colonoscopy to rule out colon cancer (patient is Cologuard positive on 08/17/2023)  Follow-up with NP in 3 weeks

## 2024-05-17 DIAGNOSIS — C34.11 PRIMARY ADENOCARCINOMA OF UPPER LOBE OF RIGHT LUNG: Primary | ICD-10-CM

## 2024-05-20 ENCOUNTER — OFFICE VISIT (OUTPATIENT)
Dept: HEMATOLOGY/ONCOLOGY | Facility: CLINIC | Age: 64
End: 2024-05-20
Payer: MEDICAID

## 2024-05-20 ENCOUNTER — CLINICAL SUPPORT (OUTPATIENT)
Dept: HEMATOLOGY/ONCOLOGY | Facility: CLINIC | Age: 64
End: 2024-05-20
Payer: MEDICAID

## 2024-05-20 ENCOUNTER — INFUSION (OUTPATIENT)
Dept: INFUSION THERAPY | Facility: HOSPITAL | Age: 64
End: 2024-05-20
Attending: INTERNAL MEDICINE
Payer: MEDICAID

## 2024-05-20 VITALS
HEIGHT: 59 IN | HEART RATE: 84 BPM | SYSTOLIC BLOOD PRESSURE: 105 MMHG | DIASTOLIC BLOOD PRESSURE: 66 MMHG | WEIGHT: 161.19 LBS | OXYGEN SATURATION: 97 % | TEMPERATURE: 98 F | BODY MASS INDEX: 32.49 KG/M2

## 2024-05-20 DIAGNOSIS — E86.0 DEHYDRATION: ICD-10-CM

## 2024-05-20 DIAGNOSIS — R63.0 ANOREXIA: ICD-10-CM

## 2024-05-20 DIAGNOSIS — C34.11 ADENOCARCINOMA OF UPPER LOBE OF RIGHT LUNG: Primary | ICD-10-CM

## 2024-05-20 DIAGNOSIS — K12.30 ORAL MUCOSITIS: ICD-10-CM

## 2024-05-20 DIAGNOSIS — R63.0 DECREASED APPETITE: ICD-10-CM

## 2024-05-20 DIAGNOSIS — C34.11 PRIMARY ADENOCARCINOMA OF UPPER LOBE OF RIGHT LUNG: Primary | ICD-10-CM

## 2024-05-20 DIAGNOSIS — Z51.11 ENCOUNTER FOR CHEMOTHERAPY MANAGEMENT: ICD-10-CM

## 2024-05-20 DIAGNOSIS — R11.0 NAUSEA: ICD-10-CM

## 2024-05-20 PROCEDURE — 3078F DIAST BP <80 MM HG: CPT | Mod: CPTII,,,

## 2024-05-20 PROCEDURE — 96375 TX/PRO/DX INJ NEW DRUG ADDON: CPT

## 2024-05-20 PROCEDURE — 3074F SYST BP LT 130 MM HG: CPT | Mod: CPTII,,,

## 2024-05-20 PROCEDURE — 1159F MED LIST DOCD IN RCRD: CPT | Mod: CPTII,,,

## 2024-05-20 PROCEDURE — 99214 OFFICE O/P EST MOD 30 MIN: CPT | Mod: PBBFAC

## 2024-05-20 PROCEDURE — 25000003 PHARM REV CODE 250: Performed by: INTERNAL MEDICINE

## 2024-05-20 PROCEDURE — 96411 CHEMO IV PUSH ADDL DRUG: CPT

## 2024-05-20 PROCEDURE — 63600175 PHARM REV CODE 636 W HCPCS: Mod: JZ,JG | Performed by: INTERNAL MEDICINE

## 2024-05-20 PROCEDURE — 96366 THER/PROPH/DIAG IV INF ADDON: CPT

## 2024-05-20 PROCEDURE — 1160F RVW MEDS BY RX/DR IN RCRD: CPT | Mod: CPTII,,,

## 2024-05-20 PROCEDURE — 99215 OFFICE O/P EST HI 40 MIN: CPT | Mod: S$PBB,,,

## 2024-05-20 PROCEDURE — 4010F ACE/ARB THERAPY RXD/TAKEN: CPT | Mod: CPTII,,,

## 2024-05-20 PROCEDURE — 96413 CHEMO IV INFUSION 1 HR: CPT

## 2024-05-20 PROCEDURE — 3008F BODY MASS INDEX DOCD: CPT | Mod: CPTII,,,

## 2024-05-20 PROCEDURE — A4216 STERILE WATER/SALINE, 10 ML: HCPCS | Performed by: INTERNAL MEDICINE

## 2024-05-20 RX ORDER — HEPARIN 100 UNIT/ML
500 SYRINGE INTRAVENOUS
Status: CANCELLED | OUTPATIENT
Start: 2024-05-24

## 2024-05-20 RX ORDER — MEGESTROL ACETATE 40 MG/1
40 TABLET ORAL 4 TIMES DAILY
Qty: 120 TABLET | Refills: 2 | Status: SHIPPED | OUTPATIENT
Start: 2024-05-20

## 2024-05-20 RX ORDER — HEPARIN 100 UNIT/ML
500 SYRINGE INTRAVENOUS
Status: DISCONTINUED | OUTPATIENT
Start: 2024-05-20 | End: 2024-05-20 | Stop reason: HOSPADM

## 2024-05-20 RX ORDER — PROCHLORPERAZINE EDISYLATE 5 MG/ML
10 INJECTION INTRAMUSCULAR; INTRAVENOUS
Status: CANCELLED
Start: 2024-05-24

## 2024-05-20 RX ORDER — SODIUM CHLORIDE 0.9 % (FLUSH) 0.9 %
10 SYRINGE (ML) INJECTION
Status: CANCELLED | OUTPATIENT
Start: 2024-05-24

## 2024-05-20 RX ORDER — SODIUM CHLORIDE 0.9 % (FLUSH) 0.9 %
10 SYRINGE (ML) INJECTION
Status: DISCONTINUED | OUTPATIENT
Start: 2024-05-20 | End: 2024-05-20 | Stop reason: HOSPADM

## 2024-05-20 RX ORDER — PROCHLORPERAZINE MALEATE 10 MG
10 TABLET ORAL 4 TIMES DAILY
Qty: 30 TABLET | Refills: 1 | Status: SHIPPED | OUTPATIENT
Start: 2024-05-20 | End: 2025-05-20

## 2024-05-20 RX ADMIN — PEMETREXED DISODIUM 900 MG: 500 INJECTION, POWDER, LYOPHILIZED, FOR SOLUTION INTRAVENOUS at 12:05

## 2024-05-20 RX ADMIN — MAGNESIUM SULFATE HEPTAHYDRATE 500 ML/HR: 500 INJECTION, SOLUTION INTRAMUSCULAR; INTRAVENOUS at 01:05

## 2024-05-20 RX ADMIN — HEPARIN 500 UNITS: 100 SYRINGE at 03:05

## 2024-05-20 RX ADMIN — Medication 10 ML: at 03:05

## 2024-05-20 RX ADMIN — APREPITANT 130 MG: 130 INJECTION, EMULSION INTRAVENOUS at 11:05

## 2024-05-20 RX ADMIN — PALONOSETRON HYDROCHLORIDE 0.25 MG: 0.25 INJECTION, SOLUTION INTRAVENOUS at 11:05

## 2024-05-20 RX ADMIN — MAGNESIUM SULFATE HEPTAHYDRATE 500 ML/HR: 500 INJECTION, SOLUTION INTRAMUSCULAR; INTRAVENOUS at 11:05

## 2024-05-20 RX ADMIN — CISPLATIN 132 MG: 1 INJECTION, SOLUTION INTRAVENOUS at 01:05

## 2024-05-20 RX ADMIN — SODIUM CHLORIDE: 9 INJECTION, SOLUTION INTRAVENOUS at 11:05

## 2024-05-20 NOTE — NURSING
Patient arrived ambulatory to infusion from provider appt. Pt is alert and oriented with no acute complaints at this time.  Pt is here for   Cycle 2 Day 1 Alimta + Cisplatin, pt tolerated tx well.    Aimee Sahu RN

## 2024-05-20 NOTE — Clinical Note
Proceed with chemotherapy today in infusion Return to infusion on 05/24 for IV fluids and nausea meds  Weekly labs (CBC/CMP) (5/27 and 6/3) Nurse Huffman to review labs prior to patient leaving  Return to clinic with me in 3 weeks with labs prior (CBC/CMP) followed by next day treat in infusion

## 2024-05-20 NOTE — PROGRESS NOTES
Initial Nutrition Assessment    Nadja Geronimo is a 63 y.o. female.    DATE: 2024     Referral from: Oncology    Diagnosis: Right Lung Cancer     PAST MEDICAL HISTORY  Past Medical History:   Diagnosis Date    Acid reflux     Dizziness     Gout     High cholesterol     Insomnia     Irregular heart rhythm     Low vitamin D level     Lung nodule     Neuropathy     Obesity     Seasonal allergies     Sleep apnea     cipap    Stroke 2012    Vertigo      Past Surgical History:   Procedure Laterality Date    APPENDECTOMY       SECTION      CHOLECYSTECTOMY      COLONOSCOPY      HERNIA REPAIR      INSERTION OF TUNNELED CENTRAL VENOUS CATHETER (CVC) WITH SUBCUTANEOUS PORT N/A 2024    Procedure: [id number]-[id number];  Surgeon: Luz Elena Elder MD;  Location: [facility] OR;  Service: General;  Laterality: N/A;    LOBECTOMY Right 3/7/2024    Procedure: LOBECTOMY;  Surgeon: Naty Middleton MD;  Location: [facility] OR;  Service: Thoracic;  Laterality: Right;  upper and middle lobectomy    THORACOSCOPIC WEDGE RESECTION OF LUNG Right 3/7/2024    Procedure: VATS, WITH WEDGE RESECTION, LUNG;  Surgeon: Naty Middleton MD;  Location: [facility] OR;  Service: Thoracic;  Laterality: Right;  RIGHT VATS // POSS LOBECTOMY    THORACOTOMY Right 3/7/2024    Procedure: THORACOTOMY;  Surgeon: Naty Middleton MD;  Location: [facility] OR;  Service: Thoracic;  Laterality: Right;  converted to open at 1011     Family History   Problem Relation Name Age of Onset    Cancer Mother      Heart disease Mother      Breast cancer Mother      Cancer Father       Social History     Tobacco Use    Smoking status: Former     Types: Cigarettes     Start date: 2023     Quit date: 1972     Years since quittin.4    Smokeless tobacco: Never    Tobacco comments:     Smoked since age 12; quit 2023   Substance and Sexual Activity     "Alcohol use: Not Currently    Drug use: Never    Sexual activity: Yes     Partners: Male       TREATMENT PLAN:  Chemo: [x] Yes, OP NSCLC PEMETREXED + CISPLATIN Q3W       [] No  Radiation: [] Yes      [x] No    Review of patient's allergies indicates:   Allergen Reactions    Codeine Anxiety and Other (See Comments)       ANTHROPOMETRICS:  Height:  4'10"  Weight:   Wt Readings from Last 10 Encounters:   05/20/24 73.1 kg (161 lb 3.2 oz)   05/09/24 72.3 kg (159 lb 6.4 oz)   05/06/24 72.5 kg (159 lb 12.8 oz)   04/29/24 76 kg (167 lb 8.8 oz)   04/26/24 74.1 kg (163 lb 6.4 oz)   04/24/24 74.2 kg (163 lb 9.6 oz)   04/18/24 75.3 kg (166 lb)   04/16/24 75.5 kg (166 lb 6.4 oz)   04/03/24 73.9 kg (163 lb)   03/09/24 83.6 kg (184 lb 4.9 oz)   02/19/24 75.9 kg (167 lb)  11/27/23 71.36 kg (157 lb)      lb x > 1 year  Weight Changes:  Most recently with 3% wt loss x 1 month, not significant  BMI: 32.9 (obese)    INTAKE:  Current Diet: regular diet  Diet Texture: Regular  % PO consumed at meals: %  Dietary Patterns: Patient eats []0  []1  []2  [x]3  [] 4 meals/day  Skips [x]0  []1  []2  []3 meals  Cooks [] some meals  [x] most meals  Eats out [] Frequently  [x] occasionally    Current ONS Intake: []  Yes  X/day  [x] No    Enteral Nutrition     Patient not receiving enteral nutrition at this time.    Food Security  Is patient able to sufficiently able to prepare meals at home? [x] Yes  [] No []N/A  If no, does patient have help cooking, preparing, and serving meals at home? [] Yes  [] No [x] N/A    SYMPTOMS/COMPLAINTS:  decreased appetite and nausea    Current Medications:   Current Outpatient Medications:     allopurinoL (ZYLOPRIM) 100 MG tablet, Take 100 mg by mouth once daily., Disp: , Rfl:     ALPRAZolam (XANAX) 0.5 MG tablet, Take 1 tablet (0.5 mg total) by mouth once as needed for Anxiety. Take at 8:30 am on day of scans, Disp: 1 tablet, Rfl: 0    aspirin (ECOTRIN) 81 MG EC tablet, Take 81 mg by mouth once daily., " Disp: , Rfl:     atorvastatin (LIPITOR) 40 MG tablet, Take 40 mg by mouth once daily., Disp: , Rfl:     carvediloL (COREG) 12.5 MG tablet, Take 12.5 mg by mouth 2 (two) times daily., Disp: , Rfl:     colchicine (COLCRYS) 0.6 mg tablet, Take 0.6 mg by mouth once daily., Disp: , Rfl:     dexAMETHasone (DECADRON) 4 MG Tab, Take Dexamethasone 8mg daily the day before treatment, treatment day, and 3 days after., Disp: 120 tablet, Rfl: 3    diphenhydrAMINE-aluminum-magnesium hydroxide-simethicone-LIDOcaine viscous HCl 2%, Swish and spit 15 mLs every 4 (four) hours as needed (oral mucositis)., Disp: 300 each, Rfl: 2    ENTRESTO 24-26 mg per tablet, Take 1 tablet by mouth 2 (two) times daily., Disp: , Rfl:     ergocalciferol (ERGOCALCIFEROL) 50,000 unit Cap, Take 50,000 Units by mouth every 7 days., Disp: , Rfl:     furosemide (LASIX) 40 MG tablet, Take 40 mg by mouth once daily., Disp: , Rfl:     gabapentin (NEURONTIN) 800 MG tablet, Take 800 mg by mouth 2 (two) times daily., Disp: , Rfl:     HYDROcodone-acetaminophen (NORCO) 5-325 mg per tablet, Take 1 tablet by mouth every 6 (six) hours as needed for Pain., Disp: 6 tablet, Rfl: 0    ibuprofen (ADVIL,MOTRIN) 800 MG tablet, 1 tablet with food or milk as needed Orally Three times a day, Disp: , Rfl:     linaCLOtide (LINZESS) 145 mcg Cap capsule, Take 290 mcg by mouth as needed., Disp: , Rfl:     meclizine (ANTIVERT) 25 mg tablet, Take 25 mg by mouth Daily., Disp: , Rfl:     megestroL (MEGACE) 40 MG Tab, Take 1 tablet (40 mg total) by mouth 4 (four) times daily., Disp: 120 tablet, Rfl: 2    mirtazapine (REMERON) 30 MG tablet, Take 30 mg by mouth every evening., Disp: , Rfl:     montelukast (SINGULAIR) 10 mg tablet, Take 10 mg by mouth once daily., Disp: , Rfl:     nortriptyline (PAMELOR) 25 MG capsule, Take 25 mg by mouth Daily., Disp: , Rfl:     ondansetron (ZOFRAN) 4 MG tablet, Take 1 tablet (4 mg total) by mouth daily as needed for Nausea., Disp: 30 tablet, Rfl: 1     pantoprazole (PROTONIX) 40 MG tablet, Take 40 mg by mouth once daily., Disp: , Rfl:     polyethylene glycol (MOVIPREP) 100-7.5-2.691 gram solution, Take as directed prior to colonoscopy, Disp: 1 kit, Rfl: 0    potassium chloride (K-TAB) 20 mEq, Take 1 tablet (20 mEq total) by mouth 2 (two) times daily., Disp: 28 tablet, Rfl: 0    prochlorperazine (COMPAZINE) 10 MG tablet, Take 1 tablet (10 mg total) by mouth 4 (four) times daily., Disp: 30 tablet, Rfl: 1      Current labs reviewed:  24 -- NA:7,K:7,CALCIUM:7,PHOS:7,M,CHLORIDE:7,CO2:7,BUN:7,CREATININE:7,GLUCOSE:7,BILITOT:7,ALKPHOS:7,ALT:7,AST:7,ALBUMIN:7,PREALB:7,TRI,HGBA1C:7,AMMONIA:7,LIPASE:7,AMYLASE:7,WBC:7,HGB:7,HCT:7    RD Note:  24 -- Pt receiving chemo treatment for lung cancer; reports previously with decreased appetite, now improved, prescribed Megace tablets today noted; nausea managed with antiemetics; Constipation managed with Linzess; reports  lb > 1 year, most recently with 3% wt loss in last month, not significant; pt reports trying Ensure however disliked, will try Ensure Clear    Education Provided:  Maximizing Nutrition during cancer treatment, Food Safety  Teaching Method: explanation and printed materials  Comprehension: verbalizes understanding  Barriers to Learning: none evident  Expected Compliance: good  Comments: All questions were answered and dietitian's contact information was provided.     Estimated Nutrition Needs:  Calories : 1825 - 2044 kcal (25 - 28 kcal/kg)  Protein : 65 - 80 gm (0.9-1.1 gm/kg)  Fluid: 7334-9126 ml (1ml/kcal)      RD Plan/Goals:   [x] Weight stable [] Weight gain  [] Weight Loss [] Increase Kcal/protein [] Biochemical data improved  [] Adjust Tube-feeding Rx  [] Tolerate Tube Feedings   [] Increase tube feedings to goal   [x] Tolerate Supplements   [x] Symptoms Improved  [] Understand nutrition Education  [] offer supportive visits [] other, please specify        Interventions:  GI Soft diet, 6  small meals  Ensure Clear BID  Megace to stimulate appetite  Monitor Weight Weekly     Follow up:   []  Will continue to closely monitor throughout chemotherapy treatment regimen.  []  Will continue to monitor with MD follow up appointments or as needed per patient  [x]  Consult KEVIN melvinn    Amelie Laura RDN, LDN

## 2024-05-20 NOTE — PROGRESS NOTES
Reason for Follow-up:  Reason for consultation:  -adenocarcinoma right upper lung lobe, right VATS/right upper lobe wedge resection/completion bright upper lobectomy and right middle lobectomy and regional lymph node dissection 2024, G3, pT3 pN0  -Cologuard positive    History:  Social history:  .  Lives in [place].  Has 3 children.  Does not work.  Has been smoking 1-2 pack of cigarettes daily for 51 years, since age 12; discontinued recently.  No alcohol or illicit drug abuse.      Family history:   Mother experienced some kind of intrathoracic malignancy at age 83;  from MI at age 83   Father  from prostate cancer) experienced at age 83 and probably, sarcoma) experienced at age 83)    Health maintenance:   -PCP in [place]  -says that she had screening colonoscopy performed in [place] in , and that it was unremarkable  -says that now, for positive Cologuard test, she is scheduled for colonoscopy in May 2024  -2023:  Cologuard positive  -2023:  Bilateral digital screening mammogram with tomosynthesis (comparison:  2022 mammogram, etc.):  BI-RADS: 1 negative        History of Present Illness:   No chief complaint on file.        Oncologic/Hematologic History:  Oncology History   Primary adenocarcinoma of upper lobe of right lung   3/7/2024 Cancer Staged    Staging form: Lung, AJCC 8th Edition  - Pathologic stage from 3/7/2024: Stage IIB (pT3, pN0, cM0)     4/15/2024 Initial Diagnosis    Primary adenocarcinoma of upper lobe of right lung     2024 -  Chemotherapy    Treatment Summary   Plan Name: OP NSCLC PEMETREXED + CISPLATIN Q3W  Treatment Goal: Curative  Status: Active  Start Date: 2024  End Date: 7/3/2024 (Planned)  Provider: Emery Mahoney MD  Chemotherapy: CISplatin (Platinol) 132 mg in sodium chloride 0.9% 697 mL chemo infusion, 141 mg, Intravenous, Clinic/HOD 1 time, 1 of 4 cycles  Administration: 132 mg (2024)  PEMEtrexed  disodium (ALIMTA) 900 mg in sodium chloride 0.9% SolP 100 mL chemo infusion, 950 mg, Intravenous, Clinic/HOD 1 time, 1 of 4 cycles  Administration: 900 mg (4/29/2024)     63-year-old female, referred from [facility] surgery, Dr. Arnulfo Middleton, with poorly-differentiated adenocarcinoma of right lung.    -08/12/2022: CT chest lung screening low-dose (comparison:  09/24/2020):  Lung rads 2: Benign appearance or behavior:  Continue annual screening with LDCT in 12 months  -09/11/2023:  CT chest lung screening low-dose without contrast (comparison:  08/12/2022):  Lung rads category 4A: Suspicious; enlarging nodule right upper lobe, lobulated, < 8 mm, i.e., 7 x 6.7 mm, previously 5 mm, previously not lobulated, too small for adequate detection on PET-CT; recommend three-month follow-up)  -12/26/2023: CT chest with contrast (comparison:  09/11/2023):  Lung rads 4A: Very suspicious; continued enlargement of right upper lobe lung nodule of concern (9.5 x 8.9 mm)  -01/29/2024: FDG PET-CT (comparison:  Chest CT 12/26/2023; CT abdomen pelvis 09/04/2019):  1. Markedly FDG-avid right upper lobe soft tissue nodule, increased in size in the interval, raising concern for malignancy (smoothly marginated noncalcified soft tissue nodule lateral subpleural right upper lobe, 1.2 x 1.1 cm, maximum SUV 14, previously 1 cm x 0.9 cm).  2. No definite PET-CT findings to suggest additional right hemithoracic or more distant metastatic disease.  -02/01/2024: CT chest without contrast (comparison:  12/26/2023): Lateral right upper lobe nodule continues to enlarge in size currently measuring 11 mm concerning for malignancy. Recommend further evaluation.   -03/07/2024:  Right VATS, right upper lobe wedge resection; right upper lobectomy, right middle lobectomy, regional lymph node dissection:  1. Right lung, upper lobe, wedge resection:  Poorly-differentiated adenocarcinoma, 0.6 cm, clear margins of resection  2. Level 8R lymph node  (paraesophageal), biopsy:  3 lymph nodes, negative for metastatic carcinoma    3. Level 4R lymph node (lower paratracheal), biopsy: 2 lymph nodes, negative for metastatic carcinoma  4. Level 7R lymph node (subcarinal), biopsy:  1 lymph node, negative for metastatic carcinoma  5. Level 11R lymph node (interlobar), biopsy:  2 lymph nodes, negative for metastatic carcinoma    6. Right lung, upper lobe, completion lobectomy:  -poorly-differentiated adenocarcinoma, 0.8 cm  -bronchial and vascular margins of resection negative   -1 hilar lymph node with no evidence of metastatic carcinoma  7. Level 12R lymph node (lobar), lymphadenectomy:  1 lymph node, negative for metastatic carcinoma    Synoptic report:  Total number of primary tumors: 2  S/P wedge resection  Completion lobectomy  Right lung  Separate tumor nodules (metastases) in same lobe, therefore, pT3  number of intrapulmonary metastases:  2   Tumor site:  Upper lobe of lung  Tumor size:  Total tumor size:  Greatest dimension:  0.8 cm  Invasive acinar adenocarcinoma; G3, poorly-differentiated; spread through airspaces (CHAMP), present; no visceral pleural invasion; no adjacent structures present; no known pre-surgical therapy; no LVI  All margins negative for invasive carcinoma; closest margin invasive carcinoma, bronchial vascular; distance from invasive carcinoma closest margin,> 2 cm; margins status for noninvasive tumor, all margins negative for noninvasive tumor  Number of lymph nodes examined, 10; all regional lymph nodes negative for tumor  >>>  pT3 pN0    PD-L1 expression:  Positive: High (TPS 95%; )  Negative for EGFR, KRAS, BRAF, AL K, ROS1, RET, MET, HER2  No gene rearrangement no reportable altered splicing events identified from RNA sequencing  No reportable pathogenic variants found  MSI stable  TMB:  41.6m/MB  Fusions: Negative      Interval History 5/20/24:  Patient presents to the clinic today for a scheduled clinic visit to follow up  regarding her diagnosis of adenocarcinoma of the upper lobe of the right lung.  She is due to receive C2D1 today in infusion.  She reports having nausea on the following Friday after receiving treatment.  She states that she does not eat for 2 days after treatment with helped her last time.  She also reports being prescribed Megace liquid in which he is unable to consume because it makes her nauseous.  She endorses having mouth sores after the last treatment that are now resolved.  She endorses taking dexamethasone as prescribed.  Denies any fever, chills, shortness of breath outside of her usual or signs and symptoms associated with infection.  Denies any abdominal pain, constipation, diarrhea she admits to having changes with appetite.   Labwork was reviewed by the patient. All future appointments were discussed with the patient.       Review of Systems:   All systems reviewed and found to be negative except for the symptoms detailed above  Review of Systems   Constitutional: Negative.    HENT: Negative.     Eyes: Negative.    Respiratory: Negative.          On 2 L NS    Cardiovascular: Negative.    Gastrointestinal: Negative.    Genitourinary: Negative.    Musculoskeletal: Negative.    Skin: Negative.    Neurological: Negative.    Endo/Heme/Allergies: Negative.    Psychiatric/Behavioral: Negative.     All other systems reviewed and are negative.         Physical Examination:   VITAL SIGNS:   Vitals:    05/20/24 0840   BP: 105/66   Pulse: 84   Temp: 98.1 °F (36.7 °C)     Physical Exam  Vitals reviewed.   Constitutional:       Appearance: Normal appearance.   HENT:      Head: Normocephalic and atraumatic.      Mouth/Throat:      Mouth: Mucous membranes are moist.   Cardiovascular:      Rate and Rhythm: Normal rate and regular rhythm.      Pulses: Normal pulses.      Heart sounds: Normal heart sounds.   Pulmonary:      Effort: Pulmonary effort is normal.      Breath sounds: Normal breath sounds.   Abdominal:       General: Bowel sounds are normal.      Palpations: Abdomen is soft.   Skin:     General: Skin is warm and dry.   Neurological:      Mental Status: She is alert and oriented to person, place, and time.   Psychiatric:         Mood and Affect: Mood normal.         Behavior: Behavior normal.         Thought Content: Thought content normal.         Judgment: Judgment normal.          Assessment:  Adenocarcinoma right upper lung lobe:   -LDCT chest without contrast 08/12/2022:  Lung rads 2  -LDCT chest without contrast 09/11/2023:  Lung rads 4A  -noncontrast chest CT 12/26/2023:  Lung rads 4A  -FDG PET-CT 01/29/2024:  Hypermetabolic right upper lobe nodule, 1.2 x 1.1 cm, maximum SUV 14, previously 1 cm x 0.9 cm; no distant metastases   -noncontrast chest CT 02/01/2024:  Right upper lobe nodule continues to enlarge, now 11 mm   -03/07/2024:  Right VATS, right upper lobe wedge resection, right upper lobectomy, right middle lobectomy, regional lymph node dissection:  -separate tumor nodules (metastases) in same lobe, right upper lung lobe, therefore, pT3 (0.8 cm and 0.6 cm, respectively); invasive acinar adenocarcinoma; G3, poorly-differentiated; no visceral pleural invasion; no LVI; margins negative; 10 regional lymph nodes negative  -pT3 pN0  -PD-L1 expression high positive (TPS 95%; )  -negative for EGFR, K-kathleen, BRAF, AL K, ROS1, RET, met, HER2  >>>  -pT3 pN0, most likely stage IIB (pending restaging imaging studies including PET-CT, or CTs C/A/P with contrast, whole-body nuclear medicine bone scan, and brain MRI with contrast)      Plan:   Primary Adenocarcinoma of upper lobe right lung:   continue chemotherapy every 3 weeks x4 cycles   CBC/CMP weekly (5/27) & (6/3)  Return to infusion on 5/24 for IV Fluids and nausea meds   RTC with me on 6/10 with labs prior (CBC/CMP/Mag level) followed by next day treat in infusion C3D1 of Alimta/Cisplatin  Return to infusion on 6/12 for IV Fluids   In 6 months, repeat  contrast-enhanced CT scan of chest for surveillance  For surveillance, CT chest with contrast in September   Colonoscopy to rule out colon cancer (patient is Cologuard positive on 08/17/2023) scheduled 5/30    Hypokalemia:   Potassium 4.1  Continue to take 1 potassium pill as instructed by her PCP  Will continue to monitor weekly     Decreased appetite:   FU on referral to [facility] for nutritional shakes   Change to Megace tablets due to her being unable to consume the megace liquid.     Oral candidiasis:   Will continue to monitor     -check CBC and CMP  -she was last staged with PET-CT 01/29/2024, almost 3 months prior  -at this time, stage with contrast-enhanced CT scans of C/A/P  -also need brain MRI with contrast to rule out brain metastases    If no metastases on imaging studies, then, pT3 pN0 M0, stage IIB    Adjuvant therapy:  (Patient did not receive neoadjuvant systemic therapy)  -for stage IIB (pT3 pN0 M0), adjuvant chemotherapy is recommended as category 1  -PD-L1 positive (TPS 95%; )  - mutations negative    Preferred adjuvant chemotherapy regimens for adenocarcinoma:  -cisplatin +pemetrexed every 21 days x4 cycles    Cisplatin 75 mg per m2 day 1   Pemetrexed 500 mg per m2 day 1   Every 21 days x4 cycles  >>>  Subsequently:  Atezolizumab 840 mg every 2 weeks, 1200 mg every 3 weeks, or 1680 mg every 4 weeks for up to 1 year    Refer to surgery for MediPort placement for chemotherapy.     Cisplatin +pemetrexed:  -emesis risk: High  -premedicate with dexamethasone 4 mg oral every twice daily x3 days starting the day before drug administration reduced cutaneous toxicity  -primary prophylaxis with hematopoietic growth factor is not recommended) incidence of neutropenic fever is approximately 2%)  -with pemetrexed, avoid use if creatinine clearance is < 45 mL per minute  -vitamin supplementation with folic acid and intramuscular B12 prior to administration of pemetrexed and during treatment  reduced both hematologic and non hematologic side effects  -check CBC weekly during treatment  -check CMP weekly during treatment  -monitor for hearing loss prior to each dose of cisplatin; audiometry as clinically indicated  -Each cycle should not begin until the ANC count is >1500/microL and platelet count is >100,000/microL.  During treatment, if devyn ANC is <500/microL and devyn platelet count is ?50,000/microL, reduce dose of both pemetrexed and cisplatin by 25%.   Regardless of the ANC, reduce dose of both pemetrexed and cisplatin by 25% if devyn platelet count is <50,000/microL without bleeding.   If devyn platelet count is <50,000/microL with bleeding, reduce dose of both pemetrexed and cisplatin by 50%  -Neuropathy usually is seen only after cumulative doses of cisplatin beyond 400 mg/m2, although there is marked interindividual variation.  -The dose of pemetrexed should be reduced 50% for grade 3 or 4 mucositis.   -The doses of pemetrexed and cisplatin should be decreased to 75% of previous level for any other grade 3 or 4 toxicities or for any diarrhea requiring hospitalization.    Surveillance  -history and physical and chest CT +/- contrast every 6 months X 3 years (03/2024-03/2027), then history and physical and low-dose noncontrast chest CT annually  -smoking cessation advice and counseling   -FDG PET-CT or brain MRI is not routinely indicated    -08/17/2023: Cologuard positive  -needs evaluation with colonoscopy rule out colon cancer    Above discussed at length with the patient.  All questions answered.    Discussed labs, scans, pathology report, and staging of lung cancer, and gave her copies of relevant records.  Plan of investigations discussed in detail.  Plan of adjuvant therapy including chemotherapy, followed by immunotherapy, discussed.    Potential side effects of chemotherapy discussed.  Formal chemotherapy teaching with nursing staff to follow.           Follow-up:  No follow-ups on  file.

## 2024-05-21 ENCOUNTER — INFUSION (OUTPATIENT)
Dept: INFUSION THERAPY | Facility: HOSPITAL | Age: 64
End: 2024-05-21
Attending: INTERNAL MEDICINE
Payer: MEDICAID

## 2024-05-21 VITALS
HEIGHT: 58 IN | SYSTOLIC BLOOD PRESSURE: 114 MMHG | DIASTOLIC BLOOD PRESSURE: 56 MMHG | TEMPERATURE: 98 F | HEART RATE: 74 BPM | RESPIRATION RATE: 20 BRPM | BODY MASS INDEX: 33.83 KG/M2 | OXYGEN SATURATION: 98 % | WEIGHT: 161.19 LBS

## 2024-05-21 DIAGNOSIS — C34.11 PRIMARY ADENOCARCINOMA OF UPPER LOBE OF RIGHT LUNG: Primary | ICD-10-CM

## 2024-05-21 PROCEDURE — 25000003 PHARM REV CODE 250: Performed by: INTERNAL MEDICINE

## 2024-05-21 PROCEDURE — 63600175 PHARM REV CODE 636 W HCPCS: Performed by: INTERNAL MEDICINE

## 2024-05-21 PROCEDURE — 96360 HYDRATION IV INFUSION INIT: CPT

## 2024-05-21 PROCEDURE — A4216 STERILE WATER/SALINE, 10 ML: HCPCS | Performed by: INTERNAL MEDICINE

## 2024-05-21 RX ORDER — HEPARIN 100 UNIT/ML
500 SYRINGE INTRAVENOUS
Status: DISCONTINUED | OUTPATIENT
Start: 2024-05-21 | End: 2024-05-21 | Stop reason: HOSPADM

## 2024-05-21 RX ORDER — SODIUM CHLORIDE 0.9 % (FLUSH) 0.9 %
10 SYRINGE (ML) INJECTION
Status: DISCONTINUED | OUTPATIENT
Start: 2024-05-21 | End: 2024-05-21 | Stop reason: HOSPADM

## 2024-05-21 RX ADMIN — SODIUM CHLORIDE 1000 ML: 9 INJECTION, SOLUTION INTRAVENOUS at 08:05

## 2024-05-21 RX ADMIN — Medication 10 ML: at 10:05

## 2024-05-21 RX ADMIN — HEPARIN 500 UNITS: 100 SYRINGE at 10:05

## 2024-05-21 NOTE — NURSING
8:35 Arrive for C2 D2 IVF's c/o of nausea do not have this medication available message to provider requesting a prn dose.

## 2024-05-23 ENCOUNTER — TELEPHONE (OUTPATIENT)
Dept: ENDOSCOPY | Facility: HOSPITAL | Age: 64
End: 2024-05-23
Payer: MEDICAID

## 2024-05-23 NOTE — TELEPHONE ENCOUNTER
Spoke with patient in regards to colonoscopy procedure. Patient request to post pone 05/30/2024 colonoscopy procedure until completion of chemo treatment. Patient states she is having nausea and vomiting and will not be able to drink colon prep. Patient states, she has blood work weekly and will call in July to reschedule. ED precautions given and instructed patient to call with any questions or concerns. Patient verbalized understanding. MS

## 2024-05-26 ENCOUNTER — HOSPITAL ENCOUNTER (EMERGENCY)
Facility: HOSPITAL | Age: 64
Discharge: HOME | End: 2024-05-26
Attending: EMERGENCY MEDICINE
Payer: MEDICAID

## 2024-05-26 VITALS
BODY MASS INDEX: 32.54 KG/M2 | HEART RATE: 86 BPM | DIASTOLIC BLOOD PRESSURE: 75 MMHG | TEMPERATURE: 99 F | RESPIRATION RATE: 17 BRPM | OXYGEN SATURATION: 100 % | HEIGHT: 58 IN | SYSTOLIC BLOOD PRESSURE: 165 MMHG | WEIGHT: 155 LBS

## 2024-05-26 DIAGNOSIS — R11.0 NAUSEA: ICD-10-CM

## 2024-05-26 DIAGNOSIS — T45.1X5D CHEMOTHERAPY ADVERSE REACTION, SUBSEQUENT ENCOUNTER: ICD-10-CM

## 2024-05-26 DIAGNOSIS — E87.6 HYPOKALEMIA: ICD-10-CM

## 2024-05-26 DIAGNOSIS — C34.90 MALIGNANT NEOPLASM OF LUNG, UNSPECIFIED LATERALITY, UNSPECIFIED PART OF LUNG: ICD-10-CM

## 2024-05-26 DIAGNOSIS — R53.1 WEAKNESS: ICD-10-CM

## 2024-05-26 DIAGNOSIS — E86.0 DEHYDRATION: ICD-10-CM

## 2024-05-26 DIAGNOSIS — R19.7 DIARRHEA, UNSPECIFIED TYPE: Primary | ICD-10-CM

## 2024-05-26 LAB
ALBUMIN SERPL-MCNC: 3.5 G/DL (ref 3.4–4.8)
ALBUMIN/GLOB SERPL: 1.1 RATIO (ref 1.1–2)
ALP SERPL-CCNC: 92 UNIT/L (ref 40–150)
ALT SERPL-CCNC: 36 UNIT/L (ref 0–55)
ANION GAP SERPL CALC-SCNC: 13 MEQ/L
AST SERPL-CCNC: 23 UNIT/L (ref 5–34)
BASOPHILS # BLD AUTO: 0.01 X10(3)/MCL
BASOPHILS NFR BLD AUTO: 0.1 %
BILIRUB SERPL-MCNC: 0.7 MG/DL
BUN SERPL-MCNC: 33.1 MG/DL (ref 9.8–20.1)
CALCIUM SERPL-MCNC: 8.9 MG/DL (ref 8.4–10.2)
CHLORIDE SERPL-SCNC: 94 MMOL/L (ref 98–107)
CO2 SERPL-SCNC: 29 MMOL/L (ref 23–31)
CREAT SERPL-MCNC: 0.94 MG/DL (ref 0.55–1.02)
CREAT/UREA NIT SERPL: 35
EOSINOPHIL # BLD AUTO: 0.02 X10(3)/MCL (ref 0–0.9)
EOSINOPHIL NFR BLD AUTO: 0.2 %
ERYTHROCYTE [DISTWIDTH] IN BLOOD BY AUTOMATED COUNT: 14.1 % (ref 11.5–17)
GFR SERPLBLD CREATININE-BSD FMLA CKD-EPI: >60 ML/MIN/1.73/M2
GLOBULIN SER-MCNC: 3.1 GM/DL (ref 2.4–3.5)
GLUCOSE SERPL-MCNC: 132 MG/DL (ref 82–115)
HCT VFR BLD AUTO: 38 % (ref 37–47)
HGB BLD-MCNC: 13.3 G/DL (ref 12–16)
HOLD SPECIMEN: NORMAL
IMM GRANULOCYTES # BLD AUTO: 0.08 X10(3)/MCL (ref 0–0.04)
IMM GRANULOCYTES NFR BLD AUTO: 0.9 %
LYMPHOCYTES # BLD AUTO: 1.17 X10(3)/MCL (ref 0.6–4.6)
LYMPHOCYTES NFR BLD AUTO: 13.2 %
MAGNESIUM SERPL-MCNC: 2.5 MG/DL (ref 1.6–2.6)
MCH RBC QN AUTO: 30 PG (ref 27–31)
MCHC RBC AUTO-ENTMCNC: 35 G/DL (ref 33–36)
MCV RBC AUTO: 85.6 FL (ref 80–94)
MONOCYTES # BLD AUTO: 0.05 X10(3)/MCL (ref 0.1–1.3)
MONOCYTES NFR BLD AUTO: 0.6 %
NEUTROPHILS # BLD AUTO: 7.53 X10(3)/MCL (ref 2.1–9.2)
NEUTROPHILS NFR BLD AUTO: 85 %
NRBC BLD AUTO-RTO: 0 %
PLATELET # BLD AUTO: 274 X10(3)/MCL (ref 130–400)
PMV BLD AUTO: 8.9 FL (ref 7.4–10.4)
POTASSIUM SERPL-SCNC: 3 MMOL/L (ref 3.5–5.1)
PROT SERPL-MCNC: 6.6 GM/DL (ref 5.8–7.6)
RBC # BLD AUTO: 4.44 X10(6)/MCL (ref 4.2–5.4)
SODIUM SERPL-SCNC: 136 MMOL/L (ref 136–145)
WBC # SPEC AUTO: 8.86 X10(3)/MCL (ref 4.5–11.5)

## 2024-05-26 PROCEDURE — 99284 EMERGENCY DEPT VISIT MOD MDM: CPT | Mod: 25

## 2024-05-26 PROCEDURE — 96361 HYDRATE IV INFUSION ADD-ON: CPT

## 2024-05-26 PROCEDURE — 36415 COLL VENOUS BLD VENIPUNCTURE: CPT | Performed by: EMERGENCY MEDICINE

## 2024-05-26 PROCEDURE — 93005 ELECTROCARDIOGRAM TRACING: CPT

## 2024-05-26 PROCEDURE — 25000003 PHARM REV CODE 250: Performed by: EMERGENCY MEDICINE

## 2024-05-26 PROCEDURE — 85025 COMPLETE CBC W/AUTO DIFF WBC: CPT | Performed by: EMERGENCY MEDICINE

## 2024-05-26 PROCEDURE — 83735 ASSAY OF MAGNESIUM: CPT | Performed by: EMERGENCY MEDICINE

## 2024-05-26 PROCEDURE — 63600175 PHARM REV CODE 636 W HCPCS: Performed by: EMERGENCY MEDICINE

## 2024-05-26 PROCEDURE — 80053 COMPREHEN METABOLIC PANEL: CPT | Performed by: EMERGENCY MEDICINE

## 2024-05-26 PROCEDURE — 96374 THER/PROPH/DIAG INJ IV PUSH: CPT

## 2024-05-26 RX ORDER — ONDANSETRON HYDROCHLORIDE 2 MG/ML
8 INJECTION, SOLUTION INTRAVENOUS
Status: COMPLETED | OUTPATIENT
Start: 2024-05-26 | End: 2024-05-26

## 2024-05-26 RX ORDER — POTASSIUM CHLORIDE 20 MEQ/1
40 TABLET, EXTENDED RELEASE ORAL
Status: COMPLETED | OUTPATIENT
Start: 2024-05-26 | End: 2024-05-26

## 2024-05-26 RX ADMIN — ONDANSETRON 8 MG: 2 INJECTION INTRAMUSCULAR; INTRAVENOUS at 12:05

## 2024-05-26 RX ADMIN — SODIUM CHLORIDE, POTASSIUM CHLORIDE, SODIUM LACTATE AND CALCIUM CHLORIDE 2000 ML: 600; 310; 30; 20 INJECTION, SOLUTION INTRAVENOUS at 12:05

## 2024-05-26 RX ADMIN — POTASSIUM CHLORIDE 40 MEQ: 1500 TABLET, EXTENDED RELEASE ORAL at 02:05

## 2024-05-26 NOTE — ED PROVIDER NOTES
Encounter Date: 2024       History     Chief Complaint   Patient presents with    Diarrhea     Diarrhea since this Wednesday and states she thinks she is dehydrated. Received chemo on Monday and had an appointment scheduled for IVF on Friday, but she did not go. Hx of lung cancer.     She has lung cancer on chemotherapy for the last several months about every 3 weeks, typically does get dehydrated with diarrhea and nausea after chemotherapy on Monday and comes in for scheduled IV fluids by Thursday or Friday.  This last week she had her usual chemotherapy on Monday and thought she could probably do without the IV fluids on Friday so she bypass it, but now today on  she feels that she is behind on her fluids with persistent nausea and diarrhea, feeling dehydrated with mild lightheadedness, dry mouth, and persistent thirst.  No vomiting, fever, chills, dyspnea, palpitations, abdominal pain, chest pain, or other specific complaints.  She has no complaints referable to her right upper chest MediPort.    The history is provided by the patient and the spouse. No  was used.     Review of patient's allergies indicates:   Allergen Reactions    Codeine Anxiety and Other (See Comments)     Past Medical History:   Diagnosis Date    Dizziness     GERD     Gout     High cholesterol     Insomnia     Irregular heart rhythm     Low vitamin D level     Lung cancer     Lung nodule     Mixed hyperlipidemia     Neuropathy     Obesity     Seasonal allergies     Sleep apnea     cipap    Stroke 2012    Vertigo      Past Surgical History:   Procedure Laterality Date    APPENDECTOMY       SECTION      CHOLECYSTECTOMY      COLONOSCOPY      HERNIA REPAIR      INSERTION OF TUNNELED CENTRAL VENOUS CATHETER (CVC) WITH SUBCUTANEOUS PORT N/A 2024    Procedure: [id number]-ERET-U-CATH;  Surgeon: Luz Elena Elder MD;  Location: [facility];  Service: General;  Laterality: N/A;    LOBECTOMY Right 3/7/2024     Procedure: LOBECTOMY;  Surgeon: Naty Middleton MD;  Location: [facility] OR;  Service: Thoracic;  Laterality: Right;  upper and middle lobectomy    THORACOSCOPIC WEDGE RESECTION OF LUNG Right 3/7/2024    Procedure: VATS, WITH WEDGE RESECTION, LUNG;  Surgeon: Naty Middleton MD;  Location: [facility] OR;  Service: Thoracic;  Laterality: Right;  RIGHT VATS // POSS LOBECTOMY    THORACOTOMY Right 3/7/2024    Procedure: THORACOTOMY;  Surgeon: Naty Middleton MD;  Location: [facility] OR;  Service: Thoracic;  Laterality: Right;  converted to open at 1011     Family History   Problem Relation Name Age of Onset    Cancer Mother      Heart disease Mother      Breast cancer Mother      Cancer Father       Social History     Tobacco Use    Smoking status: Former     Types: Cigarettes     Start date: 2023     Quit date:      Years since quittin.4    Smokeless tobacco: Never    Tobacco comments:     Smoked since age 12; quit 2023   Substance Use Topics    Alcohol use: Not Currently    Drug use: Never     Review of Systems   Constitutional:  Negative for activity change, fatigue and fever.   HENT:  Negative for congestion, ear pain, facial swelling, nosebleeds, sinus pressure and sore throat.    Eyes:  Negative for pain, discharge, redness and visual disturbance.   Respiratory:  Negative for cough, choking, chest tightness, shortness of breath and wheezing.    Cardiovascular:  Negative for chest pain, palpitations and leg swelling.   Gastrointestinal:  Positive for diarrhea and nausea. Negative for abdominal distention, abdominal pain and vomiting.   Endocrine: Negative for heat intolerance, polydipsia and polyuria.   Genitourinary:  Negative for difficulty urinating, dysuria, flank pain, hematuria and urgency.   Musculoskeletal:  Negative for back pain, gait problem, joint swelling and myalgias.   Skin:  Negative for color change and rash.   Allergic/Immunologic: Negative for environmental allergies and food allergies.    Neurological:  Negative for dizziness, weakness, numbness and headaches.   Hematological:  Negative for adenopathy. Does not bruise/bleed easily.   Psychiatric/Behavioral:  Negative for agitation and behavioral problems. The patient is not nervous/anxious.    All other systems reviewed and are negative.      Physical Exam     Initial Vitals [05/26/24 1138]   BP Pulse Resp Temp SpO2   125/79 104 17 98.6 °F (37 °C) 95 %      MAP       --         Physical Exam    Nursing note and vitals reviewed.  Constitutional: She appears well-developed and well-nourished. She is not diaphoretic. She appears distressed.   Mild discomfort but no great distress   HENT:   Head: Normocephalic and atraumatic.   Mouth/Throat: No oropharyngeal exudate.   Dry OP   Eyes: Conjunctivae and EOM are normal. Pupils are equal, round, and reactive to light. Right eye exhibits no discharge. Left eye exhibits no discharge. No scleral icterus.   Neck: Neck supple. No thyromegaly present. No tracheal deviation present. No JVD present.   Normal range of motion.  Cardiovascular:  Regular rhythm, normal heart sounds and intact distal pulses.     Exam reveals no gallop and no friction rub.       No murmur heard.  Mild tachycardia   Pulmonary/Chest: Breath sounds normal. No stridor. No respiratory distress. She has no wheezes. She has no rhonchi. She has no rales. She exhibits no tenderness.   Right upper chest MediPort site looks good.   Abdominal: Abdomen is soft. Bowel sounds are normal. She exhibits no distension and no mass. There is no abdominal tenderness. There is no rebound and no guarding.   Musculoskeletal:         General: No tenderness or edema. Normal range of motion.      Cervical back: Normal range of motion and neck supple.     Neurological: She is alert and oriented to person, place, and time. She has normal strength.   Skin: Skin is warm and dry. No rash and no abscess noted. No erythema.   Psychiatric: She has a normal mood and affect.  Her behavior is normal. Judgment and thought content normal.         ED Course   Procedures  Labs Reviewed   COMPREHENSIVE METABOLIC PANEL - Abnormal; Notable for the following components:       Result Value    Potassium 3.0 (*)     Chloride 94 (*)     Glucose 132 (*)     Blood Urea Nitrogen 33.1 (*)     All other components within normal limits   CBC WITH DIFFERENTIAL - Abnormal; Notable for the following components:    Mono # 0.05 (*)     IG# 0.08 (*)     All other components within normal limits   MAGNESIUM - Normal   CBC W/ AUTO DIFFERENTIAL    Narrative:     The following orders were created for panel order CBC auto differential.  Procedure                               Abnormality         Status                     ---------                               -----------         ------                     CBC with Differential[[id number]]       Abnormal            Final result                 Please view results for these tests on the individual orders.   EXTRA TUBES    Narrative:     The following orders were created for panel order EXTRA TUBES.  Procedure                               Abnormality         Status                     ---------                               -----------         ------                     Light Blue Top Hold[[id number]]                             Final result               Red Top Hold[[id number]]                                    Final result               Gold Top Hold[[id number]]                                   Final result               Pink Top Hold[[id number]]                                   Final result                 Please view results for these tests on the individual orders.   LIGHT BLUE TOP HOLD   RED TOP HOLD   GOLD TOP HOLD   PINK TOP HOLD     EKG Readings: (Independently Interpreted)   Initial Reading: No STEMI. Rhythm: Sinus Tachycardia. Ectopy: No Ectopy.   Sinus tachycardia 108 beats per minute, ST and T-wave abnormalities possibly ischemic in a broad  distribution including inferior, anterior, and lateral leads.  Compared with previous, rate has increased and degree ST and T-wave changes has worsened to a moderate degree.     ECG Results              EKG 12-lead (Weakness) Age > 50 (In process)        Collection Time Result Time QRS Duration [facility] QTC Calculation    05/26/24 11:48:02 05/26/24 11:50:54 74 455                     In process by Interface, Lab In [facility] (05/26/24 11:50:57)                   Narrative:    Test Reason : R53.1,    Vent. Rate : 108 BPM     Atrial Rate : 108 BPM     P-R Int : 152 ms          QRS Dur : 074 ms      QT Int : 340 ms       P-R-T Axes : 074 080 -80 degrees     QTc Int : 455 ms    Sinus tachycardia  ST and T wave abnormality, consider inferior ischemia  ST and T wave abnormality, consider anterolateral ischemia  Abnormal ECG  When compared with ECG of 04-MAR-2024 10:14,  Vent. rate has increased BY  37 BPM  Questionable change in The axis  T wave inversion now evident in Inferior leads  Inverted T waves have replaced nonspecific T wave abnormality in   Anterior-lateral leads    Referred By:             Confirmed By:                                   Imaging Results    None          Medications   lactated ringers bolus 2,000 mL (2,000 mLs Intravenous New Bag 5/26/24 1253)   potassium chloride SA CR tablet 40 mEq (has no administration in time range)   ondansetron injection 8 mg (8 mg Intravenous Given 5/26/24 1254)     2:40 PM Feels much better; ready for d/c with the following instructions:      As discussed, you did get fairly dehydrated.      Continue to push fluids at home and take an extra dose of your potassium tonight and tomorrow.              Medical Decision Making  Problems Addressed:  Dehydration: acute illness or injury  Hypokalemia: acute illness or injury    Amount and/or Complexity of Data Reviewed  Labs: ordered. Decision-making details documented in ED Course.    Risk  Prescription drug management.  Decision  regarding hospitalization.      Additional MDM:   Differential Diagnosis:   Dehydration/ DANIELLE/ electrolyte imbalance among many others                                    Clinical Impression:  Final diagnoses:  [R53.1] Weakness  [R19.7] Diarrhea, unspecified type (Primary)  [E86.0] Dehydration  [R11.0] Nausea  [C34.90] Malignant neoplasm of lung, unspecified laterality, unspecified part of lung  [T45.1X5D] Chemotherapy adverse reaction, subsequent encounter  [E87.6] Hypokalemia          ED Disposition Condition    Discharge Stable          ED Prescriptions    None       Follow-up Information       Follow up With Specialties Details Why Contact Info    [facility] Dept Emergency Medicine  As needed [address] [facility] [address] [phone]    Tristen Montoya, FNP Family Medicine  As needed [address] [facility] [address]  [facility] [address]  [phone]               Willie Finn MD  05/26/24 [id number]

## 2024-05-26 NOTE — DISCHARGE INSTRUCTIONS
As discussed, you did get fairly dehydrated.      Continue to push fluids at home and take an extra dose of your potassium tonight and tomorrow.

## 2024-05-27 ENCOUNTER — INFUSION (OUTPATIENT)
Dept: INFUSION THERAPY | Facility: HOSPITAL | Age: 64
End: 2024-05-27
Payer: MEDICAID

## 2024-05-27 VITALS
HEART RATE: 95 BPM | WEIGHT: 158.5 LBS | OXYGEN SATURATION: 97 % | SYSTOLIC BLOOD PRESSURE: 146 MMHG | TEMPERATURE: 98 F | BODY MASS INDEX: 33.27 KG/M2 | DIASTOLIC BLOOD PRESSURE: 69 MMHG | RESPIRATION RATE: 20 BRPM | HEIGHT: 58 IN

## 2024-05-27 DIAGNOSIS — R11.2 NAUSEA AND VOMITING, UNSPECIFIED VOMITING TYPE: ICD-10-CM

## 2024-05-27 DIAGNOSIS — E86.0 DEHYDRATION: Primary | ICD-10-CM

## 2024-05-27 LAB
OHS QRS DURATION: 74 MS
OHS QTC CALCULATION: 455 MS

## 2024-05-27 PROCEDURE — 96361 HYDRATE IV INFUSION ADD-ON: CPT

## 2024-05-27 PROCEDURE — 63600175 PHARM REV CODE 636 W HCPCS

## 2024-05-27 PROCEDURE — 25000003 PHARM REV CODE 250

## 2024-05-27 PROCEDURE — 96374 THER/PROPH/DIAG INJ IV PUSH: CPT

## 2024-05-27 RX ORDER — HEPARIN 100 UNIT/ML
500 SYRINGE INTRAVENOUS
Status: DISCONTINUED | OUTPATIENT
Start: 2024-05-27 | End: 2024-05-27 | Stop reason: HOSPADM

## 2024-05-27 RX ORDER — PROCHLORPERAZINE EDISYLATE 5 MG/ML
10 INJECTION INTRAMUSCULAR; INTRAVENOUS
Status: COMPLETED | OUTPATIENT
Start: 2024-05-27 | End: 2024-05-27

## 2024-05-27 RX ORDER — SODIUM CHLORIDE 0.9 % (FLUSH) 0.9 %
10 SYRINGE (ML) INJECTION
Status: DISCONTINUED | OUTPATIENT
Start: 2024-05-27 | End: 2024-05-27 | Stop reason: HOSPADM

## 2024-05-27 RX ADMIN — PROCHLORPERAZINE EDISYLATE 10 MG: 5 INJECTION INTRAMUSCULAR; INTRAVENOUS at 09:05

## 2024-05-27 RX ADMIN — SODIUM CHLORIDE 1000 ML: 9 INJECTION, SOLUTION INTRAVENOUS at 09:05

## 2024-05-27 RX ADMIN — HEPARIN 500 UNITS: 100 SYRINGE at 11:05

## 2024-05-27 NOTE — NURSING
Called to the room by pt who said she needs to leave (had explained that infusion will take 2 hours); pt stated she was feeling better and ready to go; de accessed port per pt's request; reminded pt to take her potassium, zofran & immodium; pt verbalized understanding.

## 2024-05-27 NOTE — NURSING
Pt here for weekly labs & spoke with ZACKERY Chaney NP to notify of diarrhea & nauseau causing a trip to the ED yesterday; NP ordered 1L NS & compazine while awaiting labs; BRIANA Pedersen LPN for NP's came spoke with pt & spouse once labs resulted, she informed pt to continue home potassium pills, and take zofran and immodium as needed for N/V and/or diarrhea; pt & spouse verbalized understanding.

## 2024-05-27 NOTE — NURSING
1010 - pt awake & alert, stated nausea is better after compazine, looks more comfortable, reviewed f/u appts - 6/3/24 weekly labs & 6/10/24 labs/A Shiv, NP/chemotherapy; pt & spouse verbalized understanding.

## 2024-06-03 ENCOUNTER — LAB REQUISITION (OUTPATIENT)
Dept: LAB | Facility: HOSPITAL | Age: 64
End: 2024-06-03
Payer: MEDICAID

## 2024-06-03 ENCOUNTER — TELEPHONE (OUTPATIENT)
Dept: HEMATOLOGY/ONCOLOGY | Facility: CLINIC | Age: 64
End: 2024-06-03
Payer: MEDICAID

## 2024-06-03 DIAGNOSIS — C34.11 MALIGNANT NEOPLASM OF UPPER LOBE, RIGHT BRONCHUS OR LUNG: ICD-10-CM

## 2024-06-03 DIAGNOSIS — D70.1 CHEMOTHERAPY-INDUCED NEUTROPENIA: Primary | ICD-10-CM

## 2024-06-03 DIAGNOSIS — I10 ESSENTIAL (PRIMARY) HYPERTENSION: ICD-10-CM

## 2024-06-03 DIAGNOSIS — T45.1X5A CHEMOTHERAPY-INDUCED NEUTROPENIA: Primary | ICD-10-CM

## 2024-06-03 LAB
ABS NEUT (OLG): 0.37 X10(3)/MCL (ref 2.1–9.2)
ALBUMIN SERPL-MCNC: 3.2 G/DL (ref 3.4–4.8)
ALBUMIN/GLOB SERPL: 1.2 RATIO (ref 1.1–2)
ALP SERPL-CCNC: 93 UNIT/L (ref 40–150)
ALT SERPL-CCNC: 20 UNIT/L (ref 0–55)
ANION GAP SERPL CALC-SCNC: 11 MEQ/L
AST SERPL-CCNC: 12 UNIT/L (ref 5–34)
BILIRUB SERPL-MCNC: 0.4 MG/DL
BUN SERPL-MCNC: 12.8 MG/DL (ref 9.8–20.1)
CALCIUM SERPL-MCNC: 8 MG/DL (ref 8.4–10.2)
CHLORIDE SERPL-SCNC: 94 MMOL/L (ref 98–107)
CO2 SERPL-SCNC: 36 MMOL/L (ref 23–31)
CREAT SERPL-MCNC: 0.85 MG/DL (ref 0.55–1.02)
CREAT/UREA NIT SERPL: 15
EOSINOPHIL NFR BLD MANUAL: 0.09 X10(3)/MCL (ref 0–0.9)
EOSINOPHIL NFR BLD MANUAL: 6 %
ERYTHROCYTE [DISTWIDTH] IN BLOOD BY AUTOMATED COUNT: 14.5 % (ref 11.5–17)
GFR SERPLBLD CREATININE-BSD FMLA CKD-EPI: >60 ML/MIN/1.73/M2
GLOBULIN SER-MCNC: 2.7 GM/DL (ref 2.4–3.5)
GLUCOSE SERPL-MCNC: 117 MG/DL (ref 82–115)
HCT VFR BLD AUTO: 27.5 % (ref 37–47)
HGB BLD-MCNC: 9.4 G/DL (ref 12–16)
INSTRUMENT WBC (OLG): 1.54 X10(3)/MCL
LYMPHOCYTES NFR BLD MANUAL: 1.06 X10(3)/MCL
LYMPHOCYTES NFR BLD MANUAL: 69 %
MCH RBC QN AUTO: 29.8 PG (ref 27–31)
MCHC RBC AUTO-ENTMCNC: 34.2 G/DL (ref 33–36)
MCV RBC AUTO: 87.3 FL (ref 80–94)
MONOCYTES NFR BLD MANUAL: 0.02 X10(3)/MCL (ref 0.1–1.3)
MONOCYTES NFR BLD MANUAL: 1 %
NEUTROPHILS NFR BLD MANUAL: 24 %
NRBC BLD AUTO-RTO: 0 %
OVALOCYTES (OLG): ABNORMAL
PLATELET # BLD AUTO: 139 X10(3)/MCL (ref 130–400)
PLATELET # BLD EST: NORMAL 10*3/UL
PMV BLD AUTO: 9.8 FL (ref 7.4–10.4)
POIKILOCYTOSIS BLD QL SMEAR: ABNORMAL
POTASSIUM SERPL-SCNC: 3.6 MMOL/L (ref 3.5–5.1)
PROT SERPL-MCNC: 5.9 GM/DL (ref 5.8–7.6)
RBC # BLD AUTO: 3.15 X10(6)/MCL (ref 4.2–5.4)
RBC MORPH BLD: ABNORMAL
SODIUM SERPL-SCNC: 141 MMOL/L (ref 136–145)
STOMATOCYTES (OLG): ABNORMAL
TARGETS BLD QL SMEAR: ABNORMAL
WBC # SPEC AUTO: 1.56 X10(3)/MCL (ref 4.5–11.5)

## 2024-06-03 PROCEDURE — 80053 COMPREHEN METABOLIC PANEL: CPT

## 2024-06-03 PROCEDURE — 85007 BL SMEAR W/DIFF WBC COUNT: CPT

## 2024-06-03 NOTE — PROGRESS NOTES
Patient had labs drawn this morning with home health she was found to have an ANC of 396.  She denies any fever, chills, shortness of breath or any other signs and symptoms associated with infection.    Plan:   Start Neupogen injection x5 days  Lab work to be done prior to the injection on the 5th day (CBC/CMP)

## 2024-06-04 ENCOUNTER — INFUSION (OUTPATIENT)
Dept: INFUSION THERAPY | Facility: HOSPITAL | Age: 64
End: 2024-06-04
Attending: INTERNAL MEDICINE
Payer: MEDICAID

## 2024-06-04 VITALS
TEMPERATURE: 98 F | OXYGEN SATURATION: 93 % | HEART RATE: 78 BPM | DIASTOLIC BLOOD PRESSURE: 51 MMHG | SYSTOLIC BLOOD PRESSURE: 103 MMHG | WEIGHT: 158.19 LBS | BODY MASS INDEX: 33.2 KG/M2 | RESPIRATION RATE: 18 BRPM | HEIGHT: 58 IN

## 2024-06-04 DIAGNOSIS — D70.1 CHEMOTHERAPY-INDUCED NEUTROPENIA: Primary | ICD-10-CM

## 2024-06-04 DIAGNOSIS — T45.1X5A CHEMOTHERAPY-INDUCED NEUTROPENIA: Primary | ICD-10-CM

## 2024-06-04 PROCEDURE — 96372 THER/PROPH/DIAG INJ SC/IM: CPT

## 2024-06-04 PROCEDURE — 63600175 PHARM REV CODE 636 W HCPCS: Mod: JZ,JB,JG | Performed by: NURSE PRACTITIONER

## 2024-06-04 RX ADMIN — FILGRASTIM-SNDZ 480 MCG: 480 INJECTION, SOLUTION INTRAVENOUS; SUBCUTANEOUS at 10:06

## 2024-06-04 NOTE — NURSING
Zarxio shot #1/5 given RLQ abd SQ. Pt had labs drawn on 6/3 which resulted WBC 1.56 and . Verbal and print out education given to pt for first dose zarxio medication. Daily zarxio shots scheduled and pt aware to come on Monday for labs/NP/infusion appt.

## 2024-06-05 ENCOUNTER — INFUSION (OUTPATIENT)
Dept: INFUSION THERAPY | Facility: HOSPITAL | Age: 64
End: 2024-06-05
Attending: INTERNAL MEDICINE
Payer: MEDICAID

## 2024-06-05 DIAGNOSIS — D70.1 CHEMOTHERAPY-INDUCED NEUTROPENIA: Primary | ICD-10-CM

## 2024-06-05 DIAGNOSIS — T45.1X5A CHEMOTHERAPY-INDUCED NEUTROPENIA: Primary | ICD-10-CM

## 2024-06-05 PROCEDURE — 63600175 PHARM REV CODE 636 W HCPCS: Mod: JZ,JB,JG | Performed by: NURSE PRACTITIONER

## 2024-06-05 PROCEDURE — 96372 THER/PROPH/DIAG INJ SC/IM: CPT

## 2024-06-05 RX ADMIN — FILGRASTIM-SNDZ 480 MCG: 480 INJECTION, SOLUTION INTRAVENOUS; SUBCUTANEOUS at 12:06

## 2024-06-06 ENCOUNTER — INFUSION (OUTPATIENT)
Dept: INFUSION THERAPY | Facility: HOSPITAL | Age: 64
End: 2024-06-06
Attending: INTERNAL MEDICINE
Payer: MEDICAID

## 2024-06-06 VITALS
OXYGEN SATURATION: 95 % | TEMPERATURE: 99 F | HEART RATE: 92 BPM | SYSTOLIC BLOOD PRESSURE: 97 MMHG | RESPIRATION RATE: 18 BRPM | DIASTOLIC BLOOD PRESSURE: 52 MMHG

## 2024-06-06 DIAGNOSIS — T45.1X5A CHEMOTHERAPY-INDUCED NEUTROPENIA: Primary | ICD-10-CM

## 2024-06-06 DIAGNOSIS — D70.1 CHEMOTHERAPY-INDUCED NEUTROPENIA: Primary | ICD-10-CM

## 2024-06-06 PROCEDURE — 63600175 PHARM REV CODE 636 W HCPCS: Mod: JZ,JB,JG | Performed by: NURSE PRACTITIONER

## 2024-06-06 PROCEDURE — 96372 THER/PROPH/DIAG INJ SC/IM: CPT

## 2024-06-06 RX ADMIN — FILGRASTIM-SNDZ 480 MCG: 480 INJECTION, SOLUTION INTRAVENOUS; SUBCUTANEOUS at 10:06

## 2024-06-07 ENCOUNTER — INFUSION (OUTPATIENT)
Dept: INFUSION THERAPY | Facility: HOSPITAL | Age: 64
End: 2024-06-07
Attending: INTERNAL MEDICINE
Payer: MEDICAID

## 2024-06-07 ENCOUNTER — TELEPHONE (OUTPATIENT)
Dept: HEMATOLOGY/ONCOLOGY | Facility: CLINIC | Age: 64
End: 2024-06-07
Payer: MEDICAID

## 2024-06-07 VITALS
OXYGEN SATURATION: 100 % | DIASTOLIC BLOOD PRESSURE: 50 MMHG | SYSTOLIC BLOOD PRESSURE: 107 MMHG | HEART RATE: 107 BPM | RESPIRATION RATE: 18 BRPM | TEMPERATURE: 98 F

## 2024-06-07 DIAGNOSIS — D70.1 CHEMOTHERAPY-INDUCED NEUTROPENIA: Primary | ICD-10-CM

## 2024-06-07 DIAGNOSIS — T45.1X5A CHEMOTHERAPY-INDUCED NEUTROPENIA: Primary | ICD-10-CM

## 2024-06-07 DIAGNOSIS — C34.11 PRIMARY ADENOCARCINOMA OF UPPER LOBE OF RIGHT LUNG: Primary | ICD-10-CM

## 2024-06-07 PROCEDURE — 63600175 PHARM REV CODE 636 W HCPCS: Mod: JZ,JB,JG | Performed by: NURSE PRACTITIONER

## 2024-06-07 PROCEDURE — 96372 THER/PROPH/DIAG INJ SC/IM: CPT

## 2024-06-07 RX ADMIN — FILGRASTIM-SNDZ 480 MCG: 480 INJECTION, SOLUTION INTRAVENOUS; SUBCUTANEOUS at 09:06

## 2024-06-08 ENCOUNTER — INFUSION (OUTPATIENT)
Dept: INFUSION THERAPY | Facility: HOSPITAL | Age: 64
End: 2024-06-08
Attending: INTERNAL MEDICINE
Payer: MEDICAID

## 2024-06-08 VITALS
BODY MASS INDEX: 33.23 KG/M2 | RESPIRATION RATE: 20 BRPM | OXYGEN SATURATION: 97 % | HEIGHT: 58 IN | TEMPERATURE: 98 F | DIASTOLIC BLOOD PRESSURE: 60 MMHG | HEART RATE: 95 BPM | WEIGHT: 158.31 LBS | SYSTOLIC BLOOD PRESSURE: 115 MMHG

## 2024-06-08 DIAGNOSIS — D70.1 CHEMOTHERAPY-INDUCED NEUTROPENIA: Primary | ICD-10-CM

## 2024-06-08 DIAGNOSIS — T45.1X5A CHEMOTHERAPY-INDUCED NEUTROPENIA: Primary | ICD-10-CM

## 2024-06-08 PROCEDURE — 96372 THER/PROPH/DIAG INJ SC/IM: CPT

## 2024-06-08 PROCEDURE — 63600175 PHARM REV CODE 636 W HCPCS: Mod: JZ,JB,JG | Performed by: NURSE PRACTITIONER

## 2024-06-08 RX ADMIN — FILGRASTIM-SNDZ 480 MCG: 480 INJECTION, SOLUTION INTRAVENOUS; SUBCUTANEOUS at 09:06

## 2024-06-08 NOTE — NURSING
Pt ambulated to Infusion [facility] for Zarxio # 5 of 5; chief complaint of SOB on exertion and in the heat; pt received injection in RLQ without incident.  Reeducated pt that on Monday 6/10/24 she is to go the the main lab on the First floor of the hospital to have her labs her prior to coming up to 5th floor for NP visit & Infusion appts; pt & significant other verbalized understanding & stated they are aware of the change.

## 2024-06-10 ENCOUNTER — INFUSION (OUTPATIENT)
Dept: INFUSION THERAPY | Facility: HOSPITAL | Age: 64
End: 2024-06-10
Attending: INTERNAL MEDICINE
Payer: MEDICAID

## 2024-06-10 ENCOUNTER — OFFICE VISIT (OUTPATIENT)
Dept: HEMATOLOGY/ONCOLOGY | Facility: CLINIC | Age: 64
End: 2024-06-10
Payer: MEDICAID

## 2024-06-10 VITALS
WEIGHT: 157.38 LBS | HEART RATE: 97 BPM | DIASTOLIC BLOOD PRESSURE: 71 MMHG | OXYGEN SATURATION: 97 % | HEIGHT: 58 IN | TEMPERATURE: 99 F | BODY MASS INDEX: 33.04 KG/M2 | SYSTOLIC BLOOD PRESSURE: 111 MMHG

## 2024-06-10 VITALS
HEART RATE: 93 BPM | SYSTOLIC BLOOD PRESSURE: 105 MMHG | RESPIRATION RATE: 20 BRPM | DIASTOLIC BLOOD PRESSURE: 58 MMHG | TEMPERATURE: 98 F | OXYGEN SATURATION: 94 %

## 2024-06-10 DIAGNOSIS — C34.11 PRIMARY ADENOCARCINOMA OF UPPER LOBE OF RIGHT LUNG: ICD-10-CM

## 2024-06-10 DIAGNOSIS — F41.9 ANXIETY IN CANCER PATIENT: ICD-10-CM

## 2024-06-10 DIAGNOSIS — C34.11 PRIMARY ADENOCARCINOMA OF UPPER LOBE OF RIGHT LUNG: Primary | ICD-10-CM

## 2024-06-10 DIAGNOSIS — E83.42 HYPOMAGNESEMIA: Primary | ICD-10-CM

## 2024-06-10 PROCEDURE — 96367 TX/PROPH/DG ADDL SEQ IV INF: CPT

## 2024-06-10 PROCEDURE — 63600175 PHARM REV CODE 636 W HCPCS: Performed by: INTERNAL MEDICINE

## 2024-06-10 PROCEDURE — 3008F BODY MASS INDEX DOCD: CPT | Mod: CPTII,,,

## 2024-06-10 PROCEDURE — 3078F DIAST BP <80 MM HG: CPT | Mod: CPTII,,,

## 2024-06-10 PROCEDURE — 96368 THER/DIAG CONCURRENT INF: CPT

## 2024-06-10 PROCEDURE — 63600175 PHARM REV CODE 636 W HCPCS

## 2024-06-10 PROCEDURE — 96413 CHEMO IV INFUSION 1 HR: CPT

## 2024-06-10 PROCEDURE — 4010F ACE/ARB THERAPY RXD/TAKEN: CPT | Mod: CPTII,,,

## 2024-06-10 PROCEDURE — 96411 CHEMO IV PUSH ADDL DRUG: CPT

## 2024-06-10 PROCEDURE — 25000003 PHARM REV CODE 250: Performed by: INTERNAL MEDICINE

## 2024-06-10 PROCEDURE — 96372 THER/PROPH/DIAG INJ SC/IM: CPT | Mod: 59

## 2024-06-10 PROCEDURE — 99215 OFFICE O/P EST HI 40 MIN: CPT | Mod: PBBFAC,25

## 2024-06-10 PROCEDURE — 99215 OFFICE O/P EST HI 40 MIN: CPT | Mod: S$PBB,,,

## 2024-06-10 PROCEDURE — 3074F SYST BP LT 130 MM HG: CPT | Mod: CPTII,,,

## 2024-06-10 PROCEDURE — 96375 TX/PRO/DX INJ NEW DRUG ADDON: CPT

## 2024-06-10 RX ORDER — SODIUM CHLORIDE 0.9 % (FLUSH) 0.9 %
10 SYRINGE (ML) INJECTION
Status: DISCONTINUED | OUTPATIENT
Start: 2024-06-10 | End: 2024-06-10 | Stop reason: HOSPADM

## 2024-06-10 RX ORDER — CYANOCOBALAMIN 1000 UG/ML
1000 INJECTION, SOLUTION INTRAMUSCULAR; SUBCUTANEOUS
Status: COMPLETED | OUTPATIENT
Start: 2024-06-10 | End: 2024-06-10

## 2024-06-10 RX ORDER — ALPRAZOLAM 0.5 MG/1
0.5 TABLET ORAL ONCE AS NEEDED
Qty: 1 TABLET | Refills: 0 | Status: SHIPPED | OUTPATIENT
Start: 2024-06-10 | End: 2024-06-10

## 2024-06-10 RX ORDER — HEPARIN 100 UNIT/ML
500 SYRINGE INTRAVENOUS
Status: DISCONTINUED | OUTPATIENT
Start: 2024-06-10 | End: 2024-06-10 | Stop reason: HOSPADM

## 2024-06-10 RX ORDER — DEXAMETHASONE SODIUM PHOSPHATE 100 MG/10ML
8 INJECTION INTRAMUSCULAR; INTRAVENOUS
Status: COMPLETED | OUTPATIENT
Start: 2024-06-10 | End: 2024-06-10

## 2024-06-10 RX ORDER — LANOLIN ALCOHOL/MO/W.PET/CERES
400 CREAM (GRAM) TOPICAL 2 TIMES DAILY
Qty: 28 TABLET | Refills: 0 | Status: SHIPPED | OUTPATIENT
Start: 2024-06-10 | End: 2024-06-24

## 2024-06-10 RX ADMIN — APREPITANT 130 MG: 130 INJECTION, EMULSION INTRAVENOUS at 11:06

## 2024-06-10 RX ADMIN — DEXAMETHASONE SODIUM PHOSPHATE 8 MG: 10 INJECTION INTRAMUSCULAR; INTRAVENOUS at 11:06

## 2024-06-10 RX ADMIN — DEXAMETHASONE SODIUM PHOSPHATE 0.25 MG: 4 INJECTION, SOLUTION INTRA-ARTICULAR; INTRALESIONAL; INTRAMUSCULAR; INTRAVENOUS; SOFT TISSUE at 11:06

## 2024-06-10 RX ADMIN — SODIUM CHLORIDE: 9 INJECTION, SOLUTION INTRAVENOUS at 09:06

## 2024-06-10 RX ADMIN — HEPARIN 500 UNITS: 100 SYRINGE at 02:06

## 2024-06-10 RX ADMIN — POTASSIUM CHLORIDE 500 ML/HR: 2 INJECTION, SOLUTION, CONCENTRATE INTRAVENOUS at 12:06

## 2024-06-10 RX ADMIN — PEMETREXED DISODIUM 900 MG: 500 INJECTION, POWDER, LYOPHILIZED, FOR SOLUTION INTRAVENOUS at 12:06

## 2024-06-10 RX ADMIN — MAGNESIUM SULFATE HEPTAHYDRATE 506 ML/HR: 500 INJECTION, SOLUTION INTRAMUSCULAR; INTRAVENOUS at 09:06

## 2024-06-10 RX ADMIN — CYANOCOBALAMIN 1000 MCG: 1000 INJECTION, SOLUTION INTRAMUSCULAR; SUBCUTANEOUS at 09:06

## 2024-06-10 RX ADMIN — CISPLATIN 128 MG: 1 INJECTION, SOLUTION INTRAVENOUS at 12:06

## 2024-06-10 NOTE — Clinical Note
-Proceed with Chemotherapy today in infusion  -Continue weekly labs (CBC/CMP/Mag level) (6/17 & 6/24) -RTC with me in 3 weeks (7/1) with labs prior (CBC/CMP/Mag level) followed by infusion

## 2024-06-10 NOTE — PROGRESS NOTES
Reason for Follow-up:  Reason for consultation:  -adenocarcinoma right upper lung lobe, right VATS/right upper lobe wedge resection/completion bright upper lobectomy and right middle lobectomy and regional lymph node dissection 2024, G3, pT3 pN0  -Cologuard positive    History:  Social history:  .  Lives in [place].  Has 3 children.  Does not work.  Has been smoking 1-2 pack of cigarettes daily for 51 years, since age 12; discontinued recently.  No alcohol or illicit drug abuse.      Family history:   Mother experienced some kind of intrathoracic malignancy at age 83;  from MI at age 83   Father  from prostate cancer) experienced at age 83 and probably, sarcoma) experienced at age 83)    Health maintenance:   -PCP in [place]  -says that she had screening colonoscopy performed in [place] in , and that it was unremarkable  -says that now, for positive Cologuard test, she is scheduled for colonoscopy in May 2024  -2023:  Cologuard positive  -2023:  Bilateral digital screening mammogram with tomosynthesis (comparison:  2022 mammogram, etc.):  BI-RADS: 1 negative        History of Present Illness:   Nausea (Patient stated nausea when drinking water this morning when taking medication.) and Shortness of Breath (Patient stated shortness of breath every once in a while.)        Oncologic/Hematologic History:  Oncology History   Primary adenocarcinoma of upper lobe of right lung   3/7/2024 Cancer Staged    Staging form: Lung, AJCC 8th Edition  - Pathologic stage from 3/7/2024: Stage IIB (pT3, pN0, cM0)     4/15/2024 Initial Diagnosis    Primary adenocarcinoma of upper lobe of right lung     2024 -  Chemotherapy    Treatment Summary   Plan Name: OP NSCLC PEMETREXED + CISPLATIN Q3W  Treatment Goal: Curative  Status: Active  Start Date: 2024  End Date: 7/3/2024 (Planned)  Provider: Emery Mahoney MD  Chemotherapy: CISplatin (Platinol) 132 mg in sodium  chloride 0.9% 697 mL chemo infusion, 141 mg, Intravenous, Clinic/HOD 1 time, 3 of 4 cycles  Administration: 132 mg (4/29/2024), 132 mg (5/20/2024)  PEMEtrexed disodium (ALIMTA) 900 mg in sodium chloride 0.9% SolP 100 mL chemo infusion, 950 mg, Intravenous, Clinic/HOD 1 time, 3 of 4 cycles  Administration: 900 mg (4/29/2024), 900 mg (5/20/2024)     63-year-old female, referred from [facility], Dr. Arnulfo Middleton, with poorly-differentiated adenocarcinoma of right lung.    -08/12/2022: CT chest lung screening low-dose (comparison:  09/24/2020):  Lung rads 2: Benign appearance or behavior:  Continue annual screening with LDCT in 12 months  -09/11/2023:  CT chest lung screening low-dose without contrast (comparison:  08/12/2022):  Lung rads category 4A: Suspicious; enlarging nodule right upper lobe, lobulated, < 8 mm, i.e., 7 x 6.7 mm, previously 5 mm, previously not lobulated, too small for adequate detection on PET-CT; recommend three-month follow-up)  -12/26/2023: CT chest with contrast (comparison:  09/11/2023):  Lung rads 4A: Very suspicious; continued enlargement of right upper lobe lung nodule of concern (9.5 x 8.9 mm)  -01/29/2024: FDG PET-CT (comparison:  Chest CT 12/26/2023; CT abdomen pelvis 09/04/2019):  1. Markedly FDG-avid right upper lobe soft tissue nodule, increased in size in the interval, raising concern for malignancy (smoothly marginated noncalcified soft tissue nodule lateral subpleural right upper lobe, 1.2 x 1.1 cm, maximum SUV 14, previously 1 cm x 0.9 cm).  2. No definite PET-CT findings to suggest additional right hemithoracic or more distant metastatic disease.  -02/01/2024: CT chest without contrast (comparison:  12/26/2023): Lateral right upper lobe nodule continues to enlarge in size currently measuring 11 mm concerning for malignancy. Recommend further evaluation.   -03/07/2024:  Right VATS, right upper lobe wedge resection; right upper lobectomy, right middle lobectomy,  regional lymph node dissection:  1. Right lung, upper lobe, wedge resection:  Poorly-differentiated adenocarcinoma, 0.6 cm, clear margins of resection  2. Level 8R lymph node (paraesophageal), biopsy:  3 lymph nodes, negative for metastatic carcinoma    3. Level 4R lymph node (lower paratracheal), biopsy: 2 lymph nodes, negative for metastatic carcinoma  4. Level 7R lymph node (subcarinal), biopsy:  1 lymph node, negative for metastatic carcinoma  5. Level 11R lymph node (interlobar), biopsy:  2 lymph nodes, negative for metastatic carcinoma    6. Right lung, upper lobe, completion lobectomy:  -poorly-differentiated adenocarcinoma, 0.8 cm  -bronchial and vascular margins of resection negative   -1 hilar lymph node with no evidence of metastatic carcinoma  7. Level 12R lymph node (lobar), lymphadenectomy:  1 lymph node, negative for metastatic carcinoma    Synoptic report:  Total number of primary tumors: 2  S/P wedge resection  Completion lobectomy  Right lung  Separate tumor nodules (metastases) in same lobe, therefore, pT3  number of intrapulmonary metastases:  2   Tumor site:  Upper lobe of lung  Tumor size:  Total tumor size:  Greatest dimension:  0.8 cm  Invasive acinar adenocarcinoma; G3, poorly-differentiated; spread through airspaces (CHAMP), present; no visceral pleural invasion; no adjacent structures present; no known pre-surgical therapy; no LVI  All margins negative for invasive carcinoma; closest margin invasive carcinoma, bronchial vascular; distance from invasive carcinoma closest margin,> 2 cm; margins status for noninvasive tumor, all margins negative for noninvasive tumor  Number of lymph nodes examined, 10; all regional lymph nodes negative for tumor  >>>  pT3 pN0    PD-L1 expression:  Positive: High (TPS 95%; )  Negative for EGFR, KRAS, BRAF, AL K, ROS1, RET, MET, HER2  No gene rearrangement no reportable altered splicing events identified from RNA sequencing  No reportable pathogenic  variants found  MSI stable  TMB:  41.6m/MB  Fusions: Negative      Interval History 6/10/24:  Patient presents to the clinic today for a scheduled clinic visit to follow up regarding her diagnosis of adenocarcinoma of the upper lobe of the right lung.  She is due to receive C3D1 today in infusion. Patient completed her growth factor injections last week. Denies any fever, chills, shortness of breath outside of her usual or signs and symptoms associated with infection.  Denies any abdominal pain, constipation, diarrhea she admits to having changes with appetite.   Labwork was reviewed by the patient. All future appointments were discussed with the patient.       Review of Systems:   All systems reviewed and found to be negative except for the symptoms detailed above    Review of Systems   Constitutional: Negative.    HENT: Negative.     Eyes: Negative.    Respiratory: Negative.          On 2 L NS    Cardiovascular: Negative.    Gastrointestinal: Negative.    Genitourinary: Negative.    Musculoskeletal: Negative.    Skin: Negative.    Neurological: Negative.    Endo/Heme/Allergies: Negative.    Psychiatric/Behavioral: Negative.     All other systems reviewed and are negative.         Physical Examination:   VITAL SIGNS:   Vitals:    06/10/24 0752   BP: 111/71   Pulse: 97   Temp: 98.8 °F (37.1 °C)     Physical Exam  Vitals reviewed.   Constitutional:       Appearance: Normal appearance.   HENT:      Head: Normocephalic and atraumatic.      Mouth/Throat:      Mouth: Mucous membranes are moist.   Cardiovascular:      Rate and Rhythm: Normal rate and regular rhythm.      Pulses: Normal pulses.      Heart sounds: Normal heart sounds.   Pulmonary:      Effort: Pulmonary effort is normal.      Breath sounds: Normal breath sounds.   Abdominal:      General: Bowel sounds are normal.      Palpations: Abdomen is soft.   Skin:     General: Skin is warm and dry.   Neurological:      Mental Status: She is alert and oriented to  person, place, and time.   Psychiatric:         Mood and Affect: Mood normal.         Behavior: Behavior normal.         Thought Content: Thought content normal.         Judgment: Judgment normal.          Assessment:  Adenocarcinoma right upper lung lobe:   -LDCT chest without contrast 08/12/2022:  Lung rads 2  -LDCT chest without contrast 09/11/2023:  Lung rads 4A  -noncontrast chest CT 12/26/2023:  Lung rads 4A  -FDG PET-CT 01/29/2024:  Hypermetabolic right upper lobe nodule, 1.2 x 1.1 cm, maximum SUV 14, previously 1 cm x 0.9 cm; no distant metastases   -noncontrast chest CT 02/01/2024:  Right upper lobe nodule continues to enlarge, now 11 mm   -03/07/2024:  Right VATS, right upper lobe wedge resection, right upper lobectomy, right middle lobectomy, regional lymph node dissection:  -separate tumor nodules (metastases) in same lobe, right upper lung lobe, therefore, pT3 (0.8 cm and 0.6 cm, respectively); invasive acinar adenocarcinoma; G3, poorly-differentiated; no visceral pleural invasion; no LVI; margins negative; 10 regional lymph nodes negative  -pT3 pN0  -PD-L1 expression high positive (TPS 95%; )  -negative for EGFR, K-kathleen, BRAF, AL K, ROS1, RET, met, HER2  >>>  -pT3 pN0, most likely stage IIB (pending restaging imaging studies including PET-CT, or CTs C/A/P with contrast, whole-body nuclear medicine bone scan, and brain MRI with contrast)      Plan:   Primary Adenocarcinoma of upper lobe right lung:   Continue chemotherapy every 3 weeks x4 cycles   CBC/CMP weekly (6/17) & (6/24)  RTC with me on 7/1 with labs prior (CBC/CMP/Mag level) followed by next day treat in infusion C4D1 of Alimta/Cisplatin  In 6 months, repeat contrast-enhanced CT scan of chest for surveillance  For surveillance, CT chest with contrast in September (xanax ordered today   Colonoscopy to rule out colon cancer (patient is Cologuard positive on 08/17/2023) scheduled 5/30    Hypokalemia:   Potassium 4.3  Continue to take 1  potassium pill as instructed by her PCP  Will continue to monitor weekly     Decreased appetite:   Continue Megace tablets due to her being unable to consume the megace liquid.     Oral candidiasis:   Will continue to monitor     Hypomagnesium:   Start magnesium 400mg BID x 14 days-RX sent to pharmacy   Recheck mag level in 2 weeks     -check CBC and CMP  -she was last staged with PET-CT 01/29/2024, almost 3 months prior  -at this time, stage with contrast-enhanced CT scans of C/A/P  -also need brain MRI with contrast to rule out brain metastases    If no metastases on imaging studies, then, pT3 pN0 M0, stage IIB    Adjuvant therapy:  (Patient did not receive neoadjuvant systemic therapy)  -for stage IIB (pT3 pN0 M0), adjuvant chemotherapy is recommended as category 1  -PD-L1 positive (TPS 95%; )  - mutations negative    Preferred adjuvant chemotherapy regimens for adenocarcinoma:  -cisplatin +pemetrexed every 21 days x4 cycles    Cisplatin 75 mg per m2 day 1   Pemetrexed 500 mg per m2 day 1   Every 21 days x4 cycles  >>>  Subsequently:  Atezolizumab 840 mg every 2 weeks, 1200 mg every 3 weeks, or 1680 mg every 4 weeks for up to 1 year    Refer to surgery for MediPort placement for chemotherapy.     Cisplatin +pemetrexed:  -emesis risk: High  -premedicate with dexamethasone 4 mg oral every twice daily x3 days starting the day before drug administration reduced cutaneous toxicity  -primary prophylaxis with hematopoietic growth factor is not recommended) incidence of neutropenic fever is approximately 2%)  -with pemetrexed, avoid use if creatinine clearance is < 45 mL per minute  -vitamin supplementation with folic acid and intramuscular B12 prior to administration of pemetrexed and during treatment reduced both hematologic and non hematologic side effects  -check CBC weekly during treatment  -check CMP weekly during treatment  -monitor for hearing loss prior to each dose of cisplatin; audiometry as  clinically indicated  -Each cycle should not begin until the ANC count is >1500/microL and platelet count is >100,000/microL.  During treatment, if devyn ANC is <500/microL and devyn platelet count is ?50,000/microL, reduce dose of both pemetrexed and cisplatin by 25%.   Regardless of the ANC, reduce dose of both pemetrexed and cisplatin by 25% if devyn platelet count is <50,000/microL without bleeding.   If devyn platelet count is <50,000/microL with bleeding, reduce dose of both pemetrexed and cisplatin by 50%  -Neuropathy usually is seen only after cumulative doses of cisplatin beyond 400 mg/m2, although there is marked interindividual variation.  -The dose of pemetrexed should be reduced 50% for grade 3 or 4 mucositis.   -The doses of pemetrexed and cisplatin should be decreased to 75% of previous level for any other grade 3 or 4 toxicities or for any diarrhea requiring hospitalization.    Surveillance  -history and physical and chest CT +/- contrast every 6 months X 3 years (03/2024-03/2027), then history and physical and low-dose noncontrast chest CT annually  -smoking cessation advice and counseling   -FDG PET-CT or brain MRI is not routinely indicated    -08/17/2023: Cologuard positive  -needs evaluation with colonoscopy rule out colon cancer    Above discussed at length with the patient.  All questions answered.    Discussed labs, scans, pathology report, and staging of lung cancer, and gave her copies of relevant records.  Plan of investigations discussed in detail.  Plan of adjuvant therapy including chemotherapy, followed by immunotherapy, discussed.    Potential side effects of chemotherapy discussed.  Formal chemotherapy teaching with nursing staff to follow.           Follow-up:  No follow-ups on file.

## 2024-06-10 NOTE — NURSING
2073 Patient is here for labs, NP visit & C3 B-12, Alimta, Cisplatin. She is accompanied by her sister & spouse.   Magnesium 1.1 today. PO mag ordered by Ace Chaney NP today to  at her [facility].  Patient reported she forgot to take po dexamethasone yesterday & this morning. New order from DENILSON Chaney NP to give 20mgs dex iv today. Encouraged patient to keep a calendar of medications to be taken the week of chemo. Patient , sister & spouse present, all verbalized understanding.  2535 Infusion completed without incident.  Patient requested to leave mediport accessed; she is returning tomorrow for IV fluids.    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

## 2024-06-11 ENCOUNTER — INFUSION (OUTPATIENT)
Dept: INFUSION THERAPY | Facility: HOSPITAL | Age: 64
End: 2024-06-11
Attending: INTERNAL MEDICINE
Payer: MEDICAID

## 2024-06-11 VITALS
SYSTOLIC BLOOD PRESSURE: 125 MMHG | RESPIRATION RATE: 20 BRPM | TEMPERATURE: 97 F | BODY MASS INDEX: 33.05 KG/M2 | DIASTOLIC BLOOD PRESSURE: 65 MMHG | WEIGHT: 157.44 LBS | OXYGEN SATURATION: 95 % | HEIGHT: 58 IN | HEART RATE: 85 BPM

## 2024-06-11 DIAGNOSIS — C34.11 PRIMARY ADENOCARCINOMA OF UPPER LOBE OF RIGHT LUNG: Primary | ICD-10-CM

## 2024-06-11 PROCEDURE — 96361 HYDRATE IV INFUSION ADD-ON: CPT

## 2024-06-11 PROCEDURE — 25000003 PHARM REV CODE 250: Performed by: INTERNAL MEDICINE

## 2024-06-11 PROCEDURE — 63600175 PHARM REV CODE 636 W HCPCS

## 2024-06-11 PROCEDURE — 63600175 PHARM REV CODE 636 W HCPCS: Performed by: INTERNAL MEDICINE

## 2024-06-11 PROCEDURE — 96374 THER/PROPH/DIAG INJ IV PUSH: CPT

## 2024-06-11 RX ORDER — ONDANSETRON HYDROCHLORIDE 2 MG/ML
8 INJECTION, SOLUTION INTRAVENOUS ONCE
Status: COMPLETED | OUTPATIENT
Start: 2024-06-11 | End: 2024-06-11

## 2024-06-11 RX ORDER — SODIUM CHLORIDE 0.9 % (FLUSH) 0.9 %
10 SYRINGE (ML) INJECTION
Status: DISCONTINUED | OUTPATIENT
Start: 2024-06-11 | End: 2024-06-11 | Stop reason: HOSPADM

## 2024-06-11 RX ORDER — HEPARIN 100 UNIT/ML
500 SYRINGE INTRAVENOUS
Status: DISCONTINUED | OUTPATIENT
Start: 2024-06-11 | End: 2024-06-11 | Stop reason: HOSPADM

## 2024-06-11 RX ADMIN — SODIUM CHLORIDE 1000 ML: 9 INJECTION, SOLUTION INTRAVENOUS at 07:06

## 2024-06-11 RX ADMIN — HEPARIN 500 UNITS: 100 SYRINGE at 08:06

## 2024-06-11 RX ADMIN — ONDANSETRON 8 MG: 2 INJECTION INTRAMUSCULAR; INTRAVENOUS at 07:06

## 2024-06-11 NOTE — NURSING
Pt arrived for C3D2 IVF using her own walker & home oxygen and accompanied by significant other; pt complaint of being nauseated, stated took dex 8 mg po & zofran 4 mg po at 6:45 today; notified ZACKERY Chaney NP, who ordered Zofran 8 mg IVP which was given at 8 am; pt stated no longer nauseated at 0845; reminded pt she has compazine (prochlorerazine); AVS given, pt has weekly lab work and returns 7/1/24 for labs, ZACKERY Chaney NP, and Cisplatin/Alimta; also, informed pt I sophia Hess, RN Nurse Navigator about orders for IVF with home health; pt verbalized understanding of all topics discussed.

## 2024-06-17 ENCOUNTER — LAB VISIT (OUTPATIENT)
Dept: HEMATOLOGY/ONCOLOGY | Facility: CLINIC | Age: 64
End: 2024-06-17
Payer: MEDICAID

## 2024-06-17 DIAGNOSIS — C34.11 PRIMARY ADENOCARCINOMA OF UPPER LOBE OF RIGHT LUNG: ICD-10-CM

## 2024-06-17 DIAGNOSIS — C34.11 PRIMARY ADENOCARCINOMA OF UPPER LOBE OF RIGHT LUNG: Primary | ICD-10-CM

## 2024-06-17 DIAGNOSIS — K12.31 MUCOSITIS DUE TO CHEMOTHERAPY: ICD-10-CM

## 2024-06-17 LAB
ALBUMIN SERPL-MCNC: 3.3 G/DL (ref 3.4–4.8)
ALBUMIN/GLOB SERPL: 1.1 RATIO (ref 1.1–2)
ALP SERPL-CCNC: 97 UNIT/L (ref 40–150)
ALT SERPL-CCNC: 40 UNIT/L (ref 0–55)
ANION GAP SERPL CALC-SCNC: 13 MEQ/L
AST SERPL-CCNC: 24 UNIT/L (ref 5–34)
BASOPHILS # BLD AUTO: 0.01 X10(3)/MCL
BASOPHILS NFR BLD AUTO: 0.2 %
BILIRUB SERPL-MCNC: 0.6 MG/DL
BUN SERPL-MCNC: 32.3 MG/DL (ref 9.8–20.1)
CALCIUM SERPL-MCNC: 8.9 MG/DL (ref 8.4–10.2)
CHLORIDE SERPL-SCNC: 94 MMOL/L (ref 98–107)
CO2 SERPL-SCNC: 28 MMOL/L (ref 23–31)
CREAT SERPL-MCNC: 1.1 MG/DL (ref 0.55–1.02)
CREAT/UREA NIT SERPL: 29
EOSINOPHIL # BLD AUTO: 0.04 X10(3)/MCL (ref 0–0.9)
EOSINOPHIL NFR BLD AUTO: 0.6 %
ERYTHROCYTE [DISTWIDTH] IN BLOOD BY AUTOMATED COUNT: 14.4 % (ref 11.5–17)
GFR SERPLBLD CREATININE-BSD FMLA CKD-EPI: 57 ML/MIN/1.73/M2
GLOBULIN SER-MCNC: 3.1 GM/DL (ref 2.4–3.5)
GLUCOSE SERPL-MCNC: 152 MG/DL (ref 82–115)
HCT VFR BLD AUTO: 26.2 % (ref 37–47)
HGB BLD-MCNC: 9.2 G/DL (ref 12–16)
IMM GRANULOCYTES # BLD AUTO: 0.09 X10(3)/MCL (ref 0–0.04)
IMM GRANULOCYTES NFR BLD AUTO: 1.4 %
LYMPHOCYTES # BLD AUTO: 1.3 X10(3)/MCL (ref 0.6–4.6)
LYMPHOCYTES NFR BLD AUTO: 19.7 %
MAGNESIUM SERPL-MCNC: 1.9 MG/DL (ref 1.6–2.6)
MCH RBC QN AUTO: 29.9 PG (ref 27–31)
MCHC RBC AUTO-ENTMCNC: 35.1 G/DL (ref 33–36)
MCV RBC AUTO: 85.1 FL (ref 80–94)
MONOCYTES # BLD AUTO: 0.11 X10(3)/MCL (ref 0.1–1.3)
MONOCYTES NFR BLD AUTO: 1.7 %
NEUTROPHILS # BLD AUTO: 5.06 X10(3)/MCL (ref 2.1–9.2)
NEUTROPHILS NFR BLD AUTO: 76.4 %
NRBC BLD AUTO-RTO: 0 %
PLATELET # BLD AUTO: 174 X10(3)/MCL (ref 130–400)
PMV BLD AUTO: 9.8 FL (ref 7.4–10.4)
POTASSIUM SERPL-SCNC: 3.4 MMOL/L (ref 3.5–5.1)
PROT SERPL-MCNC: 6.4 GM/DL (ref 5.8–7.6)
RBC # BLD AUTO: 3.08 X10(6)/MCL (ref 4.2–5.4)
SODIUM SERPL-SCNC: 135 MMOL/L (ref 136–145)
WBC # BLD AUTO: 6.61 X10(3)/MCL (ref 4.5–11.5)

## 2024-06-17 PROCEDURE — 85025 COMPLETE CBC W/AUTO DIFF WBC: CPT

## 2024-06-17 PROCEDURE — 80053 COMPREHEN METABOLIC PANEL: CPT

## 2024-06-17 PROCEDURE — 83735 ASSAY OF MAGNESIUM: CPT

## 2024-06-17 PROCEDURE — 36415 COLL VENOUS BLD VENIPUNCTURE: CPT

## 2024-06-24 ENCOUNTER — INFUSION (OUTPATIENT)
Dept: INFUSION THERAPY | Facility: HOSPITAL | Age: 64
End: 2024-06-24
Attending: INTERNAL MEDICINE
Payer: MEDICAID

## 2024-06-24 ENCOUNTER — LAB VISIT (OUTPATIENT)
Dept: HEMATOLOGY/ONCOLOGY | Facility: CLINIC | Age: 64
End: 2024-06-24
Payer: MEDICAID

## 2024-06-24 ENCOUNTER — TELEPHONE (OUTPATIENT)
Dept: HEMATOLOGY/ONCOLOGY | Facility: CLINIC | Age: 64
End: 2024-06-24
Payer: MEDICAID

## 2024-06-24 VITALS
BODY MASS INDEX: 32.39 KG/M2 | DIASTOLIC BLOOD PRESSURE: 45 MMHG | HEIGHT: 58 IN | TEMPERATURE: 97 F | OXYGEN SATURATION: 94 % | RESPIRATION RATE: 18 BRPM | HEART RATE: 87 BPM | SYSTOLIC BLOOD PRESSURE: 90 MMHG | WEIGHT: 154.31 LBS

## 2024-06-24 DIAGNOSIS — C34.11 PRIMARY ADENOCARCINOMA OF UPPER LOBE OF RIGHT LUNG: ICD-10-CM

## 2024-06-24 DIAGNOSIS — K12.31 MUCOSITIS DUE TO CHEMOTHERAPY: ICD-10-CM

## 2024-06-24 DIAGNOSIS — D70.1 CHEMOTHERAPY-INDUCED NEUTROPENIA: Primary | ICD-10-CM

## 2024-06-24 DIAGNOSIS — T45.1X5A CHEMOTHERAPY-INDUCED NEUTROPENIA: Primary | ICD-10-CM

## 2024-06-24 LAB
ALBUMIN SERPL-MCNC: 3.2 G/DL (ref 3.4–4.8)
ALBUMIN/GLOB SERPL: 1.1 RATIO (ref 1.1–2)
ALP SERPL-CCNC: 101 UNIT/L (ref 40–150)
ALT SERPL-CCNC: 30 UNIT/L (ref 0–55)
ANION GAP SERPL CALC-SCNC: 10 MEQ/L
AST SERPL-CCNC: 20 UNIT/L (ref 5–34)
BASOPHILS # BLD AUTO: 0 X10(3)/MCL
BASOPHILS NFR BLD AUTO: 0 %
BILIRUB SERPL-MCNC: 0.5 MG/DL
BUN SERPL-MCNC: 9.1 MG/DL (ref 9.8–20.1)
CALCIUM SERPL-MCNC: 8.8 MG/DL (ref 8.4–10.2)
CHLORIDE SERPL-SCNC: 96 MMOL/L (ref 98–107)
CO2 SERPL-SCNC: 30 MMOL/L (ref 23–31)
CREAT SERPL-MCNC: 0.81 MG/DL (ref 0.55–1.02)
CREAT/UREA NIT SERPL: 11
EOSINOPHIL # BLD AUTO: 0.07 X10(3)/MCL (ref 0–0.9)
EOSINOPHIL NFR BLD AUTO: 4.2 %
ERYTHROCYTE [DISTWIDTH] IN BLOOD BY AUTOMATED COUNT: 14.5 % (ref 11.5–17)
GFR SERPLBLD CREATININE-BSD FMLA CKD-EPI: >60 ML/MIN/1.73/M2
GLOBULIN SER-MCNC: 2.9 GM/DL (ref 2.4–3.5)
GLUCOSE SERPL-MCNC: 144 MG/DL (ref 82–115)
HCT VFR BLD AUTO: 21.5 % (ref 37–47)
HGB BLD-MCNC: 7.4 G/DL (ref 12–16)
IMM GRANULOCYTES # BLD AUTO: 0 X10(3)/MCL (ref 0–0.04)
IMM GRANULOCYTES NFR BLD AUTO: 0 %
LYMPHOCYTES # BLD AUTO: 0.77 X10(3)/MCL (ref 0.6–4.6)
LYMPHOCYTES NFR BLD AUTO: 46.7 %
MAGNESIUM SERPL-MCNC: 1.5 MG/DL (ref 1.6–2.6)
MCH RBC QN AUTO: 29.7 PG (ref 27–31)
MCHC RBC AUTO-ENTMCNC: 34.4 G/DL (ref 33–36)
MCV RBC AUTO: 86.3 FL (ref 80–94)
MONOCYTES # BLD AUTO: 0.26 X10(3)/MCL (ref 0.1–1.3)
MONOCYTES NFR BLD AUTO: 15.8 %
NEUTROPHILS # BLD AUTO: 0.55 X10(3)/MCL (ref 2.1–9.2)
NEUTROPHILS NFR BLD AUTO: 33.3 %
NRBC BLD AUTO-RTO: 0 %
PLATELET # BLD AUTO: 101 X10(3)/MCL (ref 130–400)
PMV BLD AUTO: 9.8 FL (ref 7.4–10.4)
POTASSIUM SERPL-SCNC: 3.3 MMOL/L (ref 3.5–5.1)
PROT SERPL-MCNC: 6.1 GM/DL (ref 5.8–7.6)
RBC # BLD AUTO: 2.49 X10(6)/MCL (ref 4.2–5.4)
SODIUM SERPL-SCNC: 136 MMOL/L (ref 136–145)
WBC # BLD AUTO: 1.65 X10(3)/MCL (ref 4.5–11.5)

## 2024-06-24 PROCEDURE — 80053 COMPREHEN METABOLIC PANEL: CPT

## 2024-06-24 PROCEDURE — 36415 COLL VENOUS BLD VENIPUNCTURE: CPT

## 2024-06-24 PROCEDURE — 63600175 PHARM REV CODE 636 W HCPCS: Mod: JZ,JB,JG

## 2024-06-24 PROCEDURE — 85025 COMPLETE CBC W/AUTO DIFF WBC: CPT

## 2024-06-24 PROCEDURE — 83735 ASSAY OF MAGNESIUM: CPT

## 2024-06-24 PROCEDURE — 96372 THER/PROPH/DIAG INJ SC/IM: CPT

## 2024-06-24 RX ORDER — CIPROFLOXACIN 500 MG/1
500 TABLET ORAL 2 TIMES DAILY
Qty: 14 TABLET | Refills: 0 | Status: SHIPPED | OUTPATIENT
Start: 2024-06-24 | End: 2024-07-01

## 2024-06-24 RX ADMIN — FILGRASTIM-SNDZ 480 MCG: 480 INJECTION, SOLUTION INTRAVENOUS; SUBCUTANEOUS at 12:06

## 2024-06-24 NOTE — NURSING
Pt had scheduled labs today. . Per DENILSON Chaney plan: Zarxio injections x5 days starting today. Notified pt to  Ciprofloxacin @ her pharmacy. Notified pt to go to ER if she develops fever 100.3 or higher. Educated pt on neutropenic precautions, pt verbalized understanding. Denies N/V/C/D, fever, cough, chills at this time. Reports fatigue and weakness. Zarxio #1/5 given LLQ abd SQ. Notified DENILSON Chaney pt's BP is 90/45, she is refusing IVF in infusion because she did not put on her mediport numbing cream. Received telephone orders for pt to get 1L NS at home with home health nurse. Pt is to call clinic if home health nurse has any issues. Pt verbalized understanding.

## 2024-06-24 NOTE — PROGRESS NOTES
Patient presented to the clinic today for a scheduled lab visit she was found to have chemotherapy induced neutropenia with an ANC of 550.  She last received chemotherapy on 06/10.  Denies any fever, chills, shortness of breath or signs and symptoms associated with infection.      Plan:   Give Zarxio injection 480 mcg x 5 days in infusion  Prior to giving the 5th injection draw labs (CBC)-provider needs to few prior to injection  Start Cipro 500 mg b.i.d. times 7 days-Rx sent to pharmacy

## 2024-06-25 ENCOUNTER — DOCUMENT SCAN (OUTPATIENT)
Dept: HOME HEALTH SERVICES | Facility: HOSPITAL | Age: 64
End: 2024-06-25
Payer: MEDICAID

## 2024-06-25 ENCOUNTER — INFUSION (OUTPATIENT)
Dept: INFUSION THERAPY | Facility: HOSPITAL | Age: 64
End: 2024-06-25
Attending: INTERNAL MEDICINE
Payer: MEDICAID

## 2024-06-25 VITALS
HEIGHT: 58 IN | OXYGEN SATURATION: 98 % | TEMPERATURE: 97 F | RESPIRATION RATE: 20 BRPM | BODY MASS INDEX: 33.02 KG/M2 | SYSTOLIC BLOOD PRESSURE: 97 MMHG | WEIGHT: 157.31 LBS | DIASTOLIC BLOOD PRESSURE: 51 MMHG | HEART RATE: 76 BPM

## 2024-06-25 DIAGNOSIS — T45.1X5A CHEMOTHERAPY-INDUCED NEUTROPENIA: Primary | ICD-10-CM

## 2024-06-25 DIAGNOSIS — D70.1 CHEMOTHERAPY-INDUCED NEUTROPENIA: Primary | ICD-10-CM

## 2024-06-25 PROCEDURE — 63600175 PHARM REV CODE 636 W HCPCS: Mod: JZ,JB,JG

## 2024-06-25 PROCEDURE — 96372 THER/PROPH/DIAG INJ SC/IM: CPT

## 2024-06-25 RX ADMIN — FILGRASTIM-SNDZ 480 MCG: 480 INJECTION, SOLUTION INTRAVENOUS; SUBCUTANEOUS at 11:06

## 2024-06-25 NOTE — NURSING
Pt arrived for Zarxio injection # 2 of 5, accompanied by spouse, and using own portable oxygen at 2 liters nasal cannula; pt asked for a cream or something she can put is her nose bucause its dry; sent message to GODWIN Rosa MA for ZACKERY Chaney NP, asking her to respond to pt by phone; pt received Zarxio in RLQ without incident; AVS given & pt & spouse verbalized understanding next Infusion appt tomorrow at 11 am.

## 2024-06-26 ENCOUNTER — INFUSION (OUTPATIENT)
Dept: INFUSION THERAPY | Facility: HOSPITAL | Age: 64
End: 2024-06-26
Attending: INTERNAL MEDICINE
Payer: MEDICAID

## 2024-06-26 VITALS
TEMPERATURE: 98 F | HEART RATE: 90 BPM | DIASTOLIC BLOOD PRESSURE: 60 MMHG | SYSTOLIC BLOOD PRESSURE: 137 MMHG | RESPIRATION RATE: 18 BRPM | OXYGEN SATURATION: 96 %

## 2024-06-26 DIAGNOSIS — D70.1 CHEMOTHERAPY-INDUCED NEUTROPENIA: Primary | ICD-10-CM

## 2024-06-26 DIAGNOSIS — T45.1X5A CHEMOTHERAPY-INDUCED NEUTROPENIA: Primary | ICD-10-CM

## 2024-06-26 PROCEDURE — 96372 THER/PROPH/DIAG INJ SC/IM: CPT

## 2024-06-26 PROCEDURE — 63600175 PHARM REV CODE 636 W HCPCS: Mod: JZ,JB,JG

## 2024-06-26 RX ADMIN — FILGRASTIM-SNDZ 480 MCG: 480 INJECTION, SOLUTION INTRAVENOUS; SUBCUTANEOUS at 11:06

## 2024-06-26 NOTE — NURSING
Pt arrived for Zarxio #3/5 given R arm SQ. Rreports chronic SOB on exertion on 2L NC portable oxygen. Denies pain, N/V/C/D, fever, cough, rash, or chills. Aware of appt time tomorrow.

## 2024-06-27 ENCOUNTER — INFUSION (OUTPATIENT)
Dept: INFUSION THERAPY | Facility: HOSPITAL | Age: 64
End: 2024-06-27
Attending: INTERNAL MEDICINE
Payer: MEDICAID

## 2024-06-27 VITALS
TEMPERATURE: 97 F | DIASTOLIC BLOOD PRESSURE: 63 MMHG | RESPIRATION RATE: 20 BRPM | OXYGEN SATURATION: 95 % | HEART RATE: 100 BPM | SYSTOLIC BLOOD PRESSURE: 94 MMHG

## 2024-06-27 DIAGNOSIS — T45.1X5A CHEMOTHERAPY-INDUCED NEUTROPENIA: Primary | ICD-10-CM

## 2024-06-27 DIAGNOSIS — D70.1 CHEMOTHERAPY-INDUCED NEUTROPENIA: Primary | ICD-10-CM

## 2024-06-27 PROCEDURE — 96372 THER/PROPH/DIAG INJ SC/IM: CPT

## 2024-06-27 PROCEDURE — 63600175 PHARM REV CODE 636 W HCPCS: Mod: JZ,JG

## 2024-06-27 RX ADMIN — FILGRASTIM 480 MCG: 480 INJECTION, SOLUTION INTRAVENOUS; SUBCUTANEOUS at 12:06

## 2024-06-28 ENCOUNTER — TELEPHONE (OUTPATIENT)
Dept: HEMATOLOGY/ONCOLOGY | Facility: CLINIC | Age: 64
End: 2024-06-28
Payer: MEDICAID

## 2024-06-28 ENCOUNTER — INFUSION (OUTPATIENT)
Dept: INFUSION THERAPY | Facility: HOSPITAL | Age: 64
End: 2024-06-28
Attending: INTERNAL MEDICINE
Payer: MEDICAID

## 2024-06-28 ENCOUNTER — LAB VISIT (OUTPATIENT)
Dept: HEMATOLOGY/ONCOLOGY | Facility: CLINIC | Age: 64
End: 2024-06-28
Payer: MEDICAID

## 2024-06-28 ENCOUNTER — DOCUMENT SCAN (OUTPATIENT)
Dept: HOME HEALTH SERVICES | Facility: HOSPITAL | Age: 64
End: 2024-06-28
Payer: MEDICAID

## 2024-06-28 DIAGNOSIS — K12.31 MUCOSITIS DUE TO CHEMOTHERAPY: ICD-10-CM

## 2024-06-28 DIAGNOSIS — C34.11 PRIMARY ADENOCARCINOMA OF UPPER LOBE OF RIGHT LUNG: ICD-10-CM

## 2024-06-28 LAB
ABS NEUT CALC (OHS): 37.38 X10(3)/MCL (ref 2.1–9.2)
ALBUMIN SERPL-MCNC: 3.1 G/DL (ref 3.4–4.8)
ALBUMIN/GLOB SERPL: 1.1 RATIO (ref 1.1–2)
ALP SERPL-CCNC: 277 UNIT/L (ref 40–150)
ALT SERPL-CCNC: 22 UNIT/L (ref 0–55)
ANION GAP SERPL CALC-SCNC: 15 MEQ/L
ANISOCYTOSIS BLD QL SMEAR: ABNORMAL
AST SERPL-CCNC: 35 UNIT/L (ref 5–34)
BILIRUB SERPL-MCNC: 0.6 MG/DL
BUN SERPL-MCNC: 9.3 MG/DL (ref 9.8–20.1)
CALCIUM SERPL-MCNC: 8.9 MG/DL (ref 8.4–10.2)
CHLORIDE SERPL-SCNC: 92 MMOL/L (ref 98–107)
CO2 SERPL-SCNC: 31 MMOL/L (ref 23–31)
CREAT SERPL-MCNC: 0.97 MG/DL (ref 0.55–1.02)
CREAT/UREA NIT SERPL: 10
DOHLE BOD BLD QL SMEAR: ABNORMAL
EOSINOPHIL NFR BLD MANUAL: 0.54 X10(3)/MCL (ref 0–0.9)
EOSINOPHIL NFR BLD MANUAL: 1 % (ref 0–8)
ERYTHROCYTE [DISTWIDTH] IN BLOOD BY AUTOMATED COUNT: 17.2 % (ref 11.5–17)
GFR SERPLBLD CREATININE-BSD FMLA CKD-EPI: >60 ML/MIN/1.73/M2
GIANT PLATELETS: ABNORMAL
GLOBULIN SER-MCNC: 2.8 GM/DL (ref 2.4–3.5)
GLUCOSE SERPL-MCNC: 175 MG/DL (ref 82–115)
HCT VFR BLD AUTO: 21.1 % (ref 37–47)
HGB BLD-MCNC: 7 G/DL (ref 12–16)
HYPOCHROMIA BLD QL SMEAR: ABNORMAL
LYMPH ABN # BLD MANUAL: 1 %
LYMPHOCYTES NFR BLD MANUAL: 12 % (ref 13–40)
LYMPHOCYTES NFR BLD MANUAL: 6.5 X10(3)/MCL
MACROCYTES BLD QL SMEAR: ABNORMAL
MCH RBC QN AUTO: 30.4 PG (ref 27–31)
MCHC RBC AUTO-ENTMCNC: 33.2 G/DL (ref 33–36)
MCV RBC AUTO: 91.7 FL (ref 80–94)
METAMYELOCYTES NFR BLD MANUAL: 8 %
MICROCYTES BLD QL SMEAR: ABNORMAL
MONOCYTES NFR BLD MANUAL: 3.79 X10(3)/MCL (ref 0.1–1.3)
MONOCYTES NFR BLD MANUAL: 7 % (ref 2–11)
NEUTROPHILS NFR BLD MANUAL: 65 % (ref 47–80)
NEUTS BAND NFR BLD MANUAL: 4 % (ref 0–11)
NRBC BLD AUTO-RTO: 0.5 %
NRBC BLD MANUAL-RTO: 2 %
PLATELET # BLD AUTO: 332 X10(3)/MCL (ref 130–400)
PLATELET # BLD EST: ADEQUATE 10*3/UL
PMV BLD AUTO: 9.5 FL (ref 7.4–10.4)
POLYCHROMASIA BLD QL SMEAR: ABNORMAL
POTASSIUM SERPL-SCNC: 3.5 MMOL/L (ref 3.5–5.1)
PROLYMPHOCYTES # BLD MANUAL: 1 %
PROMYELOCYTES # BLD MANUAL: 1 %
PROT SERPL-MCNC: 5.9 GM/DL (ref 5.8–7.6)
RBC # BLD AUTO: 2.3 X10(6)/MCL (ref 4.2–5.4)
RBC MORPH BLD: ABNORMAL
SMUDGE CELL (OLG): SLIGHT
SODIUM SERPL-SCNC: 138 MMOL/L (ref 136–145)
STIPPLED RBC (OHS): SLIGHT
WBC # BLD AUTO: 54.17 X10(3)/MCL (ref 4.5–11.5)

## 2024-06-28 PROCEDURE — 85025 COMPLETE CBC W/AUTO DIFF WBC: CPT

## 2024-06-28 PROCEDURE — 36415 COLL VENOUS BLD VENIPUNCTURE: CPT

## 2024-06-28 PROCEDURE — 80053 COMPREHEN METABOLIC PANEL: CPT

## 2024-06-28 NOTE — NURSING
1050  Labs results back with WBC 54.17.  A Shiv NP called to say to discontinue growth factor injections.  Pt scheduled to return Monday for labs, provider, and chemo.

## 2024-06-28 NOTE — TELEPHONE ENCOUNTER
Elva Cedillo with lab called stating patient WBC 54.17 with NON per JOLANTA Bello: Hold Neupogen injection

## 2024-06-28 NOTE — PROGRESS NOTES
Reason for Follow-up:  Reason for consultation:  -adenocarcinoma right upper lung lobe, right VATS/right upper lobe wedge resection/completion bright upper lobectomy and right middle lobectomy and regional lymph node dissection 2024, G3, pT3 pN0  -Cologuard positive    History:  Social history:  .  Lives in [place].  Has 3 children.  Does not work.  Has been smoking 1-2 pack of cigarettes daily for 51 years, since age 12; discontinued recently.  No alcohol or illicit drug abuse.      Family history:   Mother experienced some kind of intrathoracic malignancy at age 83;  from MI at age 83   Father  from prostate cancer) experienced at age 83 and probably, sarcoma) experienced at age 83)    Health maintenance:   -PCP in [place]  -says that she had screening colonoscopy performed in [place] in , and that it was unremarkable  -says that now, for positive Cologuard test, she is scheduled for colonoscopy in May 2024  -2023:  Cologuard positive  -2023:  Bilateral digital screening mammogram with tomosynthesis (comparison:  2022 mammogram, etc.):  BI-RADS: 1 negative        History of Present Illness:   No chief complaint on file.        Oncologic/Hematologic History:  Oncology History   Primary adenocarcinoma of upper lobe of right lung   3/7/2024 Cancer Staged    Staging form: Lung, AJCC 8th Edition  - Pathologic stage from 3/7/2024: Stage IIB (pT3, pN0, cM0)     4/15/2024 Initial Diagnosis    Primary adenocarcinoma of upper lobe of right lung     2024 -  Chemotherapy    Treatment Summary   Plan Name: OP NSCLC PEMETREXED + CISPLATIN Q3W  Treatment Goal: Curative  Status: Active  Start Date: 2024  End Date: 7/3/2024 (Planned)  Provider: Emery Mahoney MD  Chemotherapy: CISplatin (Platinol) 132 mg in sodium chloride 0.9% 697 mL chemo infusion, 141 mg, Intravenous, Clinic/[facility] 1 time, 4 of 4 cycles  Administration: 132 mg (2024), 128 mg  (6/10/2024), 132 mg (5/20/2024)  PEMEtrexed disodium (ALIMTA) 900 mg in sodium chloride 0.9% SolP 100 mL chemo infusion, 950 mg, Intravenous, Clinic[facility] 1 time, 4 of 4 cycles  Dose modification: 375 mg/m2 (75 % of original dose 500 mg/m2, Cycle 4, Reason: Dose Not Tolerated)  Administration: 900 mg (4/29/2024), 900 mg (6/10/2024), 900 mg (5/20/2024)     63-year-old female, referred from [facility], Dr. Arnulfo Middleton, with poorly-differentiated adenocarcinoma of right lung.    -08/12/2022: CT chest lung screening low-dose (comparison:  09/24/2020):  Lung rads 2: Benign appearance or behavior:  Continue annual screening with LDCT in 12 months  -09/11/2023:  CT chest lung screening low-dose without contrast (comparison:  08/12/2022):  Lung rads category 4A: Suspicious; enlarging nodule right upper lobe, lobulated, < 8 mm, i.e., 7 x 6.7 mm, previously 5 mm, previously not lobulated, too small for adequate detection on PET-CT; recommend three-month follow-up)  -12/26/2023: CT chest with contrast (comparison:  09/11/2023):  Lung rads 4A: Very suspicious; continued enlargement of right upper lobe lung nodule of concern (9.5 x 8.9 mm)  -01/29/2024: FDG PET-CT (comparison:  Chest CT 12/26/2023; CT abdomen pelvis 09/04/2019):  1. Markedly FDG-avid right upper lobe soft tissue nodule, increased in size in the interval, raising concern for malignancy (smoothly marginated noncalcified soft tissue nodule lateral subpleural right upper lobe, 1.2 x 1.1 cm, maximum SUV 14, previously 1 cm x 0.9 cm).  2. No definite PET-CT findings to suggest additional right hemithoracic or more distant metastatic disease.  -02/01/2024: CT chest without contrast (comparison:  12/26/2023): Lateral right upper lobe nodule continues to enlarge in size currently measuring 11 mm concerning for malignancy. Recommend further evaluation.   -03/07/2024:  Right VATS, right upper lobe wedge resection; right upper lobectomy, right middle  lobectomy, regional lymph node dissection:  1. Right lung, upper lobe, wedge resection:  Poorly-differentiated adenocarcinoma, 0.6 cm, clear margins of resection  2. Level 8R lymph node (paraesophageal), biopsy:  3 lymph nodes, negative for metastatic carcinoma    3. Level 4R lymph node (lower paratracheal), biopsy: 2 lymph nodes, negative for metastatic carcinoma  4. Level 7R lymph node (subcarinal), biopsy:  1 lymph node, negative for metastatic carcinoma  5. Level 11R lymph node (interlobar), biopsy:  2 lymph nodes, negative for metastatic carcinoma    6. Right lung, upper lobe, completion lobectomy:  -poorly-differentiated adenocarcinoma, 0.8 cm  -bronchial and vascular margins of resection negative   -1 hilar lymph node with no evidence of metastatic carcinoma  7. Level 12R lymph node (lobar), lymphadenectomy:  1 lymph node, negative for metastatic carcinoma    Synoptic report:  Total number of primary tumors: 2  S/P wedge resection  Completion lobectomy  Right lung  Separate tumor nodules (metastases) in same lobe, therefore, pT3  number of intrapulmonary metastases:  2   Tumor site:  Upper lobe of lung  Tumor size:  Total tumor size:  Greatest dimension:  0.8 cm  Invasive acinar adenocarcinoma; G3, poorly-differentiated; spread through airspaces (CHAMP), present; no visceral pleural invasion; no adjacent structures present; no known pre-surgical therapy; no LVI  All margins negative for invasive carcinoma; closest margin invasive carcinoma, bronchial vascular; distance from invasive carcinoma closest margin,> 2 cm; margins status for noninvasive tumor, all margins negative for noninvasive tumor  Number of lymph nodes examined, 10; all regional lymph nodes negative for tumor  >>>  pT3 pN0    PD-L1 expression:  Positive: High (TPS 95%; )  Negative for EGFR, KRAS, BRAF, AL K, ROS1, RET, MET, HER2  No gene rearrangement no reportable altered splicing events identified from RNA sequencing  No reportable  pathogenic variants found  MSI stable  TMB:  41.6m/MB  Fusions: Negative      Interval History 7/1/24:  Patient presents to the clinic today for a scheduled clinic visit to follow up regarding her diagnosis of adenocarcinoma of the upper lobe of the right lung.  She is due to receive C4D1 today in infusion. Patient completed her growth factor injections last week. Denies any fever, chills, shortness of breath outside of her usual or signs and symptoms associated with infection.  Denies any abdominal pain, constipation, diarrhea she admits to having changes with appetite.   Labwork was reviewed by the patient. All future appointments were discussed with the patient.       Review of Systems:   All systems reviewed and found to be negative except for the symptoms detailed above    Review of Systems   Constitutional: Negative.    HENT: Negative.     Eyes: Negative.    Respiratory: Negative.          On 2 L NS    Cardiovascular: Negative.    Gastrointestinal: Negative.    Genitourinary: Negative.    Musculoskeletal: Negative.    Skin: Negative.    Neurological: Negative.    Endo/Heme/Allergies: Negative.    Psychiatric/Behavioral: Negative.     All other systems reviewed and are negative.         Physical Examination:   VITAL SIGNS:   Vitals:    07/01/24 0814   BP: 120/73   Pulse: 87   Temp: 98 °F (36.7 °C)       Physical Exam  Vitals reviewed.   Constitutional:       Appearance: Normal appearance.   HENT:      Head: Normocephalic and atraumatic.      Mouth/Throat:      Mouth: Mucous membranes are moist.   Cardiovascular:      Rate and Rhythm: Normal rate and regular rhythm.      Pulses: Normal pulses.      Heart sounds: Normal heart sounds.   Pulmonary:      Effort: Pulmonary effort is normal.      Breath sounds: Normal breath sounds.   Abdominal:      General: Bowel sounds are normal.      Palpations: Abdomen is soft.   Skin:     General: Skin is warm and dry.   Neurological:      Mental Status: She is alert and  oriented to person, place, and time.   Psychiatric:         Mood and Affect: Mood normal.         Behavior: Behavior normal.         Thought Content: Thought content normal.         Judgment: Judgment normal.          Assessment:  Adenocarcinoma right upper lung lobe:   -LDCT chest without contrast 08/12/2022:  Lung rads 2  -LDCT chest without contrast 09/11/2023:  Lung rads 4A  -noncontrast chest CT 12/26/2023:  Lung rads 4A  -FDG PET-CT 01/29/2024:  Hypermetabolic right upper lobe nodule, 1.2 x 1.1 cm, maximum SUV 14, previously 1 cm x 0.9 cm; no distant metastases   -noncontrast chest CT 02/01/2024:  Right upper lobe nodule continues to enlarge, now 11 mm   -03/07/2024:  Right VATS, right upper lobe wedge resection, right upper lobectomy, right middle lobectomy, regional lymph node dissection:  -separate tumor nodules (metastases) in same lobe, right upper lung lobe, therefore, pT3 (0.8 cm and 0.6 cm, respectively); invasive acinar adenocarcinoma; G3, poorly-differentiated; no visceral pleural invasion; no LVI; margins negative; 10 regional lymph nodes negative  -pT3 pN0  -PD-L1 expression high positive (TPS 95%; )  -negative for EGFR, K-kathleen, BRAF, AL K, ROS1, RET, met, HER2  >>>  -pT3 pN0, most likely stage IIB (pending restaging imaging studies including PET-CT, or CTs C/A/P with contrast, whole-body nuclear medicine bone scan, and brain MRI with contrast)      Plan:   Primary Adenocarcinoma of upper lobe right lung:   Continue chemotherapy every 3 weeks x4 cycles   Proceed with C4D1 in infusion today with a 25% dose reduction to Alimta.   Return to infusion for C4D2 tomorrow (7/2) for IV Fluids   Continue weekly labs with home health nurse (7/3,7/10 & 7/17)  ONLY DRAW an H/H on 7/3  RTC with MD in 3 weeks (7/22) with labs prior (CBC/CMP/Mag level) for completion of chemotherapy   In 6 months, repeat contrast-enhanced CT scan of chest for surveillance- Scheduled 9/11/24  For surveillance, CT chest with  contrast in September  Colonoscopy to rule out colon cancer (patient is Cologuard positive on 08/17/2023) scheduled 5/30    Hypokalemia:   Potassium 4.1  Continue to take 1 potassium pill as instructed by her PCP  Will continue to monitor weekly     Decreased appetite:   Continue Megace tablets due to her being unable to consume the megace liquid.   Lost 2 pounds in 6 days     Oral candidiasis:   Will continue to monitor     Hypomagnesium:   Mag level today is 1.60  Continue to monitor     -check CBC and CMP  -she was last staged with PET-CT 01/29/2024, almost 3 months prior  -at this time, stage with contrast-enhanced CT scans of C/A/P  -also need brain MRI with contrast to rule out brain metastases    If no metastases on imaging studies, then, pT3 pN0 M0, stage IIB    Adjuvant therapy:  (Patient did not receive neoadjuvant systemic therapy)  -for stage IIB (pT3 pN0 M0), adjuvant chemotherapy is recommended as category 1  -PD-L1 positive (TPS 95%; )  - mutations negative    Preferred adjuvant chemotherapy regimens for adenocarcinoma:  -cisplatin +pemetrexed every 21 days x4 cycles    Cisplatin 75 mg per m2 day 1   Pemetrexed 500 mg per m2 day 1   Every 21 days x4 cycles  >>>  Subsequently:  Atezolizumab 840 mg every 2 weeks, 1200 mg every 3 weeks, or 1680 mg every 4 weeks for up to 1 year    Refer to surgery for MediPort placement for chemotherapy.     Cisplatin +pemetrexed:  -emesis risk: High  -premedicate with dexamethasone 4 mg oral every twice daily x3 days starting the day before drug administration reduced cutaneous toxicity  -primary prophylaxis with hematopoietic growth factor is not recommended) incidence of neutropenic fever is approximately 2%)  -with pemetrexed, avoid use if creatinine clearance is < 45 mL per minute  -vitamin supplementation with folic acid and intramuscular B12 prior to administration of pemetrexed and during treatment reduced both hematologic and non hematologic side  effects  -check CBC weekly during treatment  -check CMP weekly during treatment  -monitor for hearing loss prior to each dose of cisplatin; audiometry as clinically indicated  -Each cycle should not begin until the ANC count is >1500/microL and platelet count is >100,000/microL.  During treatment, if devyn ANC is <500/microL and devyn platelet count is ?50,000/microL, reduce dose of both pemetrexed and cisplatin by 25%.   Regardless of the ANC, reduce dose of both pemetrexed and cisplatin by 25% if devyn platelet count is <50,000/microL without bleeding.   If devyn platelet count is <50,000/microL with bleeding, reduce dose of both pemetrexed and cisplatin by 50%  -Neuropathy usually is seen only after cumulative doses of cisplatin beyond 400 mg/m2, although there is marked interindividual variation.  -The dose of pemetrexed should be reduced 50% for grade 3 or 4 mucositis.   -The doses of pemetrexed and cisplatin should be decreased to 75% of previous level for any other grade 3 or 4 toxicities or for any diarrhea requiring hospitalization.    Surveillance  -history and physical and chest CT +/- contrast every 6 months X 3 years (03/2024-03/2027), then history and physical and low-dose noncontrast chest CT annually  -smoking cessation advice and counseling   -FDG PET-CT or brain MRI is not routinely indicated    -08/17/2023: Cologuard positive  -needs evaluation with colonoscopy rule out colon cancer    Above discussed at length with the patient.  All questions answered.    Discussed labs, scans, pathology report, and staging of lung cancer, and gave her copies of relevant records.  Plan of investigations discussed in detail.  Plan of adjuvant therapy including chemotherapy, followed by immunotherapy, discussed.    Potential side effects of chemotherapy discussed.  Formal chemotherapy teaching with nursing staff to follow.           Follow-up:  No follow-ups on file.

## 2024-07-01 ENCOUNTER — INFUSION (OUTPATIENT)
Dept: INFUSION THERAPY | Facility: HOSPITAL | Age: 64
End: 2024-07-01
Attending: INTERNAL MEDICINE
Payer: MEDICAID

## 2024-07-01 ENCOUNTER — DOCUMENT SCAN (OUTPATIENT)
Dept: HOME HEALTH SERVICES | Facility: HOSPITAL | Age: 64
End: 2024-07-01
Payer: MEDICAID

## 2024-07-01 ENCOUNTER — OFFICE VISIT (OUTPATIENT)
Dept: HEMATOLOGY/ONCOLOGY | Facility: CLINIC | Age: 64
End: 2024-07-01
Payer: MEDICAID

## 2024-07-01 VITALS
SYSTOLIC BLOOD PRESSURE: 145 MMHG | RESPIRATION RATE: 20 BRPM | DIASTOLIC BLOOD PRESSURE: 52 MMHG | HEART RATE: 83 BPM | TEMPERATURE: 98 F | OXYGEN SATURATION: 98 %

## 2024-07-01 VITALS
SYSTOLIC BLOOD PRESSURE: 120 MMHG | BODY MASS INDEX: 32.57 KG/M2 | DIASTOLIC BLOOD PRESSURE: 73 MMHG | TEMPERATURE: 98 F | WEIGHT: 155.19 LBS | HEIGHT: 58 IN | HEART RATE: 87 BPM | OXYGEN SATURATION: 99 %

## 2024-07-01 DIAGNOSIS — C34.11 PRIMARY ADENOCARCINOMA OF UPPER LOBE OF RIGHT LUNG: Primary | ICD-10-CM

## 2024-07-01 DIAGNOSIS — Z51.11 ENCOUNTER FOR CHEMOTHERAPY MANAGEMENT: ICD-10-CM

## 2024-07-01 DIAGNOSIS — E83.42 HYPOMAGNESEMIA: ICD-10-CM

## 2024-07-01 DIAGNOSIS — R63.0 DECREASED APPETITE: ICD-10-CM

## 2024-07-01 DIAGNOSIS — E87.6 HYPOKALEMIA: ICD-10-CM

## 2024-07-01 PROCEDURE — 99215 OFFICE O/P EST HI 40 MIN: CPT | Mod: PBBFAC

## 2024-07-01 PROCEDURE — 25000003 PHARM REV CODE 250

## 2024-07-01 PROCEDURE — 63600175 PHARM REV CODE 636 W HCPCS: Mod: JZ,JG

## 2024-07-01 PROCEDURE — 99215 OFFICE O/P EST HI 40 MIN: CPT | Mod: S$PBB,,,

## 2024-07-01 PROCEDURE — 36593 DECLOT VASCULAR DEVICE: CPT

## 2024-07-01 PROCEDURE — 63600175 PHARM REV CODE 636 W HCPCS: Performed by: INTERNAL MEDICINE

## 2024-07-01 PROCEDURE — 4010F ACE/ARB THERAPY RXD/TAKEN: CPT | Mod: CPTII,,,

## 2024-07-01 PROCEDURE — 96372 THER/PROPH/DIAG INJ SC/IM: CPT | Mod: 59

## 2024-07-01 PROCEDURE — 3078F DIAST BP <80 MM HG: CPT | Mod: CPTII,,,

## 2024-07-01 PROCEDURE — 96413 CHEMO IV INFUSION 1 HR: CPT

## 2024-07-01 PROCEDURE — 96367 TX/PROPH/DG ADDL SEQ IV INF: CPT

## 2024-07-01 PROCEDURE — 1159F MED LIST DOCD IN RCRD: CPT | Mod: CPTII,,,

## 2024-07-01 PROCEDURE — 1160F RVW MEDS BY RX/DR IN RCRD: CPT | Mod: CPTII,,,

## 2024-07-01 PROCEDURE — 25000003 PHARM REV CODE 250: Performed by: INTERNAL MEDICINE

## 2024-07-01 PROCEDURE — 96411 CHEMO IV PUSH ADDL DRUG: CPT

## 2024-07-01 PROCEDURE — 3074F SYST BP LT 130 MM HG: CPT | Mod: CPTII,,,

## 2024-07-01 PROCEDURE — 96368 THER/DIAG CONCURRENT INF: CPT

## 2024-07-01 PROCEDURE — 3008F BODY MASS INDEX DOCD: CPT | Mod: CPTII,,,

## 2024-07-01 PROCEDURE — 96375 TX/PRO/DX INJ NEW DRUG ADDON: CPT

## 2024-07-01 PROCEDURE — 96366 THER/PROPH/DIAG IV INF ADDON: CPT

## 2024-07-01 RX ORDER — SODIUM CHLORIDE 0.9 % (FLUSH) 0.9 %
10 SYRINGE (ML) INJECTION
Status: DISCONTINUED | OUTPATIENT
Start: 2024-07-01 | End: 2024-07-01 | Stop reason: HOSPADM

## 2024-07-01 RX ORDER — CYANOCOBALAMIN 1000 UG/ML
1000 INJECTION, SOLUTION INTRAMUSCULAR; SUBCUTANEOUS
Status: COMPLETED | OUTPATIENT
Start: 2024-07-01 | End: 2024-07-01

## 2024-07-01 RX ORDER — HEPARIN 100 UNIT/ML
500 SYRINGE INTRAVENOUS
Status: DISCONTINUED | OUTPATIENT
Start: 2024-07-01 | End: 2024-07-01 | Stop reason: HOSPADM

## 2024-07-01 RX ORDER — MEGESTROL ACETATE 40 MG/1
40 TABLET ORAL 4 TIMES DAILY
Qty: 120 TABLET | Refills: 2 | Status: SHIPPED | OUTPATIENT
Start: 2024-07-01

## 2024-07-01 RX ADMIN — CYANOCOBALAMIN 1000 MCG: 1000 INJECTION, SOLUTION INTRAMUSCULAR; SUBCUTANEOUS at 11:07

## 2024-07-01 RX ADMIN — APREPITANT 130 MG: 130 INJECTION, EMULSION INTRAVENOUS at 12:07

## 2024-07-01 RX ADMIN — HEPARIN 500 UNITS: 100 SYRINGE at 03:07

## 2024-07-01 RX ADMIN — CISPLATIN 128 MG: 1 INJECTION, SOLUTION INTRAVENOUS at 01:07

## 2024-07-01 RX ADMIN — MAGNESIUM SULFATE HEPTAHYDRATE 506 ML/HR: 500 INJECTION, SOLUTION INTRAMUSCULAR; INTRAVENOUS at 10:07

## 2024-07-01 RX ADMIN — ALTEPLASE 2 MG: 2.2 INJECTION, POWDER, LYOPHILIZED, FOR SOLUTION INTRAVENOUS at 09:07

## 2024-07-01 RX ADMIN — DEXAMETHASONE SODIUM PHOSPHATE 0.25 MG: 4 INJECTION, SOLUTION INTRA-ARTICULAR; INTRALESIONAL; INTRAMUSCULAR; INTRAVENOUS; SOFT TISSUE at 11:07

## 2024-07-01 RX ADMIN — MAGNESIUM SULFATE HEPTAHYDRATE 507 ML/HR: 500 INJECTION, SOLUTION INTRAMUSCULAR; INTRAVENOUS at 01:07

## 2024-07-01 RX ADMIN — PEMETREXED DISODIUM 625 MG: 500 INJECTION, POWDER, LYOPHILIZED, FOR SOLUTION INTRAVENOUS at 12:07

## 2024-07-01 NOTE — NURSING
Infusion Note:  Pt has an appointment today for: Lamberto, Ace Chaney FNP, and Infusion    Treatment Regimen: Alimta/Cisplatin   Cycle: 4 Day: 1 every Q3 weeks;      Given via Right Mediport    UPT done: Not done  UPT exception: Age >55 y.o.    Treatment parameters: were met    Nursing note:  After Cathflo & blood return obtained, pt received C4D1 Alimta & Cisplatin without incident; port remains accessed for C4D2 IVF per pt request.    Future appts scheduled: Infusion C4D2 for IVF & 7/22/24 for labs & MP, MPF on 10/1/24.

## 2024-07-01 NOTE — Clinical Note
Proceed with chemotherapy today and infusion with a 25% dose reduction to Alimta Return to infusion on 07/02 for IV fluids Weekly lab draw with home health (7/3-only H&H, 7/10 and 7/17-CBC/CMP) Return to clinic with MD in 3 weeks (7/22) with labs prior (CBC/CMP/Mag level) for the completion of chemotherapy

## 2024-07-02 ENCOUNTER — INFUSION (OUTPATIENT)
Dept: INFUSION THERAPY | Facility: HOSPITAL | Age: 64
End: 2024-07-02
Attending: INTERNAL MEDICINE
Payer: MEDICAID

## 2024-07-02 ENCOUNTER — DOCUMENT SCAN (OUTPATIENT)
Dept: HOME HEALTH SERVICES | Facility: HOSPITAL | Age: 64
End: 2024-07-02
Payer: MEDICAID

## 2024-07-02 VITALS
DIASTOLIC BLOOD PRESSURE: 53 MMHG | OXYGEN SATURATION: 99 % | RESPIRATION RATE: 20 BRPM | BODY MASS INDEX: 33.67 KG/M2 | TEMPERATURE: 98 F | SYSTOLIC BLOOD PRESSURE: 112 MMHG | HEIGHT: 58 IN | HEART RATE: 76 BPM | WEIGHT: 160.38 LBS

## 2024-07-02 DIAGNOSIS — C34.11 PRIMARY ADENOCARCINOMA OF UPPER LOBE OF RIGHT LUNG: Primary | ICD-10-CM

## 2024-07-02 PROCEDURE — 25000003 PHARM REV CODE 250: Performed by: INTERNAL MEDICINE

## 2024-07-02 PROCEDURE — 63600175 PHARM REV CODE 636 W HCPCS: Performed by: INTERNAL MEDICINE

## 2024-07-02 PROCEDURE — 96361 HYDRATE IV INFUSION ADD-ON: CPT

## 2024-07-02 PROCEDURE — 96360 HYDRATION IV INFUSION INIT: CPT

## 2024-07-02 RX ORDER — SODIUM CHLORIDE 0.9 % (FLUSH) 0.9 %
10 SYRINGE (ML) INJECTION
Status: DISCONTINUED | OUTPATIENT
Start: 2024-07-02 | End: 2024-07-02 | Stop reason: HOSPADM

## 2024-07-02 RX ORDER — HEPARIN 100 UNIT/ML
500 SYRINGE INTRAVENOUS
Status: DISCONTINUED | OUTPATIENT
Start: 2024-07-02 | End: 2024-07-02 | Stop reason: HOSPADM

## 2024-07-02 RX ADMIN — SODIUM CHLORIDE 1000 ML: 9 INJECTION, SOLUTION INTRAVENOUS at 10:07

## 2024-07-02 RX ADMIN — HEPARIN 500 UNITS: 100 SYRINGE at 11:07

## 2024-07-02 NOTE — NURSING
Pt and spouse presented to Infusion Clinic for C4D2 1L NS, no complaints, says she feels good, right port accessed, blood return present; pt received 1L NS without incident; AVS given and next Infusion appt 10/1/24 for port flush & pt sees Dr Mahoney on 7/22/24; pt verbalized understanding.

## 2024-07-03 ENCOUNTER — DOCUMENTATION ONLY (OUTPATIENT)
Dept: HEMATOLOGY/ONCOLOGY | Facility: CLINIC | Age: 64
End: 2024-07-03
Payer: MEDICAID

## 2024-07-03 LAB — HEMATOLOGIST REVIEW: NORMAL

## 2024-07-03 NOTE — PROGRESS NOTES
Called [facility] to follow up on order for patient's lab. Valeria stated that patient missed her lab appointment on yesterday due to getting chemo and when the nurse went out today patient informed her that she already had labs. Patient had labs done on 7/1/24 for her provider visit with Ace Chaney NP. Per Ace Chaney she still needs an H&H on patient. Valeria will add patient to the schedule on Friday to have her labs drawn.

## 2024-07-05 ENCOUNTER — LAB REQUISITION (OUTPATIENT)
Dept: LAB | Facility: HOSPITAL | Age: 64
End: 2024-07-05
Payer: MEDICAID

## 2024-07-05 DIAGNOSIS — C34.11 MALIGNANT NEOPLASM OF UPPER LOBE, RIGHT BRONCHUS OR LUNG: ICD-10-CM

## 2024-07-05 DIAGNOSIS — I10 ESSENTIAL (PRIMARY) HYPERTENSION: ICD-10-CM

## 2024-07-05 LAB
HCT VFR BLD AUTO: 24.3 % (ref 37–47)
HGB BLD-MCNC: 8.2 G/DL (ref 12–16)

## 2024-07-05 PROCEDURE — 85014 HEMATOCRIT: CPT

## 2024-07-05 PROCEDURE — 85018 HEMOGLOBIN: CPT

## 2024-07-10 ENCOUNTER — HOSPITAL ENCOUNTER (OUTPATIENT)
Facility: HOSPITAL | Age: 64
Discharge: HOME | End: 2024-07-11
Attending: EMERGENCY MEDICINE | Admitting: STUDENT IN AN ORGANIZED HEALTH CARE EDUCATION/TRAINING PROGRAM
Payer: MEDICAID

## 2024-07-10 ENCOUNTER — LAB REQUISITION (OUTPATIENT)
Dept: LAB | Facility: HOSPITAL | Age: 64
End: 2024-07-10
Payer: MEDICAID

## 2024-07-10 ENCOUNTER — TELEPHONE (OUTPATIENT)
Dept: HEMATOLOGY/ONCOLOGY | Facility: CLINIC | Age: 64
End: 2024-07-10
Payer: MEDICAID

## 2024-07-10 DIAGNOSIS — R53.1 WEAKNESS: ICD-10-CM

## 2024-07-10 DIAGNOSIS — D64.9 SYMPTOMATIC ANEMIA: Primary | ICD-10-CM

## 2024-07-10 DIAGNOSIS — R07.9 CHEST PAIN: ICD-10-CM

## 2024-07-10 DIAGNOSIS — C34.11 MALIGNANT NEOPLASM OF UPPER LOBE, RIGHT BRONCHUS OR LUNG: ICD-10-CM

## 2024-07-10 LAB
ALBUMIN SERPL-MCNC: 2.7 G/DL (ref 3.4–4.8)
ALBUMIN SERPL-MCNC: 3.4 G/DL (ref 3.4–4.8)
ALBUMIN/GLOB SERPL: 1.3 RATIO (ref 1.1–2)
ALBUMIN/GLOB SERPL: 1.6 RATIO (ref 1.1–2)
ALP SERPL-CCNC: 66 UNIT/L (ref 40–150)
ALP SERPL-CCNC: 83 UNIT/L (ref 40–150)
ALT SERPL-CCNC: 27 UNIT/L (ref 0–55)
ALT SERPL-CCNC: 34 UNIT/L (ref 0–55)
ANION GAP SERPL CALC-SCNC: 12 MEQ/L
ANION GAP SERPL CALC-SCNC: 8 MEQ/L
AST SERPL-CCNC: 18 UNIT/L (ref 5–34)
AST SERPL-CCNC: 21 UNIT/L (ref 5–34)
BASOPHILS # BLD AUTO: 0 X10(3)/MCL
BASOPHILS # BLD AUTO: 0 X10(3)/MCL
BASOPHILS NFR BLD AUTO: 0 %
BASOPHILS NFR BLD AUTO: 0 %
BILIRUB SERPL-MCNC: 0.5 MG/DL
BILIRUB SERPL-MCNC: 0.7 MG/DL
BUN SERPL-MCNC: 26.2 MG/DL (ref 9.8–20.1)
BUN SERPL-MCNC: 27.2 MG/DL (ref 9.8–20.1)
CALCIUM SERPL-MCNC: 7.1 MG/DL (ref 8.4–10.2)
CALCIUM SERPL-MCNC: 8.9 MG/DL (ref 8.4–10.2)
CHLORIDE SERPL-SCNC: 103 MMOL/L (ref 98–107)
CHLORIDE SERPL-SCNC: 104 MMOL/L (ref 98–107)
CO2 SERPL-SCNC: 19 MMOL/L (ref 23–31)
CO2 SERPL-SCNC: 21 MMOL/L (ref 23–31)
CREAT SERPL-MCNC: 0.83 MG/DL (ref 0.55–1.02)
CREAT SERPL-MCNC: 1.06 MG/DL (ref 0.55–1.02)
CREAT/UREA NIT SERPL: 25
CREAT/UREA NIT SERPL: 33
EOSINOPHIL # BLD AUTO: 0.01 X10(3)/MCL (ref 0–0.9)
EOSINOPHIL # BLD AUTO: 0.03 X10(3)/MCL (ref 0–0.9)
EOSINOPHIL NFR BLD AUTO: 0.3 %
EOSINOPHIL NFR BLD AUTO: 0.6 %
ERYTHROCYTE [DISTWIDTH] IN BLOOD BY AUTOMATED COUNT: 19.9 % (ref 11.5–17)
ERYTHROCYTE [DISTWIDTH] IN BLOOD BY AUTOMATED COUNT: 20.2 % (ref 11.5–17)
GFR SERPLBLD CREATININE-BSD FMLA CKD-EPI: 59 ML/MIN/1.73/M2
GFR SERPLBLD CREATININE-BSD FMLA CKD-EPI: >60 ML/MIN/1.73/M2
GLOBULIN SER-MCNC: 1.7 GM/DL (ref 2.4–3.5)
GLOBULIN SER-MCNC: 2.7 GM/DL (ref 2.4–3.5)
GLUCOSE SERPL-MCNC: 128 MG/DL (ref 82–115)
GLUCOSE SERPL-MCNC: 88 MG/DL (ref 82–115)
GROUP & RH: NORMAL
HCT VFR BLD AUTO: 14.9 % (ref 37–47)
HCT VFR BLD AUTO: 20.8 % (ref 37–47)
HGB BLD-MCNC: 5.2 G/DL (ref 12–16)
HGB BLD-MCNC: 6.8 G/DL (ref 12–16)
HOLD SPECIMEN: NORMAL
IMM GRANULOCYTES # BLD AUTO: 0.02 X10(3)/MCL (ref 0–0.04)
IMM GRANULOCYTES # BLD AUTO: 0.03 X10(3)/MCL (ref 0–0.04)
IMM GRANULOCYTES NFR BLD AUTO: 0.6 %
IMM GRANULOCYTES NFR BLD AUTO: 0.7 %
INDIRECT COOMBS: NORMAL
LYMPHOCYTES # BLD AUTO: 0.84 X10(3)/MCL (ref 0.6–4.6)
LYMPHOCYTES # BLD AUTO: 0.87 X10(3)/MCL (ref 0.6–4.6)
LYMPHOCYTES NFR BLD AUTO: 17.4 %
LYMPHOCYTES NFR BLD AUTO: 28.7 %
MAGNESIUM SERPL-MCNC: 1.8 MG/DL (ref 1.6–2.6)
MCH RBC QN AUTO: 30.2 PG (ref 27–31)
MCH RBC QN AUTO: 31.3 PG (ref 27–31)
MCHC RBC AUTO-ENTMCNC: 32.7 G/DL (ref 33–36)
MCHC RBC AUTO-ENTMCNC: 34.9 G/DL (ref 33–36)
MCV RBC AUTO: 89.8 FL (ref 80–94)
MCV RBC AUTO: 92.4 FL (ref 80–94)
MONOCYTES # BLD AUTO: 0.13 X10(3)/MCL (ref 0.1–1.3)
MONOCYTES # BLD AUTO: 0.21 X10(3)/MCL (ref 0.1–1.3)
MONOCYTES NFR BLD AUTO: 4.2 %
MONOCYTES NFR BLD AUTO: 4.4 %
NEUTROPHILS # BLD AUTO: 1.93 X10(3)/MCL (ref 2.1–9.2)
NEUTROPHILS # BLD AUTO: 3.87 X10(3)/MCL (ref 2.1–9.2)
NEUTROPHILS NFR BLD AUTO: 65.9 %
NEUTROPHILS NFR BLD AUTO: 77.2 %
NRBC BLD AUTO-RTO: 0 %
NRBC BLD AUTO-RTO: 0 %
PLATELET # BLD AUTO: 134 X10(3)/MCL (ref 130–400)
PLATELET # BLD AUTO: 95 X10(3)/MCL (ref 130–400)
PMV BLD AUTO: 10 FL (ref 7.4–10.4)
PMV BLD AUTO: 10.4 FL (ref 7.4–10.4)
POTASSIUM SERPL-SCNC: 3.8 MMOL/L (ref 3.5–5.1)
POTASSIUM SERPL-SCNC: 4.2 MMOL/L (ref 3.5–5.1)
PROT SERPL-MCNC: 4.4 GM/DL (ref 5.8–7.6)
PROT SERPL-MCNC: 6.1 GM/DL (ref 5.8–7.6)
RBC # BLD AUTO: 1.66 X10(6)/MCL (ref 4.2–5.4)
RBC # BLD AUTO: 2.25 X10(6)/MCL (ref 4.2–5.4)
SODIUM SERPL-SCNC: 133 MMOL/L (ref 136–145)
SODIUM SERPL-SCNC: 134 MMOL/L (ref 136–145)
SPECIMEN OUTDATE: NORMAL
WBC # BLD AUTO: 2.93 X10(3)/MCL (ref 4.5–11.5)
WBC # BLD AUTO: 5.01 X10(3)/MCL (ref 4.5–11.5)

## 2024-07-10 PROCEDURE — 85025 COMPLETE CBC W/AUTO DIFF WBC: CPT | Performed by: PHYSICIAN ASSISTANT

## 2024-07-10 PROCEDURE — 86900 BLOOD TYPING SEROLOGIC ABO: CPT | Performed by: PHYSICIAN ASSISTANT

## 2024-07-10 PROCEDURE — 86923 COMPATIBILITY TEST ELECTRIC: CPT

## 2024-07-10 PROCEDURE — 85025 COMPLETE CBC W/AUTO DIFF WBC: CPT

## 2024-07-10 PROCEDURE — 83735 ASSAY OF MAGNESIUM: CPT | Performed by: PHYSICIAN ASSISTANT

## 2024-07-10 PROCEDURE — 93005 ELECTROCARDIOGRAM TRACING: CPT

## 2024-07-10 PROCEDURE — 86850 RBC ANTIBODY SCREEN: CPT | Performed by: PHYSICIAN ASSISTANT

## 2024-07-10 PROCEDURE — 25000003 PHARM REV CODE 250

## 2024-07-10 PROCEDURE — 86901 BLOOD TYPING SEROLOGIC RH(D): CPT | Performed by: PHYSICIAN ASSISTANT

## 2024-07-10 PROCEDURE — G0378 HOSPITAL OBSERVATION PER HR: HCPCS

## 2024-07-10 PROCEDURE — 80053 COMPREHEN METABOLIC PANEL: CPT | Performed by: PHYSICIAN ASSISTANT

## 2024-07-10 PROCEDURE — 80053 COMPREHEN METABOLIC PANEL: CPT

## 2024-07-10 PROCEDURE — 99285 EMERGENCY DEPT VISIT HI MDM: CPT | Mod: 25

## 2024-07-10 RX ORDER — ACETAMINOPHEN 325 MG/1
650 TABLET ORAL EVERY 4 HOURS PRN
Status: DISCONTINUED | OUTPATIENT
Start: 2024-07-10 | End: 2024-07-11 | Stop reason: HOSPADM

## 2024-07-10 RX ORDER — ENOXAPARIN SODIUM 100 MG/ML
40 INJECTION SUBCUTANEOUS EVERY 24 HOURS
Status: DISCONTINUED | OUTPATIENT
Start: 2024-07-10 | End: 2024-07-11 | Stop reason: HOSPADM

## 2024-07-10 RX ORDER — NALOXONE HCL 0.4 MG/ML
0.02 VIAL (ML) INJECTION
Status: DISCONTINUED | OUTPATIENT
Start: 2024-07-10 | End: 2024-07-11 | Stop reason: HOSPADM

## 2024-07-10 RX ORDER — ASPIRIN 81 MG/1
81 TABLET ORAL DAILY
Status: DISCONTINUED | OUTPATIENT
Start: 2024-07-11 | End: 2024-07-11 | Stop reason: HOSPADM

## 2024-07-10 RX ORDER — ONDANSETRON 4 MG/1
8 TABLET, ORALLY DISINTEGRATING ORAL EVERY 8 HOURS PRN
Status: DISCONTINUED | OUTPATIENT
Start: 2024-07-10 | End: 2024-07-11 | Stop reason: HOSPADM

## 2024-07-10 RX ORDER — HYDROCODONE BITARTRATE AND ACETAMINOPHEN 500; 5 MG/1; MG/1
TABLET ORAL
Status: DISCONTINUED | OUTPATIENT
Start: 2024-07-10 | End: 2024-07-11 | Stop reason: HOSPADM

## 2024-07-10 RX ORDER — CARVEDILOL 12.5 MG/1
12.5 TABLET ORAL 2 TIMES DAILY
Status: DISCONTINUED | OUTPATIENT
Start: 2024-07-10 | End: 2024-07-11 | Stop reason: HOSPADM

## 2024-07-10 RX ORDER — SODIUM CHLORIDE 0.9 % (FLUSH) 0.9 %
10 SYRINGE (ML) INJECTION EVERY 12 HOURS PRN
Status: DISCONTINUED | OUTPATIENT
Start: 2024-07-10 | End: 2024-07-11 | Stop reason: HOSPADM

## 2024-07-10 RX ORDER — ATORVASTATIN CALCIUM 40 MG/1
40 TABLET, FILM COATED ORAL DAILY
Status: DISCONTINUED | OUTPATIENT
Start: 2024-07-11 | End: 2024-07-11 | Stop reason: HOSPADM

## 2024-07-10 RX ORDER — PANTOPRAZOLE SODIUM 40 MG/1
40 TABLET, DELAYED RELEASE ORAL DAILY
Status: DISCONTINUED | OUTPATIENT
Start: 2024-07-11 | End: 2024-07-11 | Stop reason: HOSPADM

## 2024-07-10 RX ORDER — MEGESTROL ACETATE 40 MG/1
40 TABLET ORAL 4 TIMES DAILY
Status: DISCONTINUED | OUTPATIENT
Start: 2024-07-10 | End: 2024-07-11 | Stop reason: HOSPADM

## 2024-07-10 RX ADMIN — SACUBITRIL AND VALSARTAN 1 TABLET: 24; 26 TABLET, FILM COATED ORAL at 10:07

## 2024-07-10 RX ADMIN — CARVEDILOL 12.5 MG: 12.5 TABLET, FILM COATED ORAL at 10:07

## 2024-07-10 NOTE — ED PROVIDER NOTES
Encounter Date: 7/10/2024       History     Chief Complaint   Patient presents with    Weakness     Weakness; called by oncology to report due to low H/H     Nadja Geronimo is a 63 y.o. female with a PMHx of lung cancer currently undergoing treatment presents to the ED with complaints of weakness. She states she was called by her oncology NP and told to come to the ED for a critically low hemoglobin.        The history is provided by the patient. No  was used.     Review of patient's allergies indicates:   Allergen Reactions    Codeine Anxiety and Other (See Comments)     Past Medical History:   Diagnosis Date    Dizziness     GERD     Gout     High cholesterol     Insomnia     Irregular heart rhythm     Low vitamin D level     Lung cancer     Lung nodule     Mixed hyperlipidemia     Neuropathy     Obesity     Seasonal allergies     Sleep apnea     cipap    Stroke 2012    Vertigo      Past Surgical History:   Procedure Laterality Date    APPENDECTOMY       SECTION      CHOLECYSTECTOMY      COLONOSCOPY      HERNIA REPAIR      INSERTION OF TUNNELED CENTRAL VENOUS CATHETER (CVC) WITH SUBCUTANEOUS PORT N/A 2024    Procedure: [id number];  Surgeon: Luz Elena Elder MD;  Location: [facility] OR;  Service: General;  Laterality: N/A;    LOBECTOMY Right 3/7/2024    Procedure: LOBECTOMY;  Surgeon: Naty Middleton MD;  Location: [facility] OR;  Service: Thoracic;  Laterality: Right;  upper and middle lobectomy    THORACOSCOPIC WEDGE RESECTION OF LUNG Right 3/7/2024    Procedure: VATS, WITH WEDGE RESECTION, LUNG;  Surgeon: Naty Middleton MD;  Location: [facility] OR;  Service: Thoracic;  Laterality: Right;  RIGHT VATS // POSS LOBECTOMY    THORACOTOMY Right 3/7/2024    Procedure: THORACOTOMY;  Surgeon: Naty Middleton MD;  Location: [facility] OR;  Service: Thoracic;  Laterality: Right;  converted to open at 1011     Family History   Problem Relation Name Age of Onset    Cancer Mother      Heart  disease Mother      Breast cancer Mother      Cancer Father       Social History     Tobacco Use    Smoking status: Former     Types: Cigarettes     Start date: 2023     Quit date: 1972     Years since quittin.5    Smokeless tobacco: Never    Tobacco comments:     Smoked since age 12; quit 2023   Substance Use Topics    Alcohol use: Not Currently    Drug use: Never     Review of Systems   Constitutional:  Negative for chills, fatigue and fever.   HENT:  Negative for congestion, ear pain, sinus pain and sore throat.    Eyes:  Negative for pain.   Respiratory:  Negative for cough, chest tightness and shortness of breath.    Cardiovascular:  Negative for chest pain.   Gastrointestinal:  Negative for abdominal pain, constipation, diarrhea, nausea and vomiting.   Genitourinary:  Negative for dysuria.   Musculoskeletal:  Negative for back pain and joint swelling.   Skin:  Negative for color change and rash.   Neurological:  Positive for weakness. Negative for dizziness, tremors and syncope.   Psychiatric/Behavioral:  Negative for behavioral problems and confusion.        Physical Exam     Initial Vitals [07/10/24 1644]   BP Pulse Resp Temp SpO2   (!) 114/48 101 (!) 22 97.8 °F (36.6 °C) 98 %      MAP       --         Physical Exam    Nursing note and vitals reviewed.  Constitutional: She appears well-developed and well-nourished.   HENT:   Head: Normocephalic and atraumatic.   Nose: Nose normal.   Eyes: EOM are normal. Pupils are equal, round, and reactive to light.   Neck: Neck supple. No thyromegaly present. No JVD present.   Normal range of motion.  Cardiovascular:  Normal rate, regular rhythm, normal heart sounds and intact distal pulses.           No murmur heard.  Pulmonary/Chest: No respiratory distress. She has wheezes. She has no rhonchi. She has no rales. She exhibits no tenderness.   Abdominal: Abdomen is soft. Bowel sounds are normal. She exhibits no distension. There is no abdominal tenderness.  There is no rebound and no guarding.   Musculoskeletal:         General: No tenderness or edema. Normal range of motion.      Cervical back: Normal range of motion and neck supple.     Lymphadenopathy:     She has no cervical adenopathy.   Neurological: She is alert and oriented to person, place, and time.   Skin: Skin is warm and dry. Capillary refill takes less than 2 seconds.   Psychiatric: She has a normal mood and affect. Thought content normal.         ED Course   Procedures  Labs Reviewed   COMPREHENSIVE METABOLIC PANEL - Abnormal; Notable for the following components:       Result Value    Sodium 134 (*)     CO2 19 (*)     Glucose 128 (*)     Blood Urea Nitrogen 26.2 (*)     Creatinine 1.06 (*)     All other components within normal limits   CBC WITH DIFFERENTIAL - Abnormal; Notable for the following components:    RBC 2.25 (*)     Hgb 6.8 (*)     Hct 20.8 (*)     MCHC 32.7 (*)     RDW 20.2 (*)     All other components within normal limits   MAGNESIUM - Normal   CBC W/ AUTO DIFFERENTIAL    Narrative:     The following orders were created for panel order CBC auto differential.  Procedure                               Abnormality         Status                     ---------                               -----------         ------                     CBC with Differential[[id number]]       Abnormal            Final result                 Please view results for these tests on the individual orders.   EXTRA TUBES    Narrative:     The following orders were created for panel order EXTRA TUBES.  Procedure                               Abnormality         Status                     ---------                               -----------         ------                     Red Top Hold[[id number]]                                    Final result               Light Green Top Hold[[id number]]                            Final result               Lavender Top Hold[[id number]]                               Final result                  Please view results for these tests on the individual orders.   RED TOP HOLD   LIGHT GREEN TOP HOLD   LAVENDER TOP HOLD   TYPE & SCREEN     EKG Readings: (Independently Interpreted)   Initial Reading: No STEMI. Rhythm: Sinus Tachycardia. Heart Rate: 104. T Waves Flipped: II, III and AVF.     ECG Results              EKG 12-lead (Weakness) Age > 50 (In process)        Collection Time Result Time QRS Duration OHS QTC Calculation    07/10/24 16:53:46 07/10/24 17:00:04 74 418                     In process by Interface, Lab In [facility] (07/10/24 17:00:22)                   Narrative:    Test Reason : R53.1,    Vent. Rate : 104 BPM     Atrial Rate : 104 BPM     P-R Int : 148 ms          QRS Dur : 074 ms      QT Int : 318 ms       P-R-T Axes : 063 018 251 degrees     QTc Int : 418 ms    Sinus tachycardia  T wave abnormality, consider inferolateral ischemia  Abnormal ECG  When compared with ECG of 26-MAY-2024 11:48,  No significant change was found    Referred By:             Confirmed By:                                   Imaging Results    None          Medications - No data to display  Medical Decision Making  DDX: symptomatic anemia, anemia of chronic disease, iron deficiency anemia, among others    Nadja Geronimo is a 63 y.o. female with a PMHx of lung cancer currently undergoing treatment presents to the ED with complaints of weakness. She states she was called by her oncology NP and told to come to the ED for a critically low hemoglobin.      Hospital Course: Labs redrawn. Hemoglobin 6.8. Will Consult IM for admission for obs. Discussed case with Dr. Finn who agrees with plan.     Amount and/or Complexity of Data Reviewed  Labs: ordered.  Discussion of management or test interpretation with external provider(s): Discussed case with Dr. Rudolph () who will come to ED for admission. Discussed with patient who agrees.                                       Clinical Impression:  Final  diagnoses:  [R53.1] Weakness  [D64.9] Symptomatic anemia (Primary)          ED Disposition Condition    Observation Stable                Enid Galvan PA-C  07/10/24 2002       Enid Galvan PA-C  07/10/24 2004

## 2024-07-10 NOTE — TELEPHONE ENCOUNTER
Received a call from Valeria with [facility] to call in critical lab values. Patient's hemoglobin was 5.2 and hematocrit was 14.9. Per Ace Chaney NP patient is to go to the ER and receive 3 units of blood and repeat lab work next week.  I called and notified patient of critical lab values and Ace's orders to go to the ER and get 3 units of blood. Patient verbalized understanding and stated that she will go to the ER and get 3 units of blood.

## 2024-07-11 VITALS
TEMPERATURE: 99 F | HEIGHT: 60 IN | RESPIRATION RATE: 18 BRPM | BODY MASS INDEX: 30.49 KG/M2 | WEIGHT: 155.31 LBS | DIASTOLIC BLOOD PRESSURE: 72 MMHG | SYSTOLIC BLOOD PRESSURE: 112 MMHG | HEART RATE: 80 BPM | OXYGEN SATURATION: 100 %

## 2024-07-11 LAB
ABO + RH BLD: NORMAL
ALBUMIN SERPL-MCNC: 3.2 G/DL (ref 3.4–4.8)
ALBUMIN/GLOB SERPL: 1.3 RATIO (ref 1.1–2)
ALP SERPL-CCNC: 77 UNIT/L (ref 40–150)
ALT SERPL-CCNC: 31 UNIT/L (ref 0–55)
ANION GAP SERPL CALC-SCNC: 10 MEQ/L
AST SERPL-CCNC: 20 UNIT/L (ref 5–34)
BASOPHILS # BLD AUTO: 0 X10(3)/MCL
BASOPHILS NFR BLD AUTO: 0 %
BILIRUB SERPL-MCNC: 2.1 MG/DL
BLD PROD TYP BPU: NORMAL
BLOOD UNIT EXPIRATION DATE: NORMAL
BLOOD UNIT TYPE CODE: 600
BUN SERPL-MCNC: 20.9 MG/DL (ref 9.8–20.1)
CALCIUM SERPL-MCNC: 8.7 MG/DL (ref 8.4–10.2)
CHLORIDE SERPL-SCNC: 106 MMOL/L (ref 98–107)
CO2 SERPL-SCNC: 20 MMOL/L (ref 23–31)
CREAT SERPL-MCNC: 0.79 MG/DL (ref 0.55–1.02)
CREAT/UREA NIT SERPL: 26
CROSSMATCH INTERPRETATION: NORMAL
DISPENSE STATUS: NORMAL
EOSINOPHIL # BLD AUTO: 0.02 X10(3)/MCL (ref 0–0.9)
EOSINOPHIL NFR BLD AUTO: 0.8 %
ERYTHROCYTE [DISTWIDTH] IN BLOOD BY AUTOMATED COUNT: 17 % (ref 11.5–17)
FERRITIN SERPL-MCNC: 1314.1 NG/ML (ref 4.63–204)
GFR SERPLBLD CREATININE-BSD FMLA CKD-EPI: >60 ML/MIN/1.73/M2
GLOBULIN SER-MCNC: 2.4 GM/DL (ref 2.4–3.5)
GLUCOSE SERPL-MCNC: 91 MG/DL (ref 82–115)
HCT VFR BLD AUTO: 24.4 % (ref 37–47)
HGB BLD-MCNC: 8.7 G/DL (ref 12–16)
IMM GRANULOCYTES # BLD AUTO: 0.01 X10(3)/MCL (ref 0–0.04)
IMM GRANULOCYTES NFR BLD AUTO: 0.4 %
IRON SATN MFR SERPL: ABNORMAL %
IRON SERPL-MCNC: 298 UG/DL (ref 50–170)
LYMPHOCYTES # BLD AUTO: 0.61 X10(3)/MCL (ref 0.6–4.6)
LYMPHOCYTES NFR BLD AUTO: 24.7 %
MCH RBC QN AUTO: 32.5 PG (ref 27–31)
MCHC RBC AUTO-ENTMCNC: 35.7 G/DL (ref 33–36)
MCV RBC AUTO: 91 FL (ref 80–94)
MONOCYTES # BLD AUTO: 0.12 X10(3)/MCL (ref 0.1–1.3)
MONOCYTES NFR BLD AUTO: 4.9 %
NEUTROPHILS # BLD AUTO: 1.71 X10(3)/MCL (ref 2.1–9.2)
NEUTROPHILS NFR BLD AUTO: 69.2 %
NRBC BLD AUTO-RTO: 0 %
OHS QRS DURATION: 74 MS
OHS QTC CALCULATION: 418 MS
PLATELET # BLD AUTO: 85 X10(3)/MCL (ref 130–400)
PMV BLD AUTO: 10 FL (ref 7.4–10.4)
POTASSIUM SERPL-SCNC: 3.4 MMOL/L (ref 3.5–5.1)
PROT SERPL-MCNC: 5.6 GM/DL (ref 5.8–7.6)
RBC # BLD AUTO: 2.68 X10(6)/MCL (ref 4.2–5.4)
SODIUM SERPL-SCNC: 136 MMOL/L (ref 136–145)
TIBC SERPL-MCNC: <25 UG/DL (ref 70–310)
TIBC SERPL-MCNC: ABNORMAL UG/DL
TRANSFERRIN SERPL-MCNC: 226 MG/DL (ref 173–360)
UNIT NUMBER: NORMAL
WBC # BLD AUTO: 2.47 X10(3)/MCL (ref 4.5–11.5)

## 2024-07-11 PROCEDURE — 25000003 PHARM REV CODE 250

## 2024-07-11 PROCEDURE — P9016 RBC LEUKOCYTES REDUCED: HCPCS

## 2024-07-11 PROCEDURE — 83550 IRON BINDING TEST: CPT

## 2024-07-11 PROCEDURE — 80053 COMPREHEN METABOLIC PANEL: CPT

## 2024-07-11 PROCEDURE — G0378 HOSPITAL OBSERVATION PER HR: HCPCS

## 2024-07-11 PROCEDURE — 36430 TRANSFUSION BLD/BLD COMPNT: CPT

## 2024-07-11 PROCEDURE — 83540 ASSAY OF IRON: CPT

## 2024-07-11 PROCEDURE — 36415 COLL VENOUS BLD VENIPUNCTURE: CPT | Mod: 91

## 2024-07-11 PROCEDURE — 82728 ASSAY OF FERRITIN: CPT

## 2024-07-11 PROCEDURE — 85025 COMPLETE CBC W/AUTO DIFF WBC: CPT

## 2024-07-11 PROCEDURE — 94761 N-INVAS EAR/PLS OXIMETRY MLT: CPT

## 2024-07-11 PROCEDURE — 27000221 HC OXYGEN, UP TO 24 HOURS

## 2024-07-11 PROCEDURE — 36415 COLL VENOUS BLD VENIPUNCTURE: CPT

## 2024-07-11 RX ADMIN — POTASSIUM BICARBONATE 50 MEQ: 978 TABLET, EFFERVESCENT ORAL at 08:07

## 2024-07-11 RX ADMIN — MEGESTROL ACETATE 40 MG: 40 TABLET ORAL at 10:07

## 2024-07-11 RX ADMIN — CARVEDILOL 12.5 MG: 12.5 TABLET, FILM COATED ORAL at 08:07

## 2024-07-11 RX ADMIN — ATORVASTATIN CALCIUM 40 MG: 40 TABLET, FILM COATED ORAL at 08:07

## 2024-07-11 RX ADMIN — PANTOPRAZOLE SODIUM 40 MG: 40 TABLET, DELAYED RELEASE ORAL at 08:07

## 2024-07-11 RX ADMIN — SACUBITRIL AND VALSARTAN 1 TABLET: 24; 26 TABLET, FILM COATED ORAL at 08:07

## 2024-07-11 RX ADMIN — ASPIRIN 81 MG: 81 TABLET, COATED ORAL at 08:07

## 2024-07-11 NOTE — H&P
[facility] Internal Medicine History and Physical     Patient Name: Nadja Geronimo  MRN: [id number]  Admission Date: 7/10/2024  Current Hospital Day: 1   Code Status: Full Code  Attending Provider: Jackson Bruner MD  Chief Complaint:      Weakness (Weakness; called by oncology to report due to low H/H)    Subjective:     History of Present Illness:  Nadja Geronimo is a 63 y.o. female with PMH of right upper lung adenocarcinoma s/p lobectomy, HTN, HFpEF, HLD who presented to [facility] ED on 7/10/2024 after being called by Oncology NP and told to come to ED for critically low hemoglobin. She does c/o ongoing weakness which has been present since starting chemotherapy but seems to be improving. Reports last chemo was 2-3 weeks ago. Denies recent illness, fever/chills, SOB, cough, chest pain, abdominal pain, urinary symptoms, bloody BM, bleeding from any site.     H/H that sent her to ED was 5.2/14.9 however patient states that it was drawn from her Mediport and seemed to be diluted when drawn. Repeat H/H in ED 6.8/20.8. Her WBC are wnl and are elevated from prior CBC drawn same day however as mentioned sample appeared diluted to patient. She has no left shift or symptoms concerning for infection. Internal medicine was consulted for admission pending transfusion.     Review of Systems:  As per HPI.    Past Medical History:   -------------------------------------    Dizziness    GERD    Gout    High cholesterol    Insomnia    Irregular heart rhythm    Low vitamin D level    Lung cancer    Lung nodule    Mixed hyperlipidemia    Neuropathy    Obesity    Seasonal allergies    Sleep apnea    cipap    Stroke    Vertigo       Past Surgical History:  Past Surgical History:   Procedure Laterality Date    APPENDECTOMY       SECTION      CHOLECYSTECTOMY      COLONOSCOPY      HERNIA REPAIR      INSERTION OF TUNNELED CENTRAL VENOUS CATHETER (CVC) WITH SUBCUTANEOUS PORT N/A 2024    Procedure: [id number];   Surgeon: Luz Elena Elder MD;  Location: [facility] OR;  Service: General;  Laterality: N/A;    LOBECTOMY Right 3/7/2024    Procedure: LOBECTOMY;  Surgeon: Naty Middleton MD;  Location: [facility] OR;  Service: Thoracic;  Laterality: Right;  upper and middle lobectomy    THORACOSCOPIC WEDGE RESECTION OF LUNG Right 3/7/2024    Procedure: VATS, WITH WEDGE RESECTION, LUNG;  Surgeon: Naty Middleton MD;  Location: [facility] OR;  Service: Thoracic;  Laterality: Right;  RIGHT VATS // POSS LOBECTOMY    THORACOTOMY Right 3/7/2024    Procedure: THORACOTOMY;  Surgeon: Naty Middleton MD;  Location: [facility] OR;  Service: Thoracic;  Laterality: Right;  converted to open at 1011       Allergies:  Review of patient's allergies indicates:   Allergen Reactions    Codeine Anxiety and Other (See Comments)       Home Medications:  Prior to Admission medications    Medication Sig Start Date End Date Taking? Authorizing Provider   allopurinoL (ZYLOPRIM) 100 MG tablet Take 100 mg by mouth once daily.    Provider, Historical   ALPRAZolam (XANAX) 0.5 MG tablet Take 1 tablet (0.5 mg total) by mouth once as needed for Anxiety. Take at 8:30 am on day of scans 6/10/24 6/10/24  Ace Chaney FNP   aspirin (ECOTRIN) 81 MG EC tablet Take 81 mg by mouth once daily.    Provider, Historical   atorvastatin (LIPITOR) 40 MG tablet Take 40 mg by mouth once daily. 9/20/23 5/21/24  Provider, Historical   carvediloL (COREG) 12.5 MG tablet Take 12.5 mg by mouth 2 (two) times daily.    Provider, Historical   colchicine (COLCRYS) 0.6 mg tablet Take 0.6 mg by mouth once daily.    Provider, Historical   dexAMETHasone (DECADRON) 4 MG Tab Take Dexamethasone 8mg daily the day before treatment, treatment day, and 3 days after. 4/24/24   Ace Chaney FNP   diphenhydrAMINE-aluminum-magnesium hydroxide-simethicone-LIDOcaine viscous HCl 2% Swish and spit 15 mLs every 4 (four) hours as needed (oral mucositis). 5/9/24   Emery Mahoney MD   ENTRESTO 24-26 mg per tablet  Take 1 tablet by mouth 2 (two) times daily.    Provider, Historical   ergocalciferol (ERGOCALCIFEROL) 50,000 unit Cap Take 50,000 Units by mouth every 7 days. 11/5/23   Provider, Historical   furosemide (LASIX) 40 MG tablet Take 40 mg by mouth once daily.    Provider, Historical   gabapentin (NEURONTIN) 800 MG tablet Take 800 mg by mouth 2 (two) times daily.    Provider, Historical   HYDROcodone-acetaminophen (NORCO) 5-325 mg per tablet Take 1 tablet by mouth every 6 (six) hours as needed for Pain.  Patient not taking: Reported on 5/21/2024 4/26/24   Helga Rajan MD   ibuprofen (ADVIL,MOTRIN) 800 MG tablet 1 tablet with food or milk as needed Orally Three times a day    Provider, Historical   linaCLOtide (LINZESS) 145 mcg Cap capsule Take 290 mcg by mouth as needed.    Provider, Historical   meclizine (ANTIVERT) 25 mg tablet Take 25 mg by mouth Daily. 11/5/23   Provider, Historical   megestroL (MEGACE) 40 MG Tab Take 1 tablet (40 mg total) by mouth 4 (four) times daily. 7/1/24   Ace Chaney FNP   mirtazapine (REMERON) 30 MG tablet Take 30 mg by mouth every evening.    Provider, Historical   montelukast (SINGULAIR) 10 mg tablet Take 10 mg by mouth once daily.    Provider, Historical   nortriptyline (PAMELOR) 25 MG capsule Take 25 mg by mouth Daily.    Provider, Historical   ondansetron (ZOFRAN) 4 MG tablet Take 1 tablet (4 mg total) by mouth daily as needed for Nausea. 5/6/24 5/6/25  Ace Chaney FNP   pantoprazole (PROTONIX) 40 MG tablet Take 40 mg by mouth once daily. 11/15/23   Provider, Historical   polyethylene glycol (MOVIPREP) 100-7.5-2.691 gram solution Take as directed prior to colonoscopy 4/30/24   Elva Solis FNP   potassium chloride (K-TAB) 20 mEq Take 1 tablet (20 mEq total) by mouth 2 (two) times daily. 5/6/24   Ace Chaney FNP   prochlorperazine (COMPAZINE) 10 MG tablet Take 1 tablet (10 mg total) by mouth 4 (four) times daily. 5/20/24 5/20/25  Ace Chaney, TAHIRAP        Family History:  Family History   Problem Relation Name Age of Onset    Cancer Mother      Heart disease Mother      Breast cancer Mother      Cancer Father         Social History:  Social History     Tobacco Use    Smoking status: Former     Types: Cigarettes     Start date: 2023     Quit date: 1972     Years since quittin.5    Smokeless tobacco: Never    Tobacco comments:     Smoked since age 12; quit 2023   Substance Use Topics    Alcohol use: Not Currently    Drug use: Never       Objective:     Vitals  Vitals  BP: 116/63  Temp: 97.8 °F (36.6 °C)  Temp Source: Oral  Pulse: 87  Resp: 20  SpO2: 100 %  Height: 5' (152.4 cm)  Weight: 68.7 kg (151 lb 7.3 oz)    Physical Examination:  Physical Exam  Vitals reviewed.   Constitutional:       General: She is not in acute distress.     Appearance: Normal appearance. She is not ill-appearing.   HENT:      Head: Normocephalic and atraumatic.      Mouth/Throat:      Mouth: Mucous membranes are moist.      Pharynx: Oropharynx is clear.   Eyes:      General: No scleral icterus.     Pupils: Pupils are equal, round, and reactive to light.      Comments: Conjunctival pallor   Cardiovascular:      Rate and Rhythm: Normal rate and regular rhythm.      Pulses: Normal pulses.      Heart sounds: No murmur heard.     No friction rub. No gallop.   Pulmonary:      Effort: Pulmonary effort is normal. No respiratory distress.      Comments: Coarse rhonchi in right lower lung, breathing comfortably on room air.   Abdominal:      General: Bowel sounds are normal. There is no distension.      Palpations: Abdomen is soft.      Tenderness: There is no abdominal tenderness.   Musculoskeletal:      Cervical back: Neck supple.      Right lower leg: No edema.      Left lower leg: No edema.      Comments: Mediport right upper chest without signs of infection   Skin:     General: Skin is warm and dry.      Capillary Refill: Capillary refill takes 2 to 3 seconds.   Neurological:  "     Mental Status: She is alert and oriented to person, place, and time.          Laboratory:    Recent Labs     07/10/24  1310 07/10/24  1742   WBC 2.93* 5.01   RBC 1.66* 2.25*   HGB 5.2* 6.8*   HCT 14.9* 20.8*   MCV 89.8 92.4   MCH 31.3* 30.2   MCHC 34.9 32.7*   RDW 19.9* 20.2*   PLT 95* 134     No results for input(s): "LACTIC" in the last 72 hours.  No results for input(s): "INR", "APTT", "D-DIMER" in the last 72 hours.  No results for input(s): "HGBA1C", "CHOL", "TRIG", "LDL", "VLDL", "HDL" in the last 72 hours.  No results for input(s): "PH", "PCO2", "PO2", "HCO3", "POCSATURATED", "BE" in the last 72 hours.    Recent Labs     07/10/24  1310 07/10/24  1742   * 134*   K 4.2 3.8   CO2 21* 19*   BUN 27.2* 26.2*   CREATININE 0.83 1.06*   GLUCOSE 88 128*   CALCIUM 7.1* 8.9   MG  --  1.80   ALBUMIN 2.7* 3.4   GLOBULIN 1.7* 2.7   ALKPHOS 66 83   ALT 27 34   AST 18 21   BILITOT 0.5 0.7     No results for input(s): "BNP", "CPK", "TROPONINI" in the last 72 hours.         Radiology:  No results found in the last 24 hours.     Assessment & Plan:     Symptomatic anemia  - Initial H/H 6.8/20.8  - 1 U pRBC ordered, irradiated and leukoreduced due to malignancy on chemotherapy  - Monitor for signs and symptoms of anemia  - Monitor with repeat CBC in AM    R lung adenocarcinoma stage 3 s/p lobectomy  - Currently undergoing chemotherapy, follows with [facility]  - Continue home Megace 40 mg QID    HFpEF  HTN  HLD  - Continue home Lipitor 40 mg, Coreg 12.5 mg BID, ASA 81, Entresto 24-26 mg BID  - q4 vitals    CODE STATUS: Full  Access: PIV  Antibiotics: none  Diet: Heart healthy  DVT ppx: Lovenox  GI ppx: Pantoprazole  Fluids: none    Disposition: Admitted to telemetry for symptomatic anemia in need of transfusion.    Octavio Rudolph MD  [facility] PGY-III  "

## 2024-07-11 NOTE — DISCHARGE SUMMARY
[facility] Internal Medicine Discharge Summary    Admitting Physician: Jackson Bruner MD  Attending Physician: Jackson Bruner MD  Date of Admit: 7/10/2024  Date of Discharge: 7/11/2024    Condition: Good  Outcome: Condition has improved and patient is now ready for discharge.  DISPOSITION: Home or Self Care          Discharge Diagnoses     Patient Active Problem List   Diagnosis    Lung nodule    Primary adenocarcinoma of upper lobe of right lung    Positive colorectal cancer screening using Cologuard test    Dehydration    Nausea & vomiting    Diarrhea    Status post lobectomy of lung    Hypokalemia    Anorexia    Mucositis due to chemotherapy    Chemotherapy-induced neutropenia    Symptomatic anemia       Principal Problem:  Symptomatic anemia    Consultants and Procedures     Consultants:  None    Procedures:   * No surgery found *     Brief Admission History      Nadja Geronimo is a 63 y.o. female with PMH of right upper lung adenocarcinoma s/p lobectomy, HTN, HFpEF, HLD who presented to [facility] ED on 7/10/2024 after being called by Oncology NP and told to come to ED for critically low hemoglobin. She does c/o ongoing weakness which has been present since starting chemotherapy but seems to be improving. Reports last chemo was 2-3 weeks ago. Denies recent illness, fever/chills, SOB, cough, chest pain, abdominal pain, urinary symptoms, bloody BM, bleeding from any site.     Hospital Course with Pertinent Findings     H/H that sent her to ED was 5.2/14.9 however patient states that it was drawn from her Mediport and seemed to be diluted when drawn. Repeat H/H in ED 6.8/20.8. Her WBC are wnl and are elevated from prior CBC drawn same day however as mentioned sample appeared diluted to patient. She has no left shift or symptoms concerning for infection. Internal medicine was consulted for admission.    Patient admitted for symptomatic anemia, initial H and H 6.8/20.8, received 1 unit of PRBC, radiating  leuko reduced due to malignancy and chemotherapy.  Patient noted significant improvement of generalized weakness after blood transfusion.  Repeat H&H improved to. 8.7/24.4  Other home medications were continued.  Patient is stable for discharge.  Leukopenia and anemia seems to be from chemotherapy.  NO fevers, no other symptoms aside from chronic generalized weakness which seems to be improving. Recommend close follow up with Oncology and PCP.  Patient uses chronic oxygen at baseline at 2 LPM.    Discharge physical exam:  Vitals  BP: 123/69  Temp: 98.4 °F (36.9 °C)  Temp Source: Oral  Pulse: 84  Resp: 18  SpO2: 100 %  Height: 5' (152.4 cm)  Weight: 68.7 kg (151 lb 7.3 oz)    General: Examined a calm, cooperative patient not in distress  CVS: Regular rate and rhythm, distinct heart sounds  Lungs: Normal inspiratory effort, clear breath sounds  Abdomen: Soft, not distended, nontender, with bowel sounds  Ext: Full range of motion, no edema  Neuro: Awake, alert, no gross focal neurologic deficits     TIME SPENT ON DISCHARGE: 60 minutes    Discharge Medications        Medication List        CONTINUE taking these medications      allopurinoL 100 MG tablet  Commonly known as: ZYLOPRIM     ALPRAZolam 0.5 MG tablet  Commonly known as: XANAX  Take 1 tablet (0.5 mg total) by mouth once as needed for Anxiety. Take at 8:30 am on day of scans     aspirin 81 MG EC tablet  Commonly known as: ECOTRIN     atorvastatin 40 MG tablet  Commonly known as: LIPITOR     carvediloL 12.5 MG tablet  Commonly known as: COREG     colchicine 0.6 mg tablet  Commonly known as: COLCRYS     dexAMETHasone 4 MG Tab  Commonly known as: DECADRON  Take Dexamethasone 8mg daily the day before treatment, treatment day, and 3 days after.     diphenhydrAMINE-aluminum-magnesium hydroxide-simethicone-LIDOcaine viscous HCl 2%  Swish and spit 15 mLs every 4 (four) hours as needed (oral mucositis).     ENTRESTO 24-26 mg per tablet  Generic drug:  sacubitriL-valsartan     ergocalciferol 50,000 unit Cap  Commonly known as: ERGOCALCIFEROL     furosemide 40 MG tablet  Commonly known as: LASIX     gabapentin 800 MG tablet  Commonly known as: NEURONTIN     ibuprofen 800 MG tablet  Commonly known as: ADVIL,MOTRIN     LINZESS 145 mcg Cap capsule  Generic drug: linaCLOtide     meclizine 25 mg tablet  Commonly known as: ANTIVERT     megestroL 40 MG Tab  Commonly known as: MEGACE  Take 1 tablet (40 mg total) by mouth 4 (four) times daily.     mirtazapine 30 MG tablet  Commonly known as: REMERON     montelukast 10 mg tablet  Commonly known as: SINGULAIR     nortriptyline 25 MG capsule  Commonly known as: PAMELOR     ondansetron 4 MG tablet  Commonly known as: ZOFRAN  Take 1 tablet (4 mg total) by mouth daily as needed for Nausea.     pantoprazole 40 MG tablet  Commonly known as: PROTONIX     polyethylene glycol 100-7.5-2.691 gram solution  Commonly known as: MoviPrep  Take as directed prior to colonoscopy     potassium chloride 20 mEq  Commonly known as: K-TAB  Take 1 tablet (20 mEq total) by mouth 2 (two) times daily.     prochlorperazine 10 MG tablet  Commonly known as: COMPAZINE  Take 1 tablet (10 mg total) by mouth 4 (four) times daily.            STOP taking these medications      HYDROcodone-acetaminophen 5-325 mg per tablet  Commonly known as: NORCO              Discharge Information:     Continue home meds.  Follow-up with PCP.  Follow-up with hem-onc.    Taj Hardwick MD  [facility] Internal Medicine PGY-III

## 2024-07-11 NOTE — PROGRESS NOTES
[facility] Telemetry  Internal Medicine    Patient Name: Nadja Geronimo  MRN: [id number]  Admission Date: 7/10/2024  Hospital Length of Stay: 0 days  Code Status: Full Code  Attending Provider: Jackson Bruner MD  Primary Care Provider: Tristen Montoya FNP     Subjective:     HPI:   Nadja Geronimo is a 63 y.o. female with PMH of right upper lung adenocarcinoma s/p lobectomy, HTN, HFpEF, HLD who presented to [facility] ED on 7/10/2024 after being called by Oncology NP and told to come to ED for critically low hemoglobin. She does c/o ongoing weakness which has been present since starting chemotherapy but seems to be improving. Reports last chemo was 2-3 weeks ago. Denies recent illness, fever/chills, SOB, cough, chest pain, abdominal pain, urinary symptoms, bloody BM, bleeding from any site.     Hospital Course/Significant events:  H/H that sent her to ED was 5.2/14.9 however patient states that it was drawn from her Mediport and seemed to be diluted when drawn. Repeat H/H in ED 6.8/20.8. Her WBC are wnl and are elevated from prior CBC drawn same day however as mentioned sample appeared diluted to patient. She has no left shift or symptoms concerning for infection. Internal medicine was consulted for admission pending transfusion    24 Hour Interval History:  Patient with some improvement in generalized weakness. She denies other subjective complaints. Vital signs stable. Labs with leukopenia, H/H improved 6.8 -> 8.7 after 1U PRBC, pending iron studies, mild hypokalemia, NAGMA, normal renal indices.     Past Medical History:   Diagnosis Date    Dizziness     GERD     Gout     High cholesterol     Insomnia     Irregular heart rhythm     Low vitamin D level     Lung cancer     Lung nodule     Mixed hyperlipidemia     Neuropathy     Obesity     Seasonal allergies     Sleep apnea     cipap    Stroke 2012    Vertigo        Past Surgical History:   Procedure Laterality Date     APPENDECTOMY       SECTION      CHOLECYSTECTOMY      COLONOSCOPY      HERNIA REPAIR      INSERTION OF TUNNELED CENTRAL VENOUS CATHETER (CVC) WITH SUBCUTANEOUS PORT N/A 2024    Procedure: [id number];  Surgeon: Luz Elena Elder MD;  Location: [facility] OR;  Service: General;  Laterality: N/A;    LOBECTOMY Right 3/7/2024    Procedure: LOBECTOMY;  Surgeon: Naty Middleton MD;  Location: [facility] OR;  Service: Thoracic;  Laterality: Right;  upper and middle lobectomy    THORACOSCOPIC WEDGE RESECTION OF LUNG Right 3/7/2024    Procedure: VATS, WITH WEDGE RESECTION, LUNG;  Surgeon: Naty Middleton MD;  Location: [facility] OR;  Service: Thoracic;  Laterality: Right;  RIGHT VATS // POSS LOBECTOMY    THORACOTOMY Right 3/7/2024    Procedure: THORACOTOMY;  Surgeon: Naty Middleton MD;  Location: [facility] OR;  Service: Thoracic;  Laterality: Right;  converted to open at 1011       Social History     Socioeconomic History    Marital status:    Tobacco Use    Smoking status: Former     Types: Cigarettes     Start date: 2023     Quit date: 1972     Years since quittin.5    Smokeless tobacco: Never    Tobacco comments:     Smoked since age 12; quit 2023   Substance and Sexual Activity    Alcohol use: Not Currently    Drug use: Never    Sexual activity: Yes     Partners: Male           Current Outpatient Medications   Medication Instructions    allopurinoL (ZYLOPRIM) 100 mg, Oral, Daily    ALPRAZolam (XANAX) 0.5 mg, Oral, Once as needed, Take at 8:30 am on day of scans    aspirin (ECOTRIN) 81 mg, Oral, Daily    atorvastatin (LIPITOR) 40 mg, Oral, Daily    carvediloL (COREG) 12.5 mg, Oral, 2 times daily    colchicine (COLCRYS) 0.6 mg, Oral, Daily    dexAMETHasone (DECADRON) 4 MG Tab Take Dexamethasone 8mg daily the day before treatment, treatment day, and 3 days after.    diphenhydrAMINE-aluminum-magnesium hydroxide-simethicone-LIDOcaine viscous HCl 2% 15 mLs, Swish & Spit, Every 4 hours PRN     ENTRESTO 24-26 mg per tablet 1 tablet, Oral, 2 times daily    ergocalciferol (ERGOCALCIFEROL) 50,000 Units, Oral, Every 7 days    furosemide (LASIX) 40 mg, Oral, Daily    gabapentin (NEURONTIN) 800 mg, Oral, 2 times daily    HYDROcodone-acetaminophen (NORCO) 5-325 mg per tablet 1 tablet, Oral, Every 6 hours PRN    ibuprofen (ADVIL,MOTRIN) 800 MG tablet 1 tablet with food or milk as needed Orally Three times a day    linaCLOtide (LINZESS) 290 mcg, Oral, As needed (PRN)    meclizine (ANTIVERT) 25 mg, Oral, Daily    megestroL (MEGACE) 40 mg, Oral, 4 times daily    mirtazapine (REMERON) 30 mg, Oral, Nightly    montelukast (SINGULAIR) 10 mg, Oral, Daily    nortriptyline (PAMELOR) 25 mg, Oral, Daily    ondansetron (ZOFRAN) 4 mg, Oral, Daily PRN    pantoprazole (PROTONIX) 40 mg, Oral, Daily    polyethylene glycol (MOVIPREP) 100-7.5-2.691 gram solution Take as directed prior to colonoscopy    potassium chloride (K-TAB) 20 mEq 20 mEq, Oral, 2 times daily    prochlorperazine (COMPAZINE) 10 mg, Oral, 4 times daily       Current Inpatient Medications   aspirin  81 mg Oral Daily    atorvastatin  40 mg Oral Daily    carvediloL  12.5 mg Oral BID    enoxparin  40 mg Subcutaneous Daily    megestroL  40 mg Oral QID    pantoprazole  40 mg Oral Daily    sacubitriL-valsartan  1 tablet Oral BID       Current Intravenous Infusions        Review of Systems   Constitutional:  Negative for chills and fever.   Respiratory:  Negative for cough and hemoptysis.    Cardiovascular:  Negative for chest pain and palpitations.   Gastrointestinal:  Negative for nausea and vomiting.          Objective:       Intake/Output Summary (Last 24 hours) at 7/11/2024 0821  Last data filed at 7/11/2024 0741  Gross per 24 hour   Intake 361.67 ml   Output 550 ml   Net -188.33 ml         Vital Signs (Most Recent):  Temp: 98.4 °F (36.9 °C) (07/11/24 0740)  Pulse: 84 (07/11/24 0740)  Resp: 18 (07/11/24 0740)  BP: 123/69 (07/11/24 0740)  SpO2: 100 % (07/11/24  0740)  Body mass index is 29.58 kg/m².  Weight: 68.7 kg (151 lb 7.3 oz) Vital Signs (24h Range):  Temp:  [97.8 °F (36.6 °C)-98.9 °F (37.2 °C)] 98.4 °F (36.9 °C)  Pulse:  [] 84  Resp:  [12-22] 18  SpO2:  [95 %-100 %] 100 %  BP: (113-150)/(48-79) 123/69     Physical Exam  Constitutional:       General: She is not in acute distress.     Appearance: Normal appearance. She is not toxic-appearing.   HENT:      Head: Normocephalic and atraumatic.   Cardiovascular:      Rate and Rhythm: Normal rate and regular rhythm.   Pulmonary:      Breath sounds: Normal breath sounds. No wheezing or rhonchi.   Chest:      Chest wall: No tenderness.   Abdominal:      General: Abdomen is flat. Bowel sounds are normal.      Palpations: Abdomen is soft.      Tenderness: There is no abdominal tenderness.   Musculoskeletal:         General: No tenderness or deformity. Normal range of motion.      Cervical back: Normal range of motion and neck supple. No rigidity.   Skin:     General: Skin is warm and dry.   Neurological:      General: No focal deficit present.      Mental Status: She is alert and oriented to person, place, and time. Mental status is at baseline.           Lines/Drains/Airways       Central Venous Catheter Line  Duration             Port A Cath Single Lumen Atrial Right -- days              Peripheral Intravenous Line  Duration                  Peripheral IV - Single Lumen 07/10/24 2129 18 G 2 1/4 in Yes Right Upper Arm <1 day                    Significant Labs:    Lab Results   Component Value Date    WBC 2.47 (L) 07/11/2024    HGB 8.7 (L) 07/11/2024    HCT 24.4 (L) 07/11/2024    MCV 91.0 07/11/2024    PLT 85 (L) 07/11/2024           BMP  Lab Results   Component Value Date     07/11/2024    K 3.4 (L) 07/11/2024    CO2 20 (L) 07/11/2024    BUN 20.9 (H) 07/11/2024    CREATININE 0.79 07/11/2024    CALCIUM 8.7 07/11/2024    AGAP 10.0 07/11/2024    ESTGFRAFRICA 99 09/11/2020    EGFRNONAA >60 11/18/2020          Significant Imaging:  I have reviewed the pertinent imaging within the past 24 hours.        Assessment/Plan:     Symptomatic anemia  - Initial H/H 6.8/20.8  - 1 U pRBC transfused, irradiated and leukoreduced due to malignancy on chemotherapy  - Monitor for signs and symptoms of anemia  - Improved H/H this morning, will discuss with attending if we can discharge today  - Leukopenia suspect from chemotherapy     R lung adenocarcinoma stage 3 s/p lobectomy  - Currently undergoing chemotherapy, follows with [facility]  - Continue home Megace 40 mg QID     HFpEF  HTN  HLD  - Continue home Lipitor 40 mg, Coreg 12.5 mg BID, ASA 81, Entresto 24-26 mg BID      DVT Prophylaxis: Lovenox  GI Prophylaxis: Protonix     Disposition: Continue inpatient monitoring and medical therapy. Patient can be discharged home when medically stable.     Taj Hardwick MD  Internal Medicine PGY-III  [facility] Telemetry  DOS: 07/11/2024

## 2024-07-11 NOTE — PLAN OF CARE
Problem: Adult Inpatient Plan of Care  Goal: Plan of Care Review  Outcome: Progressing  Goal: Absence of Hospital-Acquired Illness or Injury  Outcome: Progressing  Goal: Readiness for Transition of Care  Outcome: Progressing

## 2024-07-11 NOTE — PROGRESS NOTES
Inpatient Nutrition Assessment    Admit Date: 7/10/2024   Total duration of encounter: 1 day   Patient Age: 63 y.o.    Nutrition Recommendation/Prescription     Regular, No added salt diet  Suggest daily MVI with Fe  Continue Megace to stimulate appetite  Monitor Weight Weekly   Replace electrolytes as needed    Communication of Recommendations: reviewed with patient    Nutrition Assessment     Malnutrition Assessment/Nutrition-Focused Physical Exam    Malnutrition Context: acute illness or injury (07/11/24 1228)  Malnutrition Level:  (does not meet criteria) (07/11/24 1228)  Energy Intake (Malnutrition): less than 75% for greater than 7 days (07/11/24 1228)  Weight Loss (Malnutrition):  (does not meet) (07/11/24 1228)  Subcutaneous Fat (Malnutrition):  (does not meet) (07/11/24 1228)           Muscle Mass (Malnutrition):  (does not meet) (07/11/24 1228)                                   A minimum of two characteristics is recommended for diagnosis of either severe or non-severe malnutrition.    Chart Review    Reason Seen: continuous nutrition monitoring    Malnutrition Screening Tool Results   Have you recently lost weight without trying?: No  Have you been eating poorly because of a decreased appetite?: No   MST Score: 0   Diagnosis:  Symptomatic Anemia, HFpEF, HTN, HLD, Right ling adenocarcinoma    Relevant Medical History: Right lung cancer (last chemo treatment 2 weeks ago), HTN, HF, HLD, GERD    Scheduled Medications:  aspirin, 81 mg, Daily  atorvastatin, 40 mg, Daily  carvediloL, 12.5 mg, BID  enoxparin, 40 mg, Daily  megestroL, 40 mg, QID  pantoprazole, 40 mg, Daily  sacubitriL-valsartan, 1 tablet, BID    Continuous Infusions:   PRN Medications:  0.9%  NaCl infusion (for blood administration), , Q24H PRN  acetaminophen, 650 mg, Q4H PRN  naloxone, 0.02 mg, PRN  ondansetron, 8 mg, Q8H PRN  sodium chloride 0.9%, 10 mL, Q12H PRN    Calorie Containing IV Medications: no significant kcals from medications at this  time    Recent Labs   Lab 07/05/24  1500 07/10/24  1310 07/10/24  1742 07/11/24  0719   NA  --  133* 134* 136   K  --  4.2 3.8 3.4*   CALCIUM  --  7.1* 8.9 8.7   MG  --   --  1.80  --    CO2  --  21* 19* 20*   BUN  --  27.2* 26.2* 20.9*   CREATININE  --  0.83 1.06* 0.79   EGFRNORACEVR  --  >60 59 >60   GLUCOSE  --  88 128* 91   BILITOT  --  0.5 0.7 2.1*   ALKPHOS  --  66 83 77   ALT  --  27 34 31   AST  --  18 21 20   ALBUMIN  --  2.7* 3.4 3.2*   WBC  --  2.93* 5.01 2.47*   HGB 8.2* 5.2* 6.8* 8.7*   HCT 24.3* 14.9* 20.8* 24.4*     Nutrition Orders:  Diet Heart Healthy      Appetite/Oral Intake: fair/50-75% of meals  Factors Affecting Nutritional Intake: decreased appetite  Social Needs Impacting Access to Food: none identified  Food/Holiness/Cultural Preferences: none reported  Food Allergies: no known food allergies  Last Bowel Movement: 07/09/24  Wound(s):  skin intact    Comments    7/11/24 -- Pt reports fair appetite over the last week, denies n/v or abdominal pain; Megace now prescribed for appetite noted; pt reports  lb > 1 year ago with most recent weights stable between 155-160 lb; ONS offered however pt declines d/t dislike; H/H (L) - improved s/p PRBC, consider Fe supplementation    Anthropometrics    Height: 5' (152.4 cm),    Last Weight: 70.4 kg (155 lb 4.8 oz) (07/11/24 1227), Weight Method: Bed Scale  BMI (Calculated): 30.3  BMI Classification: obese grade I (BMI 30-34.9)     Ideal Body Weight (IBW), Female: 100 lb     % Ideal Body Weight, Female (lb): 151.46 %                    Usual Body Weight (UBW), kg:  (180 lb > 1 year ago, most recently 155 - 160 lb)        Usual Weight Provided By: patient and EMR weight history    Wt Readings from Last 5 Encounters:   07/11/24 70.4 kg (155 lb 4.8 oz)   07/02/24 72.8 kg (160 lb 6.4 oz)   07/01/24 70.4 kg (155 lb 3.2 oz)   06/25/24 71.4 kg (157 lb 4.8 oz)   06/24/24 70 kg (154 lb 4.8 oz)     Weight Change(s) Since Admission:   Wt Readings from Last 1  Encounters:   07/11/24 1227 70.4 kg (155 lb 4.8 oz)   07/10/24 2144 68.7 kg (151 lb 7.3 oz)   07/10/24 1644 68.7 kg (151 lb 7.3 oz)   Admit Weight: 68.7 kg (151 lb 7.3 oz) (07/10/24 1644), Weight Method: Bed Scale    Estimated Needs    Weight Used For Calorie Calculations: 70 kg (154 lb 5.2 oz)  Energy Calorie Requirements (kcal): 3297-4576 kcal (25 kcal/kg)  Energy Need Method: Kcal/kg  Weight Used For Protein Calculations: 70 kg (154 lb 5.2 oz)  Protein Requirements: 56-70 gm (0.8-1 gm/kg)  Fluid Requirements (mL): 5780-5818 ml (1ml/kcal)        Enteral Nutrition     Patient not receiving enteral nutrition at this time.    Parenteral Nutrition     Patient not receiving parenteral nutrition support at this time.    Evaluation of Received Nutrient Intake    Calories: not meeting estimated needs  Protein: not meeting estimated needs    Patient Education     Not applicable.    Nutrition Diagnosis     PES: Inadequate oral intake related to acute illness as evidenced by <75% nutrition intake x 1 week. (new)     Nutrition Interventions     Intervention(s): general/healthful diet, commercial beverage, multivitamin/mineral supplement therapy, and collaboration with other providers    Goal: Meet greater than 80% of nutritional needs by follow-up. (new)  Goal: Maintain weight throughout hospitalization. (new)    Nutrition Goals & Monitoring     Dietitian will monitor: energy intake, weight change, and electrolyte/renal panel  Discharge planning: continue Reg, ANDREAS diet  Nutrition Risk/Follow-Up: moderate (follow-up in 3-5 days)   Please consult if re-assessment needed sooner.

## 2024-07-17 ENCOUNTER — TELEPHONE (OUTPATIENT)
Dept: HEMATOLOGY/ONCOLOGY | Facility: CLINIC | Age: 64
End: 2024-07-17
Payer: MEDICAID

## 2024-07-17 ENCOUNTER — LAB REQUISITION (OUTPATIENT)
Dept: LAB | Facility: HOSPITAL | Age: 64
End: 2024-07-17
Payer: MEDICAID

## 2024-07-17 DIAGNOSIS — C34.11 MALIGNANT NEOPLASM OF UPPER LOBE, RIGHT BRONCHUS OR LUNG: ICD-10-CM

## 2024-07-17 DIAGNOSIS — T45.1X5A CHEMOTHERAPY-INDUCED NEUTROPENIA: ICD-10-CM

## 2024-07-17 DIAGNOSIS — D70.1 CHEMOTHERAPY-INDUCED NEUTROPENIA: ICD-10-CM

## 2024-07-17 DIAGNOSIS — I10 ESSENTIAL (PRIMARY) HYPERTENSION: ICD-10-CM

## 2024-07-17 LAB
ABS NEUT (OLG): 0.53 X10(3)/MCL (ref 2.1–9.2)
ALBUMIN SERPL-MCNC: 3.2 G/DL (ref 3.4–4.8)
ALBUMIN/GLOB SERPL: 1.4 RATIO (ref 1.1–2)
ALP SERPL-CCNC: 96 UNIT/L (ref 40–150)
ALT SERPL-CCNC: 20 UNIT/L (ref 0–55)
ANION GAP SERPL CALC-SCNC: 13 MEQ/L
ANISOCYTOSIS BLD QL SMEAR: ABNORMAL
AST SERPL-CCNC: 14 UNIT/L (ref 5–34)
BASOPHILS NFR BLD MANUAL: 0.01 X10(3)/MCL (ref 0–0.2)
BASOPHILS NFR BLD MANUAL: 1 %
BILIRUB SERPL-MCNC: 0.7 MG/DL
BUN SERPL-MCNC: 12.1 MG/DL (ref 9.8–20.1)
CALCIUM SERPL-MCNC: 8 MG/DL (ref 8.4–10.2)
CHLORIDE SERPL-SCNC: 98 MMOL/L (ref 98–107)
CO2 SERPL-SCNC: 27 MMOL/L (ref 23–31)
CREAT SERPL-MCNC: 0.88 MG/DL (ref 0.55–1.02)
CREAT/UREA NIT SERPL: 14
EOSINOPHIL NFR BLD MANUAL: 0.06 X10(3)/MCL (ref 0–0.9)
EOSINOPHIL NFR BLD MANUAL: 5 %
ERYTHROCYTE [DISTWIDTH] IN BLOOD BY AUTOMATED COUNT: 18.5 % (ref 11.5–17)
GFR SERPLBLD CREATININE-BSD FMLA CKD-EPI: >60 ML/MIN/1.73/M2
GLOBULIN SER-MCNC: 2.3 GM/DL (ref 2.4–3.5)
GLUCOSE SERPL-MCNC: 97 MG/DL (ref 82–115)
HCT VFR BLD AUTO: 20.2 % (ref 37–47)
HGB BLD-MCNC: 7 G/DL (ref 12–16)
INSTRUMENT WBC (OLG): 1.25 X10(3)/MCL
LYMPHOCYTES NFR BLD MANUAL: 0.59 X10(3)/MCL
LYMPHOCYTES NFR BLD MANUAL: 47 %
MCH RBC QN AUTO: 32.1 PG (ref 27–31)
MCHC RBC AUTO-ENTMCNC: 34.7 G/DL (ref 33–36)
MCV RBC AUTO: 92.7 FL (ref 80–94)
MICROCYTES BLD QL SMEAR: ABNORMAL
MONOCYTES NFR BLD MANUAL: 0.07 X10(3)/MCL (ref 0.1–1.3)
MONOCYTES NFR BLD MANUAL: 6 %
NEUTROPHILS NFR BLD MANUAL: 42 %
NRBC BLD AUTO-RTO: 0 %
NRBC BLD MANUAL-RTO: 2 %
PLATELET # BLD AUTO: 129 X10(3)/MCL (ref 130–400)
PLATELET # BLD EST: ABNORMAL 10*3/UL
PMV BLD AUTO: 9.9 FL (ref 7.4–10.4)
POIKILOCYTOSIS BLD QL SMEAR: ABNORMAL
POTASSIUM SERPL-SCNC: 3.5 MMOL/L (ref 3.5–5.1)
PROT SERPL-MCNC: 5.5 GM/DL (ref 5.8–7.6)
RBC # BLD AUTO: 2.18 X10(6)/MCL (ref 4.2–5.4)
RBC MORPH BLD: ABNORMAL
SODIUM SERPL-SCNC: 138 MMOL/L (ref 136–145)
STOMATOCYTES (OLG): ABNORMAL
TARGETS BLD QL SMEAR: ABNORMAL
TEAR DROP CELL (OLG): ABNORMAL
WBC # BLD AUTO: 1.25 X10(3)/MCL (ref 4.5–11.5)

## 2024-07-17 PROCEDURE — 80053 COMPREHEN METABOLIC PANEL: CPT

## 2024-07-17 PROCEDURE — 85027 COMPLETE CBC AUTOMATED: CPT

## 2024-07-17 NOTE — TELEPHONE ENCOUNTER
Attempted to contact patient regarding her lab work. Unable to reach patient. Spoke with patient's daughter Maria Antonia and informed her that patient's WBC was low so we are sending antibiotics (Cipro) to the pharmacy for her to take for 7 days. Maria Antonia verbalized understanding.

## 2024-07-18 RX ORDER — CIPROFLOXACIN 500 MG/1
500 TABLET ORAL 2 TIMES DAILY
Qty: 14 TABLET | Refills: 0 | Status: SHIPPED | OUTPATIENT
Start: 2024-07-18 | End: 2024-07-25

## 2024-07-19 ENCOUNTER — TELEPHONE (OUTPATIENT)
Dept: HEMATOLOGY/ONCOLOGY | Facility: CLINIC | Age: 64
End: 2024-07-19
Payer: MEDICAID

## 2024-07-22 ENCOUNTER — INFUSION (OUTPATIENT)
Dept: INFUSION THERAPY | Facility: HOSPITAL | Age: 64
End: 2024-07-22
Attending: INTERNAL MEDICINE
Payer: MEDICAID

## 2024-07-22 ENCOUNTER — OFFICE VISIT (OUTPATIENT)
Dept: HEMATOLOGY/ONCOLOGY | Facility: CLINIC | Age: 64
End: 2024-07-22
Attending: INTERNAL MEDICINE
Payer: MEDICAID

## 2024-07-22 ENCOUNTER — DOCUMENT SCAN (OUTPATIENT)
Dept: HOME HEALTH SERVICES | Facility: HOSPITAL | Age: 64
End: 2024-07-22
Payer: MEDICAID

## 2024-07-22 VITALS
WEIGHT: 150.81 LBS | SYSTOLIC BLOOD PRESSURE: 101 MMHG | TEMPERATURE: 98 F | OXYGEN SATURATION: 100 % | BODY MASS INDEX: 29.61 KG/M2 | HEART RATE: 80 BPM | DIASTOLIC BLOOD PRESSURE: 61 MMHG | HEIGHT: 60 IN | RESPIRATION RATE: 17 BRPM

## 2024-07-22 VITALS
TEMPERATURE: 98 F | DIASTOLIC BLOOD PRESSURE: 61 MMHG | RESPIRATION RATE: 16 BRPM | SYSTOLIC BLOOD PRESSURE: 106 MMHG | OXYGEN SATURATION: 100 % | HEART RATE: 83 BPM

## 2024-07-22 DIAGNOSIS — C34.11 PRIMARY ADENOCARCINOMA OF UPPER LOBE OF RIGHT LUNG: Primary | ICD-10-CM

## 2024-07-22 DIAGNOSIS — R19.5 POSITIVE COLORECTAL CANCER SCREENING USING COLOGUARD TEST: ICD-10-CM

## 2024-07-22 DIAGNOSIS — E86.0 DEHYDRATION: Primary | ICD-10-CM

## 2024-07-22 DIAGNOSIS — C34.11 PRIMARY ADENOCARCINOMA OF UPPER LOBE OF RIGHT LUNG: ICD-10-CM

## 2024-07-22 DIAGNOSIS — Z51.11 CHEMOTHERAPY MANAGEMENT, ENCOUNTER FOR: ICD-10-CM

## 2024-07-22 DIAGNOSIS — E83.42 HYPOMAGNESEMIA: ICD-10-CM

## 2024-07-22 DIAGNOSIS — R79.89 CREATININE ELEVATION: ICD-10-CM

## 2024-07-22 DIAGNOSIS — Z90.2 STATUS POST LOBECTOMY OF LUNG: Primary | ICD-10-CM

## 2024-07-22 LAB
FERRITIN SERPL-MCNC: 1216.43 NG/ML (ref 4.63–204)
FOLATE SERPL-MCNC: 6.1 NG/ML (ref 7–31.4)
HAPTOGLOB SERPL-MCNC: 260 MG/DL (ref 63–273)
IRON SATN MFR SERPL: 34 % (ref 20–50)
IRON SERPL-MCNC: 94 UG/DL (ref 50–170)
LDH SERPL-CCNC: 390 U/L (ref 125–220)
RET# (OHS): 0.03 X10E6/UL (ref 0.02–0.08)
RETICULOCYTE COUNT AUTOMATED (OLG): 1.29 % (ref 1.1–2.1)
T4 FREE SERPL-MCNC: 1.07 NG/DL (ref 0.7–1.48)
TIBC SERPL-MCNC: 181 UG/DL (ref 70–310)
TIBC SERPL-MCNC: 275 UG/DL (ref 250–450)
TRANSFERRIN SERPL-MCNC: 255 MG/DL (ref 173–360)
TSH SERPL-ACNC: 1.63 UIU/ML (ref 0.35–4.94)
VIT B12 SERPL-MCNC: 1995 PG/ML (ref 213–816)

## 2024-07-22 PROCEDURE — 1160F RVW MEDS BY RX/DR IN RCRD: CPT | Mod: CPTII,,, | Performed by: INTERNAL MEDICINE

## 2024-07-22 PROCEDURE — 85045 AUTOMATED RETICULOCYTE COUNT: CPT | Performed by: INTERNAL MEDICINE

## 2024-07-22 PROCEDURE — 96365 THER/PROPH/DIAG IV INF INIT: CPT

## 2024-07-22 PROCEDURE — 4010F ACE/ARB THERAPY RXD/TAKEN: CPT | Mod: CPTII,,, | Performed by: INTERNAL MEDICINE

## 2024-07-22 PROCEDURE — 96366 THER/PROPH/DIAG IV INF ADDON: CPT

## 2024-07-22 PROCEDURE — 83010 ASSAY OF HAPTOGLOBIN QUANT: CPT | Performed by: INTERNAL MEDICINE

## 2024-07-22 PROCEDURE — 99215 OFFICE O/P EST HI 40 MIN: CPT | Mod: PBBFAC | Performed by: INTERNAL MEDICINE

## 2024-07-22 PROCEDURE — 3078F DIAST BP <80 MM HG: CPT | Mod: CPTII,,, | Performed by: INTERNAL MEDICINE

## 2024-07-22 PROCEDURE — 25000003 PHARM REV CODE 250

## 2024-07-22 PROCEDURE — 82746 ASSAY OF FOLIC ACID SERUM: CPT | Performed by: INTERNAL MEDICINE

## 2024-07-22 PROCEDURE — 36415 COLL VENOUS BLD VENIPUNCTURE: CPT | Mod: 59 | Performed by: INTERNAL MEDICINE

## 2024-07-22 PROCEDURE — 82607 VITAMIN B-12: CPT | Performed by: INTERNAL MEDICINE

## 2024-07-22 PROCEDURE — 83540 ASSAY OF IRON: CPT | Performed by: INTERNAL MEDICINE

## 2024-07-22 PROCEDURE — 63600175 PHARM REV CODE 636 W HCPCS

## 2024-07-22 PROCEDURE — 84439 ASSAY OF FREE THYROXINE: CPT | Performed by: INTERNAL MEDICINE

## 2024-07-22 PROCEDURE — 83615 LACTATE (LD) (LDH) ENZYME: CPT | Performed by: INTERNAL MEDICINE

## 2024-07-22 PROCEDURE — 84443 ASSAY THYROID STIM HORMONE: CPT | Performed by: INTERNAL MEDICINE

## 2024-07-22 PROCEDURE — 3008F BODY MASS INDEX DOCD: CPT | Mod: CPTII,,, | Performed by: INTERNAL MEDICINE

## 2024-07-22 PROCEDURE — 99215 OFFICE O/P EST HI 40 MIN: CPT | Mod: S$PBB,,, | Performed by: INTERNAL MEDICINE

## 2024-07-22 PROCEDURE — 3074F SYST BP LT 130 MM HG: CPT | Mod: CPTII,,, | Performed by: INTERNAL MEDICINE

## 2024-07-22 PROCEDURE — 1159F MED LIST DOCD IN RCRD: CPT | Mod: CPTII,,, | Performed by: INTERNAL MEDICINE

## 2024-07-22 PROCEDURE — 82728 ASSAY OF FERRITIN: CPT | Performed by: INTERNAL MEDICINE

## 2024-07-22 RX ORDER — HEPARIN 100 UNIT/ML
500 SYRINGE INTRAVENOUS
OUTPATIENT
Start: 2024-08-19

## 2024-07-22 RX ORDER — SODIUM CHLORIDE 0.9 % (FLUSH) 0.9 %
10 SYRINGE (ML) INJECTION
Status: CANCELLED | OUTPATIENT
Start: 2024-07-22

## 2024-07-22 RX ORDER — SODIUM CHLORIDE 0.9 % (FLUSH) 0.9 %
10 SYRINGE (ML) INJECTION
OUTPATIENT
Start: 2024-07-29

## 2024-07-22 RX ORDER — HEPARIN 100 UNIT/ML
500 SYRINGE INTRAVENOUS
OUTPATIENT
Start: 2024-07-22

## 2024-07-22 RX ORDER — EPINEPHRINE 0.3 MG/.3ML
0.3 INJECTION SUBCUTANEOUS ONCE AS NEEDED
OUTPATIENT
Start: 2024-07-29

## 2024-07-22 RX ORDER — MAGNESIUM SULFATE HEPTAHYDRATE 40 MG/ML
2 INJECTION, SOLUTION INTRAVENOUS
Status: CANCELLED
Start: 2024-07-22

## 2024-07-22 RX ORDER — HEPARIN 100 UNIT/ML
500 SYRINGE INTRAVENOUS
Status: CANCELLED | OUTPATIENT
Start: 2024-07-22

## 2024-07-22 RX ORDER — HEPARIN 100 UNIT/ML
500 SYRINGE INTRAVENOUS
OUTPATIENT
Start: 2024-07-29

## 2024-07-22 RX ORDER — SODIUM CHLORIDE 0.9 % (FLUSH) 0.9 %
10 SYRINGE (ML) INJECTION
OUTPATIENT
Start: 2024-08-19

## 2024-07-22 RX ORDER — MAGNESIUM SULFATE HEPTAHYDRATE 40 MG/ML
2 INJECTION, SOLUTION INTRAVENOUS
Status: COMPLETED | OUTPATIENT
Start: 2024-07-22 | End: 2024-07-22

## 2024-07-22 RX ORDER — HEPARIN 100 UNIT/ML
500 SYRINGE INTRAVENOUS
Status: DISCONTINUED | OUTPATIENT
Start: 2024-07-22 | End: 2024-07-22 | Stop reason: HOSPADM

## 2024-07-22 RX ORDER — DIPHENHYDRAMINE HYDROCHLORIDE 50 MG/ML
50 INJECTION INTRAMUSCULAR; INTRAVENOUS ONCE AS NEEDED
OUTPATIENT
Start: 2024-08-19

## 2024-07-22 RX ORDER — SODIUM CHLORIDE 0.9 % (FLUSH) 0.9 %
10 SYRINGE (ML) INJECTION
Status: DISCONTINUED | OUTPATIENT
Start: 2024-07-22 | End: 2024-07-22 | Stop reason: HOSPADM

## 2024-07-22 RX ORDER — EPINEPHRINE 0.3 MG/.3ML
0.3 INJECTION SUBCUTANEOUS ONCE AS NEEDED
OUTPATIENT
Start: 2024-08-19

## 2024-07-22 RX ORDER — DIPHENHYDRAMINE HYDROCHLORIDE 50 MG/ML
50 INJECTION INTRAMUSCULAR; INTRAVENOUS ONCE AS NEEDED
OUTPATIENT
Start: 2024-07-29

## 2024-07-22 RX ORDER — SODIUM CHLORIDE 0.9 % (FLUSH) 0.9 %
10 SYRINGE (ML) INJECTION
OUTPATIENT
Start: 2024-07-22

## 2024-07-22 RX ORDER — LANOLIN ALCOHOL/MO/W.PET/CERES
400 CREAM (GRAM) TOPICAL 2 TIMES DAILY
Qty: 28 TABLET | Refills: 0 | Status: CANCELLED | OUTPATIENT
Start: 2024-07-22 | End: 2024-08-05

## 2024-07-22 RX ADMIN — MAGNESIUM SULFATE HEPTAHYDRATE 2 G: 40 INJECTION, SOLUTION INTRAVENOUS at 04:07

## 2024-07-22 RX ADMIN — SODIUM CHLORIDE 1000 ML: 9 INJECTION, SOLUTION INTRAVENOUS at 04:07

## 2024-07-22 RX ADMIN — HEPARIN 500 UNITS: 100 SYRINGE at 06:07

## 2024-07-22 NOTE — NURSING
1540 Arrive from Oncology Clinic post provider visit for Magnesium 2 gm/ 1 Liter NS c/o nausea takes home medication for relief has a critical magnesium level 1.10 addressed by provider.

## 2024-07-22 NOTE — Clinical Note
-hydrate with 1 L of normal saline today -magnesium sulfate 2 g IV per pharmacy protocol -start magnesium oxide 400 mg p.o. b.i.d. x2 weeks; no refills; in 2 weeks, recheck CMP and magnesium level -check retic count, serum iron, TIBC, ferritin, B12 level, folic acid level, LDH, haptoglobin -proceed with Tecentriq every 3 weeks X 17 cycles  -check baseline TSH and free T4, then every 6 weeks -for surveillance, CT chest with contrast in 6 months (11/2024) Colonoscopy for evaluation of positive Cologuard test 08/17/2023 -Tecentriq teaching with nursing staff within a week

## 2024-07-22 NOTE — PLAN OF CARE
START ON PATHWAY REGIMEN - Non-Small Cell Lung    [id number]        Atezolizumab (Tecentriq)           Additional Orders: Serious immune-mediated adverse events can occur with   atezolizumab. Please monitor your patient and refer to the linked   immune-mediated adverse reaction management materials for more information.    **Always confirm dose/schedule in your pharmacy ordering system**    Patient Characteristics:  Postoperative without Neoadjuvant Therapy (Pathologic Staging), Stage IB (Tumor   Size = 4 cm) or Stage II / III, Adjuvant Chemotherapy Completed or Declined,   EGFR Negative/Unknown and ALK Negative/Unknown, Stage II/III, Adjuvant   Chemotherapy Completed, PD-L1 Expression ? 1% (TC)  Therapeutic Status: Postoperative without Neoadjuvant Therapy (Pathologic   Staging)  AJCC T Category: pT3  AJCC N Category: pN0  AJCC M Category: cM0  AJCC 8 Stage Grouping: IIB  Adjuvant Chemotherapy Status: Adjuvant Chemotherapy Completed  ALK Fusion/Rearrangement Status: Negative  EGFR Mutation Status: Negative/Wild Type  PD-L1 Expression Status: PD-L1 Expression ? 1% (TC)  Intent of Therapy:  Curative Intent, Discussed with Patient

## 2024-07-23 ENCOUNTER — TELEPHONE (OUTPATIENT)
Dept: HEMATOLOGY/ONCOLOGY | Facility: CLINIC | Age: 64
End: 2024-07-23
Payer: MEDICAID

## 2024-07-23 DIAGNOSIS — E53.8 FOLATE DEFICIENCY: Primary | ICD-10-CM

## 2024-07-23 RX ORDER — FOLIC ACID 1 MG/1
1 TABLET ORAL DAILY
Qty: 30 TABLET | Refills: 2 | Status: SHIPPED | OUTPATIENT
Start: 2024-07-23 | End: 2025-07-23

## 2024-08-02 ENCOUNTER — DOCUMENT SCAN (OUTPATIENT)
Dept: HOME HEALTH SERVICES | Facility: HOSPITAL | Age: 64
End: 2024-08-02
Payer: MEDICAID

## 2024-08-05 ENCOUNTER — EXTERNAL HOME HEALTH (OUTPATIENT)
Dept: HOME HEALTH SERVICES | Facility: HOSPITAL | Age: 64
End: 2024-08-05
Payer: MEDICAID

## 2024-08-07 DIAGNOSIS — E53.8 FOLATE DEFICIENCY: Primary | ICD-10-CM

## 2024-08-08 ENCOUNTER — TELEPHONE (OUTPATIENT)
Dept: HEMATOLOGY/ONCOLOGY | Facility: CLINIC | Age: 64
End: 2024-08-08

## 2024-08-08 ENCOUNTER — DOCUMENTATION ONLY (OUTPATIENT)
Dept: HEMATOLOGY/ONCOLOGY | Facility: CLINIC | Age: 64
End: 2024-08-08

## 2024-08-08 ENCOUNTER — OFFICE VISIT (OUTPATIENT)
Dept: HEMATOLOGY/ONCOLOGY | Facility: CLINIC | Age: 64
End: 2024-08-08
Payer: MEDICAID

## 2024-08-08 VITALS
BODY MASS INDEX: 29.64 KG/M2 | HEART RATE: 100 BPM | OXYGEN SATURATION: 94 % | HEIGHT: 60 IN | SYSTOLIC BLOOD PRESSURE: 98 MMHG | DIASTOLIC BLOOD PRESSURE: 62 MMHG | WEIGHT: 151 LBS | TEMPERATURE: 99 F

## 2024-08-08 DIAGNOSIS — C34.11 PRIMARY ADENOCARCINOMA OF UPPER LOBE OF RIGHT LUNG: Primary | ICD-10-CM

## 2024-08-08 DIAGNOSIS — D64.9 ANEMIA, UNSPECIFIED TYPE: Primary | ICD-10-CM

## 2024-08-08 DIAGNOSIS — R79.0 LOW MAGNESIUM LEVEL: Primary | ICD-10-CM

## 2024-08-08 PROCEDURE — 4010F ACE/ARB THERAPY RXD/TAKEN: CPT | Mod: CPTII,,,

## 2024-08-08 PROCEDURE — 1159F MED LIST DOCD IN RCRD: CPT | Mod: CPTII,,,

## 2024-08-08 PROCEDURE — 99215 OFFICE O/P EST HI 40 MIN: CPT | Mod: S$PBB,,,

## 2024-08-08 PROCEDURE — 3008F BODY MASS INDEX DOCD: CPT | Mod: CPTII,,,

## 2024-08-08 PROCEDURE — 3078F DIAST BP <80 MM HG: CPT | Mod: CPTII,,,

## 2024-08-08 PROCEDURE — 3074F SYST BP LT 130 MM HG: CPT | Mod: CPTII,,,

## 2024-08-08 PROCEDURE — 99215 OFFICE O/P EST HI 40 MIN: CPT | Mod: PBBFAC

## 2024-08-08 RX ORDER — ACETAMINOPHEN 325 MG/1
650 TABLET ORAL ONCE
Status: CANCELLED | OUTPATIENT
Start: 2024-08-08

## 2024-08-08 RX ORDER — DIPHENHYDRAMINE HCL 25 MG
25 CAPSULE ORAL ONCE
Status: CANCELLED | OUTPATIENT
Start: 2024-08-08

## 2024-08-08 RX ORDER — HYDROCODONE BITARTRATE AND ACETAMINOPHEN 500; 5 MG/1; MG/1
TABLET ORAL ONCE
Status: CANCELLED | OUTPATIENT
Start: 2024-08-08 | End: 2024-08-08

## 2024-08-08 RX ORDER — LANOLIN ALCOHOL/MO/W.PET/CERES
400 CREAM (GRAM) TOPICAL 2 TIMES DAILY
Qty: 28 TABLET | Refills: 0 | Status: SHIPPED | OUTPATIENT
Start: 2024-08-08 | End: 2024-08-22

## 2024-08-09 ENCOUNTER — APPOINTMENT (OUTPATIENT)
Dept: HEMATOLOGY/ONCOLOGY | Facility: CLINIC | Age: 64
End: 2024-08-09
Payer: MEDICAID

## 2024-08-09 ENCOUNTER — INFUSION (OUTPATIENT)
Dept: INFUSION THERAPY | Facility: HOSPITAL | Age: 64
End: 2024-08-09
Attending: INTERNAL MEDICINE
Payer: MEDICAID

## 2024-08-09 VITALS
DIASTOLIC BLOOD PRESSURE: 56 MMHG | WEIGHT: 151 LBS | HEART RATE: 82 BPM | TEMPERATURE: 98 F | HEIGHT: 60 IN | BODY MASS INDEX: 29.64 KG/M2 | SYSTOLIC BLOOD PRESSURE: 115 MMHG | RESPIRATION RATE: 18 BRPM | OXYGEN SATURATION: 100 %

## 2024-08-09 DIAGNOSIS — C34.11 PRIMARY ADENOCARCINOMA OF UPPER LOBE OF RIGHT LUNG: Primary | ICD-10-CM

## 2024-08-09 DIAGNOSIS — D64.9 ANEMIA, UNSPECIFIED TYPE: ICD-10-CM

## 2024-08-09 LAB
ABO + RH BLD: NORMAL
ABO + RH BLD: NORMAL
BLD PROD TYP BPU: NORMAL
BLD PROD TYP BPU: NORMAL
BLOOD UNIT EXPIRATION DATE: NORMAL
BLOOD UNIT EXPIRATION DATE: NORMAL
BLOOD UNIT TYPE CODE: 600
BLOOD UNIT TYPE CODE: 600
CROSSMATCH INTERPRETATION: NORMAL
CROSSMATCH INTERPRETATION: NORMAL
DISPENSE STATUS: NORMAL
DISPENSE STATUS: NORMAL
GROUP & RH: NORMAL
INDIRECT COOMBS: NORMAL
SPECIMEN OUTDATE: NORMAL
UNIT NUMBER: NORMAL
UNIT NUMBER: NORMAL

## 2024-08-09 PROCEDURE — 96409 CHEMO IV PUSH SNGL DRUG: CPT

## 2024-08-09 PROCEDURE — P9016 RBC LEUKOCYTES REDUCED: HCPCS | Performed by: INTERNAL MEDICINE

## 2024-08-09 PROCEDURE — 86923 COMPATIBILITY TEST ELECTRIC: CPT | Mod: 91 | Performed by: INTERNAL MEDICINE

## 2024-08-09 PROCEDURE — 36430 TRANSFUSION BLD/BLD COMPNT: CPT

## 2024-08-09 PROCEDURE — 86900 BLOOD TYPING SEROLOGIC ABO: CPT | Performed by: INTERNAL MEDICINE

## 2024-08-09 PROCEDURE — 36415 COLL VENOUS BLD VENIPUNCTURE: CPT

## 2024-08-09 PROCEDURE — 63600175 PHARM REV CODE 636 W HCPCS: Performed by: INTERNAL MEDICINE

## 2024-08-09 PROCEDURE — 25000003 PHARM REV CODE 250: Performed by: INTERNAL MEDICINE

## 2024-08-09 PROCEDURE — 86850 RBC ANTIBODY SCREEN: CPT | Performed by: INTERNAL MEDICINE

## 2024-08-09 RX ORDER — EPINEPHRINE 0.3 MG/.3ML
0.3 INJECTION SUBCUTANEOUS ONCE AS NEEDED
Status: DISCONTINUED | OUTPATIENT
Start: 2024-08-09 | End: 2024-08-09 | Stop reason: HOSPADM

## 2024-08-09 RX ORDER — DIPHENHYDRAMINE HCL 25 MG
25 CAPSULE ORAL ONCE
Status: COMPLETED | OUTPATIENT
Start: 2024-08-09 | End: 2024-08-09

## 2024-08-09 RX ORDER — ACETAMINOPHEN 325 MG/1
650 TABLET ORAL ONCE
Status: COMPLETED | OUTPATIENT
Start: 2024-08-09 | End: 2024-08-09

## 2024-08-09 RX ORDER — DIPHENHYDRAMINE HYDROCHLORIDE 50 MG/ML
50 INJECTION INTRAMUSCULAR; INTRAVENOUS ONCE AS NEEDED
Status: DISCONTINUED | OUTPATIENT
Start: 2024-08-09 | End: 2024-08-09 | Stop reason: HOSPADM

## 2024-08-09 RX ORDER — HYDROCODONE BITARTRATE AND ACETAMINOPHEN 500; 5 MG/1; MG/1
TABLET ORAL ONCE
Status: ACTIVE | OUTPATIENT
Start: 2024-08-09

## 2024-08-09 RX ORDER — SODIUM CHLORIDE 0.9 % (FLUSH) 0.9 %
10 SYRINGE (ML) INJECTION
Status: DISCONTINUED | OUTPATIENT
Start: 2024-08-09 | End: 2024-08-09 | Stop reason: HOSPADM

## 2024-08-09 RX ORDER — HEPARIN 100 UNIT/ML
500 SYRINGE INTRAVENOUS
Status: DISCONTINUED | OUTPATIENT
Start: 2024-08-09 | End: 2024-08-09 | Stop reason: HOSPADM

## 2024-08-09 RX ADMIN — HEPARIN 500 UNITS: 100 SYRINGE at 01:08

## 2024-08-09 RX ADMIN — ACETAMINOPHEN 650 MG: 325 TABLET, FILM COATED ORAL at 08:08

## 2024-08-09 RX ADMIN — SODIUM CHLORIDE: 9 INJECTION, SOLUTION INTRAVENOUS at 08:08

## 2024-08-09 RX ADMIN — ATEZOLIZUMAB 1200 MG: 1200 INJECTION, SOLUTION INTRAVENOUS at 08:08

## 2024-08-09 RX ADMIN — DIPHENHYDRAMINE HYDROCHLORIDE 25 MG: 25 CAPSULE ORAL at 08:08

## 2024-08-15 DIAGNOSIS — C34.11 PRIMARY ADENOCARCINOMA OF UPPER LOBE OF RIGHT LUNG: Primary | ICD-10-CM

## 2024-08-16 ENCOUNTER — TELEPHONE (OUTPATIENT)
Dept: HEMATOLOGY/ONCOLOGY | Facility: CLINIC | Age: 64
End: 2024-08-16
Payer: MEDICAID

## 2024-08-16 NOTE — TELEPHONE ENCOUNTER
Called patient on 8/15/24 to move up appointment time to 8:00 am Labs and 9:00 am NP visit. Patient confirmed new appointment times.

## 2024-08-19 ENCOUNTER — OFFICE VISIT (OUTPATIENT)
Dept: HEMATOLOGY/ONCOLOGY | Facility: CLINIC | Age: 64
End: 2024-08-19
Payer: MEDICAID

## 2024-08-19 VITALS
WEIGHT: 153.63 LBS | DIASTOLIC BLOOD PRESSURE: 72 MMHG | HEIGHT: 60 IN | HEART RATE: 70 BPM | SYSTOLIC BLOOD PRESSURE: 108 MMHG | TEMPERATURE: 98 F | BODY MASS INDEX: 30.16 KG/M2 | OXYGEN SATURATION: 100 %

## 2024-08-19 DIAGNOSIS — Z08 ENCOUNTER FOR FOLLOW-UP SURVEILLANCE OF LUNG CANCER: Primary | ICD-10-CM

## 2024-08-19 DIAGNOSIS — C34.11 PRIMARY ADENOCARCINOMA OF UPPER LOBE OF RIGHT LUNG: ICD-10-CM

## 2024-08-19 DIAGNOSIS — Z51.11 CHEMOTHERAPY MANAGEMENT, ENCOUNTER FOR: ICD-10-CM

## 2024-08-19 DIAGNOSIS — Z85.118 ENCOUNTER FOR FOLLOW-UP SURVEILLANCE OF LUNG CANCER: Primary | ICD-10-CM

## 2024-08-19 PROCEDURE — 1160F RVW MEDS BY RX/DR IN RCRD: CPT | Mod: CPTII,,,

## 2024-08-19 PROCEDURE — 1159F MED LIST DOCD IN RCRD: CPT | Mod: CPTII,,,

## 2024-08-19 PROCEDURE — 99215 OFFICE O/P EST HI 40 MIN: CPT | Mod: PBBFAC

## 2024-08-19 PROCEDURE — 3008F BODY MASS INDEX DOCD: CPT | Mod: CPTII,,,

## 2024-08-19 PROCEDURE — 3078F DIAST BP <80 MM HG: CPT | Mod: CPTII,,,

## 2024-08-19 PROCEDURE — 4010F ACE/ARB THERAPY RXD/TAKEN: CPT | Mod: CPTII,,,

## 2024-08-19 PROCEDURE — 99215 OFFICE O/P EST HI 40 MIN: CPT | Mod: S$PBB,,,

## 2024-08-19 PROCEDURE — 3074F SYST BP LT 130 MM HG: CPT | Mod: CPTII,,,

## 2024-08-19 NOTE — PROGRESS NOTES
Reason for Follow-up:  -adenocarcinoma right upper lung lobe, S/P right VATS/right upper lobe wedge resection/completion right upper lobectomy and right middle lobectomy and regional lymph node dissection 2024, G3, pT3 pN0  -Cologuard positive    Current Treatment:   Tecentriq 1200mg Q3 weeks (started 24)     History:  Social history:  .  Lives in [place].  Has 3 children.  Does not work.  Has been smoking 1-2 pack of cigarettes daily for 51 years, since age 12; discontinued recently.  No alcohol or illicit drug abuse.      Family history:   Mother experienced some kind of intrathoracic malignancy at age 83;  from MI at age 83   Father  from prostate cancer) experienced at age 83 and probably, sarcoma) experienced at age 83)    Health maintenance:   -PCP in [place]  -says that she had screening colonoscopy performed in [place] in , and that it was unremarkable  -says that now, for positive Cologuard test, she is scheduled for colonoscopy in May 2024  -2023:  Cologuard positive  -2023:  Bilateral digital screening mammogram with tomosynthesis (comparison:  2022 mammogram, etc.):  BI-RADS: 1 negative      History of Present Illness:   No chief complaint on file.        Oncologic/Hematologic History:  Oncology History   Primary adenocarcinoma of upper lobe of right lung   3/7/2024 Cancer Staged    Staging form: Lung, AJCC 8th Edition  - Pathologic stage from 3/7/2024: Stage IIB (pT3, pN0, cM0)     4/15/2024 Initial Diagnosis    Primary adenocarcinoma of upper lobe of right lung     2024 - 2024 Chemotherapy    Treatment Summary   Plan Name: OP NSCLC PEMETREXED + CISPLATIN Q3W  Treatment Goal: Curative  Status: Inactive  Start Date: 2024  End Date: 2024  Provider: Emery Mahoney MD  Chemotherapy: CISplatin (Platinol) 132 mg in sodium chloride 0.9% 697 mL chemo infusion, 141 mg, Intravenous, Clinic/HOD 1 time, 4 of 4  cycles  Administration: 132 mg (4/29/2024), 128 mg (6/10/2024), 128 mg (7/1/2024), 132 mg (5/20/2024)  PEMEtrexed disodium (ALIMTA) 900 mg in sodium chloride 0.9% SolP 100 mL chemo infusion, 950 mg, Intravenous, Clinic/HOD 1 time, 4 of 4 cycles  Dose modification: 375 mg/m2 (75 % of original dose 500 mg/m2, Cycle 4, Reason: Dose Not Tolerated)  Administration: 900 mg (4/29/2024), 900 mg (6/10/2024), 625 mg (7/1/2024), 900 mg (5/20/2024)     8/9/2024 -  Chemotherapy    Treatment Summary   Plan Name: OP ATEZOLIZUMAB Q3W  Treatment Goal: Curative  Status: Active  Start Date: 8/9/2024  End Date: 7/11/2025 (Planned)  Provider: Emery Mahoney MD  Chemotherapy: [No matching medication found in this treatment plan]     63-year-old female, referred from [facility], Dr. Arnulfo Middleton, with poorly-differentiated adenocarcinoma of right lung.    -08/12/2022: CT chest lung screening low-dose (comparison:  09/24/2020):  Lung rads 2: Benign appearance or behavior:  Continue annual screening with LDCT in 12 months  -09/11/2023:  CT chest lung screening low-dose without contrast (comparison:  08/12/2022):  Lung rads category 4A: Suspicious; enlarging nodule right upper lobe, lobulated, < 8 mm, i.e., 7 x 6.7 mm, previously 5 mm, previously not lobulated, too small for adequate detection on PET-CT; recommend three-month follow-up)  -12/26/2023: CT chest with contrast (comparison:  09/11/2023):  Lung rads 4A: Very suspicious; continued enlargement of right upper lobe lung nodule of concern (9.5 x 8.9 mm)  -01/29/2024: FDG PET-CT (comparison:  Chest CT 12/26/2023; CT abdomen pelvis 09/04/2019):  1. Markedly FDG-avid right upper lobe soft tissue nodule, increased in size in the interval, raising concern for malignancy (smoothly marginated noncalcified soft tissue nodule lateral subpleural right upper lobe, 1.2 x 1.1 cm, maximum SUV 14, previously 1 cm x 0.9 cm).  2. No definite PET-CT findings to suggest  additional right hemithoracic or more distant metastatic disease.  -02/01/2024: CT chest without contrast (comparison:  12/26/2023): Lateral right upper lobe nodule continues to enlarge in size currently measuring 11 mm concerning for malignancy. Recommend further evaluation.   -03/07/2024:  Right VATS, right upper lobe wedge resection; right upper lobectomy, right middle lobectomy, regional lymph node dissection:  1. Right lung, upper lobe, wedge resection:  Poorly-differentiated adenocarcinoma, 0.6 cm, clear margins of resection  2. Level 8R lymph node (paraesophageal), biopsy:  3 lymph nodes, negative for metastatic carcinoma    3. Level 4R lymph node (lower paratracheal), biopsy: 2 lymph nodes, negative for metastatic carcinoma  4. Level 7R lymph node (subcarinal), biopsy:  1 lymph node, negative for metastatic carcinoma  5. Level 11R lymph node (interlobar), biopsy:  2 lymph nodes, negative for metastatic carcinoma    6. Right lung, upper lobe, completion lobectomy:  -poorly-differentiated adenocarcinoma, 0.8 cm  -bronchial and vascular margins of resection negative   -1 hilar lymph node with no evidence of metastatic carcinoma  7. Level 12R lymph node (lobar), lymphadenectomy:  1 lymph node, negative for metastatic carcinoma    Synoptic report:  Total number of primary tumors: 2  S/P wedge resection  Completion lobectomy  Right lung  Separate tumor nodules (metastases) in same lobe, therefore, pT3  number of intrapulmonary metastases:  2   Tumor site:  Upper lobe of lung  Tumor size:  Total tumor size:  Greatest dimension:  0.8 cm  Invasive acinar adenocarcinoma; G3, poorly-differentiated; spread through airspaces (CHAMP), present; no visceral pleural invasion; no adjacent structures present; no known pre-surgical therapy; no LVI  All margins negative for invasive carcinoma; closest margin invasive carcinoma, bronchial vascular; distance from invasive carcinoma closest margin,> 2 cm; margins status for  noninvasive tumor, all margins negative for noninvasive tumor  Number of lymph nodes examined, 10; all regional lymph nodes negative for tumor  >>>  pT3 pN0    PD-L1 expression:  Positive: High (TPS 95%; )  Negative for EGFR, KRAS, BRAF, AL K, ROS1, RET, MET, HER2  No gene rearrangement no reportable altered splicing events identified from RNA sequencing  No reportable pathogenic variants found  MSI stable  TMB:  41.6m/MB  Fusions: Negative    04/16/2024:  Pleasant lady who presents for initial medical oncology consultation, accompanied by .  In no acute discomfort.  After lobectomy, has required supplemental oxygen via nasal cannula at 2 liters/minute.  This is being handled by home healthcare team.  Prior to surgery, she never required supplemental oxygen.  Prior to surgery, she never experienced significant cough, sputum production, or dyspnea.  Great appetite.  Mild exertional dyspnea.  ECOG 1.    No unusual headaches or focal neurological symptoms.  No hemoptysis, fevers, or chills.  No abdominal pain, nausea, vomiting, change in bowel habits, or GI bleeding.  No anorexia or unintentional weight loss.  Smoked 1-2 packs of cigarettes daily for 51 years since age 12; says that she discontinued smoking after lung surgery.    Interval History 8/19/24:  Patient presented to the clinic today for a scheduled clinic visit for a chemotherapy toxicity check after starting Tecentriq on 8/9/24. She has no acute complaints today. She reports feeling well overall. She states that her fatigue has resolved.  She endorses taking folic acid 1 mg daily as well as her magnesium prescription she has been completed yet.  She continues to use 2 L nasal cannula daily and has not required oxygen.  She states that she started physical therapy and she slowly gaining her strength back.  She denies any fever, chills or any worsening shortness of breath.  Denies any nausea, vomiting, constipation or diarrhea.  Lab work was  reviewed with the patient.  All future appointments were discussed.    Review of Systems:   All systems reviewed and found to be negative except for the symptoms detailed above    Physical Examination:   VITAL SIGNS:   Vitals:    08/19/24 0851   BP: 108/72   Pulse: 70   Temp: 97.6 °F (36.4 °C)       Physical Exam  Vitals reviewed.   Constitutional:       Appearance: Normal appearance.   HENT:      Head: Normocephalic and atraumatic.      Nose:      Comments: 2 Liter NC      Mouth/Throat:      Mouth: Mucous membranes are moist.   Cardiovascular:      Rate and Rhythm: Normal rate and regular rhythm.      Pulses: Normal pulses.      Heart sounds: Normal heart sounds.   Pulmonary:      Effort: Pulmonary effort is normal.      Breath sounds: Normal breath sounds.   Abdominal:      General: Bowel sounds are normal.      Palpations: Abdomen is soft.   Skin:     General: Skin is warm and dry.   Neurological:      Mental Status: She is alert and oriented to person, place, and time.   Psychiatric:         Mood and Affect: Mood normal.         Behavior: Behavior normal.         Thought Content: Thought content normal.         Judgment: Judgment normal.          Assessment:  Adenocarcinoma right upper lung lobe:   -LDCT chest without contrast 08/12/2022:  Lung rads 2  -LDCT chest without contrast 09/11/2023:  Lung rads 4A  -noncontrast chest CT 12/26/2023:  Lung rads 4A  -FDG PET-CT 01/29/2024:  Hypermetabolic right upper lobe nodule, 1.2 x 1.1 cm, maximum SUV 14, previously 1 cm x 0.9 cm; no distant metastases   -noncontrast chest CT 02/01/2024:  Right upper lobe nodule continues to enlarge, now 11 mm   -03/07/2024:  Right VATS, right upper lobe wedge resection, right upper lobectomy, right middle lobectomy, regional lymph node dissection:  -separate tumor nodules (metastases) in same lobe, right upper lung lobe, therefore, pT3 (0.8 cm and 0.6 cm, respectively); invasive acinar adenocarcinoma; G3, poorly-differentiated; no  visceral pleural invasion; no LVI; margins negative; 10 regional lymph nodes negative  -pT3 pN0 M0, stage IIB  -PD-L1 expression high positive (TPS 95%; )  -negative for EGFR, KRAS, BRAF, AL K, ROS1, RET, met, HER2  -MediPort placed 04/25/2024  -Adjuvant cisplatin/Alimta started 04/29/2024  -restaging CTs C/A/P 05/06/2024:  No metastases   -staging brain MRI 05/08/2024: No metastases  -adjuvant cisplatin/Alimta:  Cycle 2 on 05/20/2024; cycle 3 on 06/10/2024; cycle 4 on 07/01/2024  -hemoglobin dropped to 5.2 on 07/10/2024, requiring blood transfusion  -ANC dropped to 0.55 on 06/24/2024, requiring supportive care  -07/17/2024:  ANC 0.525; hemoglobin 7.0; iron studies consistent with anemia of chronic disease/inflammation/malignancy/chemotherapy      Plan:  Adenocarcinoma of right upper lung lobe:   Proceed with Tecentriq every 3 weeks X 17 cycles   C2D1 on 8/30 with labs prior (CBC/CMP/Mag level)   RTC with me on 9/20 with labs prior (CBC/CMP/Mag level/TSH/Free T4) followed by Tecentriq in infusion   Check baseline TSH and free T4, then every 6 weeks (Next due beginning of September)  For surveillance, CT chest with contrast in 6 months (11/2024)  Colonoscopy for evaluation of positive Cologuard test 08/17/2023      -adenocarcinoma right upper lung lobe  -right VATS and right upper and middle lobectomy 03/07/2024  -pT3 pN0 M0, stage IIB  -PD-L1 expression high positive (TPS 95%; )  -negative for EGFR, KRAS, BRAF, AL K, ROS1, RET, met, HER2  -MediPort placed 04/25/2024  -Adjuvant cisplatin/Alimta started 04/29/2024  -restaging CTs 05/06/2024:  No metastases   -staging brain MRI 05/08/2024: No metastases  >>>  Plan:  (Patient did not receive neoadjuvant chemotherapy)  -cisplatin +pemetrexed (adjuvant) every 21 days x4 cycles; followed by  Atezolizumab 840 mg every 2 weeks, 1200 mg every 3 weeks, or 1680 mg every 4 weeks for up to 1 year  >>>  -adjuvant cisplatin/Alimta started 04/29/2024; continue every 3  weeks x4 cycles  -adjuvant cisplatin/Alimta:  Cycle 2 on 05/20/2024; cycle 3 on 06/10/2024; cycle 4 on 07/01/2024  -hemoglobin dropped to 5.2 on 07/10/2024, requiring blood transfusion  -ANC dropped to 0.55 on 06/24/2024, requiring supportive care  -07/17/2024:  ANC 0.525; hemoglobin 7.0; iron studies consistent with anemia of chronic disease/inflammation/malignancy/chemotherapy  >>>  -severe anemia; most likely, induced by chemotherapy; check retic count, serum iron, TIBC, ferritin, B12 level, folic acid level, LDH, haptoglobin  -has finished 4 cycles of adjuvant cisplatin/Alimta   -now, proceed with adjuvant atezolizumab 1200 mg IV every 3 weeks x1 year (17 cycles)   -monitor for immune related adverse events with atezolizumab  -check baseline TSH and free T4, then every 6 weeks to monitor for the possibility of immune thyroiditis with atezolizumab  -for surveillance, CT chest with contrast 6 months (11/2024) after last set of CTs (05/06/2024)    Monitoring on atezolizumab:  Monitor LFTs (AST, ALT, and total bilirubin; at baseline and periodically during treatment;  kidney function (serum creatinine; at baseline and periodically during treatment);  thyroid function (at baseline, periodically during treatment and as clinically indicated);  monitor blood glucose (for hyperglycemia);  Monitor closely for signs/symptoms of immune-mediated adverse reactions, including  adrenal insufficiency,  diarrhea/colitis (consider initiating or repeating infectious workup in patients with corticosteroid-refractory immune-mediated colitis to exclude alternative causes),  dermatologic toxicity,  diabetes mellitus,  hypophysitis,  ocular disorders,  thyroid disorders,  pneumonitis and other immune-mediated adverse reactions.  Monitor for signs/symptoms of infusion-related reactions.     Surveillance:  -history and physical and chest CT +/- contrast every 6 months X 3 years (03/2024-03/2027), then history and physical and low-dose  noncontrast chest CT annually  -smoking cessation advice and counseling   -FDG PET-CT or brain MRI is not routinely indicated    -08/17/2023: Cologuard positive  -needs evaluation with colonoscopy rule out colon cancer    Above discussed at length with the patient.  All questions answered.    Discussed labs and scans and gave her copies of relevant records.  Plan of management discussed in detail.    Potential side effects of atezolizumab discussed.  Gave her educational materials from [facility].  Formal immunotherapy teaching with nursing staff to follow.  She understands and agrees with this plan.       Follow-up:  No follow-ups on file.             Dutasteride Male Counseling: Dustasteride Counseling:  I discussed with the patient the risks of use of dutasteride including but not limited to decreased libido, decreased ejaculate volume, and gynecomastia. Women who can become pregnant should not handle medication.  All of the patient's questions and concerns were addressed. Dutasteride Counseling: Dustasteride Counseling:  I discussed with the patient the risks of use of dutasteride including but not limited to decreased libido, decreased ejaculate volume, and gynecomastia. Women who can become pregnant should not handle medication.  All of the patient's questions and concerns were addressed.

## 2024-08-19 NOTE — Clinical Note
Return to infusion on 8/30 with labs prior (CBC/CMP/Mag level) followed by Tecentriq  RTC with me on 9/20 with labs prior (CBC/CMP/Mag level/TSH/Free T4) followed by Tecentriq in infusion

## 2024-08-23 ENCOUNTER — DOCUMENT SCAN (OUTPATIENT)
Dept: HOME HEALTH SERVICES | Facility: HOSPITAL | Age: 64
End: 2024-08-23
Payer: MEDICAID

## 2024-08-27 NOTE — PROGRESS NOTES
Consults    Reason For Consult  Medical management    History Of Present Illness  Marco Antonio Merlos Jr. is a 81 y.o. male with past medical history of Parkinson's disease, restless leg syndrome presenting status post HoLEP.  Patient with long history of urinary symptoms of urgency, frequency and urge incontinence.  Patient also experienced episodes of hematuria.  Patient was seen by Dr. Huffman outpatient they discussed options and proceeded with HoLEP today.  We were consulted for medical management, as patient will be monitored overnight after his procedure.  Patient currently resting in bed with no complaints.  Patient with continuous bladder irrigation currently flowing without issues.  Patient denies pain, fever, chills, chest pain, shortness of breath, nausea, vomiting or diarrhea.  Patient will be admitted for further workup.     Past Medical History  He has a past medical history of Neuropathy, Osteoarthritis, Parkinson disease (Multi), RLS (restless legs syndrome), Scoliosis, Seizure (Multi) (2020), Spinal stenosis, Thromboembolism (Multi), and Urinary tract infection.    Surgical History  He has a past surgical history that includes Total knee arthroplasty (Right, 2022); Total knee arthroplasty (Left); Appendectomy; Hip fracture surgery (2022); Spine surgery; Colonoscopy; and Cystoscopy.     Social History  He reports that he has never smoked. He has never used smokeless tobacco. He reports that he does not drink alcohol and does not use drugs.    Family History  Family History   Problem Relation Name Age of Onset    Valvular heart disease Father      Other (sepsis) Sister      Bone cancer Sister          Allergies  Patient has no known allergies.      Review of Systems      Physical Exam  HENT:      Head: Normocephalic.      Mouth/Throat:      Mouth: Mucous membranes are moist.   Cardiovascular:      Rate and Rhythm: Normal rate.   Pulmonary:      Effort: Pulmonary effort is normal.   Abdominal:      General:  History:  Past Medical History:   Diagnosis Date    Dizziness     GERD     Gout     High cholesterol     Insomnia     Irregular heart rhythm     Low vitamin D level     Lung cancer     Lung nodule     Mixed hyperlipidemia     Neuropathy     Obesity     Seasonal allergies     Sleep apnea     cipap    Stroke 2012    Vertigo    Social history:  .  Lives in [place].  Has 3 children.  Does not work.  Has been smoking 1-2 pack of cigarettes daily for 51 years, since age 12; discontinued recently.  No alcohol or illicit drug abuse.      Family history:   Mother experienced some kind of intrathoracic malignancy at age 83;  from MI at age 83   Father  from prostate cancer) experienced at age 83 and probably, sarcoma) experienced at age 83)    Health maintenance:   -PCP in [place]  -says that she had screening colonoscopy performed in [place] in , and that it was unremarkable  -says that now, for positive Cologuard test, she is scheduled for colonoscopy in May 2024  -2023:  Cologuard positive  -2023:  Bilateral digital screening mammogram with tomosynthesis (comparison:  2022 mammogram, etc.):  BI-RADS: 1 negative  Past Surgical History:   Procedure Laterality Date    APPENDECTOMY       SECTION      CHOLECYSTECTOMY      COLONOSCOPY      HERNIA REPAIR      INSERTION OF TUNNELED CENTRAL VENOUS CATHETER (CVC) WITH SUBCUTANEOUS PORT N/A 2024    Procedure: [id number];  Surgeon: Luz Elena Elder MD;  Location: [facility];  Service: General;  Laterality: N/A;    LOBECTOMY Right 3/7/2024    Procedure: LOBECTOMY;  Surgeon: Naty Middleton MD;  Location: [facility];  Service: Thoracic;  Laterality: Right;  upper and middle lobectomy    THORACOSCOPIC WEDGE RESECTION OF LUNG Right 3/7/2024    Procedure: VATS, WITH WEDGE RESECTION, LUNG;  Surgeon: Naty Middleton MD;  Location: [facility];  Service: Thoracic;  Laterality: Right;  RIGHT VATS // POSS LOBECTOMY     Bowel sounds are normal.      Palpations: Abdomen is soft.   Musculoskeletal:         General: Normal range of motion.      Cervical back: Neck supple.   Skin:     General: Skin is warm.   Neurological:      Mental Status: He is alert and oriented to person, place, and time.      Motor: Tremor present.   Psychiatric:         Mood and Affect: Mood normal.             Last Recorded Vitals  /85 (BP Location: Right arm, Patient Position: Sitting)   Pulse 85   Temp 36.4 °C (97.5 °F) (Temporal)   Resp 18   Wt 70.1 kg (154 lb 8.7 oz)   SpO2 100%     Relevant Results     Assessment/Plan   BPH with LUTS  Postop day 0 status post HoLEP  Hematuria    Plan:  Patient on continuous bladder irrigation  Trend hemoglobin  Continue finasteride and tamsulosin  P.o. Cipro as ordered    Parkinson's disease  Restless leg syndrome  -PT /OT consult  -Reports taking no medications for this    DVT prophylaxis -SCDs given hematuria  Adiel Talbot, JOSSY-CNP         THORACOTOMY Right 3/7/2024    Procedure: THORACOTOMY;  Surgeon: Naty Middleton MD;  Location: [facility] OR;  Service: Thoracic;  Laterality: Right;  converted to open at 1011      Social History     Socioeconomic History    Marital status:    Tobacco Use    Smoking status: Former     Types: Cigarettes     Start date: 2023     Quit date: 1972     Years since quittin.5    Smokeless tobacco: Never    Tobacco comments:     Smoked since age 12; quit 2023   Substance and Sexual Activity    Alcohol use: Not Currently    Drug use: Never    Sexual activity: Yes     Partners: Male      Family History   Problem Relation Name Age of Onset    Cancer Mother      Heart disease Mother      Breast cancer Mother      Cancer Father          Reason for Follow-up:  -adenocarcinoma right upper lung lobe, S/P right VATS/right upper lobe wedge resection/completion right upper lobectomy and right middle lobectomy and regional lymph node dissection 2024, G3, pT3 pN0  -Cologuard positive    History of Present Illness:   Primary adenocarcinoma of upper lobe of right lung        Oncologic/Hematologic History:  Oncology History   Primary adenocarcinoma of upper lobe of right lung   3/7/2024 Cancer Staged    Staging form: Lung, AJCC 8th Edition  - Pathologic stage from 3/7/2024: Stage IIB (pT3, pN0, cM0)     4/15/2024 Initial Diagnosis    Primary adenocarcinoma of upper lobe of right lung     2024 - 2024 Chemotherapy    Treatment Summary   Plan Name: OP NSCLC PEMETREXED + CISPLATIN Q3W  Treatment Goal: Curative  Status: Inactive  Start Date: 2024  End Date: 2024  Provider: Emery Mahoney MD  Chemotherapy: CISplatin (Platinol) 132 mg in sodium chloride 0.9% 697 mL chemo infusion, 141 mg, Intravenous, Clinic/[facility] 1 time, 4 of 4 cycles  Administration: 132 mg (2024), 128 mg (6/10/2024), 128 mg (2024), 132 mg (2024)  PEMEtrexed disodium (ALIMTA) 900 mg in sodium chloride 0.9% SolP 100 mL  chemo infusion, 950 mg, Intravenous, Clinic/[facility] 1 time, 4 of 4 cycles  Dose modification: 375 mg/m2 (75 % of original dose 500 mg/m2, Cycle 4, Reason: Dose Not Tolerated)  Administration: 900 mg (4/29/2024), 900 mg (6/10/2024), 625 mg (7/1/2024), 900 mg (5/20/2024)     7/29/2024 -  Chemotherapy    Treatment Summary   Plan Name: OP ATEZOLIZUMAB Q3W  Treatment Goal: Curative  Status: Active  Start Date: 7/29/2024 (Planned)  End Date: 6/30/2025 (Planned)  Provider: Emery Mahoney MD  Chemotherapy: [No matching medication found in this treatment plan]     63-year-old female, referred from [facility], Dr. Arnulfo Middleton, with poorly-differentiated adenocarcinoma of right lung.    -08/12/2022: CT chest lung screening low-dose (comparison:  09/24/2020):  Lung rads 2: Benign appearance or behavior:  Continue annual screening with LDCT in 12 months  -09/11/2023:  CT chest lung screening low-dose without contrast (comparison:  08/12/2022):  Lung rads category 4A: Suspicious; enlarging nodule right upper lobe, lobulated, < 8 mm, i.e., 7 x 6.7 mm, previously 5 mm, previously not lobulated, too small for adequate detection on PET-CT; recommend three-month follow-up)  -12/26/2023: CT chest with contrast (comparison:  09/11/2023):  Lung rads 4A: Very suspicious; continued enlargement of right upper lobe lung nodule of concern (9.5 x 8.9 mm)  -01/29/2024: FDG PET-CT (comparison:  Chest CT 12/26/2023; CT abdomen pelvis 09/04/2019):  1. Markedly FDG-avid right upper lobe soft tissue nodule, increased in size in the interval, raising concern for malignancy (smoothly marginated noncalcified soft tissue nodule lateral subpleural right upper lobe, 1.2 x 1.1 cm, maximum SUV 14, previously 1 cm x 0.9 cm).  2. No definite PET-CT findings to suggest additional right hemithoracic or more distant metastatic disease.  -02/01/2024: CT chest without contrast (comparison:  12/26/2023): Lateral right upper lobe nodule  continues to enlarge in size currently measuring 11 mm concerning for malignancy. Recommend further evaluation.   -03/07/2024:  Right VATS, right upper lobe wedge resection; right upper lobectomy, right middle lobectomy, regional lymph node dissection:  1. Right lung, upper lobe, wedge resection:  Poorly-differentiated adenocarcinoma, 0.6 cm, clear margins of resection  2. Level 8R lymph node (paraesophageal), biopsy:  3 lymph nodes, negative for metastatic carcinoma    3. Level 4R lymph node (lower paratracheal), biopsy: 2 lymph nodes, negative for metastatic carcinoma  4. Level 7R lymph node (subcarinal), biopsy:  1 lymph node, negative for metastatic carcinoma  5. Level 11R lymph node (interlobar), biopsy:  2 lymph nodes, negative for metastatic carcinoma    6. Right lung, upper lobe, completion lobectomy:  -poorly-differentiated adenocarcinoma, 0.8 cm  -bronchial and vascular margins of resection negative   -1 hilar lymph node with no evidence of metastatic carcinoma  7. Level 12R lymph node (lobar), lymphadenectomy:  1 lymph node, negative for metastatic carcinoma    Synoptic report:  Total number of primary tumors: 2  S/P wedge resection  Completion lobectomy  Right lung  Separate tumor nodules (metastases) in same lobe, therefore, pT3  number of intrapulmonary metastases:  2   Tumor site:  Upper lobe of lung  Tumor size:  Total tumor size:  Greatest dimension:  0.8 cm  Invasive acinar adenocarcinoma; G3, poorly-differentiated; spread through airspaces (CHAMP), present; no visceral pleural invasion; no adjacent structures present; no known pre-surgical therapy; no LVI  All margins negative for invasive carcinoma; closest margin invasive carcinoma, bronchial vascular; distance from invasive carcinoma closest margin,> 2 cm; margins status for noninvasive tumor, all margins negative for noninvasive tumor  Number of lymph nodes examined, 10; all regional lymph nodes negative for tumor  >>>  pT3 pN0    PD-L1  expression:  Positive: High (TPS 95%; )  Negative for EGFR, KRAS, BRAF, AL K, ROS1, RET, MET, HER2  No gene rearrangement no reportable altered splicing events identified from RNA sequencing  No reportable pathogenic variants found  MSI stable  TMB:  41.6m/MB  Fusions: Negative    04/16/2024:  Pleasant lady who presents for initial medical oncology consultation, accompanied by .  In no acute discomfort.  After lobectomy, has required supplemental oxygen via nasal cannula at 2 liters/minute.  This is being handled by home healthcare team.  Prior to surgery, she never required supplemental oxygen.  Prior to surgery, she never experienced significant cough, sputum production, or dyspnea.  Great appetite.  Mild exertional dyspnea.  ECOG 1.    No unusual headaches or focal neurological symptoms.  No hemoptysis, fevers, or chills.  No abdominal pain, nausea, vomiting, change in bowel habits, or GI bleeding.  No anorexia or unintentional weight loss.  Smoked 1-2 packs of cigarettes daily for 51 years since age 12; says that she discontinued smoking after lung surgery.    Interval History:  FILGRASTIM (Neupogen) 480 MCG   OP ATEZOLIZUMAB Q3W     07/22/2024:   -adjuvant cisplatin/Alimta:  Cycle 2 on 05/20/2024; cycle 3 on 06/10/2024; cycle 4 on 07/01/2024  -hemoglobin dropped to 5.2 on 07/10/2024  -ANC dropped to 0.55 on 06/24/2024  -07/17/2024: WBC 1.25; hemoglobin 7.0; platelets 129; ANC 0.525; serum iron 298 (elevated); TIBC <25 (suppressed); ferritin 1314 (anemia of chronic disease/inflammation/malignancy); unable to calculate transferrin saturation; CMP unremarkable except albumin 3.2  -07/22/2024:  WBC 4.26; hemoglobin 7.7; MCV normal; platelets normal; differential count unremarkable, ANC 2.03 (spontaneously normalized); creatinine 1.33, potassium 4.1, normal; magnesium 1.10, low  >>>  -hydrate with 1 L of normal saline today  -magnesium sulfate 2 g IV per pharmacy protocol  -start magnesium oxide 400 mg  p.o. b.i.d. x2 weeks; no refills; in 2 weeks, recheck CMP and magnesium level  Presents for a follow-up visit, accompanied by family.  In no acute discomfort.  Has been on supplemental oxygen since lobectomy of lung. Has been smoking 1-2 pack of cigarettes daily for 51 years, since age 12; discontinued a few months ago.  Chronic weakness or fatigue.  ECOG 2.  Yesterday, experienced some nausea and vomiting.  No unusual headaches, focal neurological symptoms, vision impairment, gait impairment, chest pain, cough, hemoptysis, recurrent fevers or chills, any new lumps or lymphadenopathy, anorexia, unintentional weight loss, abdominal pain, nausea, vomiting, change in bowel habits, GI bleeding, etc..    Medications:  Current Outpatient Medications on File Prior to Visit   Medication Sig Dispense Refill    allopurinoL (ZYLOPRIM) 100 MG tablet Take 100 mg by mouth once daily.      aspirin (ECOTRIN) 81 MG EC tablet Take 81 mg by mouth once daily.      carvediloL (COREG) 12.5 MG tablet Take 12.5 mg by mouth 2 (two) times daily.      ciprofloxacin HCl (CIPRO) 500 MG tablet Take 1 tablet (500 mg total) by mouth 2 (two) times daily. for 7 days 14 tablet 0    colchicine (COLCRYS) 0.6 mg tablet Take 0.6 mg by mouth once daily.      dexAMETHasone (DECADRON) 4 MG Tab Take Dexamethasone 8mg daily the day before treatment, treatment day, and 3 days after. 120 tablet 3    diphenhydrAMINE-aluminum-magnesium hydroxide-simethicone-LIDOcaine viscous HCl 2% Swish and spit 15 mLs every 4 (four) hours as needed (oral mucositis). 300 each 2    ENTRESTO 24-26 mg per tablet Take 1 tablet by mouth 2 (two) times daily.      ergocalciferol (ERGOCALCIFEROL) 50,000 unit Cap Take 50,000 Units by mouth every 7 days.      furosemide (LASIX) 40 MG tablet Take 40 mg by mouth once daily.      gabapentin (NEURONTIN) 800 MG tablet Take 800 mg by mouth 2 (two) times daily.      ibuprofen (ADVIL,MOTRIN) 800 MG tablet 1 tablet with food or milk as needed  Orally Three times a day      linaCLOtide (LINZESS) 145 mcg Cap capsule Take 290 mcg by mouth as needed.      meclizine (ANTIVERT) 25 mg tablet Take 25 mg by mouth Daily.      megestroL (MEGACE) 40 MG Tab Take 1 tablet (40 mg total) by mouth 4 (four) times daily. 120 tablet 2    mirtazapine (REMERON) 30 MG tablet Take 30 mg by mouth every evening.      montelukast (SINGULAIR) 10 mg tablet Take 10 mg by mouth once daily.      nortriptyline (PAMELOR) 25 MG capsule Take 25 mg by mouth Daily.      ondansetron (ZOFRAN) 4 MG tablet Take 1 tablet (4 mg total) by mouth daily as needed for Nausea. 30 tablet 1    pantoprazole (PROTONIX) 40 MG tablet Take 40 mg by mouth once daily.      polyethylene glycol (MOVIPREP) 100-7.5-2.691 gram solution Take as directed prior to colonoscopy 1 kit 0    potassium chloride (K-TAB) 20 mEq Take 1 tablet (20 mEq total) by mouth 2 (two) times daily. 28 tablet 0    prochlorperazine (COMPAZINE) 10 MG tablet Take 1 tablet (10 mg total) by mouth 4 (four) times daily. 30 tablet 1    ALPRAZolam (XANAX) 0.5 MG tablet Take 1 tablet (0.5 mg total) by mouth once as needed for Anxiety. Take at 8:30 am on day of scans 1 tablet 0    atorvastatin (LIPITOR) 40 MG tablet Take 40 mg by mouth once daily.       Current Facility-Administered Medications on File Prior to Visit   Medication Dose Route Frequency Provider Last Rate Last Admin    lactated ringers infusion   Intravenous Continuous Aline Muniz MD 10 mL/hr at 04/26/24 0608 New Bag at 04/26/24 0608     Review of Systems:   All systems reviewed and found to be negative except for the symptoms detailed above    Physical Examination:   VITAL SIGNS:   Vitals:    07/22/24 1443   BP: 101/61   Pulse: 80   Resp: 17   Temp: 97.5 °F (36.4 °C)         GENERAL:  In no apparent distress.    HEAD:  No signs of head trauma.  EYES:  Pupils are equal.  Extraocular motions intact.    EARS:  Hearing grossly intact.  MOUTH:  Oropharynx is normal.   NECK:  No  adenopathy, no JVD.     CHEST:  Chest with clear breath sounds bilaterally.  No wheezes, rales, rhonchi.    CARDIAC:  Regular rate and rhythm.  S1 and S2, without murmurs, gallops, rubs.  VASCULAR:  No Edema.  Peripheral pulses normal and equal in all extremities.  ABDOMEN:  Soft, without detectable tenderness.  No sign of distention.  No   rebound or guarding, and no masses palpated.   Bowel Sounds normal.  MUSCULOSKELETAL:  Good range of motion of all major joints. Extremities without clubbing, cyanosis or edema.    NEUROLOGIC EXAM:  Alert and oriented x 3.  No focal sensory or strength deficits.   Speech normal.  Follows commands.  PSYCHIATRIC:  Mood normal.  -04/16/2024:  In no acute discomfort.  On supplemental oxygen via nasal cannula at 2 liters/minute.  Has required supplemental oxygen after lobectomy.  Prior to lobectomy, never required supplemental oxygen.  Oxygen is being handled by home healthcare team.  No cervical lymphadenopathy.    Results for orders placed or performed during the hospital encounter of 03/07/24   CBC Auto Differential    Narrative    The following orders were created for panel order CBC Auto Differential.  Procedure                               Abnormality         Status                     ---------                               -----------         ------                     CBC with Differential[[id number]]       Abnormal            Final result                 Please view results for these tests on the individual orders.   CBC with Differential   Result Value Ref Range    WBC 10.24 4.50 - 11.50 x10(3)/mcL    RBC 3.71 (L) 4.20 - 5.40 x10(6)/mcL    Hgb 11.1 (L) 12.0 - 16.0 g/dL    Hct 35.3 (L) 37.0 - 47.0 %    MCV 95.1 (H) 80.0 - 94.0 fL    MCH 29.9 27.0 - 31.0 pg    MCHC 31.4 (L) 33.0 - 36.0 g/dL    RDW 13.6 11.5 - 17.0 %    Platelet 349 130 - 400 x10(3)/mcL    MPV 8.7 7.4 - 10.4 fL    Neut % 73.8 %    Lymph % 17.8 %    Mono % 6.1 %    Eos % 1.1 %    Basophil % 0.4 %    Lymph #  1.82 0.6 - 4.6 x10(3)/mcL    Neut # 7.57 2.1 - 9.2 x10(3)/mcL    Mono # 0.62 0.1 - 1.3 x10(3)/mcL    Eos # 0.11 0 - 0.9 x10(3)/mcL    Baso # 0.04 <=0.2 x10(3)/mcL    IG# 0.08 (H) 0 - 0.04 x10(3)/mcL    IG% 0.8 %    NRBC% 0.0 %     Results for orders placed or performed in visit on 03/04/24   Comprehensive metabolic panel   Result Value Ref Range    Sodium Level 141 136 - 145 mmol/L    Potassium Level 3.1 (L) 3.5 - 5.1 mmol/L    Chloride 98 98 - 107 mmol/L    Carbon Dioxide 31 23 - 31 mmol/L    Glucose Level 159 (H) 82 - 115 mg/dL    Blood Urea Nitrogen 11.6 9.8 - 20.1 mg/dL    Creatinine 0.85 0.55 - 1.02 mg/dL    Calcium Level Total 9.2 8.4 - 10.2 mg/dL    Protein Total 6.8 5.8 - 7.6 gm/dL    Albumin Level 3.4 3.4 - 4.8 g/dL    Globulin 3.4 2.4 - 3.5 gm/dL    Albumin/Globulin Ratio 1.0 (L) 1.1 - 2.0 ratio    Bilirubin Total 0.5 <=1.5 mg/dL    Alkaline Phosphatase 135 40 - 150 unit/L    Alanine Aminotransferase 9 0 - 55 unit/L    Aspartate Aminotransferase 10 5 - 34 unit/L    eGFR >60 mls/min/1.73/m2       Assessment:  Problem List Items Addressed This Visit          Pulmonary    Status post lobectomy of lung - Primary       Renal/    Hypomagnesemia    Creatinine elevation       Oncology    Primary adenocarcinoma of upper lobe of right lung    Positive colorectal cancer screening using Cologuard test       Adenocarcinoma right upper lung lobe:   -LDCT chest without contrast 08/12/2022:  Lung rads 2  -LDCT chest without contrast 09/11/2023:  Lung rads 4A  -noncontrast chest CT 12/26/2023:  Lung rads 4A  -FDG PET-CT 01/29/2024:  Hypermetabolic right upper lobe nodule, 1.2 x 1.1 cm, maximum SUV 14, previously 1 cm x 0.9 cm; no distant metastases   -noncontrast chest CT 02/01/2024:  Right upper lobe nodule continues to enlarge, now 11 mm   -03/07/2024:  Right VATS, right upper lobe wedge resection, right upper lobectomy, right middle lobectomy, regional lymph node dissection:  -separate tumor nodules (metastases) in  same lobe, right upper lung lobe, therefore, pT3 (0.8 cm and 0.6 cm, respectively); invasive acinar adenocarcinoma; G3, poorly-differentiated; no visceral pleural invasion; no LVI; margins negative; 10 regional lymph nodes negative  -pT3 pN0 M0, stage IIB  -PD-L1 expression high positive (TPS 95%; )  -negative for EGFR, KRAS, BRAF, AL K, ROS1, RET, met, HER2  -MediPort placed 04/25/2024  -Adjuvant cisplatin/Alimta started 04/29/2024  -restaging CTs C/A/P 05/06/2024:  No metastases   -staging brain MRI 05/08/2024: No metastases  -adjuvant cisplatin/Alimta:  Cycle 2 on 05/20/2024; cycle 3 on 06/10/2024; cycle 4 on 07/01/2024  -hemoglobin dropped to 5.2 on 07/10/2024, requiring blood transfusion  -ANC dropped to 0.55 on 06/24/2024, requiring supportive care  -07/17/2024:  ANC 0.525; hemoglobin 7.0; iron studies consistent with anemia of chronic disease/inflammation/malignancy/chemotherapy      Plan:  -hydrate with 1 L of normal saline today  -magnesium sulfate 2 g IV per pharmacy protocol  -start magnesium oxide 400 mg p.o. b.i.d. x2 weeks; no refills; in 2 weeks, recheck CMP and magnesium level  -check retic count, serum iron, TIBC, ferritin, B12 level, folic acid level, LDH, haptoglobin  -proceed with Tecentriq every 3 weeks X 17 cycles   -check baseline TSH and free T4, then every 6 weeks  -for surveillance, CT chest with contrast in 6 months (11/2024)  Colonoscopy for evaluation of positive Cologuard test 08/17/2023  -Tecentriq teaching with nursing staff within a week  -----------------------------------------------------      -adenocarcinoma right upper lung lobe  -right VATS and right upper and middle lobectomy 03/07/2024  -pT3 pN0 M0, stage IIB  -PD-L1 expression high positive (TPS 95%; )  -negative for EGFR, KRAS, BRAF, AL K, ROS1, RET, met, HER2  -MediPort placed 04/25/2024  -Adjuvant cisplatin/Alimta started 04/29/2024  -restaging CTs 05/06/2024:  No metastases   -staging brain MRI 05/08/2024:  No metastases  >>>  Plan:  (Patient did not receive neoadjuvant chemotherapy)  -cisplatin +pemetrexed (adjuvant) every 21 days x4 cycles; followed by  Atezolizumab 840 mg every 2 weeks, 1200 mg every 3 weeks, or 1680 mg every 4 weeks for up to 1 year  >>>  -adjuvant cisplatin/Alimta started 04/29/2024; continue every 3 weeks x4 cycles  -adjuvant cisplatin/Alimta:  Cycle 2 on 05/20/2024; cycle 3 on 06/10/2024; cycle 4 on 07/01/2024  -hemoglobin dropped to 5.2 on 07/10/2024, requiring blood transfusion  -ANC dropped to 0.55 on 06/24/2024, requiring supportive care  -07/17/2024:  ANC 0.525; hemoglobin 7.0; iron studies consistent with anemia of chronic disease/inflammation/malignancy/chemotherapy  >>>  -severe anemia; most likely, induced by chemotherapy; check retic count, serum iron, TIBC, ferritin, B12 level, folic acid level, LDH, haptoglobin  -has finished 4 cycles of adjuvant cisplatin/Alimta   -now, proceed with adjuvant atezolizumab 1200 mg IV every 3 weeks x1 year (17 cycles)   -monitor for immune related adverse events with atezolizumab  -check baseline TSH and free T4, then every 6 weeks to monitor for the possibility of immune thyroiditis with atezolizumab  -for surveillance, CT chest with contrast 6 months (11/2024) after last set of CTs (05/06/2024)    Monitoring on atezolizumab:  Monitor LFTs (AST, ALT, and total bilirubin; at baseline and periodically during treatment;  kidney function (serum creatinine; at baseline and periodically during treatment);  thyroid function (at baseline, periodically during treatment and as clinically indicated);  monitor blood glucose (for hyperglycemia);  Monitor closely for signs/symptoms of immune-mediated adverse reactions, including  adrenal insufficiency,  diarrhea/colitis (consider initiating or repeating infectious workup in patients with corticosteroid-refractory immune-mediated colitis to exclude alternative causes),  dermatologic toxicity,  diabetes  mellitus,  hypophysitis,  ocular disorders,  thyroid disorders,  pneumonitis and other immune-mediated adverse reactions.  Monitor for signs/symptoms of infusion-related reactions.     Surveillance:  -history and physical and chest CT +/- contrast every 6 months X 3 years (03/2024-03/2027), then history and physical and low-dose noncontrast chest CT annually  -smoking cessation advice and counseling   -FDG PET-CT or brain MRI is not routinely indicated    -08/17/2023: Cologuard positive  -needs evaluation with colonoscopy rule out colon cancer    -Tecentr teaching with nursing staff within a week    Above discussed at length with the patient.  All questions answered.    Discussed labs and scans and gave her copies of relevant records.  Plan of management discussed in detail.    Potential side effects of atezolizumab discussed.  Gave her educational materials from [employer].  Formal immunotherapy teaching with nursing staff to follow.  She understands and agrees with this plan.       Follow-up:  No follow-ups on file.

## 2024-08-28 ENCOUNTER — DOCUMENT SCAN (OUTPATIENT)
Dept: HOME HEALTH SERVICES | Facility: HOSPITAL | Age: 64
End: 2024-08-28
Payer: MEDICAID

## 2024-08-29 ENCOUNTER — DOCUMENT SCAN (OUTPATIENT)
Dept: HOME HEALTH SERVICES | Facility: HOSPITAL | Age: 64
End: 2024-08-29
Payer: MEDICAID

## 2024-08-29 DIAGNOSIS — Z08 ENCOUNTER FOR FOLLOW-UP SURVEILLANCE OF LUNG CANCER: Primary | ICD-10-CM

## 2024-08-29 DIAGNOSIS — Z85.118 ENCOUNTER FOR FOLLOW-UP SURVEILLANCE OF LUNG CANCER: Primary | ICD-10-CM

## 2024-08-30 ENCOUNTER — INFUSION (OUTPATIENT)
Dept: INFUSION THERAPY | Facility: HOSPITAL | Age: 64
End: 2024-08-30
Attending: INTERNAL MEDICINE
Payer: MEDICAID

## 2024-08-30 VITALS
HEART RATE: 85 BPM | TEMPERATURE: 98 F | HEIGHT: 59 IN | BODY MASS INDEX: 31.2 KG/M2 | OXYGEN SATURATION: 100 % | WEIGHT: 154.75 LBS | RESPIRATION RATE: 16 BRPM

## 2024-08-30 DIAGNOSIS — C34.11 PRIMARY ADENOCARCINOMA OF UPPER LOBE OF RIGHT LUNG: Primary | ICD-10-CM

## 2024-08-30 PROCEDURE — 25000003 PHARM REV CODE 250: Performed by: INTERNAL MEDICINE

## 2024-08-30 PROCEDURE — 96413 CHEMO IV INFUSION 1 HR: CPT

## 2024-08-30 PROCEDURE — 63600175 PHARM REV CODE 636 W HCPCS: Mod: JZ,JG | Performed by: INTERNAL MEDICINE

## 2024-08-30 RX ORDER — DIPHENHYDRAMINE HYDROCHLORIDE 50 MG/ML
50 INJECTION INTRAMUSCULAR; INTRAVENOUS ONCE AS NEEDED
Status: DISCONTINUED | OUTPATIENT
Start: 2024-08-30 | End: 2024-08-30 | Stop reason: HOSPADM

## 2024-08-30 RX ORDER — SODIUM CHLORIDE 0.9 % (FLUSH) 0.9 %
10 SYRINGE (ML) INJECTION
Status: DISCONTINUED | OUTPATIENT
Start: 2024-08-30 | End: 2024-08-30 | Stop reason: HOSPADM

## 2024-08-30 RX ORDER — HEPARIN 100 UNIT/ML
500 SYRINGE INTRAVENOUS
Status: DISCONTINUED | OUTPATIENT
Start: 2024-08-30 | End: 2024-08-30 | Stop reason: HOSPADM

## 2024-08-30 RX ORDER — EPINEPHRINE 0.3 MG/.3ML
0.3 INJECTION SUBCUTANEOUS ONCE AS NEEDED
Status: DISCONTINUED | OUTPATIENT
Start: 2024-08-30 | End: 2024-08-30 | Stop reason: HOSPADM

## 2024-08-30 RX ADMIN — ATEZOLIZUMAB 1200 MG: 1200 INJECTION, SOLUTION INTRAVENOUS at 09:08

## 2024-08-30 RX ADMIN — SODIUM CHLORIDE: 9 INJECTION, SOLUTION INTRAVENOUS at 08:08

## 2024-08-30 RX ADMIN — HEPARIN 500 UNITS: 100 SYRINGE at 09:08

## 2024-09-06 ENCOUNTER — DOCUMENT SCAN (OUTPATIENT)
Dept: HOME HEALTH SERVICES | Facility: HOSPITAL | Age: 64
End: 2024-09-06
Payer: MEDICAID

## 2024-09-18 ENCOUNTER — HOSPITAL ENCOUNTER (OUTPATIENT)
Dept: RADIOLOGY | Facility: HOSPITAL | Age: 64
Discharge: HOME | End: 2024-09-18
Attending: INTERNAL MEDICINE
Payer: MEDICAID

## 2024-09-18 DIAGNOSIS — E86.0 DEHYDRATION: ICD-10-CM

## 2024-09-18 DIAGNOSIS — C34.11 PRIMARY ADENOCARCINOMA OF UPPER LOBE OF RIGHT LUNG: ICD-10-CM

## 2024-09-18 PROCEDURE — 71260 CT THORAX DX C+: CPT | Mod: TC

## 2024-09-18 PROCEDURE — 25500020 PHARM REV CODE 255

## 2024-09-18 RX ADMIN — IOHEXOL 100 ML: 350 INJECTION, SOLUTION INTRAVENOUS at 02:09

## 2024-09-19 ENCOUNTER — TELEPHONE (OUTPATIENT)
Dept: HEMATOLOGY/ONCOLOGY | Facility: CLINIC | Age: 64
End: 2024-09-19
Payer: MEDICAID

## 2024-09-19 DIAGNOSIS — Z85.118 ENCOUNTER FOR FOLLOW-UP SURVEILLANCE OF LUNG CANCER: Primary | ICD-10-CM

## 2024-09-19 DIAGNOSIS — Z08 ENCOUNTER FOR FOLLOW-UP SURVEILLANCE OF LUNG CANCER: Primary | ICD-10-CM

## 2024-09-20 ENCOUNTER — OFFICE VISIT (OUTPATIENT)
Dept: HEMATOLOGY/ONCOLOGY | Facility: CLINIC | Age: 64
End: 2024-09-20
Attending: INTERNAL MEDICINE
Payer: MEDICAID

## 2024-09-20 ENCOUNTER — INFUSION (OUTPATIENT)
Dept: INFUSION THERAPY | Facility: HOSPITAL | Age: 64
End: 2024-09-20
Attending: INTERNAL MEDICINE
Payer: MEDICAID

## 2024-09-20 VITALS
HEIGHT: 59 IN | SYSTOLIC BLOOD PRESSURE: 111 MMHG | WEIGHT: 158.38 LBS | DIASTOLIC BLOOD PRESSURE: 46 MMHG | HEART RATE: 82 BPM | TEMPERATURE: 98 F | OXYGEN SATURATION: 97 % | BODY MASS INDEX: 31.93 KG/M2 | RESPIRATION RATE: 17 BRPM

## 2024-09-20 VITALS
HEART RATE: 82 BPM | HEIGHT: 59 IN | BODY MASS INDEX: 31.93 KG/M2 | SYSTOLIC BLOOD PRESSURE: 111 MMHG | OXYGEN SATURATION: 97 % | RESPIRATION RATE: 20 BRPM | DIASTOLIC BLOOD PRESSURE: 46 MMHG | WEIGHT: 158.38 LBS | TEMPERATURE: 98 F

## 2024-09-20 DIAGNOSIS — Z85.118 ENCOUNTER FOR FOLLOW-UP SURVEILLANCE OF LUNG CANCER: Primary | ICD-10-CM

## 2024-09-20 DIAGNOSIS — C34.11 PRIMARY ADENOCARCINOMA OF UPPER LOBE OF RIGHT LUNG: Primary | ICD-10-CM

## 2024-09-20 DIAGNOSIS — R79.89 CREATININE ELEVATION: ICD-10-CM

## 2024-09-20 DIAGNOSIS — Z08 ENCOUNTER FOR FOLLOW-UP SURVEILLANCE OF LUNG CANCER: Primary | ICD-10-CM

## 2024-09-20 DIAGNOSIS — E86.0 DEHYDRATION: ICD-10-CM

## 2024-09-20 DIAGNOSIS — C34.11 PRIMARY ADENOCARCINOMA OF UPPER LOBE OF RIGHT LUNG: ICD-10-CM

## 2024-09-20 PROCEDURE — 25000003 PHARM REV CODE 250: Performed by: NURSE PRACTITIONER

## 2024-09-20 PROCEDURE — 63600175 PHARM REV CODE 636 W HCPCS: Mod: JZ,JG | Performed by: NURSE PRACTITIONER

## 2024-09-20 PROCEDURE — 99215 OFFICE O/P EST HI 40 MIN: CPT | Mod: PBBFAC,25 | Performed by: NURSE PRACTITIONER

## 2024-09-20 PROCEDURE — 96413 CHEMO IV INFUSION 1 HR: CPT

## 2024-09-20 RX ORDER — DIPHENHYDRAMINE HYDROCHLORIDE 50 MG/ML
50 INJECTION INTRAMUSCULAR; INTRAVENOUS ONCE AS NEEDED
Status: DISCONTINUED | OUTPATIENT
Start: 2024-09-20 | End: 2024-09-20 | Stop reason: HOSPADM

## 2024-09-20 RX ORDER — SODIUM CHLORIDE 0.9 % (FLUSH) 0.9 %
10 SYRINGE (ML) INJECTION
Status: DISCONTINUED | OUTPATIENT
Start: 2024-09-20 | End: 2024-09-20 | Stop reason: HOSPADM

## 2024-09-20 RX ORDER — HEPARIN 100 UNIT/ML
500 SYRINGE INTRAVENOUS
Status: CANCELLED | OUTPATIENT
Start: 2024-09-20

## 2024-09-20 RX ORDER — EPINEPHRINE 0.3 MG/.3ML
0.3 INJECTION SUBCUTANEOUS ONCE AS NEEDED
Status: CANCELLED | OUTPATIENT
Start: 2024-09-20

## 2024-09-20 RX ORDER — HEPARIN 100 UNIT/ML
500 SYRINGE INTRAVENOUS
Status: DISCONTINUED | OUTPATIENT
Start: 2024-09-20 | End: 2024-09-20 | Stop reason: HOSPADM

## 2024-09-20 RX ORDER — DIPHENHYDRAMINE HYDROCHLORIDE 50 MG/ML
50 INJECTION INTRAMUSCULAR; INTRAVENOUS ONCE AS NEEDED
Status: CANCELLED | OUTPATIENT
Start: 2024-09-20

## 2024-09-20 RX ORDER — EPINEPHRINE 0.3 MG/.3ML
0.3 INJECTION SUBCUTANEOUS ONCE AS NEEDED
Status: DISCONTINUED | OUTPATIENT
Start: 2024-09-20 | End: 2024-09-20 | Stop reason: HOSPADM

## 2024-09-20 RX ORDER — SODIUM CHLORIDE 0.9 % (FLUSH) 0.9 %
10 SYRINGE (ML) INJECTION
Status: CANCELLED | OUTPATIENT
Start: 2024-09-20

## 2024-09-20 RX ADMIN — ATEZOLIZUMAB 1200 MG: 1200 INJECTION, SOLUTION INTRAVENOUS at 09:09

## 2024-09-20 RX ADMIN — HEPARIN 500 UNITS: 100 SYRINGE at 09:09

## 2024-09-20 RX ADMIN — SODIUM CHLORIDE 1000 ML: 9 INJECTION, SOLUTION INTRAVENOUS at 09:09

## 2024-09-20 NOTE — PROGRESS NOTES
Reason for Follow-up:  -adenocarcinoma right upper lung lobe, S/P right VATS/right upper lobe wedge resection/completion right upper lobectomy and right middle lobectomy and regional lymph node dissection 2024, G3, pT3 pN0  -Cologuard positive    Current Treatment:   Tecentriq 1200mg Q3 weeks (started 24)     Treatment History:  -2024:  Right VATS, right upper lobe wedge resection, right upper lobectomy, right middle lobectomy, regional lymph node dissection:  -MediPort placed 2024  -Adjuvant cisplatin/Alimta  x4 cycles started 2024-24    Social history:  .  Lives in [place].  Has 3 children.  Does not work.  Has been smoking 1-2 pack of cigarettes daily for 51 years, since age 12; discontinued recently.  No alcohol or illicit drug abuse.    Family history:   Mother experienced some kind of intrathoracic malignancy at age 83;  from MI at age 83   Father  from prostate cancer) experienced at age 83 and probably, sarcoma) experienced at age 83)  Health maintenance:   -PCP in [place]  -says that she had screening colonoscopy performed in [place] in , and that it was unremarkable  -says that now, for positive Cologuard test, she is scheduled for colonoscopy in May 2024  -2023:  Cologuard positive  -2023:  Bilateral digital screening mammogram with tomosynthesis (comparison:  2022 mammogram, etc.):  BI-RADS: 1 negative      History of Present Illness:   Oncologic/Hematologic History:  Oncology History   Primary adenocarcinoma of upper lobe of right lung   3/7/2024 Cancer Staged    Staging form: Lung, AJCC 8th Edition  - Pathologic stage from 3/7/2024: Stage IIB (pT3, pN0, cM0)     4/15/2024 Initial Diagnosis    Primary adenocarcinoma of upper lobe of right lung     2024 - 2024 Chemotherapy    Treatment Summary   Plan Name: OP NSCLC PEMETREXED + CISPLATIN Q3W  Treatment Goal: Curative  Status: Inactive  Start Date:  4/29/2024  End Date: 7/2/2024  Provider: Emery Mahoney MD  Chemotherapy: CISplatin (Platinol) 132 mg in sodium chloride 0.9% 697 mL chemo infusion, 141 mg, Intravenous, Clinic/HOD 1 time, 4 of 4 cycles  Administration: 132 mg (4/29/2024), 128 mg (6/10/2024), 128 mg (7/1/2024), 132 mg (5/20/2024)  PEMEtrexed disodium (ALIMTA) 900 mg in sodium chloride 0.9% SolP 100 mL chemo infusion, 950 mg, Intravenous, Clinic/HOD 1 time, 4 of 4 cycles  Dose modification: 375 mg/m2 (75 % of original dose 500 mg/m2, Cycle 4, Reason: Dose Not Tolerated)  Administration: 900 mg (4/29/2024), 900 mg (6/10/2024), 625 mg (7/1/2024), 900 mg (5/20/2024)     8/9/2024 -  Chemotherapy    Treatment Summary   Plan Name: OP ATEZOLIZUMAB Q3W  Treatment Goal: Curative  Status: Active  Start Date: 8/9/2024  End Date: 7/11/2025 (Planned)  Provider: Emery Mahoney MD  Chemotherapy: [No matching medication found in this treatment plan]     63-year-old female, referred from [facility], Dr. Arnulfo Middleton, with poorly-differentiated adenocarcinoma of right lung.      Interval History 9/20/24:  Patient presents to clinic along with her  for a scheduled follow up and treatment visit.  She is due to receive Tecentriq cycle 3 today.  Patient reports tolerating treatment well without any significant reportable symptoms.  She denies any N/V/D/C, worsening shortness of breath, chest pain, rash, fever or any signs of infection.  Lab work reviewed with the patient elevated BUN and serum creatinine levels.  Patient says she drinks at least 6 bottles of water a day.  Patient recently completed CT of the chest which was not due to be completed until November however was completed two months early and was negative for any concern of metastatic disease or infection.  We discussed plan of care and follow up appointments.    Review of Systems   All other systems reviewed and are negative.      Physical Exam  Vitals reviewed.    Constitutional:       Appearance: Normal appearance.   HENT:      Head: Normocephalic and atraumatic.      Nose:      Comments: 2 Liter NC      Mouth/Throat:      Mouth: Mucous membranes are moist.   Cardiovascular:      Rate and Rhythm: Normal rate and regular rhythm.      Pulses: Normal pulses.      Heart sounds: Normal heart sounds.   Pulmonary:      Effort: Pulmonary effort is normal.      Breath sounds: Normal breath sounds.   Abdominal:      General: Bowel sounds are normal.      Palpations: Abdomen is soft.   Skin:     General: Skin is warm and dry.   Neurological:      Mental Status: She is alert and oriented to person, place, and time.   Psychiatric:         Mood and Affect: Mood normal.         Behavior: Behavior normal.         Thought Content: Thought content normal.         Judgment: Judgment normal.          VITAL SIGNS:  Vitals:    09/20/24 0901   BP: (!) 111/46   Pulse: 82   Resp: 17   Temp: 97.9 °F (36.6 °C)       Lab Results   Component Value Date    WBC 5.13 09/20/2024    RBC 2.87 (L) 09/20/2024    HGB 9.3 (L) 09/20/2024    HCT 28.4 (L) 09/20/2024    MCV 99.0 (H) 09/20/2024    MCH 32.4 (H) 09/20/2024    MCHC 32.7 (L) 09/20/2024    RDW 14.5 09/20/2024     09/20/2024    MPV 9.1 09/20/2024     CMP  Sodium   Date Value Ref Range Status   09/20/2024 141 136 - 145 mmol/L Final   09/11/2020 141 136 - 145 mmol/L Final     Potassium   Date Value Ref Range Status   09/20/2024 3.9 3.5 - 5.1 mmol/L Final   09/11/2020 3.9 3.5 - 5.1 mmol/L Final     Chloride   Date Value Ref Range Status   09/20/2024 103 98 - 107 mmol/L Final   09/11/2020 100 100 - 109 mmol/L Final     CO2   Date Value Ref Range Status   09/20/2024 26 23 - 31 mmol/L Final     Carbon Dioxide   Date Value Ref Range Status   09/11/2020 30 22 - 33 mmol/L Final     Blood Urea Nitrogen   Date Value Ref Range Status   09/20/2024 21.1 (H) 9.8 - 20.1 mg/dL Final   09/11/2020 11 5 - 25 mg/dL Final     Creatinine   Date Value Ref Range Status    09/20/2024 1.55 (H) 0.55 - 1.02 mg/dL Final   09/11/2020 0.73 0.57 - 1.25 mg/dL Final     Calcium   Date Value Ref Range Status   09/20/2024 9.5 8.4 - 10.2 mg/dL Final   09/11/2020 8.9 8.8 - 10.6 mg/dL Final     Albumin   Date Value Ref Range Status   09/20/2024 3.4 3.4 - 4.8 g/dL Final     Bilirubin Total   Date Value Ref Range Status   09/20/2024 0.4 <=1.5 mg/dL Final     ALP   Date Value Ref Range Status   09/20/2024 91 40 - 150 unit/L Final     AST   Date Value Ref Range Status   09/20/2024 14 5 - 34 unit/L Final     ALT   Date Value Ref Range Status   09/20/2024 10 0 - 55 unit/L Final     Anion Gap   Date Value Ref Range Status   09/11/2020 11 8 - 16 mmol/L Final     eGFR   Date Value Ref Range Status   09/20/2024 37 mL/min/1.73/m2 Final         Assessment:  Adenocarcinoma right upper lung lobe:   -LDCT chest without contrast 08/12/2022:  Lung rads 2  -LDCT chest without contrast 09/11/2023:  Lung rads 4A  -noncontrast chest CT 12/26/2023:  Lung rads 4A  -FDG PET-CT 01/29/2024:  Hypermetabolic right upper lobe nodule, 1.2 x 1.1 cm, maximum SUV 14, previously 1 cm x 0.9 cm; no distant metastases   -noncontrast chest CT 02/01/2024:  Right upper lobe nodule continues to enlarge, now 11 mm   -03/07/2024:  Right VATS, right upper lobe wedge resection, right upper lobectomy, right middle lobectomy, regional lymph node dissection:  -separate tumor nodules (metastases) in same lobe, right upper lung lobe, therefore, pT3 (0.8 cm and 0.6 cm, respectively); invasive acinar adenocarcinoma; G3, poorly-differentiated; no visceral pleural invasion; no LVI; margins negative; 10 regional lymph nodes negative  -pT3 pN0 M0, stage IIB  -PD-L1 expression high positive (TPS 95%; )  -negative for EGFR, KRAS, BRAF, AL K, ROS1, RET, met, HER2  -MediPort placed 04/25/2024  -Adjuvant cisplatin/Alimta started 04/29/2024  -restaging CTs C/A/P 05/06/2024:  No metastases   -staging brain MRI 05/08/2024: No metastases  -adjuvant  cisplatin/Alimta:  Cycle 2 on 05/20/2024; cycle 3 on 06/10/2024; cycle 4 on 07/01/2024  -hemoglobin dropped to 5.2 on 07/10/2024, requiring blood transfusion  -ANC dropped to 0.55 on 06/24/2024, requiring supportive care  -07/17/2024:  ANC 0.525; hemoglobin 7.0; iron studies consistent with anemia of chronic disease/inflammation/malignancy/chemotherapy      Plan:  Adenocarcinoma of right upper lung lobe:  Patient did not receive neoadjuvant chemotherapy)  -cisplatin +pemetrexed (adjuvant) every 21 days x4 cycles; followed by  Atezolizumab 840 mg every 2 weeks, 1200 mg every 3 weeks, or 1680 mg every 4 weeks for up to 1 year  -has finished 4 cycles of adjuvant cisplatin/Alimta   -now, proceed with adjuvant atezolizumab 1200 mg IV every 3 weeks x1 year (17 cycles)     -Okay to Proceed with cycle 4 of Tecentriq every 3 weeks X 17 cycles   -Fu w/ in 3 weeks with lab work (cbc,cmp) prior to cycle 4 of Tecentriq in infusion  -Check TSH and free T4 every 6 weeks to monitor for the possibility of immune thyroiditis with atezolizumab   -For surveillance, CT chest with contrast in 6 months (3/2024)  Colonoscopy for evaluation of positive Cologuard test 08/17/2023      Dehydration  -9/20/24 Bun 21.1 Cr 1.55  Give 1L of Normal Saline IV today  Increase hydration with water daily      Monitoring on atezolizumab:  Monitor LFTs (AST, ALT, and total bilirubin; at baseline and periodically during treatment;  kidney function (serum creatinine; at baseline and periodically during treatment);  thyroid function (at baseline, periodically during treatment and as clinically indicated);  monitor blood glucose (for hyperglycemia);  Monitor closely for signs/symptoms of immune-mediated adverse reactions, including  adrenal insufficiency,  diarrhea/colitis (consider initiating or repeating infectious workup in patients with corticosteroid-refractory immune-mediated colitis to exclude alternative causes),  dermatologic  toxicity,  diabetes mellitus,  hypophysitis,  ocular disorders,  thyroid disorders,  pneumonitis and other immune-mediated adverse reactions.  Monitor for signs/symptoms of infusion-related reactions.     Surveillance:  -history and physical and chest CT +/- contrast every 6 months X 3 years (03/2024-03/2027), then history and physical and low-dose noncontrast chest CT annually  -smoking cessation advice and counseling   -FDG PET-CT or brain MRI is not routinely indicated      Above discussed at length with the patient.  All questions answered.    Plan of management discussed in detail.     She understands and agrees with this plan.

## 2024-09-20 NOTE — NURSING
Patient here for tecentriq. Patient had labs. Patient has a scheduled appointment with JOLANTA David NP.  Dede Ariza RN, commmunitcated that Abby David NP, will not see patient in clinic but see patient on the IV infusion side. Patient's abnormal labs creatine 1.55 BUN 21.1. Abby David NP, orders 1 Liter NS.   Abby David NP, at bedside seeing patient.  Patient tolerated treatment.

## 2024-09-20 NOTE — Clinical Note
Infusion today Tecentriq  Follow up with Dr. Padgett in 3 weeks with CT results and lab work prior to Tecentriq

## 2024-09-23 DIAGNOSIS — R63.0 DECREASED APPETITE: ICD-10-CM

## 2024-09-23 RX ORDER — MEGESTROL ACETATE 40 MG/1
40 TABLET ORAL 4 TIMES DAILY
Qty: 120 TABLET | Refills: 2 | Status: SHIPPED | OUTPATIENT
Start: 2024-09-23

## 2024-10-01 ENCOUNTER — EXTERNAL HOME HEALTH (OUTPATIENT)
Dept: HOME HEALTH SERVICES | Facility: HOSPITAL | Age: 64
End: 2024-10-01
Payer: MEDICAID

## 2024-10-08 DIAGNOSIS — E53.8 FOLATE DEFICIENCY: ICD-10-CM

## 2024-10-08 RX ORDER — FOLIC ACID 1 MG/1
1000 TABLET ORAL
Qty: 30 TABLET | Refills: 0 | OUTPATIENT
Start: 2024-10-08

## 2024-10-09 DIAGNOSIS — E53.8 FOLATE DEFICIENCY: Primary | ICD-10-CM

## 2024-10-09 PROBLEM — D63.8 ANEMIA OF CHRONIC DISEASE: Status: ACTIVE | Noted: 2024-10-09

## 2024-10-09 RX ORDER — HEPARIN 100 UNIT/ML
500 SYRINGE INTRAVENOUS
Status: CANCELLED | OUTPATIENT
Start: 2024-10-11

## 2024-10-09 RX ORDER — HEPARIN 100 UNIT/ML
500 SYRINGE INTRAVENOUS
OUTPATIENT
Start: 2024-11-01

## 2024-10-09 RX ORDER — EPINEPHRINE 0.3 MG/.3ML
0.3 INJECTION SUBCUTANEOUS ONCE AS NEEDED
OUTPATIENT
Start: 2024-11-01

## 2024-10-09 RX ORDER — DIPHENHYDRAMINE HYDROCHLORIDE 50 MG/ML
50 INJECTION INTRAMUSCULAR; INTRAVENOUS ONCE AS NEEDED
OUTPATIENT
Start: 2024-11-22

## 2024-10-09 RX ORDER — SODIUM CHLORIDE 0.9 % (FLUSH) 0.9 %
10 SYRINGE (ML) INJECTION
OUTPATIENT
Start: 2024-11-01

## 2024-10-09 RX ORDER — DIPHENHYDRAMINE HYDROCHLORIDE 50 MG/ML
50 INJECTION INTRAMUSCULAR; INTRAVENOUS ONCE AS NEEDED
OUTPATIENT
Start: 2024-11-01

## 2024-10-09 RX ORDER — EPINEPHRINE 0.3 MG/.3ML
0.3 INJECTION SUBCUTANEOUS ONCE AS NEEDED
Status: CANCELLED | OUTPATIENT
Start: 2024-10-11

## 2024-10-09 RX ORDER — EPINEPHRINE 0.3 MG/.3ML
0.3 INJECTION SUBCUTANEOUS ONCE AS NEEDED
OUTPATIENT
Start: 2024-11-22

## 2024-10-09 RX ORDER — DIPHENHYDRAMINE HYDROCHLORIDE 50 MG/ML
50 INJECTION INTRAMUSCULAR; INTRAVENOUS ONCE AS NEEDED
Status: CANCELLED | OUTPATIENT
Start: 2024-10-11

## 2024-10-09 RX ORDER — SODIUM CHLORIDE 0.9 % (FLUSH) 0.9 %
10 SYRINGE (ML) INJECTION
Status: CANCELLED | OUTPATIENT
Start: 2024-10-11

## 2024-10-09 RX ORDER — SODIUM CHLORIDE 0.9 % (FLUSH) 0.9 %
10 SYRINGE (ML) INJECTION
OUTPATIENT
Start: 2024-11-22

## 2024-10-09 RX ORDER — HEPARIN 100 UNIT/ML
500 SYRINGE INTRAVENOUS
OUTPATIENT
Start: 2024-11-22

## 2024-10-10 ENCOUNTER — APPOINTMENT (OUTPATIENT)
Dept: HEMATOLOGY/ONCOLOGY | Facility: CLINIC | Age: 64
End: 2024-10-10
Attending: INTERNAL MEDICINE
Payer: MEDICAID

## 2024-10-10 VITALS
DIASTOLIC BLOOD PRESSURE: 69 MMHG | HEART RATE: 73 BPM | WEIGHT: 159.38 LBS | OXYGEN SATURATION: 100 % | TEMPERATURE: 98 F | SYSTOLIC BLOOD PRESSURE: 102 MMHG | RESPIRATION RATE: 20 BRPM | BODY MASS INDEX: 32.13 KG/M2 | HEIGHT: 59 IN

## 2024-10-10 DIAGNOSIS — R19.5 POSITIVE COLORECTAL CANCER SCREENING USING COLOGUARD TEST: ICD-10-CM

## 2024-10-10 DIAGNOSIS — E53.8 FOLIC ACID DEFICIENCY: ICD-10-CM

## 2024-10-10 DIAGNOSIS — Z51.11 CHEMOTHERAPY MANAGEMENT, ENCOUNTER FOR: ICD-10-CM

## 2024-10-10 DIAGNOSIS — C34.11 PRIMARY ADENOCARCINOMA OF UPPER LOBE OF RIGHT LUNG: ICD-10-CM

## 2024-10-10 DIAGNOSIS — E86.0 DEHYDRATION: ICD-10-CM

## 2024-10-10 DIAGNOSIS — Z85.118 ENCOUNTER FOR FOLLOW-UP SURVEILLANCE OF LUNG CANCER: ICD-10-CM

## 2024-10-10 DIAGNOSIS — D64.9 SYMPTOMATIC ANEMIA: ICD-10-CM

## 2024-10-10 DIAGNOSIS — D63.8 ANEMIA OF CHRONIC DISEASE: ICD-10-CM

## 2024-10-10 DIAGNOSIS — Z99.81 OXYGEN DEPENDENT: ICD-10-CM

## 2024-10-10 DIAGNOSIS — Z90.2 STATUS POST LOBECTOMY OF LUNG: Primary | ICD-10-CM

## 2024-10-10 DIAGNOSIS — C34.11 PRIMARY ADENOCARCINOMA OF UPPER LOBE OF RIGHT LUNG: Primary | ICD-10-CM

## 2024-10-10 DIAGNOSIS — E53.8 FOLATE DEFICIENCY: ICD-10-CM

## 2024-10-10 DIAGNOSIS — Z08 ENCOUNTER FOR FOLLOW-UP SURVEILLANCE OF LUNG CANCER: ICD-10-CM

## 2024-10-10 LAB
ALBUMIN SERPL-MCNC: 3.6 G/DL (ref 3.4–4.8)
ALBUMIN/GLOB SERPL: 1.1 RATIO (ref 1.1–2)
ALP SERPL-CCNC: 111 UNIT/L (ref 40–150)
ALT SERPL-CCNC: 10 UNIT/L (ref 0–55)
ANION GAP SERPL CALC-SCNC: 10 MEQ/L
AST SERPL-CCNC: 13 UNIT/L (ref 5–34)
BASOPHILS # BLD AUTO: 0.02 X10(3)/MCL
BASOPHILS NFR BLD AUTO: 0.3 %
BILIRUB SERPL-MCNC: 0.3 MG/DL
BUN SERPL-MCNC: 17.9 MG/DL (ref 9.8–20.1)
CALCIUM SERPL-MCNC: 9.9 MG/DL (ref 8.4–10.2)
CHLORIDE SERPL-SCNC: 105 MMOL/L (ref 98–107)
CO2 SERPL-SCNC: 26 MMOL/L (ref 23–31)
CREAT SERPL-MCNC: 1.14 MG/DL (ref 0.55–1.02)
CREAT/UREA NIT SERPL: 16
EOSINOPHIL # BLD AUTO: 0.06 X10(3)/MCL (ref 0–0.9)
EOSINOPHIL NFR BLD AUTO: 1 %
ERYTHROCYTE [DISTWIDTH] IN BLOOD BY AUTOMATED COUNT: 13.1 % (ref 11.5–17)
FOLATE SERPL-MCNC: >80 NG/ML (ref 7–31.4)
GFR SERPLBLD CREATININE-BSD FMLA CKD-EPI: 54 ML/MIN/1.73/M2
GLOBULIN SER-MCNC: 3.4 GM/DL (ref 2.4–3.5)
GLUCOSE SERPL-MCNC: 120 MG/DL (ref 82–115)
HCT VFR BLD AUTO: 31.9 % (ref 37–47)
HGB BLD-MCNC: 10.4 G/DL (ref 12–16)
IMM GRANULOCYTES # BLD AUTO: 0.01 X10(3)/MCL (ref 0–0.04)
IMM GRANULOCYTES NFR BLD AUTO: 0.2 %
LYMPHOCYTES # BLD AUTO: 1.7 X10(3)/MCL (ref 0.6–4.6)
LYMPHOCYTES NFR BLD AUTO: 27.7 %
MAGNESIUM SERPL-MCNC: 2.2 MG/DL (ref 1.6–2.6)
MCH RBC QN AUTO: 32 PG (ref 27–31)
MCHC RBC AUTO-ENTMCNC: 32.6 G/DL (ref 33–36)
MCV RBC AUTO: 98.2 FL (ref 80–94)
MONOCYTES # BLD AUTO: 0.5 X10(3)/MCL (ref 0.1–1.3)
MONOCYTES NFR BLD AUTO: 8.1 %
NEUTROPHILS # BLD AUTO: 3.85 X10(3)/MCL (ref 2.1–9.2)
NEUTROPHILS NFR BLD AUTO: 62.7 %
NRBC BLD AUTO-RTO: 0 %
PLATELET # BLD AUTO: 244 X10(3)/MCL (ref 130–400)
PMV BLD AUTO: 9.2 FL (ref 7.4–10.4)
POTASSIUM SERPL-SCNC: 3.9 MMOL/L (ref 3.5–5.1)
PROT SERPL-MCNC: 7 GM/DL (ref 5.8–7.6)
RBC # BLD AUTO: 3.25 X10(6)/MCL (ref 4.2–5.4)
SODIUM SERPL-SCNC: 141 MMOL/L (ref 136–145)
WBC # BLD AUTO: 6.14 X10(3)/MCL (ref 4.5–11.5)

## 2024-10-10 PROCEDURE — 3074F SYST BP LT 130 MM HG: CPT | Mod: CPTII,,, | Performed by: INTERNAL MEDICINE

## 2024-10-10 PROCEDURE — 82746 ASSAY OF FOLIC ACID SERUM: CPT

## 2024-10-10 PROCEDURE — 4010F ACE/ARB THERAPY RXD/TAKEN: CPT | Mod: CPTII,,, | Performed by: INTERNAL MEDICINE

## 2024-10-10 PROCEDURE — 99215 OFFICE O/P EST HI 40 MIN: CPT | Mod: S$PBB,,, | Performed by: INTERNAL MEDICINE

## 2024-10-10 PROCEDURE — 80053 COMPREHEN METABOLIC PANEL: CPT

## 2024-10-10 PROCEDURE — 3008F BODY MASS INDEX DOCD: CPT | Mod: CPTII,,, | Performed by: INTERNAL MEDICINE

## 2024-10-10 PROCEDURE — 85025 COMPLETE CBC W/AUTO DIFF WBC: CPT

## 2024-10-10 PROCEDURE — 3078F DIAST BP <80 MM HG: CPT | Mod: CPTII,,, | Performed by: INTERNAL MEDICINE

## 2024-10-10 PROCEDURE — 99215 OFFICE O/P EST HI 40 MIN: CPT | Mod: PBBFAC | Performed by: INTERNAL MEDICINE

## 2024-10-10 PROCEDURE — 83735 ASSAY OF MAGNESIUM: CPT

## 2024-10-10 PROCEDURE — 36415 COLL VENOUS BLD VENIPUNCTURE: CPT

## 2024-10-10 NOTE — PROGRESS NOTES
History:  Past Medical History:   Diagnosis Date    Dizziness     GERD     Gout     High cholesterol     Insomnia     Irregular heart rhythm     Low vitamin D level     Lung cancer     Lung nodule     Mixed hyperlipidemia     Neuropathy     Obesity     Seasonal allergies     Sleep apnea     cipap    Stroke 2012    Vertigo      Past Surgical History:   Procedure Laterality Date    APPENDECTOMY       SECTION      CHOLECYSTECTOMY      COLONOSCOPY      HERNIA REPAIR      INSERTION OF TUNNELED CENTRAL VENOUS CATHETER (CVC) WITH SUBCUTANEOUS PORT N/A 2024    Procedure: [id number]-[id number];  Surgeon: Luz Elena Elder MD;  Location: [facility] OR;  Service: General;  Laterality: N/A;    LOBECTOMY Right 3/7/2024    Procedure: LOBECTOMY;  Surgeon: Naty Middleton MD;  Location: [facility] OR;  Service: Thoracic;  Laterality: Right;  upper and middle lobectomy    THORACOSCOPIC WEDGE RESECTION OF LUNG Right 3/7/2024    Procedure: VATS, WITH WEDGE RESECTION, LUNG;  Surgeon: Naty Middleton MD;  Location: [facility] OR;  Service: Thoracic;  Laterality: Right;  RIGHT VATS // POSS LOBECTOMY    THORACOTOMY Right 3/7/2024    Procedure: THORACOTOMY;  Surgeon: Naty Middleton MD;  Location: [facility] OR;  Service: Thoracic;  Laterality: Right;  converted to open at 1011      Social History     Socioeconomic History    Marital status:    Tobacco Use    Smoking status: Former     Average packs/day: 2.0 packs/day for 15.0 years (30.0 ttl pk-yrs)     Types: Cigarettes     Start date: 2023     Quit date:      Years since quittin.8    Smokeless tobacco: Never    Tobacco comments:     Smoked since age 12; quit 2023   Substance and Sexual Activity    Alcohol use: Never    Drug use: Never    Sexual activity: Yes     Partners: Male      Family History   Problem Relation Name Age of Onset    Cancer Mother Alesia     Heart disease Mother Alesia     Breast cancer Mother Alesia     Cancer Father Chowdary         Reason for  Follow-up:  -adenocarcinoma right upper lung lobe, S/P right VATS/right upper lobe wedge resection/completion right upper lobectomy and right middle lobectomy and regional lymph node dissection 03/07/2024, G3, pT3 pN0  -Cologuard positive  -anemia of chronic disease  -folic acid deficiency    History of Present Illness:   Primary adenocarcinoma of upper lobe of right lung        Oncologic/Hematologic History:  Oncology History   Primary adenocarcinoma of upper lobe of right lung   3/7/2024 Cancer Staged    Staging form: Lung, AJCC 8th Edition  - Pathologic stage from 3/7/2024: Stage IIB (pT3, pN0, cM0)     4/15/2024 Initial Diagnosis    Primary adenocarcinoma of upper lobe of right lung     4/29/2024 - 7/2/2024 Chemotherapy    Treatment Summary   Plan Name: OP NSCLC PEMETREXED + CISPLATIN Q3W  Treatment Goal: Curative  Status: Inactive  Start Date: 4/29/2024  End Date: 7/2/2024  Provider: Emery Mahoney MD  Chemotherapy: CISplatin (Platinol) 132 mg in sodium chloride 0.9% 697 mL chemo infusion, 141 mg, Intravenous, Clinic/HOD 1 time, 4 of 4 cycles  Administration: 132 mg (4/29/2024), 128 mg (6/10/2024), 128 mg (7/1/2024), 132 mg (5/20/2024)  PEMEtrexed disodium (ALIMTA) 900 mg in sodium chloride 0.9% SolP 100 mL chemo infusion, 950 mg, Intravenous, Clinic/HOD 1 time, 4 of 4 cycles  Dose modification: 375 mg/m2 (75 % of original dose 500 mg/m2, Cycle 4, Reason: Dose Not Tolerated)  Administration: 900 mg (4/29/2024), 900 mg (6/10/2024), 625 mg (7/1/2024), 900 mg (5/20/2024)     8/9/2024 -  Chemotherapy    Treatment Summary   Plan Name: OP ATEZOLIZUMAB Q3W  Treatment Goal: Curative  Status: Active  Start Date: 8/9/2024  End Date: 7/11/2025 (Planned)  Provider: Emery Mahoney MD  Chemotherapy: [No matching medication found in this treatment plan]     Social history:  .  Lives in [place].  Has 3 children.  Does not work.  Has been smoking 1-2 pack of cigarettes daily for 51 years, since age 12;  discontinued recently.  No alcohol or illicit drug abuse.      Family history:   Mother experienced some kind of intrathoracic malignancy at age 83;  from MI at age 83   Father  from prostate cancer) experienced at age 83 and probably, sarcoma) experienced at age 83)    Health maintenance:   -PCP in [employer]  -says that she had screening colonoscopy performed in [place] in , and that it was unremarkable  -says that now, for positive Cologuard test, she is scheduled for colonoscopy in May 2024  -2023:  Cologuard positive  -2023:  Bilateral digital screening mammogram with tomosynthesis (comparison:  2022 mammogram, etc.):  BI-RADS: 1 negative  =========================================    63-year-old female, referred from [facility], Dr. Arnulfo Middleton, with poorly-differentiated adenocarcinoma of right lung.      Investigations reviewed:  -2022: CT chest lung screening low-dose (comparison:  2020):  Lung rads 2: Benign appearance or behavior:  Continue annual screening with LDCT in 12 months  -2023:  CT chest lung screening low-dose without contrast (comparison:  2022):  Lung rads category 4A: Suspicious; enlarging nodule right upper lobe, lobulated, < 8 mm, i.e., 7 x 6.7 mm, previously 5 mm, previously not lobulated, too small for adequate detection on PET-CT; recommend three-month follow-up)  -2023: CT chest with contrast (comparison:  2023):  Lung rads 4A: Very suspicious; continued enlargement of right upper lobe lung nodule of concern (9.5 x 8.9 mm)  -2024: FDG PET-CT (comparison:  Chest CT 2023; CT abdomen pelvis 2019):  1. Markedly FDG-avid right upper lobe soft tissue nodule, increased in size in the interval, raising concern for malignancy (smoothly marginated noncalcified soft tissue nodule lateral subpleural right upper lobe, 1.2 x 1.1 cm, maximum SUV 14, previously 1 cm x 0.9 cm).  2. No  definite PET-CT findings to suggest additional right hemithoracic or more distant metastatic disease.  -02/01/2024: CT chest without contrast (comparison:  12/26/2023): Lateral right upper lobe nodule continues to enlarge in size currently measuring 11 mm concerning for malignancy. Recommend further evaluation.   -03/07/2024:  Right VATS, right upper lobe wedge resection; right upper lobectomy, right middle lobectomy, regional lymph node dissection:  1. Right lung, upper lobe, wedge resection:  Poorly-differentiated adenocarcinoma, 0.6 cm, clear margins of resection  2. Level 8R lymph node (paraesophageal), biopsy:  3 lymph nodes, negative for metastatic carcinoma    3. Level 4R lymph node (lower paratracheal), biopsy: 2 lymph nodes, negative for metastatic carcinoma  4. Level 7R lymph node (subcarinal), biopsy:  1 lymph node, negative for metastatic carcinoma  5. Level 11R lymph node (interlobar), biopsy:  2 lymph nodes, negative for metastatic carcinoma    6. Right lung, upper lobe, completion lobectomy:  -poorly-differentiated adenocarcinoma, 0.8 cm  -bronchial and vascular margins of resection negative   -1 hilar lymph node with no evidence of metastatic carcinoma  7. Level 12R lymph node (lobar), lymphadenectomy:  1 lymph node, negative for metastatic carcinoma    Synoptic report:  Total number of primary tumors: 2  S/P wedge resection  Completion lobectomy  Right lung  Separate tumor nodules (metastases) in same lobe, therefore, pT3  number of intrapulmonary metastases:  2   Tumor site:  Upper lobe of lung  Tumor size:  Total tumor size:  Greatest dimension:  0.8 cm  Invasive acinar adenocarcinoma; G3, poorly-differentiated; spread through airspaces (CHAMP), present; no visceral pleural invasion; no adjacent structures present; no known pre-surgical therapy; no LVI  All margins negative for invasive carcinoma; closest margin invasive carcinoma, bronchial vascular; distance from invasive carcinoma closest  margin,> 2 cm; margins status for noninvasive tumor, all margins negative for noninvasive tumor  Number of lymph nodes examined, 10; all regional lymph nodes negative for tumor  >>>  pT3 pN0    PD-L1 expression:  Positive: High (TPS 95%; )  Negative for EGFR, KRAS, BRAF, AL K, ROS1, RET, MET, HER2  No gene rearrangement no reportable altered splicing events identified from RNA sequencing  No reportable pathogenic variants found  MSI stable  TMB:  41.6m/MB  Fusions: Negative    04/16/2024:  Pleasant lady who presents for initial medical oncology consultation, accompanied by .  In no acute discomfort.  After lobectomy, has required supplemental oxygen via nasal cannula at 2 liters/minute.  This is being handled by home healthcare team.  Prior to surgery, she never required supplemental oxygen.  Prior to surgery, she never experienced significant cough, sputum production, or dyspnea.  Great appetite.  Mild exertional dyspnea.  ECOG 1.    No unusual headaches or focal neurological symptoms.  No hemoptysis, fevers, or chills.  No abdominal pain, nausea, vomiting, change in bowel habits, or GI bleeding.  No anorexia or unintentional weight loss.  Smoked 1-2 packs of cigarettes daily for 51 years since age 12; says that she discontinued smoking after lung surgery.    Interval History:  FILGRASTIM (Neupogen) 480 MCG   OP ATEZOLIZUMAB Q3W     10/10/2024:   -07/22/2024: Hemoglobin 7.7; MCV normal; serum iron normal, TIBC normal; ferritin 1216, elevated; transferrin saturation 34%, normal; folic acid level 6.1, low; B12 level 1995, elevated; retic count 1.9%; haptoglobin normal; , elevated  (Anemia of chronic disease/inflammation/malignancy)  -07/22/2024: Started folic acid 1 mg daily  -hemoglobin dropped to 6.8 on 08/08/2024, requiring PRBC x2 units  -07/22/2024: TSH, free T4 normal  -09/20/2024: TSH, free T4 normal  -adjuvant atezolizumab 1200 mg every 3 weeks, started 08/09/2024; cycle 2 on 08/30/2024;  cycle 3 on 09/20/2024  -09/18/2024:  Surveillance CT chest with contrast (comparison: CT C/A/P 0 05/06/2024):  No evidence of local recurrence or metastatic disease in chest  -10/10/2024: Hemoglobin 10.4, stable; MCV 98.2, rest of CBC essentially unremarkable; creatinine 1.14, stable; rest of CMP unremarkable  Presents for a follow-up visit, accompanied by a male family member.  In no acute discomfort.  Has been on oxygen ever since diagnosis of lung cancer.  Says that she does not smoke anymore.  She smoked 1-2 pack of cigarettes daily for 51 years, since age 12.  Great appetite.  Denies unusual headaches, focal neurological symptoms, significant chest pain/cough/hemoptysis/mucous production/dyspnea, abdominal pain, nausea, vomiting, etc..  No new lumps or lymphadenopathy.  No anorexia or unintentional weight loss.  No recurrent bouts of pneumonia or bronchitis.  No immune related adverse events with ongoing consolidation therapy with Tecentriq in the form of immune pneumonitis, myocarditis, hepatitis, nephritis, endocrinopathies, dermatitis, colitis, vision impairment, allergic reactions, etc..  ECOG 1.  Ambulates with the help of walker.      Medications:  Current Outpatient Medications on File Prior to Visit   Medication Sig Dispense Refill    allopurinoL (ZYLOPRIM) 100 MG tablet Take 100 mg by mouth once daily.      aspirin (ECOTRIN) 81 MG EC tablet Take 81 mg by mouth once daily.      carvediloL (COREG) 12.5 MG tablet Take 12.5 mg by mouth 2 (two) times daily.      colchicine (COLCRYS) 0.6 mg tablet Take 0.6 mg by mouth once daily.      dexAMETHasone (DECADRON) 4 MG Tab Take Dexamethasone 8mg daily the day before treatment, treatment day, and 3 days after. 120 tablet 3    diphenhydrAMINE-aluminum-magnesium hydroxide-simethicone-LIDOcaine viscous HCl 2% Swish and spit 15 mLs every 4 (four) hours as needed (oral mucositis). 300 each 2    ENTRESTO 24-26 mg per tablet Take 1 tablet by mouth 2 (two) times daily.       ergocalciferol (ERGOCALCIFEROL) 50,000 unit Cap Take 50,000 Units by mouth every 7 days.      folic acid (FOLVITE) 1 MG tablet Take 1 tablet (1 mg total) by mouth once daily. 30 tablet 2    furosemide (LASIX) 40 MG tablet Take 40 mg by mouth once daily.      gabapentin (NEURONTIN) 800 MG tablet Take 800 mg by mouth 2 (two) times daily.      ibuprofen (ADVIL,MOTRIN) 800 MG tablet 1 tablet with food or milk as needed Orally Three times a day      linaCLOtide (LINZESS) 145 mcg Cap capsule Take 290 mcg by mouth as needed.      meclizine (ANTIVERT) 25 mg tablet Take 25 mg by mouth Daily.      megestroL (MEGACE) 40 MG Tab Take 1 tablet (40 mg total) by mouth 4 (four) times daily. 120 tablet 2    mirtazapine (REMERON) 30 MG tablet Take 30 mg by mouth every evening.      montelukast (SINGULAIR) 10 mg tablet Take 10 mg by mouth once daily.      nortriptyline (PAMELOR) 25 MG capsule Take 25 mg by mouth Daily.      ondansetron (ZOFRAN) 4 MG tablet Take 1 tablet (4 mg total) by mouth daily as needed for Nausea. 30 tablet 1    pantoprazole (PROTONIX) 40 MG tablet Take 40 mg by mouth once daily.      polyethylene glycol (MOVIPREP) 100-7.5-2.691 gram solution Take as directed prior to colonoscopy 1 kit 0    potassium chloride (K-TAB) 20 mEq Take 1 tablet (20 mEq total) by mouth 2 (two) times daily. 28 tablet 0    prochlorperazine (COMPAZINE) 10 MG tablet Take 1 tablet (10 mg total) by mouth 4 (four) times daily. 30 tablet 1    ALPRAZolam (XANAX) 0.5 MG tablet Take 1 tablet (0.5 mg total) by mouth once as needed for Anxiety. Take at 8:30 am on day of scans 1 tablet 0    atorvastatin (LIPITOR) 40 MG tablet Take 40 mg by mouth once daily.       Current Facility-Administered Medications on File Prior to Visit   Medication Dose Route Frequency Provider Last Rate Last Admin    0.9%  NaCl infusion (for blood administration)   Intravenous Once Emery Mahoney MD        lactated ringers infusion   Intravenous Continuous Flavio  Aline ARCHER MD 10 mL/hr at 04/26/24 0608 New Bag at 04/26/24 0608     Review of Systems:   All systems reviewed and found to be negative except for the symptoms detailed above    Physical Examination:   VITAL SIGNS:   Vitals:    10/10/24 0834   BP: 102/69   Pulse: 73   Resp: 20   Temp: 97.9 °F (36.6 °C)         GENERAL:  In no apparent distress.    HEAD:  No signs of head trauma.  EYES:  Pupils are equal.  Extraocular motions intact.    EARS:  Hearing grossly intact.  MOUTH:  Oropharynx is normal.   NECK:  No adenopathy, no JVD.     CHEST:  Chest with clear breath sounds bilaterally.  No wheezes, rales, rhonchi.    CARDIAC:  Regular rate and rhythm.  S1 and S2, without murmurs, gallops, rubs.  VASCULAR:  No Edema.  Peripheral pulses normal and equal in all extremities.  ABDOMEN:  Soft, without detectable tenderness.  No sign of distention.  No   rebound or guarding, and no masses palpated.   Bowel Sounds normal.  MUSCULOSKELETAL:  Good range of motion of all major joints. Extremities without clubbing, cyanosis or edema.    NEUROLOGIC EXAM:  Alert and oriented x 3.  No focal sensory or strength deficits.   Speech normal.  Follows commands.  PSYCHIATRIC:  Mood normal.  -04/16/2024:  In no acute discomfort.  On supplemental oxygen via nasal cannula at 2 liters/minute.  Has required supplemental oxygen after lobectomy.  Prior to lobectomy, never required supplemental oxygen.  Oxygen is being handled by home healthcare team.  No cervical lymphadenopathy.    Results for orders placed or performed during the hospital encounter of 03/07/24   CBC Auto Differential    Narrative    The following orders were created for panel order CBC Auto Differential.  Procedure                               Abnormality         Status                     ---------                               -----------         ------                     CBC with Differential[[id number]]       Abnormal            Final result                 Please view  results for these tests on the individual orders.   CBC with Differential   Result Value Ref Range    WBC 10.24 4.50 - 11.50 x10(3)/mcL    RBC 3.71 (L) 4.20 - 5.40 x10(6)/mcL    Hgb 11.1 (L) 12.0 - 16.0 g/dL    Hct 35.3 (L) 37.0 - 47.0 %    MCV 95.1 (H) 80.0 - 94.0 fL    MCH 29.9 27.0 - 31.0 pg    MCHC 31.4 (L) 33.0 - 36.0 g/dL    RDW 13.6 11.5 - 17.0 %    Platelet 349 130 - 400 x10(3)/mcL    MPV 8.7 7.4 - 10.4 fL    Neut % 73.8 %    Lymph % 17.8 %    Mono % 6.1 %    Eos % 1.1 %    Basophil % 0.4 %    Lymph # 1.82 0.6 - 4.6 x10(3)/mcL    Neut # 7.57 2.1 - 9.2 x10(3)/mcL    Mono # 0.62 0.1 - 1.3 x10(3)/mcL    Eos # 0.11 0 - 0.9 x10(3)/mcL    Baso # 0.04 <=0.2 x10(3)/mcL    IG# 0.08 (H) 0 - 0.04 x10(3)/mcL    IG% 0.8 %    NRBC% 0.0 %     Results for orders placed or performed in visit on 03/04/24   Comprehensive metabolic panel   Result Value Ref Range    Sodium Level 141 136 - 145 mmol/L    Potassium Level 3.1 (L) 3.5 - 5.1 mmol/L    Chloride 98 98 - 107 mmol/L    Carbon Dioxide 31 23 - 31 mmol/L    Glucose Level 159 (H) 82 - 115 mg/dL    Blood Urea Nitrogen 11.6 9.8 - 20.1 mg/dL    Creatinine 0.85 0.55 - 1.02 mg/dL    Calcium Level Total 9.2 8.4 - 10.2 mg/dL    Protein Total 6.8 5.8 - 7.6 gm/dL    Albumin Level 3.4 3.4 - 4.8 g/dL    Globulin 3.4 2.4 - 3.5 gm/dL    Albumin/Globulin Ratio 1.0 (L) 1.1 - 2.0 ratio    Bilirubin Total 0.5 <=1.5 mg/dL    Alkaline Phosphatase 135 40 - 150 unit/L    Alanine Aminotransferase 9 0 - 55 unit/L    Aspartate Aminotransferase 10 5 - 34 unit/L    eGFR >60 mls/min/1.73/m2       Assessment:  Problem List Items Addressed This Visit          Pulmonary    Status post lobectomy of lung - Primary       Oncology    Primary adenocarcinoma of upper lobe of right lung    Positive colorectal cancer screening using Cologuard test    Symptomatic anemia    Chemotherapy management, encounter for    Anemia of chronic disease       Endocrine    Folic acid deficiency     Orders for 10/10/2024:    Continue Tecentriq every 3 weeks for a total of 17 cycles   Check TSH and free T4 every 6 weeks  Surveillance CT chest with contrast in March 2025  Refer to GI for colonoscopy for evaluation of positive Cologuard test   Follow-up with NP in 3 weeks     Above discussed at length with the patient.  All questions answered.  Discussed labs and scans and gave her copies of relevant results.  She understands and agrees with this plan.  =================================    Adenocarcinoma right upper lung lobe:   -LDCT chest without contrast 08/12/2022:  Lung rads 2  -LDCT chest without contrast 09/11/2023:  Lung rads 4A  -noncontrast chest CT 12/26/2023:  Lung rads 4A  -FDG PET-CT 01/29/2024:  Hypermetabolic right upper lobe nodule, 1.2 x 1.1 cm, maximum SUV 14, previously 1 cm x 0.9 cm; no distant metastases   -noncontrast chest CT 02/01/2024:  Right upper lobe nodule continues to enlarge, now 11 mm   -03/07/2024:  Right VATS, right upper lobe wedge resection, right upper lobectomy, right middle lobectomy, regional lymph node dissection:  -separate tumor nodules (metastases) in same lobe, right upper lung lobe, therefore, pT3 (0.8 cm and 0.6 cm, respectively); invasive acinar adenocarcinoma; G3, poorly-differentiated; no visceral pleural invasion; no LVI; margins negative; 10 regional lymph nodes negative  -pT3 pN0 M0, stage IIB  -PD-L1 expression high positive (TPS 95%; )  -negative for EGFR, KRAS, BRAF, AL K, ROS1, RET, met, HER2  -MediPort placed 04/25/2024  -S/P adjuvant cisplatin/Alimta every 3 weeks x4 cycles (04/29/2024-07/01/2024)  -restaging CTs C/A/P 05/06/2024:  No metastases   -staging brain MRI 05/08/2024: No metastases  -hemoglobin dropped to 5.2 on 07/10/2024, requiring blood transfusion  -ANC dropped to 0.55 on 06/24/2024, requiring supportive care  -07/17/2024:  ANC 0.525; hemoglobin 7.0; iron studies consistent with anemia of chronic disease/inflammation/malignancy/chemotherapy  -07/22/2024:  TSH, free T4 normal  -adjuvant atezolizumab 1200 mg every 3 weeks, started 08/09/2024; cycle 2 on 08/30/2024; cycle 3 on 09/20/2024  -09/20/2024: TSH, free T4 normal  -surveillance CT chest 09/18/2024: MALLORY  >>>  Plan:  -continue adjuvant atezolizumab 1200 mg IV every 3 weeks x1 year (17 cycles)   -monitor for immune related adverse events with atezolizumab  -check baseline TSH and free T4 every 6 weeks to monitor for the possibility of immune thyroiditis with atezolizumab  -for surveillance, CT chest with contrast in 6 months (March 2025)    Monitoring on atezolizumab:  Monitor LFTs (AST, ALT, and total bilirubin; at baseline and periodically during treatment;  kidney function (serum creatinine; at baseline and periodically during treatment);  thyroid function (at baseline, periodically during treatment and as clinically indicated);  monitor blood glucose (for hyperglycemia);  Monitor closely for signs/symptoms of immune-mediated adverse reactions, including  adrenal insufficiency,  diarrhea/colitis (consider initiating or repeating infectious workup in patients with corticosteroid-refractory immune-mediated colitis to exclude alternative causes),  dermatologic toxicity,  diabetes mellitus,  hypophysitis,  ocular disorders,  thyroid disorders,  pneumonitis and other immune-mediated adverse reactions.  Monitor for signs/symptoms of infusion-related reactions.     Surveillance:  -history and physical and chest CT +/- contrast every 6 months X 3 years (03/2024-03/2027), then history and physical and low-dose noncontrast chest CT annually  -smoking cessation advice and counseling   -FDG PET-CT or brain MRI is not routinely indicated      Severe anemia:   (Anemia of chronic disease/inflammation/malignancy)  -07/22/2024: Hemoglobin 7.7; MCV normal (anemia of chronic disease/inflammation/malignancy)  -07/22/2024: Started on folic acid 1 mg daily  -08/08/2024: Hemoglobin dropped to 6.8, requiring PRBC x3 units  -hemoglobin:  9.3 on 09/20/2024; 9.3 on 08/30/2024; 9.7 on 08/19/2024, etc.      Cologuard positive:  -08/17/2023: Cologuard positive  -needs evaluation with colonoscopy rule out colon cancer       Follow-up:  No follow-ups on file.    Answers submitted by the patient for this visit:  Review of Systems Questionnaire (Submitted on 10/7/2024)  appetite change : No  unexpected weight change: No  mouth sores: No  visual disturbance: No  cough: No  shortness of breath: No  chest pain: No  abdominal pain: No  diarrhea: No  frequency: No  back pain: No  rash: No  headaches: No  adenopathy: No  nervous/ anxious: No

## 2024-10-10 NOTE — Clinical Note
Continue Tecentriq every 3 weeks for a total of 17 cycles  Check TSH and free T4 every 6 weeks Surveillance CT chest with contrast in March 2025 Refer to GI for colonoscopy for evaluation of positive Cologuard test  Follow-up with NP in 3 weeks

## 2024-10-11 ENCOUNTER — INFUSION (OUTPATIENT)
Dept: INFUSION THERAPY | Facility: HOSPITAL | Age: 64
End: 2024-10-11
Attending: INTERNAL MEDICINE
Payer: MEDICAID

## 2024-10-11 VITALS
DIASTOLIC BLOOD PRESSURE: 61 MMHG | BODY MASS INDEX: 32.42 KG/M2 | RESPIRATION RATE: 20 BRPM | SYSTOLIC BLOOD PRESSURE: 114 MMHG | HEART RATE: 78 BPM | TEMPERATURE: 98 F | HEIGHT: 59 IN | OXYGEN SATURATION: 100 % | WEIGHT: 160.81 LBS

## 2024-10-11 DIAGNOSIS — C34.11 PRIMARY ADENOCARCINOMA OF UPPER LOBE OF RIGHT LUNG: Primary | ICD-10-CM

## 2024-10-11 PROCEDURE — 25000003 PHARM REV CODE 250: Performed by: INTERNAL MEDICINE

## 2024-10-11 PROCEDURE — 63600175 PHARM REV CODE 636 W HCPCS: Mod: JZ,JG | Performed by: INTERNAL MEDICINE

## 2024-10-11 RX ORDER — DIPHENHYDRAMINE HYDROCHLORIDE 50 MG/ML
50 INJECTION INTRAMUSCULAR; INTRAVENOUS ONCE AS NEEDED
Status: ACTIVE | OUTPATIENT
Start: 2024-10-11 | End: 2036-03-09

## 2024-10-11 RX ORDER — EPINEPHRINE 1 MG/ML
0.3 INJECTION INTRAMUSCULAR; INTRAVENOUS; SUBCUTANEOUS ONCE AS NEEDED
Status: ACTIVE | OUTPATIENT
Start: 2024-10-11 | End: 2036-03-09

## 2024-10-11 RX ORDER — SODIUM CHLORIDE 0.9 % (FLUSH) 0.9 %
10 SYRINGE (ML) INJECTION
Status: ACTIVE | OUTPATIENT
Start: 2024-10-11

## 2024-10-11 RX ORDER — HEPARIN 100 UNIT/ML
500 SYRINGE INTRAVENOUS
Status: ACTIVE | OUTPATIENT
Start: 2024-10-11

## 2024-10-11 RX ADMIN — ATEZOLIZUMAB 1200 MG: 1200 INJECTION, SOLUTION INTRAVENOUS at 11:10

## 2024-10-11 RX ADMIN — ALTEPLASE 2 MG: 2.2 INJECTION, POWDER, LYOPHILIZED, FOR SOLUTION INTRAVENOUS at 10:10

## 2024-10-15 NOTE — ADDENDUM NOTE
Addended by: TADEO DUMONT on: 10/15/2024 10:45 AM     Modules accepted: Orders, Level of Service

## 2024-10-28 DIAGNOSIS — E53.8 FOLATE DEFICIENCY: ICD-10-CM

## 2024-10-28 RX ORDER — FOLIC ACID 1 MG/1
1000 TABLET ORAL
Qty: 30 TABLET | Refills: 0 | Status: SHIPPED | OUTPATIENT
Start: 2024-10-28

## 2024-10-30 ENCOUNTER — DOCUMENT SCAN (OUTPATIENT)
Dept: HOME HEALTH SERVICES | Facility: HOSPITAL | Age: 64
End: 2024-10-30
Payer: MEDICAID

## 2024-10-30 ENCOUNTER — TELEPHONE (OUTPATIENT)
Dept: HEMATOLOGY/ONCOLOGY | Facility: CLINIC | Age: 64
End: 2024-10-30
Payer: MEDICAID

## 2024-10-31 ENCOUNTER — INFUSION (OUTPATIENT)
Dept: INFUSION THERAPY | Facility: HOSPITAL | Age: 64
End: 2024-10-31
Attending: INTERNAL MEDICINE
Payer: MEDICAID

## 2024-10-31 ENCOUNTER — OFFICE VISIT (OUTPATIENT)
Dept: HEMATOLOGY/ONCOLOGY | Facility: CLINIC | Age: 64
End: 2024-10-31
Attending: INTERNAL MEDICINE
Payer: MEDICAID

## 2024-10-31 VITALS
HEART RATE: 77 BPM | RESPIRATION RATE: 20 BRPM | HEIGHT: 59 IN | SYSTOLIC BLOOD PRESSURE: 102 MMHG | OXYGEN SATURATION: 100 % | BODY MASS INDEX: 32.33 KG/M2 | DIASTOLIC BLOOD PRESSURE: 60 MMHG | WEIGHT: 160.38 LBS | TEMPERATURE: 98 F

## 2024-10-31 DIAGNOSIS — C34.11 PRIMARY ADENOCARCINOMA OF UPPER LOBE OF RIGHT LUNG: Primary | ICD-10-CM

## 2024-10-31 DIAGNOSIS — R19.5 POSITIVE COLORECTAL CANCER SCREENING USING COLOGUARD TEST: ICD-10-CM

## 2024-10-31 PROCEDURE — 3008F BODY MASS INDEX DOCD: CPT | Mod: CPTII,,, | Performed by: NURSE PRACTITIONER

## 2024-10-31 PROCEDURE — 99215 OFFICE O/P EST HI 40 MIN: CPT | Mod: PBBFAC,25 | Performed by: NURSE PRACTITIONER

## 2024-10-31 PROCEDURE — 99215 OFFICE O/P EST HI 40 MIN: CPT | Mod: S$PBB,,, | Performed by: NURSE PRACTITIONER

## 2024-10-31 PROCEDURE — 3074F SYST BP LT 130 MM HG: CPT | Mod: CPTII,,, | Performed by: NURSE PRACTITIONER

## 2024-10-31 PROCEDURE — 96413 CHEMO IV INFUSION 1 HR: CPT

## 2024-10-31 PROCEDURE — 1159F MED LIST DOCD IN RCRD: CPT | Mod: CPTII,,, | Performed by: NURSE PRACTITIONER

## 2024-10-31 PROCEDURE — 4010F ACE/ARB THERAPY RXD/TAKEN: CPT | Mod: CPTII,,, | Performed by: NURSE PRACTITIONER

## 2024-10-31 PROCEDURE — 63600175 PHARM REV CODE 636 W HCPCS: Mod: JZ,JG | Performed by: INTERNAL MEDICINE

## 2024-10-31 PROCEDURE — 3078F DIAST BP <80 MM HG: CPT | Mod: CPTII,,, | Performed by: NURSE PRACTITIONER

## 2024-10-31 PROCEDURE — 25000003 PHARM REV CODE 250: Performed by: INTERNAL MEDICINE

## 2024-10-31 PROCEDURE — 1160F RVW MEDS BY RX/DR IN RCRD: CPT | Mod: CPTII,,, | Performed by: NURSE PRACTITIONER

## 2024-10-31 RX ORDER — HEPARIN 100 UNIT/ML
500 SYRINGE INTRAVENOUS
Status: DISCONTINUED | OUTPATIENT
Start: 2024-10-31 | End: 2024-10-31 | Stop reason: HOSPADM

## 2024-10-31 RX ORDER — SODIUM CHLORIDE 0.9 % (FLUSH) 0.9 %
10 SYRINGE (ML) INJECTION
Status: DISCONTINUED | OUTPATIENT
Start: 2024-10-31 | End: 2024-10-31 | Stop reason: HOSPADM

## 2024-10-31 RX ORDER — EPINEPHRINE 1 MG/ML
0.3 INJECTION INTRAMUSCULAR; INTRAVENOUS; SUBCUTANEOUS ONCE AS NEEDED
Status: DISCONTINUED | OUTPATIENT
Start: 2024-10-31 | End: 2024-10-31 | Stop reason: HOSPADM

## 2024-10-31 RX ORDER — DIPHENHYDRAMINE HYDROCHLORIDE 50 MG/ML
50 INJECTION INTRAMUSCULAR; INTRAVENOUS ONCE AS NEEDED
Status: DISCONTINUED | OUTPATIENT
Start: 2024-10-31 | End: 2024-10-31 | Stop reason: HOSPADM

## 2024-10-31 RX ADMIN — HEPARIN 500 UNITS: 100 SYRINGE at 02:10

## 2024-10-31 RX ADMIN — ATEZOLIZUMAB 1200 MG: 1200 INJECTION, SOLUTION INTRAVENOUS at 02:10

## 2024-11-05 ENCOUNTER — EXTERNAL HOME HEALTH (OUTPATIENT)
Dept: HOME HEALTH SERVICES | Facility: HOSPITAL | Age: 64
End: 2024-11-05
Payer: MEDICAID

## 2024-11-06 ENCOUNTER — DOCUMENTATION ONLY (OUTPATIENT)
Dept: HEMATOLOGY/ONCOLOGY | Facility: CLINIC | Age: 64
End: 2024-11-06
Payer: MEDICAID

## 2024-11-06 NOTE — PROGRESS NOTES
Received a vital sign alert report from Jaylen Stewart with [facility]. Patient's BP was 91/50 on 11/5/24. Notified Paula Oseguera who ordered that vital sign report gets sent to PCP. Called [facility] and spoke with Jaylen who stated that he will send her results to her PCP.

## 2024-11-12 ENCOUNTER — OFFICE VISIT (OUTPATIENT)
Dept: PULMONOLOGY | Facility: CLINIC | Age: 64
End: 2024-11-12
Payer: MEDICAID

## 2024-11-12 VITALS
DIASTOLIC BLOOD PRESSURE: 62 MMHG | RESPIRATION RATE: 18 BRPM | OXYGEN SATURATION: 99 % | HEIGHT: 59 IN | HEART RATE: 86 BPM | BODY MASS INDEX: 32.38 KG/M2 | TEMPERATURE: 98 F | WEIGHT: 160.63 LBS | SYSTOLIC BLOOD PRESSURE: 98 MMHG

## 2024-11-12 DIAGNOSIS — Z90.2 STATUS POST LOBECTOMY OF LUNG: Primary | ICD-10-CM

## 2024-11-12 DIAGNOSIS — C34.11 PRIMARY ADENOCARCINOMA OF UPPER LOBE OF RIGHT LUNG: ICD-10-CM

## 2024-11-12 PROCEDURE — 3074F SYST BP LT 130 MM HG: CPT | Mod: CPTII,,, | Performed by: INTERNAL MEDICINE

## 2024-11-12 PROCEDURE — 3078F DIAST BP <80 MM HG: CPT | Mod: CPTII,,, | Performed by: INTERNAL MEDICINE

## 2024-11-12 PROCEDURE — 4010F ACE/ARB THERAPY RXD/TAKEN: CPT | Mod: CPTII,,, | Performed by: INTERNAL MEDICINE

## 2024-11-12 PROCEDURE — 3008F BODY MASS INDEX DOCD: CPT | Mod: CPTII,,, | Performed by: INTERNAL MEDICINE

## 2024-11-12 PROCEDURE — 99214 OFFICE O/P EST MOD 30 MIN: CPT | Mod: S$PBB,,, | Performed by: INTERNAL MEDICINE

## 2024-11-12 PROCEDURE — 99213 OFFICE O/P EST LOW 20 MIN: CPT | Mod: PBBFAC

## 2024-11-12 NOTE — PROGRESS NOTES
Subjective:     Chief Complaint: Follow-up    HPI: Nadja Geronimo is a 64 y.o. female who was initially referred to [facility] pulmonology clinic for suspicious lung nodule seen on screening LDCT.  Patient referred to Oncology at last pulmonology appointment and underwent VATS with subsequent wedge resection and lobectomy.  She is currently followed by Oncology where she is currently undergoing chemotherapy.    CT Chest:  9/18/24:  No evidence of local recurrence or metastatic disease of the chest  5/06/2024: no evidence of recurrent malignancy or metastatic disease to the chest, abdomen, or pelvis  02/01/2024: lateral right upper lobe nodule continue to enlarge currently measuring 11 mm concerning for malignancy  12/26/2023:  Lung rads 4b for continuing only enlarging right upper lobe pulmonary nodule    Today's visit:  Patient reports that she is feeling well overall from a postoperative standpoint.  She is currently receiving chemotherapy with good appetite and good functional capabilities currently.  She has been on 2 L nasal cannula continuous it is that she is doing well on this.    Past Medical History:   Diagnosis Date    Dizziness     GERD     Gout     High cholesterol     Insomnia     Irregular heart rhythm     Low vitamin D level     Lung cancer     Lung nodule     Mixed hyperlipidemia     Neuropathy     Obesity     Seasonal allergies     Sleep apnea     cipap    Stroke 2012    Vertigo          Review of Systems   Constitutional:  Negative for chills, fever and malaise/fatigue.   HENT:  Negative for sinus pain.    Respiratory:  Positive for shortness of breath. Negative for cough, hemoptysis, sputum production, wheezing and stridor.    Cardiovascular:  Negative for chest pain, palpitations, orthopnea, claudication and leg swelling.   Gastrointestinal:  Negative for constipation, heartburn and vomiting.   Musculoskeletal:  Negative for falls, joint pain, myalgias and neck pain.   Neurological:  Negative  for dizziness, speech change, focal weakness, seizures, loss of consciousness and weakness.   Psychiatric/Behavioral:  Negative for depression and suicidal ideas. The patient is not nervous/anxious.            Social History     Socioeconomic History    Marital status:    Tobacco Use    Smoking status: Former     Average packs/day: 2.0 packs/day for 15.0 years (30.0 ttl pk-yrs)     Types: Cigarettes     Start date: 2023     Quit date: 1972     Years since quittin.9    Smokeless tobacco: Never    Tobacco comments:     Smoked since age 12; quit 2023   Substance and Sexual Activity    Alcohol use: Never    Drug use: Never    Sexual activity: Yes     Partners: Male         Current Outpatient Medications   Medication Instructions    allopurinoL (ZYLOPRIM) 100 mg, Daily    ALPRAZolam (XANAX) 0.5 mg, Oral, Once as needed, Take at 8:30 am on day of scans    aspirin (ECOTRIN) 81 mg, Daily    atorvastatin (LIPITOR) 40 mg, Oral, Daily    carvediloL (COREG) 12.5 mg, 2 times daily    colchicine (COLCRYS) 0.6 mg, Daily    dexAMETHasone (DECADRON) 4 MG Tab Take Dexamethasone 8mg daily the day before treatment, treatment day, and 3 days after.    diphenhydrAMINE-aluminum-magnesium hydroxide-simethicone-LIDOcaine viscous HCl 2% 15 mLs, Swish & Spit, Every 4 hours PRN    ENTRESTO 24-26 mg per tablet 1 tablet, 2 times daily    ergocalciferol (ERGOCALCIFEROL) 50,000 Units, Every 7 days    folic acid (FOLVITE) 1,000 mcg, Oral    furosemide (LASIX) 40 mg, Daily    gabapentin (NEURONTIN) 800 mg, 2 times daily    ibuprofen (ADVIL,MOTRIN) 800 MG tablet 1 tablet with food or milk as needed Orally Three times a day    linaCLOtide (LINZESS) 290 mcg, As needed (PRN)    meclizine (ANTIVERT) 25 mg, Daily    megestroL (MEGACE) 40 mg, Oral, 4 times daily    mirtazapine (REMERON) 30 mg, Nightly    montelukast (SINGULAIR) 10 mg, Daily    nortriptyline (PAMELOR) 25 mg, Daily    ondansetron (ZOFRAN) 4 mg, Oral, Daily PRN     "pantoprazole (PROTONIX) 40 mg, Daily    polyethylene glycol (MOVIPREP) 100-7.5-2.691 gram solution Take as directed prior to colonoscopy    potassium chloride (K-TAB) 20 mEq 20 mEq, Oral, 2 times daily    prochlorperazine (COMPAZINE) 10 mg, Oral, 4 times daily             Objective:   BP 98/62 (BP Location: Right arm, Patient Position: Sitting)   Pulse 86   Temp 97.5 °F (36.4 °C) (Oral)   Resp 18   Ht 4' 11" (1.499 m)   Wt 72.8 kg (160 lb 9.6 oz)   SpO2 99%   BMI 32.44 kg/m²     Physical Exam  Constitutional:       General: She is not in acute distress.     Appearance: She is not ill-appearing.   HENT:      Head: Normocephalic and atraumatic.   Eyes:      General:         Right eye: No discharge.         Left eye: No discharge.      Pupils: Pupils are equal, round, and reactive to light.   Cardiovascular:      Rate and Rhythm: Normal rate and regular rhythm.      Heart sounds: Normal heart sounds. No murmur heard.     No gallop.   Pulmonary:      Breath sounds: Normal breath sounds. No wheezing, rhonchi or rales.      Comments: On 2 L nasal cannula  Abdominal:      General: Abdomen is flat. Bowel sounds are normal. There is no distension.      Palpations: Abdomen is soft.   Musculoskeletal:         General: Normal range of motion.      Right lower leg: No edema.      Left lower leg: No edema.   Skin:     Findings: No lesion or rash.   Neurological:      General: No focal deficit present.      Mental Status: She is alert and oriented to person, place, and time. Mental status is at baseline.   Psychiatric:         Mood and Affect: Mood normal.         Behavior: Behavior normal.          Assessment:     Adenocarcinoma of the lung (RUL) now s/p VATS, RUL wedge resection, right upper lobectomy, right middle lobectomy, and regional lymph node dissection   S/p adjuvant cisplatin/Alimta every 3 weeks x4 cycles (04/29/2024-07/01/2024). Currently receiving adjuvant atezolizumab 1200 mg every 3 weeks.  Supplemental " oxygen dependence     Plan:     Continue to follow-up with Oncology for further chemotherapy and surveillance  Currently on 2L O2 continuous.  Patient will need continuous oxygen for the foreseeable future secondary to recent history above.  She reports significant functional limitation with current oxygen delivery system, specifically tank, which is currently impairing her activities of daily living.  Will order portable oxygen concentrator to assist with this.  Will follow-up in 1 year      Carroll Pabon DO  \[facility]\"" Internal Medicine PGY-2

## 2024-11-13 ENCOUNTER — TELEPHONE (OUTPATIENT)
Dept: PULMONOLOGY | Facility: CLINIC | Age: 64
End: 2024-11-13
Payer: MEDICAID

## 2024-11-13 NOTE — TELEPHONE ENCOUNTER
Patient requested portable oxygen concentrator at Pulm visit on 11/12/2024. She stated that she previously called her insurance company and they told her that a letter needs to be sent to the company and they would pay for the portable concentrator. Informed patient that Medicaid will not pay for her to have a portable oxygen concentrator even if a letter is sent to the company. Informed her that I will fax her Pulm note from 11/12/2024 and a new order to [facility] ([facility] for oxygen) to see if they can be of assistance. Patient voiced understanding.    Oxygen order and clinicals faxed to [facility] on 11/13/2024.    Sarita with [facility] called stating that Medicaid will not pay for patient to have a portable oxygen concentrator at this time. She stated she attempted to contact patient to inform her but was not able to reach her at this time. Understanding voiced.

## 2024-11-13 NOTE — PROGRESS NOTES
History:  Past Medical History:   Diagnosis Date    Dizziness     GERD     Gout     High cholesterol     Insomnia     Irregular heart rhythm     Low vitamin D level     Lung cancer     Lung nodule     Mixed hyperlipidemia     Neuropathy     Obesity     Seasonal allergies     Sleep apnea     cipap    Stroke 2012    Vertigo      Past Surgical History:   Procedure Laterality Date    APPENDECTOMY       SECTION      CHOLECYSTECTOMY      COLONOSCOPY      HERNIA REPAIR      INSERTION OF TUNNELED CENTRAL VENOUS CATHETER (CVC) WITH SUBCUTANEOUS PORT N/A 2024    Procedure: [id number];  Surgeon: Luz Elena Elder MD;  Location: [facility] OR;  Service: General;  Laterality: N/A;    LOBECTOMY Right 3/7/2024    Procedure: LOBECTOMY;  Surgeon: Naty Middleton MD;  Location: [facility] OR;  Service: Thoracic;  Laterality: Right;  upper and middle lobectomy    THORACOSCOPIC WEDGE RESECTION OF LUNG Right 3/7/2024    Procedure: VATS, WITH WEDGE RESECTION, LUNG;  Surgeon: Naty Middleton MD;  Location: [facility] OR;  Service: Thoracic;  Laterality: Right;  RIGHT VATS // POSS LOBECTOMY    THORACOTOMY Right 3/7/2024    Procedure: THORACOTOMY;  Surgeon: Naty Middleton MD;  Location: [facility] OR;  Service: Thoracic;  Laterality: Right;  converted to open at 1011      Social History     Socioeconomic History    Marital status:    Tobacco Use    Smoking status: Former     Average packs/day: 2.0 packs/day for 15.0 years (30.0 ttl pk-yrs)     Types: Cigarettes     Start date: 2023     Quit date:      Years since quittin.9    Smokeless tobacco: Never    Tobacco comments:     Smoked since age 12; quit 2023   Substance and Sexual Activity    Alcohol use: Never    Drug use: Never    Sexual activity: Yes     Partners: Male      Family History   Problem Relation Name Age of Onset    Cancer Mother Alesia     Heart disease Mother Alesia     Breast cancer Mother Alesia     Cancer Father Chowdary         Reason for  Follow-up:  -adenocarcinoma right upper lung lobe, S/P right VATS/right upper lobe wedge resection/completion right upper lobectomy and right middle lobectomy and regional lymph node dissection 03/07/2024, G3, pT3 pN0  -Cologuard positive  -anemia of chronic disease  -folic acid deficiency    History of Present Illness:   Follow-up        Oncologic/Hematologic History:  Oncology History   Primary adenocarcinoma of upper lobe of right lung   3/7/2024 Cancer Staged    Staging form: Lung, AJCC 8th Edition  - Pathologic stage from 3/7/2024: Stage IIB (pT3, pN0, cM0)     4/15/2024 Initial Diagnosis    Primary adenocarcinoma of upper lobe of right lung     4/29/2024 - 7/2/2024 Chemotherapy    Treatment Summary   Plan Name: OP NSCLC PEMETREXED + CISPLATIN Q3W  Treatment Goal: Curative  Status: Inactive  Start Date: 4/29/2024  End Date: 7/2/2024  Provider: Emery Mahoney MD  Chemotherapy: CISplatin (Platinol) 132 mg in sodium chloride 0.9% 697 mL chemo infusion, 141 mg, Intravenous, Clinic/HOD 1 time, 4 of 4 cycles  Administration: 132 mg (4/29/2024), 128 mg (6/10/2024), 128 mg (7/1/2024), 132 mg (5/20/2024)  PEMEtrexed disodium (ALIMTA) 900 mg in sodium chloride 0.9% SolP 100 mL chemo infusion, 950 mg, Intravenous, Clinic/HOD 1 time, 4 of 4 cycles  Dose modification: 375 mg/m2 (75 % of original dose 500 mg/m2, Cycle 4, Reason: Dose Not Tolerated)  Administration: 900 mg (4/29/2024), 900 mg (6/10/2024), 625 mg (7/1/2024), 900 mg (5/20/2024)     8/9/2024 -  Chemotherapy    Treatment Summary   Plan Name: OP ATEZOLIZUMAB Q3W  Treatment Goal: Curative  Status: Active  Start Date: 8/9/2024  End Date: 7/11/2025 (Planned)  Provider: Emery Mahoeny MD  Chemotherapy: [No matching medication found in this treatment plan]     Social history:  .  Lives in [place].  Has 3 children.  Does not work.  Has been smoking 1-2 pack of cigarettes daily for 51 years, since age 12; discontinued recently.  No alcohol or illicit drug  abuse.      Family history:   Mother experienced some kind of intrathoracic malignancy at age 83;  from MI at age 83   Father  from prostate cancer) experienced at age 83 and probably, sarcoma) experienced at age 83)    Health maintenance:   -PCP in [place]  -says that she had screening colonoscopy performed in [place] in , and that it was unremarkable  -says that now, for positive Cologuard test, she is scheduled for colonoscopy in May 2024  -2023:  Cologuard positive  -2023:  Bilateral digital screening mammogram with tomosynthesis (comparison:  2022 mammogram, etc.):  BI-RADS: 1 negative  =========================================    63-year-old female, referred from [facility], Dr. Arnulfo Middleton, with poorly-differentiated adenocarcinoma of right lung.      Investigations reviewed:  -2022: CT chest lung screening low-dose (comparison:  2020):  Lung rads 2: Benign appearance or behavior:  Continue annual screening with LDCT in 12 months  -2023:  CT chest lung screening low-dose without contrast (comparison:  2022):  Lung rads category 4A: Suspicious; enlarging nodule right upper lobe, lobulated, < 8 mm, i.e., 7 x 6.7 mm, previously 5 mm, previously not lobulated, too small for adequate detection on PET-CT; recommend three-month follow-up)  -2023: CT chest with contrast (comparison:  2023):  Lung rads 4A: Very suspicious; continued enlargement of right upper lobe lung nodule of concern (9.5 x 8.9 mm)  -2024: FDG PET-CT (comparison:  Chest CT 2023; CT abdomen pelvis 2019):  1. Markedly FDG-avid right upper lobe soft tissue nodule, increased in size in the interval, raising concern for malignancy (smoothly marginated noncalcified soft tissue nodule lateral subpleural right upper lobe, 1.2 x 1.1 cm, maximum SUV 14, previously 1 cm x 0.9 cm).  2. No definite PET-CT findings to suggest additional  right hemithoracic or more distant metastatic disease.  -02/01/2024: CT chest without contrast (comparison:  12/26/2023): Lateral right upper lobe nodule continues to enlarge in size currently measuring 11 mm concerning for malignancy. Recommend further evaluation.   -03/07/2024:  Right VATS, right upper lobe wedge resection; right upper lobectomy, right middle lobectomy, regional lymph node dissection:  1. Right lung, upper lobe, wedge resection:  Poorly-differentiated adenocarcinoma, 0.6 cm, clear margins of resection  2. Level 8R lymph node (paraesophageal), biopsy:  3 lymph nodes, negative for metastatic carcinoma    3. Level 4R lymph node (lower paratracheal), biopsy: 2 lymph nodes, negative for metastatic carcinoma  4. Level 7R lymph node (subcarinal), biopsy:  1 lymph node, negative for metastatic carcinoma  5. Level 11R lymph node (interlobar), biopsy:  2 lymph nodes, negative for metastatic carcinoma    6. Right lung, upper lobe, completion lobectomy:  -poorly-differentiated adenocarcinoma, 0.8 cm  -bronchial and vascular margins of resection negative   -1 hilar lymph node with no evidence of metastatic carcinoma  7. Level 12R lymph node (lobar), lymphadenectomy:  1 lymph node, negative for metastatic carcinoma    Synoptic report:  Total number of primary tumors: 2  S/P wedge resection  Completion lobectomy  Right lung  Separate tumor nodules (metastases) in same lobe, therefore, pT3  number of intrapulmonary metastases:  2   Tumor site:  Upper lobe of lung  Tumor size:  Total tumor size:  Greatest dimension:  0.8 cm  Invasive acinar adenocarcinoma; G3, poorly-differentiated; spread through airspaces (CHAMP), present; no visceral pleural invasion; no adjacent structures present; no known pre-surgical therapy; no LVI  All margins negative for invasive carcinoma; closest margin invasive carcinoma, bronchial vascular; distance from invasive carcinoma closest margin,> 2 cm; margins status for noninvasive tumor,  all margins negative for noninvasive tumor  Number of lymph nodes examined, 10; all regional lymph nodes negative for tumor  >>>  pT3 pN0    PD-L1 expression:  Positive: High (TPS 95%; )  Negative for EGFR, KRAS, BRAF, AL K, ROS1, RET, MET, HER2  No gene rearrangement no reportable altered splicing events identified from RNA sequencing  No reportable pathogenic variants found  MSI stable  TMB:  41.6m/MB  Fusions: Negative    04/16/2024:  Pleasant lady who presents for initial medical oncology consultation, accompanied by .  In no acute discomfort.  After lobectomy, has required supplemental oxygen via nasal cannula at 2 liters/minute.  This is being handled by home healthcare team.  Prior to surgery, she never required supplemental oxygen.  Prior to surgery, she never experienced significant cough, sputum production, or dyspnea.  Great appetite.  Mild exertional dyspnea.  ECOG 1.    No unusual headaches or focal neurological symptoms.  No hemoptysis, fevers, or chills.  No abdominal pain, nausea, vomiting, change in bowel habits, or GI bleeding.  No anorexia or unintentional weight loss.  Smoked 1-2 packs of cigarettes daily for 51 years since age 12; says that she discontinued smoking after lung surgery.    Interval History:  FILGRASTIM (Neupogen) 480 MCG   OP ATEZOLIZUMAB Q3W     10/10/2024:   -07/22/2024: Hemoglobin 7.7; MCV normal; serum iron normal, TIBC normal; ferritin 1216, elevated; transferrin saturation 34%, normal; folic acid level 6.1, low; B12 level 1995, elevated; retic count 1.9%; haptoglobin normal; , elevated  (Anemia of chronic disease/inflammation/malignancy)  -07/22/2024: Started folic acid 1 mg daily  -hemoglobin dropped to 6.8 on 08/08/2024, requiring PRBC x2 units  -07/22/2024: TSH, free T4 normal  -09/20/2024: TSH, free T4 normal  -adjuvant atezolizumab 1200 mg every 3 weeks, started 08/09/2024; cycle 2 on 08/30/2024; cycle 3 on 09/20/2024  -09/18/2024:  Surveillance CT  chest with contrast (comparison: CT C/A/P 0 05/06/2024):  No evidence of local recurrence or metastatic disease in chest  -10/10/2024: Hemoglobin 10.4, stable; MCV 98.2, rest of CBC essentially unremarkable; creatinine 1.14, stable; rest of CMP unremarkable  Presents for a follow-up visit, accompanied by a male family member.  In no acute discomfort.  Has been on oxygen ever since diagnosis of lung cancer.  Says that she does not smoke anymore.  She smoked 1-2 pack of cigarettes daily for 51 years, since age 12.  Great appetite.  Denies unusual headaches, focal neurological symptoms, significant chest pain/cough/hemoptysis/mucous production/dyspnea, abdominal pain, nausea, vomiting, etc..  No new lumps or lymphadenopathy.  No anorexia or unintentional weight loss.  No recurrent bouts of pneumonia or bronchitis.  No immune related adverse events with ongoing consolidation therapy with Tecentriq in the form of immune pneumonitis, myocarditis, hepatitis, nephritis, endocrinopathies, dermatitis, colitis, vision impairment, allergic reactions, etc..  ECOG 1.  Ambulates with the help of walker.      Medications:  Current Outpatient Medications on File Prior to Visit   Medication Sig Dispense Refill    allopurinoL (ZYLOPRIM) 100 MG tablet Take 100 mg by mouth once daily.      aspirin (ECOTRIN) 81 MG EC tablet Take 81 mg by mouth once daily.      carvediloL (COREG) 12.5 MG tablet Take 12.5 mg by mouth 2 (two) times daily.      colchicine (COLCRYS) 0.6 mg tablet Take 0.6 mg by mouth once daily.      dexAMETHasone (DECADRON) 4 MG Tab Take Dexamethasone 8mg daily the day before treatment, treatment day, and 3 days after. (Patient not taking: Reported on 11/12/2024) 120 tablet 3    diphenhydrAMINE-aluminum-magnesium hydroxide-simethicone-LIDOcaine viscous HCl 2% Swish and spit 15 mLs every 4 (four) hours as needed (oral mucositis). (Patient not taking: Reported on 11/12/2024) 300 each 2    ENTRESTO 24-26 mg per tablet Take 1  tablet by mouth 2 (two) times daily.      ergocalciferol (ERGOCALCIFEROL) 50,000 unit Cap Take 50,000 Units by mouth every 7 days.      folic acid (FOLVITE) 1 MG tablet Take 1 tablet by mouth once daily 30 tablet 0    furosemide (LASIX) 40 MG tablet Take 40 mg by mouth once daily.      gabapentin (NEURONTIN) 800 MG tablet Take 800 mg by mouth 2 (two) times daily.      ibuprofen (ADVIL,MOTRIN) 800 MG tablet 1 tablet with food or milk as needed Orally Three times a day      linaCLOtide (LINZESS) 145 mcg Cap capsule Take 290 mcg by mouth as needed.      meclizine (ANTIVERT) 25 mg tablet Take 25 mg by mouth Daily.      megestroL (MEGACE) 40 MG Tab Take 1 tablet (40 mg total) by mouth 4 (four) times daily. (Patient not taking: Reported on 11/12/2024.) 120 tablet 2    mirtazapine (REMERON) 30 MG tablet Take 30 mg by mouth every evening.      montelukast (SINGULAIR) 10 mg tablet Take 10 mg by mouth once daily.      nortriptyline (PAMELOR) 25 MG capsule Take 25 mg by mouth Daily.      ondansetron (ZOFRAN) 4 MG tablet Take 1 tablet (4 mg total) by mouth daily as needed for Nausea. 30 tablet 1    pantoprazole (PROTONIX) 40 MG tablet Take 40 mg by mouth once daily.      polyethylene glycol (MOVIPREP) 100-7.5-2.691 gram solution Take as directed prior to colonoscopy (Patient not taking: Reported on 11/12/2024) 1 kit 0    potassium chloride (K-TAB) 20 mEq Take 1 tablet (20 mEq total) by mouth 2 (two) times daily. 28 tablet 0    prochlorperazine (COMPAZINE) 10 MG tablet Take 1 tablet (10 mg total) by mouth 4 (four) times daily. 30 tablet 1    ALPRAZolam (XANAX) 0.5 MG tablet Take 1 tablet (0.5 mg total) by mouth once as needed for Anxiety. Take at 8:30 am on day of scans (Patient not taking: Reported on 11/12/2024) 1 tablet 0    atorvastatin (LIPITOR) 40 MG tablet Take 40 mg by mouth once daily.       Current Facility-Administered Medications on File Prior to Visit   Medication Dose Route Frequency Provider Last Rate Last Admin     0.9%  NaCl infusion (for blood administration)   Intravenous Once Emery Mahoney MD        0.9% NaCl 100 mL flush bag   Intravenous PRN Emery Mahoney MD        alteplase injection 2 mg  2 mg Intra-Catheter PRN Emery Mahoney MD   2 mg at 10/11/24 1037    diphenhydrAMINE injection 50 mg  50 mg Intravenous Once PRN Emery Mahoney MD        EPINEPHrine injection 0.3 mg  0.3 mg Intramuscular Once PRN Emery Mahoney MD        heparin, porcine (PF) 100 unit/mL injection flush 500 Units  500 Units Intravenous PRN Emery Mahoney MD        hydrocortisone sodium succinate injection 100 mg  100 mg Intravenous Once PRN Emery Mahoney MD        lactated ringers infusion   Intravenous Continuous Aline Muniz MD 10 mL/hr at 04/26/24 0608 New Bag at 04/26/24 0608    sodium chloride 0.9% flush 10 mL  10 mL Intravenous PRN Emery Mahoney MD         Review of Systems:   All systems reviewed and found to be negative except for the symptoms detailed above    Physical Examination:   VITAL SIGNS:   Vitals:    10/31/24 1320   BP: 102/60   Pulse: 77   Resp: 20   Temp: 97.6 °F (36.4 °C)         GENERAL:  In no apparent distress.    HEAD:  No signs of head trauma.  EYES:  Pupils are equal.  Extraocular motions intact.    EARS:  Hearing grossly intact.  MOUTH:  Oropharynx is normal.   NECK:  No adenopathy, no JVD.     CHEST:  Chest with clear breath sounds bilaterally.  No wheezes, rales, rhonchi.    CARDIAC:  Regular rate and rhythm.  S1 and S2, without murmurs, gallops, rubs.  VASCULAR:  No Edema.  Peripheral pulses normal and equal in all extremities.  ABDOMEN:  Soft, without detectable tenderness.  No sign of distention.  No   rebound or guarding, and no masses palpated.   Bowel Sounds normal.  MUSCULOSKELETAL:  Good range of motion of all major joints. Extremities without clubbing, cyanosis or edema.    NEUROLOGIC EXAM:  Alert and oriented x 3.  No focal sensory or strength deficits.   Speech normal.   Follows commands.  PSYCHIATRIC:  Mood normal.  -04/16/2024:  In no acute discomfort.  On supplemental oxygen via nasal cannula at 2 liters/minute.  Has required supplemental oxygen after lobectomy.  Prior to lobectomy, never required supplemental oxygen.  Oxygen is being handled by home healthcare team.  No cervical lymphadenopathy.    Results for orders placed or performed during the hospital encounter of 03/07/24   CBC Auto Differential    Narrative    The following orders were created for panel order CBC Auto Differential.  Procedure                               Abnormality         Status                     ---------                               -----------         ------                     CBC with Differential[[id number]]       Abnormal            Final result                 Please view results for these tests on the individual orders.   CBC with Differential   Result Value Ref Range    WBC 10.24 4.50 - 11.50 x10(3)/mcL    RBC 3.71 (L) 4.20 - 5.40 x10(6)/mcL    Hgb 11.1 (L) 12.0 - 16.0 g/dL    Hct 35.3 (L) 37.0 - 47.0 %    MCV 95.1 (H) 80.0 - 94.0 fL    MCH 29.9 27.0 - 31.0 pg    MCHC 31.4 (L) 33.0 - 36.0 g/dL    RDW 13.6 11.5 - 17.0 %    Platelet 349 130 - 400 x10(3)/mcL    MPV 8.7 7.4 - 10.4 fL    Neut % 73.8 %    Lymph % 17.8 %    Mono % 6.1 %    Eos % 1.1 %    Basophil % 0.4 %    Lymph # 1.82 0.6 - 4.6 x10(3)/mcL    Neut # 7.57 2.1 - 9.2 x10(3)/mcL    Mono # 0.62 0.1 - 1.3 x10(3)/mcL    Eos # 0.11 0 - 0.9 x10(3)/mcL    Baso # 0.04 <=0.2 x10(3)/mcL    IG# 0.08 (H) 0 - 0.04 x10(3)/mcL    IG% 0.8 %    NRBC% 0.0 %     Results for orders placed or performed in visit on 03/04/24   Comprehensive metabolic panel   Result Value Ref Range    Sodium Level 141 136 - 145 mmol/L    Potassium Level 3.1 (L) 3.5 - 5.1 mmol/L    Chloride 98 98 - 107 mmol/L    Carbon Dioxide 31 23 - 31 mmol/L    Glucose Level 159 (H) 82 - 115 mg/dL    Blood Urea Nitrogen 11.6 9.8 - 20.1 mg/dL    Creatinine 0.85 0.55 - 1.02 mg/dL     Calcium Level Total 9.2 8.4 - 10.2 mg/dL    Protein Total 6.8 5.8 - 7.6 gm/dL    Albumin Level 3.4 3.4 - 4.8 g/dL    Globulin 3.4 2.4 - 3.5 gm/dL    Albumin/Globulin Ratio 1.0 (L) 1.1 - 2.0 ratio    Bilirubin Total 0.5 <=1.5 mg/dL    Alkaline Phosphatase 135 40 - 150 unit/L    Alanine Aminotransferase 9 0 - 55 unit/L    Aspartate Aminotransferase 10 5 - 34 unit/L    eGFR >60 mls/min/1.73/m2       Assessment:  Problem List Items Addressed This Visit       Primary adenocarcinoma of upper lobe of right lung - Primary    Relevant Orders    CBC Auto Differential    Comprehensive Metabolic Panel    Magnesium    Phosphorus    Positive colorectal cancer screening using Cologuard test    Relevant Orders    CBC Auto Differential    Comprehensive Metabolic Panel    Magnesium    Phosphorus     Orders for 10/10/2024:   Continue Tecentriq every 3 weeks for a total of 17 cycles   Check TSH and free T4 every 6 weeks  Surveillance CT chest with contrast in March 2025  Refer to GI for colonoscopy for evaluation of positive Cologuard test   Follow-up with NP in 3 weeks     Above discussed at length with the patient.  All questions answered.  Discussed labs and scans and gave her copies of relevant results.  She understands and agrees with this plan.  =================================    Adenocarcinoma right upper lung lobe:   -LDCT chest without contrast 08/12/2022:  Lung rads 2  -LDCT chest without contrast 09/11/2023:  Lung rads 4A  -noncontrast chest CT 12/26/2023:  Lung rads 4A  -FDG PET-CT 01/29/2024:  Hypermetabolic right upper lobe nodule, 1.2 x 1.1 cm, maximum SUV 14, previously 1 cm x 0.9 cm; no distant metastases   -noncontrast chest CT 02/01/2024:  Right upper lobe nodule continues to enlarge, now 11 mm   -03/07/2024:  Right VATS, right upper lobe wedge resection, right upper lobectomy, right middle lobectomy, regional lymph node dissection:  -separate tumor nodules (metastases) in same lobe, right upper lung lobe,  therefore, pT3 (0.8 cm and 0.6 cm, respectively); invasive acinar adenocarcinoma; G3, poorly-differentiated; no visceral pleural invasion; no LVI; margins negative; 10 regional lymph nodes negative  -pT3 pN0 M0, stage IIB  -PD-L1 expression high positive (TPS 95%; )  -negative for EGFR, KRAS, BRAF, AL K, ROS1, RET, met, HER2  -MediPort placed 04/25/2024  -S/P adjuvant cisplatin/Alimta every 3 weeks x4 cycles (04/29/2024-07/01/2024)  -restaging CTs C/A/P 05/06/2024:  No metastases   -staging brain MRI 05/08/2024: No metastases  -hemoglobin dropped to 5.2 on 07/10/2024, requiring blood transfusion  -ANC dropped to 0.55 on 06/24/2024, requiring supportive care  -07/17/2024:  ANC 0.525; hemoglobin 7.0; iron studies consistent with anemia of chronic disease/inflammation/malignancy/chemotherapy  -07/22/2024: TSH, free T4 normal  -adjuvant atezolizumab 1200 mg every 3 weeks, started 08/09/2024; cycle 2 on 08/30/2024; cycle 3 on 09/20/2024  -09/20/2024: TSH, free T4 normal  -surveillance CT chest 09/18/2024: MALLORY  >>>  Plan:  -continue adjuvant atezolizumab 1200 mg IV every 3 weeks x1 year (17 cycles)   -monitor for immune related adverse events with atezolizumab  -check baseline TSH and free T4 every 6 weeks to monitor for the possibility of immune thyroiditis with atezolizumab  -for surveillance, CT chest with contrast in 6 months (March 2025)    Monitoring on atezolizumab:  Monitor LFTs (AST, ALT, and total bilirubin; at baseline and periodically during treatment;  kidney function (serum creatinine; at baseline and periodically during treatment);  thyroid function (at baseline, periodically during treatment and as clinically indicated);  monitor blood glucose (for hyperglycemia);  Monitor closely for signs/symptoms of immune-mediated adverse reactions, including  adrenal insufficiency,  diarrhea/colitis (consider initiating or repeating infectious workup in patients with corticosteroid-refractory immune-mediated  colitis to exclude alternative causes),  dermatologic toxicity,  diabetes mellitus,  hypophysitis,  ocular disorders,  thyroid disorders,  pneumonitis and other immune-mediated adverse reactions.  Monitor for signs/symptoms of infusion-related reactions.     Surveillance:  -history and physical and chest CT +/- contrast every 6 months X 3 years (03/2024-03/2027), then history and physical and low-dose noncontrast chest CT annually  -smoking cessation advice and counseling   -FDG PET-CT or brain MRI is not routinely indicated      Severe anemia:   (Anemia of chronic disease/inflammation/malignancy)  -07/22/2024: Hemoglobin 7.7; MCV normal (anemia of chronic disease/inflammation/malignancy)  -07/22/2024: Started on folic acid 1 mg daily  -08/08/2024: Hemoglobin dropped to 6.8, requiring PRBC x3 units  -hemoglobin: 9.3 on 09/20/2024; 9.3 on 08/30/2024; 9.7 on 08/19/2024, etc.      Cologuard positive:  -08/17/2023: Cologuard positive  -needs evaluation with colonoscopy rule out colon cancer       Follow-up:  Follow up in about 3 weeks (around 11/21/2024) for NP + lab + Atezolizumab .    Answers submitted by the patient for this visit:  Review of Systems Questionnaire (Submitted on 10/7/2024)  appetite change : No  unexpected weight change: No  mouth sores: No  visual disturbance: No  cough: No  shortness of breath: No  chest pain: No  abdominal pain: No  diarrhea: No  frequency: No  back pain: No  rash: No  headaches: No  adenopathy: No  nervous/ anxious: No

## 2024-11-20 ENCOUNTER — TELEPHONE (OUTPATIENT)
Dept: HEMATOLOGY/ONCOLOGY | Facility: CLINIC | Age: 64
End: 2024-11-20
Payer: MEDICAID

## 2024-11-21 ENCOUNTER — INFUSION (OUTPATIENT)
Dept: INFUSION THERAPY | Facility: HOSPITAL | Age: 64
End: 2024-11-21
Attending: INTERNAL MEDICINE
Payer: MEDICAID

## 2024-11-21 ENCOUNTER — OFFICE VISIT (OUTPATIENT)
Dept: HEMATOLOGY/ONCOLOGY | Facility: CLINIC | Age: 64
End: 2024-11-21
Payer: MEDICAID

## 2024-11-21 VITALS
SYSTOLIC BLOOD PRESSURE: 132 MMHG | DIASTOLIC BLOOD PRESSURE: 69 MMHG | RESPIRATION RATE: 20 BRPM | BODY MASS INDEX: 32.58 KG/M2 | OXYGEN SATURATION: 99 % | HEIGHT: 59 IN | HEART RATE: 76 BPM | TEMPERATURE: 98 F | WEIGHT: 161.63 LBS

## 2024-11-21 VITALS
HEIGHT: 59 IN | SYSTOLIC BLOOD PRESSURE: 111 MMHG | BODY MASS INDEX: 32.62 KG/M2 | TEMPERATURE: 98 F | WEIGHT: 161.81 LBS | OXYGEN SATURATION: 100 % | DIASTOLIC BLOOD PRESSURE: 70 MMHG | HEART RATE: 73 BPM

## 2024-11-21 DIAGNOSIS — C34.11 PRIMARY ADENOCARCINOMA OF UPPER LOBE OF RIGHT LUNG: Primary | ICD-10-CM

## 2024-11-21 PROCEDURE — 4010F ACE/ARB THERAPY RXD/TAKEN: CPT | Mod: CPTII,,, | Performed by: NURSE PRACTITIONER

## 2024-11-21 PROCEDURE — A4216 STERILE WATER/SALINE, 10 ML: HCPCS | Performed by: INTERNAL MEDICINE

## 2024-11-21 PROCEDURE — 63600175 PHARM REV CODE 636 W HCPCS: Performed by: INTERNAL MEDICINE

## 2024-11-21 PROCEDURE — 1159F MED LIST DOCD IN RCRD: CPT | Mod: CPTII,,, | Performed by: NURSE PRACTITIONER

## 2024-11-21 PROCEDURE — 3078F DIAST BP <80 MM HG: CPT | Mod: CPTII,,, | Performed by: NURSE PRACTITIONER

## 2024-11-21 PROCEDURE — 99215 OFFICE O/P EST HI 40 MIN: CPT | Mod: S$PBB,,, | Performed by: NURSE PRACTITIONER

## 2024-11-21 PROCEDURE — 3074F SYST BP LT 130 MM HG: CPT | Mod: CPTII,,, | Performed by: NURSE PRACTITIONER

## 2024-11-21 PROCEDURE — 1160F RVW MEDS BY RX/DR IN RCRD: CPT | Mod: CPTII,,, | Performed by: NURSE PRACTITIONER

## 2024-11-21 PROCEDURE — 25000003 PHARM REV CODE 250: Performed by: INTERNAL MEDICINE

## 2024-11-21 PROCEDURE — 3008F BODY MASS INDEX DOCD: CPT | Mod: CPTII,,, | Performed by: NURSE PRACTITIONER

## 2024-11-21 PROCEDURE — 96413 CHEMO IV INFUSION 1 HR: CPT

## 2024-11-21 PROCEDURE — 96523 IRRIG DRUG DELIVERY DEVICE: CPT

## 2024-11-21 PROCEDURE — 99215 OFFICE O/P EST HI 40 MIN: CPT | Mod: PBBFAC,25 | Performed by: NURSE PRACTITIONER

## 2024-11-21 RX ORDER — DIPHENHYDRAMINE HYDROCHLORIDE 50 MG/ML
50 INJECTION INTRAMUSCULAR; INTRAVENOUS ONCE AS NEEDED
Status: DISCONTINUED | OUTPATIENT
Start: 2024-11-21 | End: 2024-11-21 | Stop reason: HOSPADM

## 2024-11-21 RX ORDER — SODIUM CHLORIDE 0.9 % (FLUSH) 0.9 %
10 SYRINGE (ML) INJECTION
Status: DISCONTINUED | OUTPATIENT
Start: 2024-11-21 | End: 2024-11-21 | Stop reason: HOSPADM

## 2024-11-21 RX ORDER — EPINEPHRINE 1 MG/ML
0.3 INJECTION INTRAMUSCULAR; INTRAVENOUS; SUBCUTANEOUS ONCE AS NEEDED
Status: DISCONTINUED | OUTPATIENT
Start: 2024-11-21 | End: 2024-11-21 | Stop reason: HOSPADM

## 2024-11-21 RX ORDER — HEPARIN 100 UNIT/ML
500 SYRINGE INTRAVENOUS
Status: DISCONTINUED | OUTPATIENT
Start: 2024-11-21 | End: 2024-11-21 | Stop reason: HOSPADM

## 2024-11-21 RX ADMIN — SODIUM CHLORIDE: 9 INJECTION, SOLUTION INTRAVENOUS at 01:11

## 2024-11-21 RX ADMIN — HEPARIN 500 UNITS: 100 SYRINGE at 04:11

## 2024-11-21 RX ADMIN — ATEZOLIZUMAB 1200 MG: 1200 INJECTION, SOLUTION INTRAVENOUS at 02:11

## 2024-11-21 RX ADMIN — Medication 10 ML: at 01:11

## 2024-11-21 RX ADMIN — ALTEPLASE 2 MG: 2.2 INJECTION, POWDER, LYOPHILIZED, FOR SOLUTION INTRAVENOUS at 02:11

## 2024-11-21 NOTE — NURSING
1339  Pt did labs, saw BRITTANY Oseguera NP, and is here for C 6 tecentriq.  Pt accomp by her .  Pt using a rolator which she says she uses to carry her O2 tank with O2 2LNC.  Denies any pain or complaint.  1349  MP accessed without a blood return.  1415  Cath fernando instilled into MP.  1445  No blood return from MP.  1515  No blood return from MP 1548  No blood return from MP.  1621  After 2 hour of cath fernando, still no blood return.  MP flushes easily and was heparin locked upon deaccess.  May need a port study at next visit.

## 2024-11-25 DIAGNOSIS — E53.8 FOLATE DEFICIENCY: ICD-10-CM

## 2024-11-25 RX ORDER — FOLIC ACID 1 MG/1
1000 TABLET ORAL DAILY
Qty: 30 TABLET | Refills: 0 | Status: SHIPPED | OUTPATIENT
Start: 2024-11-25

## 2024-11-25 NOTE — PROGRESS NOTES
History:  Past Medical History:   Diagnosis Date    Dizziness     GERD     Gout     High cholesterol     Insomnia     Irregular heart rhythm     Low vitamin D level     Lung cancer     Lung nodule     Mixed hyperlipidemia     Neuropathy     Obesity     Seasonal allergies     Sleep apnea     cipap    Stroke 2012    Vertigo      Past Surgical History:   Procedure Laterality Date    APPENDECTOMY       SECTION      CHOLECYSTECTOMY      COLONOSCOPY      HERNIA REPAIR      INSERTION OF TUNNELED CENTRAL VENOUS CATHETER (CVC) WITH SUBCUTANEOUS PORT N/A 2024    Procedure: [id number];  Surgeon: Luz Elena Elder MD;  Location: [facility] OR;  Service: General;  Laterality: N/A;    LOBECTOMY Right 3/7/2024    Procedure: LOBECTOMY;  Surgeon: Naty Middleton MD;  Location: [facility] OR;  Service: Thoracic;  Laterality: Right;  upper and middle lobectomy    THORACOSCOPIC WEDGE RESECTION OF LUNG Right 3/7/2024    Procedure: VATS, WITH WEDGE RESECTION, LUNG;  Surgeon: Naty Middleton MD;  Location: [facility] OR;  Service: Thoracic;  Laterality: Right;  RIGHT VATS // POSS LOBECTOMY    THORACOTOMY Right 3/7/2024    Procedure: THORACOTOMY;  Surgeon: Naty Middleton MD;  Location: [facility] OR;  Service: Thoracic;  Laterality: Right;  converted to open at 1011      Social History     Socioeconomic History    Marital status:    Tobacco Use    Smoking status: Former     Average packs/day: 2.0 packs/day for 15.0 years (30.0 ttl pk-yrs)     Types: Cigarettes     Start date: 2023     Quit date:      Years since quittin.9    Smokeless tobacco: Never    Tobacco comments:     Smoked since age 12; quit 2023   Substance and Sexual Activity    Alcohol use: Never    Drug use: Never    Sexual activity: Yes     Partners: Male      Family History   Problem Relation Name Age of Onset    Cancer Mother Alesia     Heart disease Mother Alesia     Breast cancer Mother Alesia     Cancer Father Chowdary         Reason for  Follow-up:  -adenocarcinoma right upper lung lobe, S/P right VATS/right upper lobe wedge resection/completion right upper lobectomy and right middle lobectomy and regional lymph node dissection 03/07/2024, G3, pT3 pN0  -Cologuard positive  -anemia of chronic disease  -folic acid deficiency        Oncologic/Hematologic History:  Oncology History   Primary adenocarcinoma of upper lobe of right lung   3/7/2024 Cancer Staged    Staging form: Lung, AJCC 8th Edition  - Pathologic stage from 3/7/2024: Stage IIB (pT3, pN0, cM0)     4/15/2024 Initial Diagnosis    Primary adenocarcinoma of upper lobe of right lung     4/29/2024 - 7/2/2024 Chemotherapy    Treatment Summary   Plan Name: OP NSCLC PEMETREXED + CISPLATIN Q3W  Treatment Goal: Curative  Status: Inactive  Start Date: 4/29/2024  End Date: 7/2/2024  Provider: Emery Mahoney MD  Chemotherapy: CISplatin (Platinol) 132 mg in sodium chloride 0.9% 697 mL chemo infusion, 141 mg, Intravenous, Clinic/HOD 1 time, 4 of 4 cycles  Administration: 132 mg (4/29/2024), 128 mg (6/10/2024), 128 mg (7/1/2024), 132 mg (5/20/2024)  PEMEtrexed disodium (ALIMTA) 900 mg in sodium chloride 0.9% SolP 100 mL chemo infusion, 950 mg, Intravenous, Clinic/HOD 1 time, 4 of 4 cycles  Dose modification: 375 mg/m2 (75 % of original dose 500 mg/m2, Cycle 4, Reason: Dose Not Tolerated)  Administration: 900 mg (4/29/2024), 900 mg (6/10/2024), 625 mg (7/1/2024), 900 mg (5/20/2024)     8/9/2024 -  Chemotherapy    Treatment Summary   Plan Name: OP ATEZOLIZUMAB Q3W  Treatment Goal: Curative  Status: Active  Start Date: 8/9/2024  End Date: 7/11/2025 (Planned)  Provider: Emery Mahoney MD  Chemotherapy: [No matching medication found in this treatment plan]     Social history:  .  Lives in [place].  Has 3 children.  Does not work.  Has been smoking 1-2 pack of cigarettes daily for 51 years, since age 12; discontinued recently.  No alcohol or illicit drug abuse.      Family history:   Mother  experienced some kind of intrathoracic malignancy at age 83;  from MI at age 83   Father  from prostate cancer) experienced at age 83 and probably, sarcoma) experienced at age 83)    Health maintenance:   -PCP in [place]  -says that she had screening colonoscopy performed in [place] in , and that it was unremarkable  -says that now, for positive Cologuard test, she is scheduled for colonoscopy in May 2024  -2023:  Cologuard positive  -2023:  Bilateral digital screening mammogram with tomosynthesis (comparison:  2022 mammogram, etc.):  BI-RADS: 1 negative  =========================================    64-year-old female, referred from [facility], Dr. Arnulfo Middleton, with poorly-differentiated adenocarcinoma of right lung.      Investigations reviewed:  -2022: CT chest lung screening low-dose (comparison:  2020):  Lung rads 2: Benign appearance or behavior:  Continue annual screening with LDCT in 12 months  -2023:  CT chest lung screening low-dose without contrast (comparison:  2022):  Lung rads category 4A: Suspicious; enlarging nodule right upper lobe, lobulated, < 8 mm, i.e., 7 x 6.7 mm, previously 5 mm, previously not lobulated, too small for adequate detection on PET-CT; recommend three-month follow-up)  -2023: CT chest with contrast (comparison:  2023):  Lung rads 4A: Very suspicious; continued enlargement of right upper lobe lung nodule of concern (9.5 x 8.9 mm)  -2024: FDG PET-CT (comparison:  Chest CT 2023; CT abdomen pelvis 2019):  1. Markedly FDG-avid right upper lobe soft tissue nodule, increased in size in the interval, raising concern for malignancy (smoothly marginated noncalcified soft tissue nodule lateral subpleural right upper lobe, 1.2 x 1.1 cm, maximum SUV 14, previously 1 cm x 0.9 cm).  2. No definite PET-CT findings to suggest additional right hemithoracic or more distant  metastatic disease.  -02/01/2024: CT chest without contrast (comparison:  12/26/2023): Lateral right upper lobe nodule continues to enlarge in size currently measuring 11 mm concerning for malignancy. Recommend further evaluation.   -03/07/2024:  Right VATS, right upper lobe wedge resection; right upper lobectomy, right middle lobectomy, regional lymph node dissection:  1. Right lung, upper lobe, wedge resection:  Poorly-differentiated adenocarcinoma, 0.6 cm, clear margins of resection  2. Level 8R lymph node (paraesophageal), biopsy:  3 lymph nodes, negative for metastatic carcinoma    3. Level 4R lymph node (lower paratracheal), biopsy: 2 lymph nodes, negative for metastatic carcinoma  4. Level 7R lymph node (subcarinal), biopsy:  1 lymph node, negative for metastatic carcinoma  5. Level 11R lymph node (interlobar), biopsy:  2 lymph nodes, negative for metastatic carcinoma    6. Right lung, upper lobe, completion lobectomy:  -poorly-differentiated adenocarcinoma, 0.8 cm  -bronchial and vascular margins of resection negative   -1 hilar lymph node with no evidence of metastatic carcinoma  7. Level 12R lymph node (lobar), lymphadenectomy:  1 lymph node, negative for metastatic carcinoma    Synoptic report:  Total number of primary tumors: 2  S/P wedge resection  Completion lobectomy  Right lung  Separate tumor nodules (metastases) in same lobe, therefore, pT3  number of intrapulmonary metastases:  2   Tumor site:  Upper lobe of lung  Tumor size:  Total tumor size:  Greatest dimension:  0.8 cm  Invasive acinar adenocarcinoma; G3, poorly-differentiated; spread through airspaces (CHAMP), present; no visceral pleural invasion; no adjacent structures present; no known pre-surgical therapy; no LVI  All margins negative for invasive carcinoma; closest margin invasive carcinoma, bronchial vascular; distance from invasive carcinoma closest margin,> 2 cm; margins status for noninvasive tumor, all margins negative for  noninvasive tumor  Number of lymph nodes examined, 10; all regional lymph nodes negative for tumor  >>>  pT3 pN0    PD-L1 expression:  Positive: High (TPS 95%; )  Negative for EGFR, KRAS, BRAF, AL K, ROS1, RET, MET, HER2  No gene rearrangement no reportable altered splicing events identified from RNA sequencing  No reportable pathogenic variants found  MSI stable  TMB:  41.6m/MB  Fusions: Negative    04/16/2024:  Pleasant lady who presents for initial medical oncology consultation, accompanied by .  In no acute discomfort.  After lobectomy, has required supplemental oxygen via nasal cannula at 2 liters/minute.  This is being handled by home healthcare team.  Prior to surgery, she never required supplemental oxygen.  Prior to surgery, she never experienced significant cough, sputum production, or dyspnea.  Great appetite.  Mild exertional dyspnea.  ECOG 1.    No unusual headaches or focal neurological symptoms.  No hemoptysis, fevers, or chills.  No abdominal pain, nausea, vomiting, change in bowel habits, or GI bleeding.  No anorexia or unintentional weight loss.  Smoked 1-2 packs of cigarettes daily for 51 years since age 12; says that she discontinued smoking after lung surgery.    Interval History:    10/31/24  Ms. Geronimo presents for follow-up of her lung cancer and cycle 5 of Atezolizumab. She reports feeling well. She continues with the use of 2L NC at home. She will be seen by Pulmonary by 11/25/24. She had a positive cologaurd test and will be seeing GI in December. She has great appetite and energy. She denies headaches, dizziness, blurred vision, hemoptysis, CP or fevers.     10/10/2024:   -07/22/2024: Hemoglobin 7.7; MCV normal; serum iron normal, TIBC normal; ferritin 1216, elevated; transferrin saturation 34%, normal; folic acid level 6.1, low; B12 level 1995, elevated; retic count 1.9%; haptoglobin normal; , elevated  (Anemia of chronic  disease/inflammation/malignancy)  -07/22/2024: Started folic acid 1 mg daily  -hemoglobin dropped to 6.8 on 08/08/2024, requiring PRBC x2 units  -07/22/2024: TSH, free T4 normal  -09/20/2024: TSH, free T4 normal  -adjuvant atezolizumab 1200 mg every 3 weeks, started 08/09/2024; cycle 2 on 08/30/2024; cycle 3 on 09/20/2024  -09/18/2024:  Surveillance CT chest with contrast (comparison: CT C/A/P 0 05/06/2024):  No evidence of local recurrence or metastatic disease in chest  -10/10/2024: Hemoglobin 10.4, stable; MCV 98.2, rest of CBC essentially unremarkable; creatinine 1.14, stable; rest of CMP unremarkable  Presents for a follow-up visit, accompanied by a male family member.  In no acute discomfort.  Has been on oxygen ever since diagnosis of lung cancer.  Says that she does not smoke anymore.  She smoked 1-2 pack of cigarettes daily for 51 years, since age 12.  Great appetite.  Denies unusual headaches, focal neurological symptoms, significant chest pain/cough/hemoptysis/mucous production/dyspnea, abdominal pain, nausea, vomiting, etc..  No new lumps or lymphadenopathy.  No anorexia or unintentional weight loss.  No recurrent bouts of pneumonia or bronchitis.  No immune related adverse events with ongoing consolidation therapy with Tecentriq in the form of immune pneumonitis, myocarditis, hepatitis, nephritis, endocrinopathies, dermatitis, colitis, vision impairment, allergic reactions, etc..  ECOG 1.  Ambulates with the help of walker.      Medications:  Current Outpatient Medications on File Prior to Visit   Medication Sig Dispense Refill    allopurinoL (ZYLOPRIM) 100 MG tablet Take 100 mg by mouth once daily.      aspirin (ECOTRIN) 81 MG EC tablet Take 81 mg by mouth once daily.      carvediloL (COREG) 12.5 MG tablet Take 12.5 mg by mouth 2 (two) times daily.      colchicine (COLCRYS) 0.6 mg tablet Take 0.6 mg by mouth once daily.      ENTRESTO 24-26 mg per tablet Take 1 tablet by mouth 2 (two) times daily.       ergocalciferol (ERGOCALCIFEROL) 50,000 unit Cap Take 50,000 Units by mouth every 7 days.      folic acid (FOLVITE) 1 MG tablet Take 1 tablet by mouth once daily 30 tablet 0    furosemide (LASIX) 40 MG tablet Take 40 mg by mouth once daily.      gabapentin (NEURONTIN) 800 MG tablet Take 800 mg by mouth 2 (two) times daily.      ibuprofen (ADVIL,MOTRIN) 800 MG tablet 1 tablet with food or milk as needed Orally Three times a day      linaCLOtide (LINZESS) 145 mcg Cap capsule Take 290 mcg by mouth as needed.      meclizine (ANTIVERT) 25 mg tablet Take 25 mg by mouth Daily.      mirtazapine (REMERON) 30 MG tablet Take 30 mg by mouth every evening.      montelukast (SINGULAIR) 10 mg tablet Take 10 mg by mouth once daily.      nortriptyline (PAMELOR) 25 MG capsule Take 25 mg by mouth Daily.      pantoprazole (PROTONIX) 40 MG tablet Take 40 mg by mouth once daily.      potassium chloride (K-TAB) 20 mEq Take 1 tablet (20 mEq total) by mouth 2 (two) times daily. 28 tablet 0    ALPRAZolam (XANAX) 0.5 MG tablet Take 1 tablet (0.5 mg total) by mouth once as needed for Anxiety. Take at 8:30 am on day of scans (Patient not taking: Reported on 11/21/2024) 1 tablet 0    atorvastatin (LIPITOR) 40 MG tablet Take 40 mg by mouth once daily.      dexAMETHasone (DECADRON) 4 MG Tab Take Dexamethasone 8mg daily the day before treatment, treatment day, and 3 days after. (Patient not taking: Reported on 11/21/2024) 120 tablet 3    diphenhydrAMINE-aluminum-magnesium hydroxide-simethicone-LIDOcaine viscous HCl 2% Swish and spit 15 mLs every 4 (four) hours as needed (oral mucositis). (Patient not taking: Reported on 11/21/2024) 300 each 2    megestroL (MEGACE) 40 MG Tab Take 1 tablet (40 mg total) by mouth 4 (four) times daily. (Patient not taking: Reported on 11/21/2024) 120 tablet 2    ondansetron (ZOFRAN) 4 MG tablet Take 1 tablet (4 mg total) by mouth daily as needed for Nausea. (Patient not taking: Reported on 11/21/2024) 30 tablet 1     polyethylene glycol (MOVIPREP) 100-7.5-2.691 gram solution Take as directed prior to colonoscopy (Patient not taking: Reported on 11/21/2024) 1 kit 0    prochlorperazine (COMPAZINE) 10 MG tablet Take 1 tablet (10 mg total) by mouth 4 (four) times daily. (Patient not taking: Reported on 11/21/2024) 30 tablet 1     Current Facility-Administered Medications on File Prior to Visit   Medication Dose Route Frequency Provider Last Rate Last Admin    0.9%  NaCl infusion (for blood administration)   Intravenous Once Emery Mahoney MD        0.9% NaCl 100 mL flush bag   Intravenous PRN Emery Mahoney MD        alteplase injection 2 mg  2 mg Intra-Catheter PRN Emery Mahoney MD   2 mg at 10/11/24 1037    diphenhydrAMINE injection 50 mg  50 mg Intravenous Once PRN Emery Mahoney MD        EPINEPHrine injection 0.3 mg  0.3 mg Intramuscular Once PRN Emery Mahoney MD        heparin, porcine (PF) 100 unit/mL injection flush 500 Units  500 Units Intravenous PRN Emery Mahoney MD        hydrocortisone sodium succinate injection 100 mg  100 mg Intravenous Once PRN Emery Mahoney MD        lactated ringers infusion   Intravenous Continuous Aline Muniz MD 10 mL/hr at 04/26/24 0608 New Bag at 04/26/24 0608    sodium chloride 0.9% flush 10 mL  10 mL Intravenous PRN Emery Mahoney MD         Review of Systems:   All systems reviewed and found to be negative except for the symptoms detailed above    Physical Examination:   VITAL SIGNS:   Vitals:    11/21/24 1306   BP: 111/70   Pulse: 73   Temp: 97.7 °F (36.5 °C)         GENERAL:  In no apparent distress.    HEAD:  No signs of head trauma.  EYES:  Pupils are equal.  Extraocular motions intact.    EARS:  Hearing grossly intact.  MOUTH:  Oropharynx is normal.   NECK:  No adenopathy, no JVD.     CHEST:  Chest with clear breath sounds bilaterally.  No wheezes, rales, rhonchi.    CARDIAC:  Regular rate and rhythm.  S1 and S2, without murmurs, gallops,  rubs.  VASCULAR:  No Edema.  Peripheral pulses normal and equal in all extremities.  ABDOMEN:  Soft, without detectable tenderness.  No sign of distention.  No   rebound or guarding, and no masses palpated.   Bowel Sounds normal.  MUSCULOSKELETAL:  Good range of motion of all major joints. Extremities without clubbing, cyanosis or edema.    NEUROLOGIC EXAM:  Alert and oriented x 3.  No focal sensory or strength deficits.   Speech normal.  Follows commands.  PSYCHIATRIC:  Mood normal.  -04/16/2024:  In no acute discomfort.  On supplemental oxygen via nasal cannula at 2 liters/minute.  Has required supplemental oxygen after lobectomy.  Prior to lobectomy, never required supplemental oxygen.  Oxygen is being handled by home healthcare team.  No cervical lymphadenopathy.    Results for orders placed or performed during the hospital encounter of 03/07/24   CBC Auto Differential    Narrative    The following orders were created for panel order CBC Auto Differential.  Procedure                               Abnormality         Status                     ---------                               -----------         ------                     CBC with Differential[[id number]]       Abnormal            Final result                 Please view results for these tests on the individual orders.   CBC with Differential   Result Value Ref Range    WBC 10.24 4.50 - 11.50 x10(3)/mcL    RBC 3.71 (L) 4.20 - 5.40 x10(6)/mcL    Hgb 11.1 (L) 12.0 - 16.0 g/dL    Hct 35.3 (L) 37.0 - 47.0 %    MCV 95.1 (H) 80.0 - 94.0 fL    MCH 29.9 27.0 - 31.0 pg    MCHC 31.4 (L) 33.0 - 36.0 g/dL    RDW 13.6 11.5 - 17.0 %    Platelet 349 130 - 400 x10(3)/mcL    MPV 8.7 7.4 - 10.4 fL    Neut % 73.8 %    Lymph % 17.8 %    Mono % 6.1 %    Eos % 1.1 %    Basophil % 0.4 %    Lymph # 1.82 0.6 - 4.6 x10(3)/mcL    Neut # 7.57 2.1 - 9.2 x10(3)/mcL    Mono # 0.62 0.1 - 1.3 x10(3)/mcL    Eos # 0.11 0 - 0.9 x10(3)/mcL    Baso # 0.04 <=0.2 x10(3)/mcL    IG# 0.08 (H) 0 -  0.04 x10(3)/mcL    IG% 0.8 %    NRBC% 0.0 %     Results for orders placed or performed in visit on 03/04/24   Comprehensive metabolic panel   Result Value Ref Range    Sodium Level 141 136 - 145 mmol/L    Potassium Level 3.1 (L) 3.5 - 5.1 mmol/L    Chloride 98 98 - 107 mmol/L    Carbon Dioxide 31 23 - 31 mmol/L    Glucose Level 159 (H) 82 - 115 mg/dL    Blood Urea Nitrogen 11.6 9.8 - 20.1 mg/dL    Creatinine 0.85 0.55 - 1.02 mg/dL    Calcium Level Total 9.2 8.4 - 10.2 mg/dL    Protein Total 6.8 5.8 - 7.6 gm/dL    Albumin Level 3.4 3.4 - 4.8 g/dL    Globulin 3.4 2.4 - 3.5 gm/dL    Albumin/Globulin Ratio 1.0 (L) 1.1 - 2.0 ratio    Bilirubin Total 0.5 <=1.5 mg/dL    Alkaline Phosphatase 135 40 - 150 unit/L    Alanine Aminotransferase 9 0 - 55 unit/L    Aspartate Aminotransferase 10 5 - 34 unit/L    eGFR >60 mls/min/1.73/m2       Assessment:  Problem List Items Addressed This Visit    None      Orders for 10/31/2024:   Continue Tecentriq every 3 weeks for a total of 17 cycles. Proceed with cycle 5 today.  Check TSH and free T4 every 6 weeks  Surveillance CT chest with contrast in March 2025  She has been referred to GI for colonoscopy for evaluation of positive Cologuard test, She states she has an appointment in December 2025.   Follow-up with NP in 3 weeks     Above discussed at length with the patient.  All questions answered.  Discussed labs and scans and gave her copies of relevant results.  She understands and agrees with this plan.  =================================    Adenocarcinoma right upper lung lobe:   -LDCT chest without contrast 08/12/2022:  Lung rads 2  -LDCT chest without contrast 09/11/2023:  Lung rads 4A  -noncontrast chest CT 12/26/2023:  Lung rads 4A  -FDG PET-CT 01/29/2024:  Hypermetabolic right upper lobe nodule, 1.2 x 1.1 cm, maximum SUV 14, previously 1 cm x 0.9 cm; no distant metastases   -noncontrast chest CT 02/01/2024:  Right upper lobe nodule continues to enlarge, now 11 mm   -03/07/2024:   Right VATS, right upper lobe wedge resection, right upper lobectomy, right middle lobectomy, regional lymph node dissection:  -separate tumor nodules (metastases) in same lobe, right upper lung lobe, therefore, pT3 (0.8 cm and 0.6 cm, respectively); invasive acinar adenocarcinoma; G3, poorly-differentiated; no visceral pleural invasion; no LVI; margins negative; 10 regional lymph nodes negative  -pT3 pN0 M0, stage IIB  -PD-L1 expression high positive (TPS 95%; )  -negative for EGFR, KRAS, BRAF, AL K, ROS1, RET, met, HER2  -MediPort placed 04/25/2024  -S/P adjuvant cisplatin/Alimta every 3 weeks x4 cycles (04/29/2024-07/01/2024)  -restaging CTs C/A/P 05/06/2024:  No metastases   -staging brain MRI 05/08/2024: No metastases  -hemoglobin dropped to 5.2 on 07/10/2024, requiring blood transfusion  -ANC dropped to 0.55 on 06/24/2024, requiring supportive care  -07/17/2024:  ANC 0.525; hemoglobin 7.0; iron studies consistent with anemia of chronic disease/inflammation/malignancy/chemotherapy  -07/22/2024: TSH, free T4 normal  -adjuvant atezolizumab 1200 mg every 3 weeks, started 08/09/2024; cycle 2 on 08/30/2024; cycle 3 on 09/20/2024  -09/20/2024: TSH, free T4 normal  -surveillance CT chest 09/18/2024: MALLORY  >>>  Plan:  -continue adjuvant atezolizumab 1200 mg IV every 3 weeks x1 year (17 cycles)   -monitor for immune related adverse events with atezolizumab  -check baseline TSH and free T4 every 6 weeks to monitor for the possibility of immune thyroiditis with atezolizumab  -for surveillance, CT chest with contrast in 6 months (March 2025)    Monitoring on atezolizumab:  Monitor LFTs (AST, ALT, and total bilirubin; at baseline and periodically during treatment;  kidney function (serum creatinine; at baseline and periodically during treatment);  thyroid function (at baseline, periodically during treatment and as clinically indicated);  monitor blood glucose (for hyperglycemia);  Monitor closely for signs/symptoms of  immune-mediated adverse reactions, including  adrenal insufficiency,  diarrhea/colitis (consider initiating or repeating infectious workup in patients with corticosteroid-refractory immune-mediated colitis to exclude alternative causes),  dermatologic toxicity,  diabetes mellitus,  hypophysitis,  ocular disorders,  thyroid disorders,  pneumonitis and other immune-mediated adverse reactions.  Monitor for signs/symptoms of infusion-related reactions.     Surveillance:  -history and physical and chest CT +/- contrast every 6 months X 3 years (03/2024-03/2027), then history and physical and low-dose noncontrast chest CT annually  -smoking cessation advice and counseling   -FDG PET-CT or brain MRI is not routinely indicated      Severe anemia:   (Anemia of chronic disease/inflammation/malignancy)  -07/22/2024: Hemoglobin 7.7; MCV normal (anemia of chronic disease/inflammation/malignancy)  -07/22/2024: Started on folic acid 1 mg daily  -08/08/2024: Hemoglobin dropped to 6.8, requiring PRBC x3 units  -hemoglobin: 9.3 on 09/20/2024; 9.3 on 08/30/2024; 9.7 on 08/19/2024, etc.      Cologuard positive:  -08/17/2023: Cologuard positive  -needs evaluation with colonoscopy rule out colon cancer       Follow-up:  Follow up in about 3 weeks (around 12/12/2024) for NP 3 weeks and 6 weeks labs, Atezolizumab every 3 weeks.    Answers submitted by the patient for this visit:  Review of Systems Questionnaire (Submitted on 10/7/2024)  appetite change : No  unexpected weight change: No  mouth sores: No  visual disturbance: No  cough: No  shortness of breath: No  chest pain: No  abdominal pain: No  diarrhea: No  frequency: No  back pain: No  rash: No  headaches: No  adenopathy: No  nervous/ anxious: No

## 2024-12-11 ENCOUNTER — ANESTHESIA EVENT (OUTPATIENT)
Dept: ENDOSCOPY | Facility: HOSPITAL | Age: 64
End: 2024-12-11
Payer: MEDICAID

## 2024-12-12 ENCOUNTER — HOSPITAL ENCOUNTER (OUTPATIENT)
Facility: HOSPITAL | Age: 64
Discharge: HOME | End: 2024-12-12
Attending: INTERNAL MEDICINE | Admitting: INTERNAL MEDICINE
Payer: MEDICAID

## 2024-12-12 ENCOUNTER — ANESTHESIA (OUTPATIENT)
Dept: ENDOSCOPY | Facility: HOSPITAL | Age: 64
End: 2024-12-12
Payer: MEDICAID

## 2024-12-12 DIAGNOSIS — C34.11 PRIMARY ADENOCARCINOMA OF UPPER LOBE OF RIGHT LUNG: Primary | ICD-10-CM

## 2024-12-12 DIAGNOSIS — D12.8 ADENOMATOUS RECTAL POLYP: ICD-10-CM

## 2024-12-12 DIAGNOSIS — R19.5 POSITIVE COLORECTAL CANCER SCREENING USING COLOGUARD TEST: ICD-10-CM

## 2024-12-12 DIAGNOSIS — D12.5 ADENOMATOUS POLYP OF SIGMOID COLON: ICD-10-CM

## 2024-12-12 DIAGNOSIS — K57.30 DIVERTICULOSIS LARGE INTESTINE W/O PERFORATION OR ABSCESS W/O BLEEDING: ICD-10-CM

## 2024-12-12 PROCEDURE — 37000008 HC ANESTHESIA 1ST 15 MINUTES: Performed by: INTERNAL MEDICINE

## 2024-12-12 PROCEDURE — 45378 DIAGNOSTIC COLONOSCOPY: CPT | Performed by: INTERNAL MEDICINE

## 2024-12-12 PROCEDURE — 37000009 HC ANESTHESIA EA ADD 15 MINS: Performed by: INTERNAL MEDICINE

## 2024-12-12 PROCEDURE — 45378 DIAGNOSTIC COLONOSCOPY: CPT | Mod: ,,, | Performed by: INTERNAL MEDICINE

## 2024-12-12 PROCEDURE — 63600175 PHARM REV CODE 636 W HCPCS: Performed by: NURSE ANESTHETIST, CERTIFIED REGISTERED

## 2024-12-12 RX ORDER — PROPOFOL 10 MG/ML
VIAL (ML) INTRAVENOUS
Status: DISCONTINUED | OUTPATIENT
Start: 2024-12-12 | End: 2024-12-12

## 2024-12-12 RX ORDER — SODIUM CHLORIDE, SODIUM LACTATE, POTASSIUM CHLORIDE, CALCIUM CHLORIDE 600; 310; 30; 20 MG/100ML; MG/100ML; MG/100ML; MG/100ML
INJECTION, SOLUTION INTRAVENOUS CONTINUOUS
Status: DISCONTINUED | OUTPATIENT
Start: 2024-12-12 | End: 2024-12-12 | Stop reason: HOSPADM

## 2024-12-12 RX ORDER — LIDOCAINE HYDROCHLORIDE 10 MG/ML
1 INJECTION, SOLUTION EPIDURAL; INFILTRATION; INTRACAUDAL; PERINEURAL ONCE
Status: DISCONTINUED | OUTPATIENT
Start: 2024-12-12 | End: 2024-12-12 | Stop reason: HOSPADM

## 2024-12-12 RX ORDER — LIDOCAINE HYDROCHLORIDE 20 MG/ML
INJECTION INTRAVENOUS
Status: DISCONTINUED | OUTPATIENT
Start: 2024-12-12 | End: 2024-12-12

## 2024-12-12 RX ADMIN — LIDOCAINE HYDROCHLORIDE 50 MG: 20 INJECTION INTRAVENOUS at 07:12

## 2024-12-12 RX ADMIN — PROPOFOL 120 MCG/KG/MIN: 10 INJECTION, EMULSION INTRAVENOUS at 07:12

## 2024-12-12 RX ADMIN — PROPOFOL 80 MG: 10 INJECTION, EMULSION INTRAVENOUS at 07:12

## 2024-12-12 NOTE — TRANSFER OF CARE
Anesthesia Transfer of Care Note    Patient: Nadja Reyard    Procedure(s) Performed: Procedure(s) (LRB):  COLONOSCOPY (N/A)    Patient location: GI    Anesthesia Type: general    Transport from OR: Transported from OR on room air with adequate spontaneous ventilation    Post pain: adequate analgesia    Post assessment: no apparent anesthetic complications    Post vital signs: stable    Level of consciousness: awake    Nausea/Vomiting: no nausea/vomiting    Complications: none    Transfer of care protocol was followed

## 2024-12-12 NOTE — ANESTHESIA POSTPROCEDURE EVALUATION
Anesthesia Post Evaluation    Patient: Nadja Reyard    Procedure(s) Performed: Procedure(s) (LRB):  COLONOSCOPY (N/A)    Final Anesthesia Type: general      Patient location during evaluation: GI PACU  Patient participation: Yes- Able to Participate  Level of consciousness: awake and alert  Post-procedure vital signs: reviewed and stable  Pain management: adequate  Airway patency: patent    PONV status at discharge: No PONV  Anesthetic complications: no      Cardiovascular status: stable  Respiratory status: spontaneous ventilation and unassisted  Hydration status: euvolemic  Follow-up not needed.

## 2024-12-12 NOTE — DISCHARGE SUMMARY
[facility] - Endoscopy  Discharge Note  Short Stay  .      Discharge plan the patient will resume a regular diet today and normal activities tomorrow.  A follow-up colonoscopy in 5 years is recommended.  Procedure(s) (LRB):  COLONOSCOPY (N/A)    The procedure of colonoscopy was explained to the patient and consent obtained.  The patient is transferred to the endoscopy suite, general IV anesthesia was provided by anesthesia Services.  Rectal exam was normal.  The Olympus videocolonoscope was introduced per rectum and advanced around the colon to the cecum.  The ileocecal valve and appendiceal orifice were identified and normal.  There were areas of thick yellowish mucoid material scattered throughout the colon that required vigorous lavage for adequate exam to be performed.  The ascending, transverse and descending colon were normal.  There were few scattered varying size diverticula in the sigmoid colon.  The rectum was normal including retroflexed view.  Withdrawal time cecum to rectum 16 minutes.  The procedure was well tolerated and the patient returned to the recovery area for observation.  A follow-up colonoscopy in 5 years is recommended.  OUTCOME: Patient tolerated treatment/procedure well without complication and is now ready for discharge.    DISPOSITION: Home or Self Care    FINAL DIAGNOSIS:  <principal problem not specified>    FOLLOWUP: With primary care provider    DISCHARGE INSTRUCTIONS:    Discharge Procedure Orders   Diet Adult Regular     Diet general     No dressing needed     Notify your health care provider if you experience any of the following:  temperature >100.4     Notify your health care provider if you experience any of the following:  persistent nausea and vomiting or diarrhea     Notify your health care provider if you experience any of the following:  severe uncontrolled pain     Notify your health care provider if you experience any of the following:  redness, tenderness, or  signs of infection (pain, swelling, redness, odor or green/yellow discharge around incision site)     Notify your health care provider if you experience any of the following:  difficulty breathing or increased cough     Notify your health care provider if you experience any of the following:  increased confusion or weakness     Call MD for:  temperature >100.4     Call MD for:  persistent nausea and vomiting     Call MD for:  severe uncontrolled pain     Call MD for:  difficulty breathing, headache or visual disturbances     Activity as tolerated        TIME SPENT ON DISCHARGE: 5 minutes

## 2024-12-12 NOTE — H&P
Colonoscopy History and Physical    Patient Name: Nadja Geronimo  MRN: [id number]  : 1960  Date of Procedure:  2024  Referring Physician: Terry Ugarte MD  Primary Physician: Tristen Montoya FNP  Procedure Physician: Herlinda Holguin MD, MPH     Procedure - Colonoscopy  ASA - per anesthesia  Mallampati - per anesthesia  History of Anesthesia problems - no  Family history Anesthesia problems -  no   Plan of anesthesia - General    Diagnosis: constipation  Chief Complaint: Same as above    HPI: Patient is an 64 y.o. female is here for colonoscopy after she was noted to have a positive Cologuard test.  She has a history of chronic constipation fairly well controlled with Linzess on a daily basis.  She has had no overt rectal bleeding.  There is no family history of colon polyps or colon cancer that she is aware of.  She apparently had a colonoscopy years ago that was unremarkable.    Last colonoscopy:  Unsure of time   Family history:  No  Anticoagulation:  No    ROS:  Constitutional: No fevers, chills, No weight loss  CV: No chest pain  Pulm: No cough, No shortness of breath  GI: see HPI    Medical History:   Past Medical History:   Diagnosis Date    Dizziness     GERD     Gout     High cholesterol     Insomnia     Irregular heart rhythm     Low vitamin D level     Lung cancer     Lung nodule     Mixed hyperlipidemia     Neuropathy     Obesity     Seasonal allergies     Sleep apnea     cipap    Stroke 2012    Vertigo          Surgical History:   Past Surgical History:   Procedure Laterality Date    APPENDECTOMY       SECTION      CHOLECYSTECTOMY      COLONOSCOPY      HERNIA REPAIR      INSERTION OF TUNNELED CENTRAL VENOUS CATHETER (CVC) WITH SUBCUTANEOUS PORT N/A 2024    Procedure: [id number]-CATH;  Surgeon: Luz Elena Elder MD;  Location: [facility];  Service: General;  Laterality: N/A;    LOBECTOMY Right 3/7/2024    Procedure: LOBECTOMY;  Surgeon: Naty Middleton MD;   Location: [facility] OR;  Service: Thoracic;  Laterality: Right;  upper and middle lobectomy    THORACOSCOPIC WEDGE RESECTION OF LUNG Right 3/7/2024    Procedure: VATS, WITH WEDGE RESECTION, LUNG;  Surgeon: Naty Middleton MD;  Location: [facility] OR;  Service: Thoracic;  Laterality: Right;  RIGHT VATS // POSS LOBECTOMY    THORACOTOMY Right 3/7/2024    Procedure: THORACOTOMY;  Surgeon: Naty Middleton MD;  Location: [facility] OR;  Service: Thoracic;  Laterality: Right;  converted to open at 1011       Family History:   Family History   Problem Relation Name Age of Onset    Cancer Mother Alesia     Heart disease Mother Alesia     Breast cancer Mother Alesia     Cancer Father Epi    .    Social History:   Social History     Socioeconomic History    Marital status:    Tobacco Use    Smoking status: Former     Average packs/day: 2.0 packs/day for 15.0 years (30.0 ttl pk-yrs)     Types: Cigarettes     Start date: 2023     Quit date: 1972     Years since quittin.9    Smokeless tobacco: Never    Tobacco comments:     Smoked since age 12; quit 2023   Substance and Sexual Activity    Alcohol use: Never    Drug use: Never    Sexual activity: Yes     Partners: Male       Review of patient's allergies indicates:   Allergen Reactions    Codeine Anxiety and Other (See Comments)       Medications:   Facility-Administered Medications Prior to Admission   Medication Dose Route Frequency Provider Last Rate Last Admin    0.9%  NaCl infusion (for blood administration)   Intravenous Once Emery Mahoney MD         Medications Prior to Admission   Medication Sig Dispense Refill Last Dose/Taking    allopurinoL (ZYLOPRIM) 100 MG tablet Take 100 mg by mouth once daily.   2024    aspirin (ECOTRIN) 81 MG EC tablet Take 81 mg by mouth once daily.   2024    atorvastatin (LIPITOR) 40 MG tablet Take 40 mg by mouth once daily.   2024    colchicine (COLCRYS) 0.6 mg tablet Take 0.6 mg by mouth once daily.   2024     ENTRESTO 24-26 mg per tablet Take 1 tablet by mouth 2 (two) times daily.   12/11/2024    ergocalciferol (ERGOCALCIFEROL) 50,000 unit Cap Take 50,000 Units by mouth every 7 days.   12/11/2024    folic acid (FOLVITE) 1 MG tablet Take 1 tablet (1,000 mcg total) by mouth once daily. 30 tablet 0 12/11/2024    furosemide (LASIX) 40 MG tablet Take 40 mg by mouth once daily.   12/11/2024    gabapentin (NEURONTIN) 800 MG tablet Take 800 mg by mouth 2 (two) times daily.   12/11/2024    ibuprofen (ADVIL,MOTRIN) 800 MG tablet 1 tablet with food or milk as needed Orally Three times a day   Past Month    linaCLOtide (LINZESS) 145 mcg Cap capsule Take 290 mcg by mouth as needed.   Past Week    meclizine (ANTIVERT) 25 mg tablet Take 25 mg by mouth Daily.   12/11/2024    mirtazapine (REMERON) 30 MG tablet Take 30 mg by mouth every evening.   12/11/2024    montelukast (SINGULAIR) 10 mg tablet Take 10 mg by mouth once daily.   12/11/2024    nortriptyline (PAMELOR) 25 MG capsule Take 25 mg by mouth Daily.   12/11/2024    pantoprazole (PROTONIX) 40 MG tablet Take 40 mg by mouth once daily.   12/11/2024    potassium chloride (K-TAB) 20 mEq Take 1 tablet (20 mEq total) by mouth 2 (two) times daily. 28 tablet 0 12/11/2024    ALPRAZolam (XANAX) 0.5 MG tablet Take 1 tablet (0.5 mg total) by mouth once as needed for Anxiety. Take at 8:30 am on day of scans (Patient not taking: Reported on 11/21/2024) 1 tablet 0     carvediloL (COREG) 12.5 MG tablet Take 12.5 mg by mouth 2 (two) times daily.       dexAMETHasone (DECADRON) 4 MG Tab Take Dexamethasone 8mg daily the day before treatment, treatment day, and 3 days after. (Patient not taking: Reported on 11/21/2024) 120 tablet 3     diphenhydrAMINE-aluminum-magnesium hydroxide-simethicone-LIDOcaine viscous HCl 2% Swish and spit 15 mLs every 4 (four) hours as needed (oral mucositis). (Patient not taking: Reported on 11/21/2024) 300 each 2     megestroL (MEGACE) 40 MG Tab Take 1 tablet (40 mg  total) by mouth 4 (four) times daily. (Patient not taking: Reported on 11/21/2024) 120 tablet 2     ondansetron (ZOFRAN) 4 MG tablet Take 1 tablet (4 mg total) by mouth daily as needed for Nausea. (Patient not taking: Reported on 11/21/2024) 30 tablet 1     polyethylene glycol (MOVIPREP) 100-7.5-2.691 gram solution Take as directed prior to colonoscopy (Patient not taking: Reported on 11/21/2024) 1 kit 0     prochlorperazine (COMPAZINE) 10 MG tablet Take 1 tablet (10 mg total) by mouth 4 (four) times daily. (Patient not taking: Reported on 11/21/2024) 30 tablet 1          Physical Exam:    Vital Signs:   Vitals:    12/12/24 0701   BP: 122/69   Pulse: 85   Resp: 20   Temp: 98.5 °F (36.9 °C)     /69 (BP Location: Left arm, Patient Position: Lying)   Pulse 85   Temp 98.5 °F (36.9 °C)   Resp 20   Ht 5' (1.524 m)   Wt 72.6 kg (160 lb)   SpO2 99%   Breastfeeding No   BMI 31.25 kg/m²     General:          Well appearing in no acute distress  Lungs: Clear to auscultation bilaterally, respirations unlabored  Heart: Regular rate and rhythm, S1 and S2 normal, no obvious murmurs  Abdomen:         Soft, non-tender, bowel sounds normal, no masses, no organomegaly        Labs:  Lab Results   Component Value Date    WBC 6.74 11/21/2024    HGB 11.8 (L) 11/21/2024    HCT 37.1 11/21/2024    MCV 99.7 (H) 11/21/2024     11/21/2024     Lab Results   Component Value Date    INR 0.9 11/11/2019    APTT 30.6 03/04/2024     Lab Results   Component Value Date     11/21/2024    K 4.0 11/21/2024    CO2 25 11/21/2024    BUN 18.9 11/21/2024    CREATININE 1.23 (H) 11/21/2024    LABPROT 7.5 11/21/2024    ALBUMIN 3.8 11/21/2024    BILITOT 0.4 11/21/2024    ALKPHOS 142 11/21/2024    ALT 12 11/21/2024    AST 17 11/21/2024         Assessment and Plan:    History reviewed, vital signs satisfactory, cardiopulmonary status satisfactory.  I have explained the sedation options, risks, benefits, and alternatives of this endoscopic  procedure to the patient including but not limited to bleeding, inflammation, infection, perforation, and death.  All questions were answered and the patient consented to proceed with procedure as planned.   The patient is deemed an appropriate candidate for the sedation as planned.      Terry Ugarte MD, MPH   of Clinical Medicine  Gastroenterology and Hepatology  [facility] - [facility]    12/12/2024  7:31 AM

## 2024-12-12 NOTE — ANESTHESIA PREPROCEDURE EVALUATION
12/11/2024  Nadja Geronimo is a 64 y.o., female with PMHx of obesity, HLD, HFrEF, smoking, COPD, MACY, GERD, h/o lung CA, CVA presents for colonoscopy secondary to +cologuard.    Carvedilol--last dose 12/12/24 @ 0630    Active Ambulatory Problems     Diagnosis Date Noted    Lung nodule 02/27/2024    Primary adenocarcinoma of upper lobe of right lung 04/15/2024    Positive colorectal cancer screening using Cologuard test 04/15/2024    Dehydration 05/03/2024    Nausea & vomiting 05/03/2024    Diarrhea 05/03/2024    Status post lobectomy of lung 05/09/2024    Hypokalemia 05/09/2024    Anorexia 05/09/2024    Mucositis due to chemotherapy 05/09/2024    Chemotherapy-induced neutropenia 06/03/2024    Symptomatic anemia 07/10/2024    Hypomagnesemia 07/22/2024    Creatinine elevation 07/22/2024    Chemotherapy management, encounter for 07/22/2024    Anemia of chronic disease 10/09/2024    Folic acid deficiency 10/09/2024     Resolved Ambulatory Problems     Diagnosis Date Noted    No Resolved Ambulatory Problems     Past Medical History:   Diagnosis Date    Dizziness     GERD     Gout     High cholesterol     Insomnia     Irregular heart rhythm     Low vitamin D level     Lung cancer     Mixed hyperlipidemia     Neuropathy     Obesity     Seasonal allergies     Sleep apnea     Stroke 2012    Vertigo        Pre-op Assessment    I have reviewed the NPO Status.      Review of Systems  Anesthesia Hx:  No problems with previous Anesthesia                Social:  Smoker       Hematology/Oncology:                        --  Cancer in past history:       Other (see Oncology comments)       surgery       Cardiovascular:            CHF    hyperlipidemia                               Pulmonary:   COPD     Sleep Apnea                Hepatic/GI:     GERD                Neurological:   CVA                                     Endocrine:        Obesity / BMI > 30    Vitals:    12/12/24 0701   BP: 122/69   BP Location: Left arm   Patient Position: Lying   Pulse: 85   Resp: 20   Temp: 36.9 °C (98.5 °F)   SpO2: 99%   Weight: 72.6 kg (160 lb)   Height: 5' (1.524 m)         Physical Exam  General: Alert, Cooperative and Well nourished    Airway:  Mallampati: II   Mouth Opening: Normal  TM Distance: Normal  Tongue: Normal  Neck ROM: Normal ROM    Dental:  Intact    Chest/Lungs:  Clear to auscultation, Normal Respiratory Rate    Heart:  Rate: Normal  Rhythm: Regular Rhythm  Sounds: Normal      Lab Results   Component Value Date    WBC 6.74 11/21/2024    HGB 11.8 (L) 11/21/2024    HCT 37.1 11/21/2024    MCV 99.7 (H) 11/21/2024     11/21/2024       CMP  Sodium   Date Value Ref Range Status   11/21/2024 140 136 - 145 mmol/L Final   09/11/2020 141 136 - 145 mmol/L Final     Potassium   Date Value Ref Range Status   11/21/2024 4.0 3.5 - 5.1 mmol/L Final   09/11/2020 3.9 3.5 - 5.1 mmol/L Final     Chloride   Date Value Ref Range Status   11/21/2024 102 98 - 107 mmol/L Final   09/11/2020 100 100 - 109 mmol/L Final     CO2   Date Value Ref Range Status   11/21/2024 25 23 - 31 mmol/L Final     Carbon Dioxide   Date Value Ref Range Status   09/11/2020 30 22 - 33 mmol/L Final     Blood Urea Nitrogen   Date Value Ref Range Status   11/21/2024 18.9 9.8 - 20.1 mg/dL Final   09/11/2020 11 5 - 25 mg/dL Final     Creatinine   Date Value Ref Range Status   11/21/2024 1.23 (H) 0.55 - 1.02 mg/dL Final   09/11/2020 0.73 0.57 - 1.25 mg/dL Final     Calcium   Date Value Ref Range Status   11/21/2024 9.9 8.4 - 10.2 mg/dL Final   09/11/2020 8.9 8.8 - 10.6 mg/dL Final     Albumin   Date Value Ref Range Status   11/21/2024 3.8 3.4 - 4.8 g/dL Final     Bilirubin Total   Date Value Ref Range Status   11/21/2024 0.4 <=1.5 mg/dL Final     ALP   Date Value Ref Range Status   11/21/2024 142 40 - 150 unit/L Final     AST   Date Value Ref Range Status   11/21/2024 17 5 -  34 unit/L Final     ALT   Date Value Ref Range Status   11/21/2024 12 0 - 55 unit/L Final     Anion Gap   Date Value Ref Range Status   09/11/2020 11 8 - 16 mmol/L Final     eGFR   Date Value Ref Range Status   11/21/2024 49 mL/min/1.73/m2 Final       ECHO 2/15/24    CARDIAC CATH 9/11/20    CARDIAC PROGRESS NOTE 2/26/24            Anesthesia Plan  Type of Anesthesia, risks & benefits discussed:    Anesthesia Type: Gen Natural Airway  Intra-op Monitoring Plan: Standard ASA Monitors  Post Op Pain Control Plan: IV/PO Opioids PRN  Induction:  IV  Informed Consent: Informed consent signed with the Patient and all parties understand the risks and agree with anesthesia plan.  All questions answered.   ASA Score: 3  Day of Surgery Review of History & Physical: H&P Update referred to the surgeon/provider.    Ready For Surgery From Anesthesia Perspective.     .

## 2024-12-13 VITALS
HEART RATE: 75 BPM | RESPIRATION RATE: 20 BRPM | SYSTOLIC BLOOD PRESSURE: 118 MMHG | DIASTOLIC BLOOD PRESSURE: 60 MMHG | BODY MASS INDEX: 31.41 KG/M2 | TEMPERATURE: 99 F | WEIGHT: 160 LBS | HEIGHT: 60 IN | OXYGEN SATURATION: 95 %

## 2024-12-16 ENCOUNTER — OFFICE VISIT (OUTPATIENT)
Dept: HEMATOLOGY/ONCOLOGY | Facility: CLINIC | Age: 64
End: 2024-12-16
Attending: INTERNAL MEDICINE
Payer: MEDICAID

## 2024-12-16 ENCOUNTER — INFUSION (OUTPATIENT)
Dept: INFUSION THERAPY | Facility: HOSPITAL | Age: 64
End: 2024-12-16
Attending: INTERNAL MEDICINE
Payer: MEDICAID

## 2024-12-16 VITALS
HEART RATE: 73 BPM | RESPIRATION RATE: 20 BRPM | OXYGEN SATURATION: 100 % | DIASTOLIC BLOOD PRESSURE: 57 MMHG | TEMPERATURE: 97 F | SYSTOLIC BLOOD PRESSURE: 102 MMHG

## 2024-12-16 VITALS
TEMPERATURE: 97 F | BODY MASS INDEX: 30.89 KG/M2 | WEIGHT: 163.63 LBS | DIASTOLIC BLOOD PRESSURE: 93 MMHG | HEIGHT: 61 IN | SYSTOLIC BLOOD PRESSURE: 131 MMHG | OXYGEN SATURATION: 96 % | HEART RATE: 77 BPM | RESPIRATION RATE: 16 BRPM

## 2024-12-16 DIAGNOSIS — C34.11 PRIMARY ADENOCARCINOMA OF UPPER LOBE OF RIGHT LUNG: ICD-10-CM

## 2024-12-16 DIAGNOSIS — E87.6 HYPOKALEMIA: ICD-10-CM

## 2024-12-16 DIAGNOSIS — C34.11 PRIMARY ADENOCARCINOMA OF UPPER LOBE OF RIGHT LUNG: Primary | ICD-10-CM

## 2024-12-16 DIAGNOSIS — E86.0 DEHYDRATION: ICD-10-CM

## 2024-12-16 DIAGNOSIS — D63.8 ANEMIA OF CHRONIC DISEASE: ICD-10-CM

## 2024-12-16 LAB
ALBUMIN SERPL-MCNC: 3.8 G/DL (ref 3.4–4.8)
ALBUMIN/GLOB SERPL: 1.2 RATIO (ref 1.1–2)
ALP SERPL-CCNC: 124 UNIT/L (ref 40–150)
ALT SERPL-CCNC: 14 UNIT/L (ref 0–55)
ANION GAP SERPL CALC-SCNC: 6 MEQ/L
AST SERPL-CCNC: 13 UNIT/L (ref 5–34)
BASOPHILS # BLD AUTO: 0.02 X10(3)/MCL
BASOPHILS NFR BLD AUTO: 0.3 %
BILIRUB SERPL-MCNC: 0.4 MG/DL
BUN SERPL-MCNC: 14.9 MG/DL (ref 9.8–20.1)
CALCIUM SERPL-MCNC: 9.6 MG/DL (ref 8.4–10.2)
CHLORIDE SERPL-SCNC: 103 MMOL/L (ref 98–107)
CO2 SERPL-SCNC: 30 MMOL/L (ref 23–31)
CREAT SERPL-MCNC: 0.99 MG/DL (ref 0.55–1.02)
CREAT/UREA NIT SERPL: 15
EOSINOPHIL # BLD AUTO: 0.1 X10(3)/MCL (ref 0–0.9)
EOSINOPHIL NFR BLD AUTO: 1.6 %
ERYTHROCYTE [DISTWIDTH] IN BLOOD BY AUTOMATED COUNT: 13.1 % (ref 11.5–17)
GFR SERPLBLD CREATININE-BSD FMLA CKD-EPI: >60 ML/MIN/1.73/M2
GLOBULIN SER-MCNC: 3.2 GM/DL (ref 2.4–3.5)
GLUCOSE SERPL-MCNC: 123 MG/DL (ref 82–115)
HCT VFR BLD AUTO: 33.6 % (ref 37–47)
HGB BLD-MCNC: 11 G/DL (ref 12–16)
IMM GRANULOCYTES # BLD AUTO: 0.02 X10(3)/MCL (ref 0–0.04)
IMM GRANULOCYTES NFR BLD AUTO: 0.3 %
LYMPHOCYTES # BLD AUTO: 1.74 X10(3)/MCL (ref 0.6–4.6)
LYMPHOCYTES NFR BLD AUTO: 28.1 %
MCH RBC QN AUTO: 30.9 PG (ref 27–31)
MCHC RBC AUTO-ENTMCNC: 32.7 G/DL (ref 33–36)
MCV RBC AUTO: 94.4 FL (ref 80–94)
MONOCYTES # BLD AUTO: 0.66 X10(3)/MCL (ref 0.1–1.3)
MONOCYTES NFR BLD AUTO: 10.6 %
NEUTROPHILS # BLD AUTO: 3.66 X10(3)/MCL (ref 2.1–9.2)
NEUTROPHILS NFR BLD AUTO: 59.1 %
NRBC BLD AUTO-RTO: 0 %
PLATELET # BLD AUTO: 201 X10(3)/MCL (ref 130–400)
PMV BLD AUTO: 9 FL (ref 7.4–10.4)
POTASSIUM SERPL-SCNC: 3.1 MMOL/L (ref 3.5–5.1)
PROT SERPL-MCNC: 7 GM/DL (ref 5.8–7.6)
RBC # BLD AUTO: 3.56 X10(6)/MCL (ref 4.2–5.4)
SODIUM SERPL-SCNC: 139 MMOL/L (ref 136–145)
WBC # BLD AUTO: 6.2 X10(3)/MCL (ref 4.5–11.5)

## 2024-12-16 PROCEDURE — 85025 COMPLETE CBC W/AUTO DIFF WBC: CPT

## 2024-12-16 PROCEDURE — 96413 CHEMO IV INFUSION 1 HR: CPT

## 2024-12-16 PROCEDURE — 80053 COMPREHEN METABOLIC PANEL: CPT

## 2024-12-16 PROCEDURE — 36415 COLL VENOUS BLD VENIPUNCTURE: CPT

## 2024-12-16 PROCEDURE — 25000003 PHARM REV CODE 250: Performed by: NURSE PRACTITIONER

## 2024-12-16 PROCEDURE — 99215 OFFICE O/P EST HI 40 MIN: CPT | Mod: PBBFAC | Performed by: NURSE PRACTITIONER

## 2024-12-16 PROCEDURE — 63600175 PHARM REV CODE 636 W HCPCS: Mod: JZ,JG | Performed by: NURSE PRACTITIONER

## 2024-12-16 RX ORDER — SODIUM CHLORIDE 0.9 % (FLUSH) 0.9 %
10 SYRINGE (ML) INJECTION
Status: CANCELLED | OUTPATIENT
Start: 2024-12-16

## 2024-12-16 RX ORDER — HEPARIN 100 UNIT/ML
500 SYRINGE INTRAVENOUS
Status: CANCELLED | OUTPATIENT
Start: 2024-12-16

## 2024-12-16 RX ORDER — DIPHENHYDRAMINE HYDROCHLORIDE 50 MG/ML
50 INJECTION INTRAMUSCULAR; INTRAVENOUS ONCE AS NEEDED
Status: CANCELLED | OUTPATIENT
Start: 2024-12-16

## 2024-12-16 RX ORDER — EPINEPHRINE 0.3 MG/.3ML
0.3 INJECTION SUBCUTANEOUS ONCE AS NEEDED
Status: CANCELLED | OUTPATIENT
Start: 2024-12-16

## 2024-12-16 RX ORDER — SODIUM CHLORIDE 0.9 % (FLUSH) 0.9 %
10 SYRINGE (ML) INJECTION
Status: DISCONTINUED | OUTPATIENT
Start: 2024-12-16 | End: 2024-12-16 | Stop reason: HOSPADM

## 2024-12-16 RX ORDER — EPINEPHRINE 1 MG/ML
0.3 INJECTION INTRAMUSCULAR; INTRAVENOUS; SUBCUTANEOUS ONCE AS NEEDED
Status: DISCONTINUED | OUTPATIENT
Start: 2024-12-16 | End: 2024-12-16 | Stop reason: HOSPADM

## 2024-12-16 RX ORDER — DIPHENHYDRAMINE HYDROCHLORIDE 50 MG/ML
50 INJECTION INTRAMUSCULAR; INTRAVENOUS ONCE AS NEEDED
Status: DISCONTINUED | OUTPATIENT
Start: 2024-12-16 | End: 2024-12-16 | Stop reason: HOSPADM

## 2024-12-16 RX ORDER — HEPARIN 100 UNIT/ML
500 SYRINGE INTRAVENOUS
Status: DISCONTINUED | OUTPATIENT
Start: 2024-12-16 | End: 2024-12-16 | Stop reason: HOSPADM

## 2024-12-16 RX ADMIN — ATEZOLIZUMAB 1200 MG: 1200 INJECTION, SOLUTION INTRAVENOUS at 11:12

## 2024-12-16 RX ADMIN — HEPARIN 500 UNITS: 100 SYRINGE at 11:12

## 2024-12-16 RX ADMIN — SODIUM CHLORIDE: 9 INJECTION, SOLUTION INTRAVENOUS at 11:12

## 2024-12-16 NOTE — NURSING
Patient here for tecentriq C7D1. Blood return noted with Mediport. Shaila Cohen RN, accessed mediport. Potassium 3.1. Sebastian Luu NP, spoke to patient about increasing KCL supplements. Tolerated well.

## 2024-12-16 NOTE — Clinical Note
-Follow-up: · Follow-up with NP January 6, 2025 -Labs: · CBC, CMP, LDH, TSH, free T4 prior to the next visit-January 6, 2025 -Imaging: ·  -Other orders: Cycle 8-atezolizumab treatment-January 3, 2025

## 2024-12-16 NOTE — PATIENT INSTRUCTIONS
-Follow-up:  Follow-up with NP January 3, 2025  -Labs:  CBC, CMP, LDH, TSH, free T4 prior to the next visit-January 3, 2025  -Imaging:    -Other orders:  Cycle 8-atezolizumab treatment-January 3, 2025  -increase potassium to 40 mEq a day for 7 days

## 2024-12-26 DIAGNOSIS — E53.8 FOLATE DEFICIENCY: ICD-10-CM

## 2024-12-26 RX ORDER — FOLIC ACID 1 MG/1
1000 TABLET ORAL
Qty: 30 TABLET | Refills: 0 | Status: SHIPPED | OUTPATIENT
Start: 2024-12-26

## 2025-01-03 ENCOUNTER — TELEPHONE (OUTPATIENT)
Dept: HEMATOLOGY/ONCOLOGY | Facility: CLINIC | Age: 65
End: 2025-01-03
Payer: MEDICAID

## 2025-01-03 DIAGNOSIS — E53.8 FOLATE DEFICIENCY: Primary | ICD-10-CM

## 2025-01-06 ENCOUNTER — APPOINTMENT (OUTPATIENT)
Dept: HEMATOLOGY/ONCOLOGY | Facility: CLINIC | Age: 65
End: 2025-01-06
Payer: MEDICAID

## 2025-01-06 ENCOUNTER — OFFICE VISIT (OUTPATIENT)
Dept: HEMATOLOGY/ONCOLOGY | Facility: CLINIC | Age: 65
End: 2025-01-06
Payer: MEDICAID

## 2025-01-06 ENCOUNTER — INFUSION (OUTPATIENT)
Dept: INFUSION THERAPY | Facility: HOSPITAL | Age: 65
End: 2025-01-06
Attending: INTERNAL MEDICINE
Payer: MEDICAID

## 2025-01-06 VITALS
DIASTOLIC BLOOD PRESSURE: 52 MMHG | BODY MASS INDEX: 31.53 KG/M2 | RESPIRATION RATE: 20 BRPM | RESPIRATION RATE: 18 BRPM | SYSTOLIC BLOOD PRESSURE: 138 MMHG | HEART RATE: 75 BPM | OXYGEN SATURATION: 100 % | WEIGHT: 167 LBS | OXYGEN SATURATION: 100 % | TEMPERATURE: 98 F | DIASTOLIC BLOOD PRESSURE: 66 MMHG | HEART RATE: 75 BPM | SYSTOLIC BLOOD PRESSURE: 112 MMHG | HEIGHT: 61 IN

## 2025-01-06 DIAGNOSIS — E87.6 HYPOKALEMIA: ICD-10-CM

## 2025-01-06 DIAGNOSIS — E53.8 FOLATE DEFICIENCY: ICD-10-CM

## 2025-01-06 DIAGNOSIS — C34.11 PRIMARY ADENOCARCINOMA OF UPPER LOBE OF RIGHT LUNG: Primary | ICD-10-CM

## 2025-01-06 DIAGNOSIS — C34.11 PRIMARY ADENOCARCINOMA OF UPPER LOBE OF RIGHT LUNG: ICD-10-CM

## 2025-01-06 DIAGNOSIS — Z51.11 CHEMOTHERAPY MANAGEMENT, ENCOUNTER FOR: ICD-10-CM

## 2025-01-06 DIAGNOSIS — D63.8 ANEMIA OF CHRONIC DISEASE: ICD-10-CM

## 2025-01-06 LAB
ALBUMIN SERPL-MCNC: 4 G/DL (ref 3.4–4.8)
ALBUMIN/GLOB SERPL: 1.1 RATIO (ref 1.1–2)
ALP SERPL-CCNC: 118 UNIT/L (ref 40–150)
ALT SERPL-CCNC: 14 UNIT/L (ref 0–55)
ANION GAP SERPL CALC-SCNC: 9 MEQ/L
AST SERPL-CCNC: 12 UNIT/L (ref 5–34)
BASOPHILS # BLD AUTO: 0.03 X10(3)/MCL
BASOPHILS NFR BLD AUTO: 0.5 %
BILIRUB SERPL-MCNC: 0.7 MG/DL
BUN SERPL-MCNC: 27.8 MG/DL (ref 9.8–20.1)
CALCIUM SERPL-MCNC: 9.9 MG/DL (ref 8.4–10.2)
CHLORIDE SERPL-SCNC: 102 MMOL/L (ref 98–107)
CO2 SERPL-SCNC: 31 MMOL/L (ref 23–31)
CREAT SERPL-MCNC: 1.2 MG/DL (ref 0.55–1.02)
CREAT/UREA NIT SERPL: 23
EOSINOPHIL # BLD AUTO: 0.07 X10(3)/MCL (ref 0–0.9)
EOSINOPHIL NFR BLD AUTO: 1.1 %
ERYTHROCYTE [DISTWIDTH] IN BLOOD BY AUTOMATED COUNT: 13.2 % (ref 11.5–17)
GFR SERPLBLD CREATININE-BSD FMLA CKD-EPI: 51 ML/MIN/1.73/M2
GLOBULIN SER-MCNC: 3.6 GM/DL (ref 2.4–3.5)
GLUCOSE SERPL-MCNC: 119 MG/DL (ref 82–115)
HCT VFR BLD AUTO: 37.1 % (ref 37–47)
HGB BLD-MCNC: 12 G/DL (ref 12–16)
IMM GRANULOCYTES # BLD AUTO: 0.03 X10(3)/MCL (ref 0–0.04)
IMM GRANULOCYTES NFR BLD AUTO: 0.5 %
LDH SERPL-CCNC: 182 U/L (ref 125–220)
LYMPHOCYTES # BLD AUTO: 1.59 X10(3)/MCL (ref 0.6–4.6)
LYMPHOCYTES NFR BLD AUTO: 24.3 %
MCH RBC QN AUTO: 30.8 PG (ref 27–31)
MCHC RBC AUTO-ENTMCNC: 32.3 G/DL (ref 33–36)
MCV RBC AUTO: 95.4 FL (ref 80–94)
MONOCYTES # BLD AUTO: 0.66 X10(3)/MCL (ref 0.1–1.3)
MONOCYTES NFR BLD AUTO: 10.1 %
NEUTROPHILS # BLD AUTO: 4.16 X10(3)/MCL (ref 2.1–9.2)
NEUTROPHILS NFR BLD AUTO: 63.5 %
NRBC BLD AUTO-RTO: 0 %
PLATELET # BLD AUTO: 229 X10(3)/MCL (ref 130–400)
PMV BLD AUTO: 9.2 FL (ref 7.4–10.4)
POTASSIUM SERPL-SCNC: 4.1 MMOL/L (ref 3.5–5.1)
PROT SERPL-MCNC: 7.6 GM/DL (ref 5.8–7.6)
RBC # BLD AUTO: 3.89 X10(6)/MCL (ref 4.2–5.4)
SODIUM SERPL-SCNC: 142 MMOL/L (ref 136–145)
T4 FREE SERPL-MCNC: 1.03 NG/DL (ref 0.7–1.48)
TSH SERPL-ACNC: 2.2 UIU/ML (ref 0.35–4.94)
WBC # BLD AUTO: 6.54 X10(3)/MCL (ref 4.5–11.5)

## 2025-01-06 PROCEDURE — 1159F MED LIST DOCD IN RCRD: CPT | Mod: CPTII,,, | Performed by: NURSE PRACTITIONER

## 2025-01-06 PROCEDURE — 36415 COLL VENOUS BLD VENIPUNCTURE: CPT

## 2025-01-06 PROCEDURE — 84443 ASSAY THYROID STIM HORMONE: CPT

## 2025-01-06 PROCEDURE — 63600175 PHARM REV CODE 636 W HCPCS: Performed by: NURSE PRACTITIONER

## 2025-01-06 PROCEDURE — 85025 COMPLETE CBC W/AUTO DIFF WBC: CPT

## 2025-01-06 PROCEDURE — 3078F DIAST BP <80 MM HG: CPT | Mod: CPTII,,, | Performed by: NURSE PRACTITIONER

## 2025-01-06 PROCEDURE — 3074F SYST BP LT 130 MM HG: CPT | Mod: CPTII,,, | Performed by: NURSE PRACTITIONER

## 2025-01-06 PROCEDURE — 84439 ASSAY OF FREE THYROXINE: CPT

## 2025-01-06 PROCEDURE — 83615 LACTATE (LD) (LDH) ENZYME: CPT

## 2025-01-06 PROCEDURE — 80053 COMPREHEN METABOLIC PANEL: CPT

## 2025-01-06 PROCEDURE — 1160F RVW MEDS BY RX/DR IN RCRD: CPT | Mod: CPTII,,, | Performed by: NURSE PRACTITIONER

## 2025-01-06 PROCEDURE — 99215 OFFICE O/P EST HI 40 MIN: CPT | Mod: S$PBB,,, | Performed by: NURSE PRACTITIONER

## 2025-01-06 PROCEDURE — 96413 CHEMO IV INFUSION 1 HR: CPT

## 2025-01-06 PROCEDURE — 3008F BODY MASS INDEX DOCD: CPT | Mod: CPTII,,, | Performed by: NURSE PRACTITIONER

## 2025-01-06 PROCEDURE — 25000003 PHARM REV CODE 250: Performed by: NURSE PRACTITIONER

## 2025-01-06 PROCEDURE — 99215 OFFICE O/P EST HI 40 MIN: CPT | Mod: PBBFAC | Performed by: NURSE PRACTITIONER

## 2025-01-06 RX ORDER — SODIUM CHLORIDE 0.9 % (FLUSH) 0.9 %
10 SYRINGE (ML) INJECTION
Status: DISCONTINUED | OUTPATIENT
Start: 2025-01-06 | End: 2025-01-06 | Stop reason: HOSPADM

## 2025-01-06 RX ORDER — HEPARIN 100 UNIT/ML
500 SYRINGE INTRAVENOUS
Status: CANCELLED | OUTPATIENT
Start: 2025-01-06

## 2025-01-06 RX ORDER — DIPHENHYDRAMINE HYDROCHLORIDE 50 MG/ML
50 INJECTION INTRAMUSCULAR; INTRAVENOUS ONCE AS NEEDED
Status: CANCELLED | OUTPATIENT
Start: 2025-01-06

## 2025-01-06 RX ORDER — DIPHENHYDRAMINE HYDROCHLORIDE 50 MG/ML
50 INJECTION INTRAMUSCULAR; INTRAVENOUS ONCE AS NEEDED
Status: DISCONTINUED | OUTPATIENT
Start: 2025-01-06 | End: 2025-01-06 | Stop reason: HOSPADM

## 2025-01-06 RX ORDER — EPINEPHRINE 0.3 MG/.3ML
0.3 INJECTION SUBCUTANEOUS ONCE AS NEEDED
Status: CANCELLED | OUTPATIENT
Start: 2025-01-06

## 2025-01-06 RX ORDER — HEPARIN 100 UNIT/ML
500 SYRINGE INTRAVENOUS
Status: DISCONTINUED | OUTPATIENT
Start: 2025-01-06 | End: 2025-01-06 | Stop reason: HOSPADM

## 2025-01-06 RX ORDER — SODIUM CHLORIDE 0.9 % (FLUSH) 0.9 %
10 SYRINGE (ML) INJECTION
Status: CANCELLED | OUTPATIENT
Start: 2025-01-06

## 2025-01-06 RX ORDER — EPINEPHRINE 1 MG/ML
0.3 INJECTION INTRAMUSCULAR; INTRAVENOUS; SUBCUTANEOUS ONCE AS NEEDED
Status: DISCONTINUED | OUTPATIENT
Start: 2025-01-06 | End: 2025-01-06 | Stop reason: HOSPADM

## 2025-01-06 RX ADMIN — HEPARIN 500 UNITS: 100 SYRINGE at 01:01

## 2025-01-06 RX ADMIN — SODIUM CHLORIDE: 9 INJECTION, SOLUTION INTRAVENOUS at 12:01

## 2025-01-06 RX ADMIN — ATEZOLIZUMAB 1200 MG: 1200 INJECTION, SOLUTION INTRAVENOUS at 12:01

## 2025-01-06 NOTE — Clinical Note
-Follow-up: · F/U with NP-01/27/2025 -Labs: · CBC, CMP, LDH, TSH, free T4-1/27/2025 -Imaging: · CT chest with contrast-03/03/2025 (already scheduled) -Other orders: · Treatment, atezolizumab-cycle 9-01/27/2025. · Pulmonologist appointment-11/18/2025

## 2025-01-06 NOTE — NURSING
Infusion Note:  Pt has an appointment today for: Labs, Provider, and Infusion    Treatment Regimen: Tecentriq   Cycle: 8 Day: 1   every Q3 weeks cycle;      Given via Right Mediport    Nursing note:  Treatment parameters: were met    Denies pain, N/V/C/D, fever, cough, rash, or chills. Reports baseline SOB on home O2 NC 2L. Denies previous infusion reactions with tecentriq. Mediport gave blood return initially, but then no bllod return after that (okay to proceed per BRITTANY ARMSTRONG)    Future appts scheduled: Labs, Provider, and Infusion in 3 weeks

## 2025-01-06 NOTE — PROGRESS NOTES
History:  Past Medical History:   Diagnosis Date    Dizziness     GERD     Gout     High cholesterol     Insomnia     Irregular heart rhythm     Low vitamin D level     Lung cancer     Lung nodule     Mixed hyperlipidemia     Neuropathy     Obesity     Seasonal allergies     Sleep apnea     cipap    Stroke 2012    Vertigo      Past Surgical History:   Procedure Laterality Date    APPENDECTOMY       SECTION      CHOLECYSTECTOMY      COLONOSCOPY      COLONOSCOPY N/A 2024    Procedure: COLONOSCOPY;  Surgeon: Terry Ugarte MD;  Location: [facility] ENDOSCOPY;  Service: Endoscopy;  Laterality: N/A;    HERNIA REPAIR      INSERTION OF TUNNELED CENTRAL VENOUS CATHETER (CVC) WITH SUBCUTANEOUS PORT N/A 2024    Procedure: MUTWIRORW-VGNN-O-CATH;  Surgeon: Luz Elena Elder MD;  Location: [facility] OR;  Service: General;  Laterality: N/A;    LOBECTOMY Right 3/7/2024    Procedure: LOBECTOMY;  Surgeon: Naty Middleton MD;  Location: [facility] OR;  Service: Thoracic;  Laterality: Right;  upper and middle lobectomy    THORACOSCOPIC WEDGE RESECTION OF LUNG Right 3/7/2024    Procedure: VATS, WITH WEDGE RESECTION, LUNG;  Surgeon: Naty Middleton MD;  Location: [facility] OR;  Service: Thoracic;  Laterality: Right;  RIGHT VATS // POSS LOBECTOMY    THORACOTOMY Right 3/7/2024    Procedure: THORACOTOMY;  Surgeon: Naty Middleton MD;  Location: [facility] OR;  Service: Thoracic;  Laterality: Right;  converted to open at 1011      Social History     Socioeconomic History    Marital status:    Tobacco Use    Smoking status: Former     Average packs/day: 2.0 packs/day for 15.0 years (30.0 ttl pk-yrs)     Types: Cigarettes     Start date: 2023     Quit date: 1972     Years since quittin.0    Smokeless tobacco: Never    Tobacco comments:     Smoked since age 12; quit 2023   Substance and Sexual Activity    Alcohol use: Never    Drug use: Never    Sexual activity: Yes     Partners: Male      Family History   Problem  Relation Name Age of Onset    Cancer Mother Alesia     Heart disease Mother Alesia     Breast cancer Mother Alesia     Cancer Father Epi         Reason for Follow-up:  -adenocarcinoma right upper lung lobe, S/P right VATS/right upper lobe wedge resection/completion right upper lobectomy and right middle lobectomy and regional lymph node dissection 03/07/2024, G3, pT3 pN0  -Cologuard positive  -anemia of chronic disease  -folic acid deficiency        Oncologic/Hematologic History:  Oncology History   Primary adenocarcinoma of upper lobe of right lung   3/7/2024 Cancer Staged    Staging form: Lung, AJCC 8th Edition  - Pathologic stage from 3/7/2024: Stage IIB (pT3, pN0, cM0)     4/15/2024 Initial Diagnosis    Primary adenocarcinoma of upper lobe of right lung     4/29/2024 - 7/2/2024 Chemotherapy    Treatment Summary   Plan Name: OP NSCLC PEMETREXED + CISPLATIN Q3W  Treatment Goal: Curative  Status: Inactive  Start Date: 4/29/2024  End Date: 7/2/2024  Provider: Emery Mahoney MD  Chemotherapy: CISplatin (Platinol) 132 mg in sodium chloride 0.9% 697 mL chemo infusion, 141 mg, Intravenous, Clinic/HOD 1 time, 4 of 4 cycles  Administration: 132 mg (4/29/2024), 128 mg (6/10/2024), 128 mg (7/1/2024), 132 mg (5/20/2024)  PEMEtrexed disodium (ALIMTA) 900 mg in sodium chloride 0.9% SolP 100 mL chemo infusion, 950 mg, Intravenous, Clinic/HOD 1 time, 4 of 4 cycles  Dose modification: 375 mg/m2 (75 % of original dose 500 mg/m2, Cycle 4, Reason: Dose Not Tolerated)  Administration: 900 mg (4/29/2024), 900 mg (6/10/2024), 625 mg (7/1/2024), 900 mg (5/20/2024)     8/9/2024 -  Chemotherapy    Treatment Summary   Plan Name: OP ATEZOLIZUMAB Q3W  Treatment Goal: Curative  Status: Active  Start Date: 8/9/2024  End Date: 7/14/2025 (Planned)  Provider: Emery Mahoney MD  Chemotherapy: [No matching medication found in this treatment plan]     Social history:  .  Lives in [place].  Has 3 children.  Does not work.  Has been  smoking 1-2 pack of cigarettes daily for 51 years, since age 12; discontinued recently.  No alcohol or illicit drug abuse.      Family history:   Mother experienced some kind of intrathoracic malignancy at age 83;  from MI at age 83   Father  from prostate cancer) experienced at age 83 and probably, sarcoma) experienced at age 83)    Health maintenance:   -PCP in [place]  -says that she had screening colonoscopy performed in [place] in , and that it was unremarkable  -says that now, for positive Cologuard test, she is scheduled for colonoscopy in May 2024  -2023:  Cologuard positive  -2023:  Bilateral digital screening mammogram with tomosynthesis (comparison:  2022 mammogram, etc.):  BI-RADS: 1 negative  =========================================    64-year-old female, referred from [facility], Dr. Arnulfo Middleton, with poorly-differentiated adenocarcinoma of right lung.      Investigations reviewed:  -2022: CT chest lung screening low-dose (comparison:  2020):  Lung rads 2: Benign appearance or behavior:  Continue annual screening with LDCT in 12 months  -2023:  CT chest lung screening low-dose without contrast (comparison:  2022):  Lung rads category 4A: Suspicious; enlarging nodule right upper lobe, lobulated, < 8 mm, i.e., 7 x 6.7 mm, previously 5 mm, previously not lobulated, too small for adequate detection on PET-CT; recommend three-month follow-up)  -2023: CT chest with contrast (comparison:  2023):  Lung rads 4A: Very suspicious; continued enlargement of right upper lobe lung nodule of concern (9.5 x 8.9 mm)  -2024: FDG PET-CT (comparison:  Chest CT 2023; CT abdomen pelvis 2019):  1. Markedly FDG-avid right upper lobe soft tissue nodule, increased in size in the interval, raising concern for malignancy (smoothly marginated noncalcified soft tissue nodule lateral subpleural right upper lobe,  1.2 x 1.1 cm, maximum SUV 14, previously 1 cm x 0.9 cm).  2. No definite PET-CT findings to suggest additional right hemithoracic or more distant metastatic disease.  -02/01/2024: CT chest without contrast (comparison:  12/26/2023): Lateral right upper lobe nodule continues to enlarge in size currently measuring 11 mm concerning for malignancy. Recommend further evaluation.   -03/07/2024:  Right VATS, right upper lobe wedge resection; right upper lobectomy, right middle lobectomy, regional lymph node dissection:  1. Right lung, upper lobe, wedge resection:  Poorly-differentiated adenocarcinoma, 0.6 cm, clear margins of resection  2. Level 8R lymph node (paraesophageal), biopsy:  3 lymph nodes, negative for metastatic carcinoma    3. Level 4R lymph node (lower paratracheal), biopsy: 2 lymph nodes, negative for metastatic carcinoma  4. Level 7R lymph node (subcarinal), biopsy:  1 lymph node, negative for metastatic carcinoma  5. Level 11R lymph node (interlobar), biopsy:  2 lymph nodes, negative for metastatic carcinoma    6. Right lung, upper lobe, completion lobectomy:  -poorly-differentiated adenocarcinoma, 0.8 cm  -bronchial and vascular margins of resection negative   -1 hilar lymph node with no evidence of metastatic carcinoma  7. Level 12R lymph node (lobar), lymphadenectomy:  1 lymph node, negative for metastatic carcinoma    Synoptic report:  Total number of primary tumors: 2  S/P wedge resection  Completion lobectomy  Right lung  Separate tumor nodules (metastases) in same lobe, therefore, pT3  number of intrapulmonary metastases:  2   Tumor site:  Upper lobe of lung  Tumor size:  Total tumor size:  Greatest dimension:  0.8 cm  Invasive acinar adenocarcinoma; G3, poorly-differentiated; spread through airspaces (CHAMP), present; no visceral pleural invasion; no adjacent structures present; no known pre-surgical therapy; no LVI  All margins negative for invasive carcinoma; closest margin invasive carcinoma,  bronchial vascular; distance from invasive carcinoma closest margin,> 2 cm; margins status for noninvasive tumor, all margins negative for noninvasive tumor  Number of lymph nodes examined, 10; all regional lymph nodes negative for tumor  >>>  pT3 pN0    PD-L1 expression:  Positive: High (TPS 95%; )  Negative for EGFR, KRAS, BRAF, AL K, ROS1, RET, MET, HER2  No gene rearrangement no reportable altered splicing events identified from RNA sequencing  No reportable pathogenic variants found  MSI stable  TMB:  41.6m/MB  Fusions: Negative    04/16/2024:  Pleasant lady who presents for initial medical oncology consultation, accompanied by .  In no acute discomfort.  After lobectomy, has required supplemental oxygen via nasal cannula at 2 liters/minute.  This is being handled by home healthcare team.  Prior to surgery, she never required supplemental oxygen.  Prior to surgery, she never experienced significant cough, sputum production, or dyspnea.  Great appetite.  Mild exertional dyspnea.  ECOG 1.    No unusual headaches or focal neurological symptoms.  No hemoptysis, fevers, or chills.  No abdominal pain, nausea, vomiting, change in bowel habits, or GI bleeding.  No anorexia or unintentional weight loss.  Smoked 1-2 packs of cigarettes daily for 51 years since age 12; says that she discontinued smoking after lung surgery.    Interval History:    11/21/24  Ms. Geronimo presents for follow-up of her lung cancer and cycle 6 of Atezolizumab.She reports feeling well overall. She was seen by pulmonary 2 weeks ago for follow-up. She continues on 2L NC which is stable. She has good appetite. She ambulates with a walker. She denies She denies headaches, dizziness, blurred vision, hemoptysis, CP, constipation, diarrhea or fevers.     10/31/24  Ms. Geronimo presents for follow-up of her lung cancer and cycle 5 of Atezolizumab. She reports feeling well. She continues with the use of 2L NC at home. She will be seen by  Pulmonary by 11/25/24. She had a positive cologaurd test and will be seeing GI in December. She has great appetite and energy. She denies headaches, dizziness, blurred vision, hemoptysis, CP or fevers.     10/10/2024:   -07/22/2024: Hemoglobin 7.7; MCV normal; serum iron normal, TIBC normal; ferritin 1216, elevated; transferrin saturation 34%, normal; folic acid level 6.1, low; B12 level 1995, elevated; retic count 1.9%; haptoglobin normal; , elevated  (Anemia of chronic disease/inflammation/malignancy)  -07/22/2024: Started folic acid 1 mg daily  -hemoglobin dropped to 6.8 on 08/08/2024, requiring PRBC x2 units  -07/22/2024: TSH, free T4 normal  -09/20/2024: TSH, free T4 normal  -adjuvant atezolizumab 1200 mg every 3 weeks, started 08/09/2024; cycle 2 on 08/30/2024; cycle 3 on 09/20/2024  -09/18/2024:  Surveillance CT chest with contrast (comparison: CT C/A/P 0 05/06/2024):  No evidence of local recurrence or metastatic disease in chest  -10/10/2024: Hemoglobin 10.4, stable; MCV 98.2, rest of CBC essentially unremarkable; creatinine 1.14, stable; rest of CMP unremarkable  Presents for a follow-up visit, accompanied by a male family member.  In no acute discomfort.  Has been on oxygen ever since diagnosis of lung cancer.  Says that she does not smoke anymore.  She smoked 1-2 pack of cigarettes daily for 51 years, since age 12.  Great appetite.  Denies unusual headaches, focal neurological symptoms, significant chest pain/cough/hemoptysis/mucous production/dyspnea, abdominal pain, nausea, vomiting, etc..  No new lumps or lymphadenopathy.  No anorexia or unintentional weight loss.  No recurrent bouts of pneumonia or bronchitis.  No immune related adverse events with ongoing consolidation therapy with Tecentriq in the form of immune pneumonitis, myocarditis, hepatitis, nephritis, endocrinopathies, dermatitis, colitis, vision impairment, allergic reactions, etc..  ECOG 1.  Ambulates with the help of  walker.    Interval History- 12/16/2024:   -S/P Tecentriq cycle 6 on November 21, 2024  -S/P colonoscopy December 12, 2024.  - On this visit the patient presents to the office in the company of her male .  She is doing well and denies any major significant complaints.  On  specific questioning the patient denies any chest pain hemoptysis, hoarseness, changes on baseline shortness of breath or any other issues at all.  She denies any side effects associated with the use of immunotherapy    Interval history  1/6/2025:  December 16, 2024-S/P atezolizumab cycle 7.  On today's visit the patient presents to the office for follow-up and management in the company of her .  She is doing well and denies any major significant complaints.  On specific questioning the patient denies any rash, cough, nausea, vomiting, diarrhea, abdominal pain.  She continues to wear oxygen 2 L 247.  Her shortness of breath have not changed and it is as it was at baseline.      Medications:  Current Outpatient Medications on File Prior to Visit   Medication Sig Dispense Refill    allopurinoL (ZYLOPRIM) 100 MG tablet Take 100 mg by mouth once daily.      aspirin (ECOTRIN) 81 MG EC tablet Take 81 mg by mouth once daily.      atorvastatin (LIPITOR) 40 MG tablet Take 40 mg by mouth once daily.      carvediloL (COREG) 12.5 MG tablet Take 12.5 mg by mouth 2 (two) times daily.      colchicine (COLCRYS) 0.6 mg tablet Take 0.6 mg by mouth once daily.      ENTRESTO 24-26 mg per tablet Take 1 tablet by mouth 2 (two) times daily.      ergocalciferol (ERGOCALCIFEROL) 50,000 unit Cap Take 50,000 Units by mouth every 7 days.      folic acid (FOLVITE) 1 MG tablet Take 1 tablet by mouth once daily 30 tablet 0    furosemide (LASIX) 40 MG tablet Take 40 mg by mouth once daily.      gabapentin (NEURONTIN) 800 MG tablet Take 800 mg by mouth 2 (two) times daily.      linaCLOtide (LINZESS) 145 mcg Cap capsule Take 290 mcg by mouth as needed.       meclizine (ANTIVERT) 25 mg tablet Take 25 mg by mouth Daily.      mirtazapine (REMERON) 30 MG tablet Take 30 mg by mouth every evening.      montelukast (SINGULAIR) 10 mg tablet Take 10 mg by mouth once daily.      nortriptyline (PAMELOR) 25 MG capsule Take 25 mg by mouth Daily.      ondansetron (ZOFRAN) 4 MG tablet Take 1 tablet (4 mg total) by mouth daily as needed for Nausea. 30 tablet 1    pantoprazole (PROTONIX) 40 MG tablet Take 40 mg by mouth once daily.      potassium chloride (K-TAB) 20 mEq Take 1 tablet (20 mEq total) by mouth 2 (two) times daily. 28 tablet 0    ALPRAZolam (XANAX) 0.5 MG tablet Take 1 tablet (0.5 mg total) by mouth once as needed for Anxiety. Take at 8:30 am on day of scans (Patient not taking: Reported on 11/12/2024) 1 tablet 0    dexAMETHasone (DECADRON) 4 MG Tab Take Dexamethasone 8mg daily the day before treatment, treatment day, and 3 days after. (Patient not taking: Reported on 1/6/2025) 120 tablet 3    diphenhydrAMINE-aluminum-magnesium hydroxide-simethicone-LIDOcaine viscous HCl 2% Swish and spit 15 mLs every 4 (four) hours as needed (oral mucositis). (Patient not taking: Reported on 1/6/2025) 300 each 2    ibuprofen (ADVIL,MOTRIN) 800 MG tablet 1 tablet with food or milk as needed Orally Three times a day (Patient not taking: Reported on 1/6/2025)      megestroL (MEGACE) 40 MG Tab Take 1 tablet (40 mg total) by mouth 4 (four) times daily. (Patient not taking: Reported on 1/6/2025.) 120 tablet 2    polyethylene glycol (MOVIPREP) 100-7.5-2.691 gram solution Take as directed prior to colonoscopy (Patient not taking: Reported on 1/6/2025) 1 kit 0    prochlorperazine (COMPAZINE) 10 MG tablet Take 1 tablet (10 mg total) by mouth 4 (four) times daily. (Patient not taking: Reported on 1/6/2025) 30 tablet 1     Current Facility-Administered Medications on File Prior to Visit   Medication Dose Route Frequency Provider Last Rate Last Admin    0.9%  NaCl infusion (for blood administration)    Intravenous Once Emery Mahoney MD        0.9% NaCl 100 mL flush bag   Intravenous PRN Emery Mahoney MD        alteplase injection 2 mg  2 mg Intra-Catheter PRN Emery Mahoney MD   2 mg at 10/11/24 1037    diphenhydrAMINE injection 50 mg  50 mg Intravenous Once PRN Emery Mahoney MD        EPINEPHrine injection 0.3 mg  0.3 mg Intramuscular Once PRN Emery Mahoney MD        heparin, porcine (PF) 100 unit/mL injection flush 500 Units  500 Units Intravenous PRN Emery Mahoney MD        hydrocortisone sodium succinate injection 100 mg  100 mg Intravenous Once PRN Emery Mahoney MD        lactated ringers infusion   Intravenous Continuous Aline Muniz MD 10 mL/hr at 04/26/24 0608 New Bag at 04/26/24 0608    sodium chloride 0.9% flush 10 mL  10 mL Intravenous PRN Emery Mahoney MD         Review of System  Review of Systems   Constitutional: Negative.    HENT:  Negative.     Eyes: Negative.    Respiratory: Negative.     Cardiovascular: Negative.    Endocrine: Negative.    Genitourinary: Negative.     Musculoskeletal: Negative.    Skin: Negative.    Neurological: Negative.    Hematological: Negative.    Psychiatric/Behavioral: Negative.         Physical Exam  Physical Exam  Vitals and nursing note reviewed.   Constitutional:       Appearance: Normal appearance.   HENT:      Head: Normocephalic and atraumatic.   Eyes:      General: No scleral icterus.     Conjunctiva/sclera: Conjunctivae normal.   Cardiovascular:      Rate and Rhythm: Normal rate and regular rhythm.      Heart sounds: Normal heart sounds.   Pulmonary:      Effort: Pulmonary effort is normal. No respiratory distress.      Comments: Wearing oxygen 2 L  Abdominal:      General: Bowel sounds are normal. There is no distension.      Palpations: Abdomen is soft.      Tenderness: There is no abdominal tenderness.   Musculoskeletal:         General: Normal range of motion.   Skin:     General: Skin is warm and dry.   Neurological:       General: No focal deficit present.      Mental Status: She is alert and oriented to person, place, and time.   Psychiatric:         Mood and Affect: Mood normal.         Behavior: Behavior normal.         Thought Content: Thought content normal.         Judgment: Judgment normal.         Visit Labs       01/06/25 1129    Comprehensive Metabolic Panel  Collected: 01/06/25 1019  Final result  Specimen: Blood    Sodium 142 mmol/L Globulin 3.6 High  gm/dL   Potassium 4.1 mmol/L Albumin/Globulin Ratio 1.1 ratio   Chloride 102 mmol/L Bilirubin Total 0.7 mg/dL   CO2 31 mmol/L  unit/L   Glucose 119 High  mg/dL ALT 14 unit/L   Blood Urea Nitrogen 27.8 High  mg/dL AST 12 unit/L   Creatinine 1.20 High  mg/dL eGFR 51 mL/min/1.73/m2   Calcium 9.9 mg/dL Anion Gap 9.0 mEq/L   Protein Total 7.6 gm/dL BUN/Creatinine Ratio 23   Albumin 4.0 g/dL            01/06/25 1129  Lactate Dehydrogenase  Collected: 01/06/25 1019  Final result  Specimen: Blood    Lactate Dehydrogenase 182 U/L            01/06/25 1109    CBC with Differential  Collected: 01/06/25 1019  Final result  Specimen: Blood    WBC 6.54 x10(3)/mcL Mono % 10.1 %   RBC 3.89 Low  x10(6)/mcL Eos % 1.1 %   Hgb 12.0 g/dL Basophil % 0.5 %   Hct 37.1 % Lymph # 1.59 x10(3)/mcL   MCV 95.4 High  fL Neut # 4.16 x10(3)/mcL   MCH 30.8 pg Mono # 0.66 x10(3)/mcL   MCHC 32.3 Low  g/dL Eos # 0.07 x10(3)/mcL   RDW 13.2 % Baso # 0.03 x10(3)/mcL   Platelet 229 x10(3)/mcL IG# 0.03 x10(3)/mcL   MPV 9.2 fL IG% 0.5 %   Neut % 63.5 % NRBC% 0.0 %   Lymph % 24.3 %             VITAL SIGNS:   Vitals:    01/06/25 1110   BP: 112/66   Pulse: 75   Resp: 18   Temp: 97.6 °F (36.4 °C)         Assessment:  Problem List Items Addressed This Visit    None        Orders for 11/21/2024:   Continue Tecentriq every 3 weeks for a total of 17 cycles. Proceed with cycle 6 today.  Check TSH and free T4 every 6 weeks  Surveillance CT chest with contrast in March 2025  She has been referred to GI for  colonoscopy for evaluation of positive Cologuard test, She states she has an appointment in December 2025.   Follow-up with NP in 3 weeks     Above discussed at length with the patient.  All questions answered.  Discussed labs and scans and gave her copies of relevant results.  She understands and agrees with this plan.  =================================    Adenocarcinoma right upper lung lobe:   -LDCT chest without contrast 08/12/2022:  Lung rads 2  -LDCT chest without contrast 09/11/2023:  Lung rads 4A  -noncontrast chest CT 12/26/2023:  Lung rads 4A  -FDG PET-CT 01/29/2024:  Hypermetabolic right upper lobe nodule, 1.2 x 1.1 cm, maximum SUV 14, previously 1 cm x 0.9 cm; no distant metastases   -noncontrast chest CT 02/01/2024:  Right upper lobe nodule continues to enlarge, now 11 mm   -03/07/2024:  Right VATS, right upper lobe wedge resection, right upper lobectomy, right middle lobectomy, regional lymph node dissection:  -separate tumor nodules (metastases) in same lobe, right upper lung lobe, therefore, pT3 (0.8 cm and 0.6 cm, respectively); invasive acinar adenocarcinoma; G3, poorly-differentiated; no visceral pleural invasion; no LVI; margins negative; 10 regional lymph nodes negative  -pT3 pN0 M0, stage IIB  -PD-L1 expression high positive (TPS 95%; )  -negative for EGFR, KRAS, BRAF, AL K, ROS1, RET, met, HER2  -MediPort placed 04/25/2024  -S/P adjuvant cisplatin/Alimta every 3 weeks x4 cycles (04/29/2024-07/01/2024)  -restaging CTs C/A/P 05/06/2024:  No metastases   -staging brain MRI 05/08/2024: No metastases  -hemoglobin dropped to 5.2 on 07/10/2024, requiring blood transfusion  -ANC dropped to 0.55 on 06/24/2024, requiring supportive care  -07/17/2024:  ANC 0.525; hemoglobin 7.0; iron studies consistent with anemia of chronic disease/inflammation/malignancy/chemotherapy  -07/22/2024: TSH, free T4 normal  -adjuvant atezolizumab 1200 mg every 3 weeks, started 08/09/2024; cycle 2 on 08/30/2024; cycle  3 on 09/20/2024  -09/20/2024: TSH, free T4 normal  -surveillance CT chest 09/18/2024: MALLORY  >>>  Plan:  -continue adjuvant atezolizumab 1200 mg IV every 3 weeks x1 year (17 cycles)   -monitor for immune related adverse events with atezolizumab  -check baseline TSH and free T4 every 6 weeks to monitor for the possibility of immune thyroiditis with atezolizumab  -for surveillance, CT chest with contrast in 6 months (March 2025)    Monitoring on atezolizumab:  Monitor LFTs (AST, ALT, and total bilirubin; at baseline and periodically during treatment;  kidney function (serum creatinine; at baseline and periodically during treatment);  thyroid function (at baseline, periodically during treatment and as clinically indicated);  monitor blood glucose (for hyperglycemia);  Monitor closely for signs/symptoms of immune-mediated adverse reactions, including  adrenal insufficiency,  diarrhea/colitis (consider initiating or repeating infectious workup in patients with corticosteroid-refractory immune-mediated colitis to exclude alternative causes),  dermatologic toxicity,  diabetes mellitus,  hypophysitis,  ocular disorders,  thyroid disorders,  pneumonitis and other immune-mediated adverse reactions.  Monitor for signs/symptoms of infusion-related reactions.     Surveillance:  -history and physical and chest CT +/- contrast every 6 months X 3 years (03/2024-03/2027), then history and physical and low-dose noncontrast chest CT annually  -smoking cessation advice and counseling   -FDG PET-CT or brain MRI is not routinely indicated      Severe anemia:   (Anemia of chronic disease/inflammation/malignancy)  -07/22/2024: Hemoglobin 7.7; MCV normal (anemia of chronic disease/inflammation/malignancy)  -07/22/2024: Started on folic acid 1 mg daily  -08/08/2024: Hemoglobin dropped to 6.8, requiring PRBC x3 units  -hemoglobin: 9.3 on 09/20/2024; 9.3 on 08/30/2024; 9.7 on 08/19/2024, etc.    Hypokalemia:   -start potassium 40 mEq x7 days  (12/16/2024)    Cologuard positive:  -08/17/2023: Cologuard positive  -colonoscopy performed December 12, 2024- negative for cancer follow-up in 5 years    Discussion & Plan   01/06/2025      Dx:  Lung Cancer  Status:  Undergoing active treatment  The patient will continue treatment with Tecentriq every 3 weeks.  The plan is to complete 17 cycles.  I reviewed with the patient side effects associated with the use of atezolizumab.  The patient does not have any signs of immune mediated adverse reactions  Liver function tests within normal limits  Renal function as monitor by creatinine and EGFR within normal limits  Thyroid functions monitor by TSH and free T4 within normal limits  I provided education to the patient to report any new or worsening symptoms such as cough, chest pain, shortness of breath, diarrhea, jaundice, severe abdominal pain, or unusual swelling immediately.   Patient will proceed today with cycle 8  Discussed with the patient signs and symptoms to report  Patient will be due for the next scan evaluation, surveillance CT of the chest with contrast on March of 2025.  Dx:  Anemia  Status:  Resolved  Hemoglobin = 12  Dx:  Hypokalemia  Status:  Resolved  Potassium = 4.1  Dx:  Port dysfunction  Status:  Resolved  For was able to give a blood return without any issues.          Patient instructions and visit orders.     -Follow-up:  F/U with NP-01/27/2025  -Labs:  CBC, CMP, LDH, TSH, free T4-1/27/2025  -Imaging:  CT chest with contrast-03/03/2025 (already scheduled)  -Other orders:  Treatment, atezolizumab-cycle 9-01/27/2025.  Pulmonologist appointment-11/18/2025    Follow-up:  No follow-ups on file.

## 2025-01-20 DIAGNOSIS — E53.8 FOLATE DEFICIENCY: ICD-10-CM

## 2025-01-24 DIAGNOSIS — C34.11 PRIMARY ADENOCARCINOMA OF UPPER LOBE OF RIGHT LUNG: Primary | ICD-10-CM

## 2025-01-25 ENCOUNTER — TELEPHONE (OUTPATIENT)
Dept: ADMINISTRATIVE | Facility: HOSPITAL | Age: 65
End: 2025-01-25
Payer: MEDICAID

## 2025-01-27 ENCOUNTER — DOCUMENTATION ONLY (OUTPATIENT)
Dept: INFUSION THERAPY | Facility: HOSPITAL | Age: 65
End: 2025-01-27
Payer: MEDICAID

## 2025-01-27 DIAGNOSIS — E53.8 FOLATE DEFICIENCY: ICD-10-CM

## 2025-01-27 RX ORDER — FOLIC ACID 1 MG/1
1000 TABLET ORAL DAILY
Qty: 30 TABLET | Refills: 2 | Status: SHIPPED | OUTPATIENT
Start: 2025-01-27

## 2025-01-27 RX ORDER — FOLIC ACID 1 MG/1
1000 TABLET ORAL
Qty: 30 TABLET | Refills: 0 | OUTPATIENT
Start: 2025-01-27

## 2025-01-31 DIAGNOSIS — E53.8 FOLATE DEFICIENCY: Primary | ICD-10-CM

## 2025-02-02 NOTE — PROGRESS NOTES
Reason for Follow-up:  -adenocarcinoma right upper lung lobe, S/P right VATS/right upper lobe wedge resection/completion right upper lobectomy and right middle lobectomy and regional lymph node dissection 2024, G3, pT3 pN0  -Cologuard positive  -anemia of chronic disease  -folic acid deficiency      Chief Complaint   Patient presents with    Cancer     Primary adenocarcinoma of upper sukumar of right lung.          History:  Social history:   .  Lives in [place].  Has 3 children.  Does not work.  Has been smoking 1-2 pack of cigarettes daily for 51 years, since age 12; discontinued recently.  No alcohol or illicit drug abuse.      Family history:   Mother experienced some kind of intrathoracic malignancy at age 83;  from MI at age 83   Father  from prostate cancer) experienced at age 83 and probably, sarcoma) experienced at age 83)    Health maintenance:   -PCP in [place]  -says that she had screening colonoscopy performed in [place] in , and that it was unremarkable  -says that now, for positive Cologuard test, she is scheduled for colonoscopy in May 2024  -2023:  Cologuard positive  -2023:  Bilateral digital screening mammogram with tomosynthesis (comparison:  2022 mammogram, etc.):  BI-RADS: 1 negative    Oncologic/Hematologic History:  Oncology History   Primary adenocarcinoma of upper lobe of right lung   3/7/2024 Cancer Staged    Staging form: Lung, AJCC 8th Edition  - Pathologic stage from 3/7/2024: Stage IIB (pT3, pN0, cM0)     4/15/2024 Initial Diagnosis    Primary adenocarcinoma of upper lobe of right lung     2024 - 2024 Chemotherapy    Treatment Summary   Plan Name: OP NSCLC PEMETREXED + CISPLATIN Q3W  Treatment Goal: Curative  Status: Inactive  Start Date: 2024  End Date: 2024  Provider: Emery Mahoney MD  Chemotherapy: CISplatin (Platinol) 132 mg in sodium chloride 0.9% 697 mL chemo infusion, 141 mg, Intravenous,  Clinic/HOD 1 time, 4 of 4 cycles  Administration: 132 mg (4/29/2024), 128 mg (6/10/2024), 128 mg (7/1/2024), 132 mg (5/20/2024)  PEMEtrexed disodium (ALIMTA) 900 mg in sodium chloride 0.9% SolP 100 mL chemo infusion, 950 mg, Intravenous, Clinic/HOD 1 time, 4 of 4 cycles  Dose modification: 375 mg/m2 (75 % of original dose 500 mg/m2, Cycle 4, Reason: Dose Not Tolerated)  Administration: 900 mg (4/29/2024), 900 mg (6/10/2024), 625 mg (7/1/2024), 900 mg (5/20/2024)     8/9/2024 -  Chemotherapy    Treatment Summary   Plan Name: OP ATEZOLIZUMAB Q3W  Treatment Goal: Curative  Status: Active  Start Date: 8/9/2024  End Date: 7/21/2025 (Planned)  Provider: Emery Mahoney MD  Chemotherapy: [No matching medication found in this treatment plan]     =========================================    64-year-old female, referred from [facility], Dr. Arnulfo Middleton, with poorly-differentiated adenocarcinoma of right lung.      Investigations reviewed:  -08/12/2022: CT chest lung screening low-dose (comparison:  09/24/2020):  Lung rads 2: Benign appearance or behavior:  Continue annual screening with LDCT in 12 months  -09/11/2023:  CT chest lung screening low-dose without contrast (comparison:  08/12/2022):  Lung rads category 4A: Suspicious; enlarging nodule right upper lobe, lobulated, < 8 mm, i.e., 7 x 6.7 mm, previously 5 mm, previously not lobulated, too small for adequate detection on PET-CT; recommend three-month follow-up)  -12/26/2023: CT chest with contrast (comparison:  09/11/2023):  Lung rads 4A: Very suspicious; continued enlargement of right upper lobe lung nodule of concern (9.5 x 8.9 mm)  -01/29/2024: FDG PET-CT (comparison:  Chest CT 12/26/2023; CT abdomen pelvis 09/04/2019):  1. Markedly FDG-avid right upper lobe soft tissue nodule, increased in size in the interval, raising concern for malignancy (smoothly marginated noncalcified soft tissue nodule lateral subpleural right upper lobe, 1.2 x  1.1 cm, maximum SUV 14, previously 1 cm x 0.9 cm).  2. No definite PET-CT findings to suggest additional right hemithoracic or more distant metastatic disease.  -02/01/2024: CT chest without contrast (comparison:  12/26/2023): Lateral right upper lobe nodule continues to enlarge in size currently measuring 11 mm concerning for malignancy. Recommend further evaluation.   -03/07/2024:  Right VATS, right upper lobe wedge resection; right upper lobectomy, right middle lobectomy, regional lymph node dissection:  1. Right lung, upper lobe, wedge resection:  Poorly-differentiated adenocarcinoma, 0.6 cm, clear margins of resection  2. Level 8R lymph node (paraesophageal), biopsy:  3 lymph nodes, negative for metastatic carcinoma    3. Level 4R lymph node (lower paratracheal), biopsy: 2 lymph nodes, negative for metastatic carcinoma  4. Level 7R lymph node (subcarinal), biopsy:  1 lymph node, negative for metastatic carcinoma  5. Level 11R lymph node (interlobar), biopsy:  2 lymph nodes, negative for metastatic carcinoma    6. Right lung, upper lobe, completion lobectomy:  -poorly-differentiated adenocarcinoma, 0.8 cm  -bronchial and vascular margins of resection negative   -1 hilar lymph node with no evidence of metastatic carcinoma  7. Level 12R lymph node (lobar), lymphadenectomy:  1 lymph node, negative for metastatic carcinoma    Synoptic report:  Total number of primary tumors: 2  S/P wedge resection  Completion lobectomy  Right lung  Separate tumor nodules (metastases) in same lobe, therefore, pT3  number of intrapulmonary metastases:  2   Tumor site:  Upper lobe of lung  Tumor size:  Total tumor size:  Greatest dimension:  0.8 cm  Invasive acinar adenocarcinoma; G3, poorly-differentiated; spread through airspaces (CHAMP), present; no visceral pleural invasion; no adjacent structures present; no known pre-surgical therapy; no LVI  All margins negative for invasive carcinoma; closest margin invasive carcinoma, bronchial  vascular; distance from invasive carcinoma closest margin,> 2 cm; margins status for noninvasive tumor, all margins negative for noninvasive tumor  Number of lymph nodes examined, 10; all regional lymph nodes negative for tumor  >>>  pT3 pN0    PD-L1 expression:  Positive: High (TPS 95%; )  Negative for EGFR, KRAS, BRAF, AL K, ROS1, RET, MET, HER2  No gene rearrangement no reportable altered splicing events identified from RNA sequencing  No reportable pathogenic variants found  MSI stable  TMB:  41.6m/MB  Fusions: Negative        Interval History 2/2/25:  Patient presents to the clinic today for a scheduled clinic visit to follow up regarding her history of lung cancer. She is due to receive C9D1 of Atezolizumab today in infusion. She has no acute compliants today. She denies fever, chills, worsening SOB (she continues to wear 2L NC), or s/s associated with infection. She denies any headaches or memory changes. She denies any abdominal pain, constipation, diarrhea, or changes with appetite. Labwork was reviewed with the patient. All future appointments were discussed.         Review of System  Review of Systems   All other systems reviewed and are negative.       Physical Exam  Physical Exam  Vitals reviewed.   Constitutional:       Appearance: Normal appearance.   HENT:      Head: Normocephalic and atraumatic.      Mouth/Throat:      Mouth: Mucous membranes are moist.   Cardiovascular:      Rate and Rhythm: Normal rate and regular rhythm.      Pulses: Normal pulses.      Heart sounds: Normal heart sounds.   Pulmonary:      Effort: Pulmonary effort is normal.      Breath sounds: Normal breath sounds.      Comments: 2L of O2 via NC  Abdominal:      General: Bowel sounds are normal.      Palpations: Abdomen is soft.   Skin:     General: Skin is warm and dry.   Neurological:      Mental Status: She is alert and oriented to person, place, and time.   Psychiatric:         Mood and Affect: Mood normal.          Behavior: Behavior normal.         Thought Content: Thought content normal.         Judgment: Judgment normal.        Assessment:  Adenocarcinoma right upper lung lobe:   -LDCT chest without contrast 08/12/2022:  Lung rads 2  -LDCT chest without contrast 09/11/2023:  Lung rads 4A  -noncontrast chest CT 12/26/2023:  Lung rads 4A  -FDG PET-CT 01/29/2024:  Hypermetabolic right upper lobe nodule, 1.2 x 1.1 cm, maximum SUV 14, previously 1 cm x 0.9 cm; no distant metastases   -noncontrast chest CT 02/01/2024:  Right upper lobe nodule continues to enlarge, now 11 mm   -03/07/2024:  Right VATS, right upper lobe wedge resection, right upper lobectomy, right middle lobectomy, regional lymph node dissection:  -separate tumor nodules (metastases) in same lobe, right upper lung lobe, therefore, pT3 (0.8 cm and 0.6 cm, respectively); invasive acinar adenocarcinoma; G3, poorly-differentiated; no visceral pleural invasion; no LVI; margins negative; 10 regional lymph nodes negative  -pT3 pN0 M0, stage IIB  -PD-L1 expression high positive (TPS 95%; )  -negative for EGFR, KRAS, BRAF, AL K, ROS1, RET, met, HER2  -MediPort placed 04/25/2024  -S/P adjuvant cisplatin/Alimta every 3 weeks x4 cycles (04/29/2024-07/01/2024)  -restaging CTs C/A/P 05/06/2024:  No metastases   -staging brain MRI 05/08/2024: No metastases  -hemoglobin dropped to 5.2 on 07/10/2024, requiring blood transfusion  -ANC dropped to 0.55 on 06/24/2024, requiring supportive care  -07/17/2024:  ANC 0.525; hemoglobin 7.0; iron studies consistent with anemia of chronic disease/inflammation/malignancy/chemotherapy  -07/22/2024: TSH, free T4 normal  -adjuvant atezolizumab 1200 mg every 3 weeks, started 08/09/2024; cycle 2 on 08/30/2024; cycle 3 on 09/20/2024  -09/20/2024: TSH, free T4 normal  -surveillance CT chest 09/18/2024: MALLORY  >>>  Plan:  Primary Adenocarcinoma of upper lobe of right lung:   Continue adjuvant atezolizumab 1200 mg IV every 3 weeks x1 year (17  cycles)   Proceed with C9D1 today in infusion   RTC with me in 3 weeks (2/24) with labs prior (CBC/CMP/Mag level) followed by infusion   Check baseline TSH and free T4 every 6 weeks to monitor for the possibility of immune thyroiditis with atezolizumab (next middle of March)  For surveillance, CT chest with contrast in 6 months (March 2025)-scheduled 3/3/25    Elevated BUN/Creatinine level:   BUN: 26.2  Creatinine level: 1.33  Encouraged to increase daily water intake. Drink half of body weight in ounces of water per day.       Monitoring on atezolizumab:  Monitor LFTs (AST, ALT, and total bilirubin; at baseline and periodically during treatment;  kidney function (serum creatinine; at baseline and periodically during treatment);  thyroid function (at baseline, periodically during treatment and as clinically indicated);  monitor blood glucose (for hyperglycemia);  Monitor closely for signs/symptoms of immune-mediated adverse reactions, including  adrenal insufficiency,  diarrhea/colitis (consider initiating or repeating infectious workup in patients with corticosteroid-refractory immune-mediated colitis to exclude alternative causes),  dermatologic toxicity,  diabetes mellitus,  hypophysitis,  ocular disorders,  thyroid disorders,  pneumonitis and other immune-mediated adverse reactions.  Monitor for signs/symptoms of infusion-related reactions.     Surveillance:  -history and physical and chest CT +/- contrast every 6 months X 3 years (03/2024-03/2027), then history and physical and low-dose noncontrast chest CT annually  -smoking cessation advice and counseling   -FDG PET-CT or brain MRI is not routinely indicated      Follow up in about 3 weeks (around 2/24/2025) for Labs, With Reyna Candelaria-Tecentriq .

## 2025-02-03 ENCOUNTER — APPOINTMENT (OUTPATIENT)
Dept: HEMATOLOGY/ONCOLOGY | Facility: CLINIC | Age: 65
End: 2025-02-03
Payer: MEDICAID

## 2025-02-03 ENCOUNTER — INFUSION (OUTPATIENT)
Dept: INFUSION THERAPY | Facility: HOSPITAL | Age: 65
End: 2025-02-03
Attending: INTERNAL MEDICINE
Payer: MEDICAID

## 2025-02-03 ENCOUNTER — OFFICE VISIT (OUTPATIENT)
Dept: HEMATOLOGY/ONCOLOGY | Facility: CLINIC | Age: 65
End: 2025-02-03
Payer: MEDICAID

## 2025-02-03 VITALS
TEMPERATURE: 98 F | OXYGEN SATURATION: 100 % | RESPIRATION RATE: 18 BRPM | BODY MASS INDEX: 32.1 KG/M2 | HEIGHT: 61 IN | HEART RATE: 78 BPM | DIASTOLIC BLOOD PRESSURE: 70 MMHG | SYSTOLIC BLOOD PRESSURE: 125 MMHG | WEIGHT: 170 LBS

## 2025-02-03 VITALS
WEIGHT: 170 LBS | TEMPERATURE: 98 F | BODY MASS INDEX: 32.1 KG/M2 | HEART RATE: 70 BPM | RESPIRATION RATE: 17 BRPM | HEIGHT: 61 IN | OXYGEN SATURATION: 100 % | DIASTOLIC BLOOD PRESSURE: 63 MMHG | SYSTOLIC BLOOD PRESSURE: 91 MMHG

## 2025-02-03 DIAGNOSIS — T45.1X5A IMMUNODEFICIENCY DUE TO LONG TERM IMMUNOSUPPRESSIVE DRUG THERAPY: ICD-10-CM

## 2025-02-03 DIAGNOSIS — Z79.899 IMMUNODEFICIENCY DUE TO LONG TERM IMMUNOSUPPRESSIVE DRUG THERAPY: ICD-10-CM

## 2025-02-03 DIAGNOSIS — R79.89 CREATININE ELEVATION: Primary | ICD-10-CM

## 2025-02-03 DIAGNOSIS — D84.821 IMMUNODEFICIENCY DUE TO LONG TERM IMMUNOSUPPRESSIVE DRUG THERAPY: ICD-10-CM

## 2025-02-03 DIAGNOSIS — C34.11 ADENOCARCINOMA OF UPPER LOBE OF RIGHT LUNG: ICD-10-CM

## 2025-02-03 DIAGNOSIS — E53.8 FOLATE DEFICIENCY: ICD-10-CM

## 2025-02-03 DIAGNOSIS — C34.11 PRIMARY ADENOCARCINOMA OF UPPER LOBE OF RIGHT LUNG: Primary | ICD-10-CM

## 2025-02-03 LAB
ALBUMIN SERPL-MCNC: 4.1 G/DL (ref 3.4–4.8)
ALBUMIN/GLOB SERPL: 1.1 RATIO (ref 1.1–2)
ALP SERPL-CCNC: 128 UNIT/L (ref 40–150)
ALT SERPL-CCNC: 14 UNIT/L (ref 0–55)
ANION GAP SERPL CALC-SCNC: 11 MEQ/L
AST SERPL-CCNC: 16 UNIT/L (ref 5–34)
BASOPHILS # BLD AUTO: 0.02 X10(3)/MCL
BASOPHILS NFR BLD AUTO: 0.3 %
BILIRUB SERPL-MCNC: 0.5 MG/DL
BUN SERPL-MCNC: 26.2 MG/DL (ref 9.8–20.1)
CALCIUM SERPL-MCNC: 9.7 MG/DL (ref 8.4–10.2)
CHLORIDE SERPL-SCNC: 102 MMOL/L (ref 98–107)
CO2 SERPL-SCNC: 29 MMOL/L (ref 23–31)
CREAT SERPL-MCNC: 1.33 MG/DL (ref 0.55–1.02)
CREAT/UREA NIT SERPL: 20
EOSINOPHIL # BLD AUTO: 0.1 X10(3)/MCL (ref 0–0.9)
EOSINOPHIL NFR BLD AUTO: 1.6 %
ERYTHROCYTE [DISTWIDTH] IN BLOOD BY AUTOMATED COUNT: 13.2 % (ref 11.5–17)
GFR SERPLBLD CREATININE-BSD FMLA CKD-EPI: 45 ML/MIN/1.73/M2
GLOBULIN SER-MCNC: 3.6 GM/DL (ref 2.4–3.5)
GLUCOSE SERPL-MCNC: 134 MG/DL (ref 82–115)
HCT VFR BLD AUTO: 37.1 % (ref 37–47)
HGB BLD-MCNC: 12.1 G/DL (ref 12–16)
IMM GRANULOCYTES # BLD AUTO: 0.02 X10(3)/MCL (ref 0–0.04)
IMM GRANULOCYTES NFR BLD AUTO: 0.3 %
LDH SERPL-CCNC: 178 U/L (ref 125–220)
LYMPHOCYTES # BLD AUTO: 1.86 X10(3)/MCL (ref 0.6–4.6)
LYMPHOCYTES NFR BLD AUTO: 30 %
MCH RBC QN AUTO: 31.2 PG (ref 27–31)
MCHC RBC AUTO-ENTMCNC: 32.6 G/DL (ref 33–36)
MCV RBC AUTO: 95.6 FL (ref 80–94)
MONOCYTES # BLD AUTO: 0.56 X10(3)/MCL (ref 0.1–1.3)
MONOCYTES NFR BLD AUTO: 9 %
NEUTROPHILS # BLD AUTO: 3.65 X10(3)/MCL (ref 2.1–9.2)
NEUTROPHILS NFR BLD AUTO: 58.8 %
NRBC BLD AUTO-RTO: 0 %
PLATELET # BLD AUTO: 244 X10(3)/MCL (ref 130–400)
PMV BLD AUTO: 8.8 FL (ref 7.4–10.4)
POTASSIUM SERPL-SCNC: 4 MMOL/L (ref 3.5–5.1)
PROT SERPL-MCNC: 7.7 GM/DL (ref 5.8–7.6)
RBC # BLD AUTO: 3.88 X10(6)/MCL (ref 4.2–5.4)
SODIUM SERPL-SCNC: 142 MMOL/L (ref 136–145)
T4 FREE SERPL-MCNC: 1.13 NG/DL (ref 0.7–1.48)
TSH SERPL-ACNC: 2.63 UIU/ML (ref 0.35–4.94)
WBC # BLD AUTO: 6.21 X10(3)/MCL (ref 4.5–11.5)

## 2025-02-03 PROCEDURE — 99215 OFFICE O/P EST HI 40 MIN: CPT | Mod: S$PBB,,,

## 2025-02-03 PROCEDURE — A4216 STERILE WATER/SALINE, 10 ML: HCPCS

## 2025-02-03 PROCEDURE — 36593 DECLOT VASCULAR DEVICE: CPT

## 2025-02-03 PROCEDURE — 25000003 PHARM REV CODE 250

## 2025-02-03 PROCEDURE — 3074F SYST BP LT 130 MM HG: CPT | Mod: CPTII,,,

## 2025-02-03 PROCEDURE — 63600175 PHARM REV CODE 636 W HCPCS: Mod: JZ,TB

## 2025-02-03 PROCEDURE — 36415 COLL VENOUS BLD VENIPUNCTURE: CPT

## 2025-02-03 PROCEDURE — 84443 ASSAY THYROID STIM HORMONE: CPT

## 2025-02-03 PROCEDURE — 84439 ASSAY OF FREE THYROXINE: CPT

## 2025-02-03 PROCEDURE — 85025 COMPLETE CBC W/AUTO DIFF WBC: CPT

## 2025-02-03 PROCEDURE — 1160F RVW MEDS BY RX/DR IN RCRD: CPT | Mod: CPTII,,,

## 2025-02-03 PROCEDURE — 80053 COMPREHEN METABOLIC PANEL: CPT

## 2025-02-03 PROCEDURE — 4010F ACE/ARB THERAPY RXD/TAKEN: CPT | Mod: CPTII,,,

## 2025-02-03 PROCEDURE — 83615 LACTATE (LD) (LDH) ENZYME: CPT

## 2025-02-03 PROCEDURE — 96413 CHEMO IV INFUSION 1 HR: CPT

## 2025-02-03 PROCEDURE — 1159F MED LIST DOCD IN RCRD: CPT | Mod: CPTII,,,

## 2025-02-03 PROCEDURE — 3008F BODY MASS INDEX DOCD: CPT | Mod: CPTII,,,

## 2025-02-03 PROCEDURE — 3078F DIAST BP <80 MM HG: CPT | Mod: CPTII,,,

## 2025-02-03 PROCEDURE — 99215 OFFICE O/P EST HI 40 MIN: CPT | Mod: PBBFAC,25

## 2025-02-03 RX ORDER — SODIUM CHLORIDE 0.9 % (FLUSH) 0.9 %
10 SYRINGE (ML) INJECTION
Status: DISCONTINUED | OUTPATIENT
Start: 2025-02-03 | End: 2025-02-03 | Stop reason: HOSPADM

## 2025-02-03 RX ORDER — DIPHENHYDRAMINE HYDROCHLORIDE 50 MG/ML
50 INJECTION INTRAMUSCULAR; INTRAVENOUS ONCE AS NEEDED
Status: CANCELLED | OUTPATIENT
Start: 2025-02-03

## 2025-02-03 RX ORDER — DIPHENHYDRAMINE HYDROCHLORIDE 50 MG/ML
50 INJECTION INTRAMUSCULAR; INTRAVENOUS ONCE AS NEEDED
Status: DISCONTINUED | OUTPATIENT
Start: 2025-02-03 | End: 2025-02-03 | Stop reason: HOSPADM

## 2025-02-03 RX ORDER — HEPARIN 100 UNIT/ML
500 SYRINGE INTRAVENOUS
Status: DISCONTINUED | OUTPATIENT
Start: 2025-02-03 | End: 2025-02-03 | Stop reason: HOSPADM

## 2025-02-03 RX ORDER — EPINEPHRINE 0.3 MG/.3ML
0.3 INJECTION SUBCUTANEOUS ONCE AS NEEDED
Status: CANCELLED | OUTPATIENT
Start: 2025-02-03

## 2025-02-03 RX ORDER — SODIUM CHLORIDE 0.9 % (FLUSH) 0.9 %
10 SYRINGE (ML) INJECTION
Status: CANCELLED | OUTPATIENT
Start: 2025-02-03

## 2025-02-03 RX ORDER — EPINEPHRINE 1 MG/ML
0.3 INJECTION INTRAMUSCULAR; INTRAVENOUS; SUBCUTANEOUS ONCE AS NEEDED
Status: DISCONTINUED | OUTPATIENT
Start: 2025-02-03 | End: 2025-02-03 | Stop reason: HOSPADM

## 2025-02-03 RX ORDER — HEPARIN 100 UNIT/ML
500 SYRINGE INTRAVENOUS
Status: CANCELLED | OUTPATIENT
Start: 2025-02-03

## 2025-02-03 RX ADMIN — ALTEPLASE 2 MG: 2.2 INJECTION, POWDER, LYOPHILIZED, FOR SOLUTION INTRAVENOUS at 11:02

## 2025-02-03 RX ADMIN — Medication 10 ML: at 11:02

## 2025-02-03 RX ADMIN — HEPARIN 500 UNITS: 100 SYRINGE at 01:02

## 2025-02-03 RX ADMIN — ATEZOLIZUMAB 1200 MG: 1200 INJECTION, SOLUTION INTRAVENOUS at 10:02

## 2025-02-03 NOTE — Clinical Note
Proceed with treatment today in infusion  RTC with me in 3 weeks (2/24) with labs prior (CBC/CMP/Mag level) followed by infusion  Schedule an appt with MD on 3/17 with labs prior (CBC/CMP/Mag level) to discuss CT scans followed by infusion

## 2025-02-03 NOTE — NURSING
0912 Patient is here for labs, NP visit & C9 Tecentriq. Patient voices no c/o.   Mediport flushes easily, but no blood return.   1100 Cathflo instilled per protocol.   Tecentriq given via R arm piv.   1315  Good Blood return obtained. 1317 Mediport Heparin locked.

## 2025-02-20 DIAGNOSIS — R79.89 CREATININE ELEVATION: Primary | ICD-10-CM

## 2025-02-21 ENCOUNTER — TELEPHONE (OUTPATIENT)
Dept: HEMATOLOGY/ONCOLOGY | Facility: CLINIC | Age: 65
End: 2025-02-21
Payer: MEDICAID

## 2025-02-24 ENCOUNTER — INFUSION (OUTPATIENT)
Dept: INFUSION THERAPY | Facility: HOSPITAL | Age: 65
End: 2025-02-24
Attending: INTERNAL MEDICINE
Payer: MEDICAID

## 2025-02-24 ENCOUNTER — OFFICE VISIT (OUTPATIENT)
Dept: HEMATOLOGY/ONCOLOGY | Facility: CLINIC | Age: 65
End: 2025-02-24
Payer: MEDICAID

## 2025-02-24 VITALS
HEART RATE: 72 BPM | OXYGEN SATURATION: 100 % | SYSTOLIC BLOOD PRESSURE: 97 MMHG | RESPIRATION RATE: 16 BRPM | BODY MASS INDEX: 32.22 KG/M2 | HEIGHT: 61 IN | WEIGHT: 170.63 LBS | DIASTOLIC BLOOD PRESSURE: 61 MMHG | TEMPERATURE: 97 F

## 2025-02-24 VITALS
OXYGEN SATURATION: 100 % | HEART RATE: 72 BPM | RESPIRATION RATE: 16 BRPM | TEMPERATURE: 97 F | SYSTOLIC BLOOD PRESSURE: 143 MMHG | DIASTOLIC BLOOD PRESSURE: 61 MMHG

## 2025-02-24 DIAGNOSIS — Z90.2 STATUS POST LOBECTOMY OF LUNG: ICD-10-CM

## 2025-02-24 DIAGNOSIS — C34.11 PRIMARY ADENOCARCINOMA OF UPPER LOBE OF RIGHT LUNG: Primary | ICD-10-CM

## 2025-02-24 DIAGNOSIS — G62.0 NEUROPATHY DUE TO DRUG: Primary | ICD-10-CM

## 2025-02-24 DIAGNOSIS — F41.9 ANXIETY: ICD-10-CM

## 2025-02-24 DIAGNOSIS — R42 VERTIGO: ICD-10-CM

## 2025-02-24 DIAGNOSIS — F41.9 ACUTE ANXIETY: ICD-10-CM

## 2025-02-24 DIAGNOSIS — R79.89 CREATININE ELEVATION: Primary | ICD-10-CM

## 2025-02-24 DIAGNOSIS — K12.31 MUCOSITIS DUE TO CHEMOTHERAPY: ICD-10-CM

## 2025-02-24 PROCEDURE — 25000003 PHARM REV CODE 250

## 2025-02-24 PROCEDURE — 99215 OFFICE O/P EST HI 40 MIN: CPT | Mod: PBBFAC,25

## 2025-02-24 PROCEDURE — 4010F ACE/ARB THERAPY RXD/TAKEN: CPT | Mod: CPTII,,,

## 2025-02-24 PROCEDURE — 3074F SYST BP LT 130 MM HG: CPT | Mod: CPTII,,,

## 2025-02-24 PROCEDURE — 1159F MED LIST DOCD IN RCRD: CPT | Mod: CPTII,,,

## 2025-02-24 PROCEDURE — 96415 CHEMO IV INFUSION ADDL HR: CPT

## 2025-02-24 PROCEDURE — 1160F RVW MEDS BY RX/DR IN RCRD: CPT | Mod: CPTII,,,

## 2025-02-24 PROCEDURE — 99214 OFFICE O/P EST MOD 30 MIN: CPT | Mod: S$PBB,,,

## 2025-02-24 PROCEDURE — 63600175 PHARM REV CODE 636 W HCPCS

## 2025-02-24 PROCEDURE — 3008F BODY MASS INDEX DOCD: CPT | Mod: CPTII,,,

## 2025-02-24 PROCEDURE — 3078F DIAST BP <80 MM HG: CPT | Mod: CPTII,,,

## 2025-02-24 RX ORDER — LORAZEPAM 1 MG/1
1 TABLET ORAL ONCE
Qty: 1 TABLET | Refills: 0 | Status: SHIPPED | OUTPATIENT
Start: 2025-02-24 | End: 2025-02-24

## 2025-02-24 RX ORDER — MECLIZINE HYDROCHLORIDE 25 MG/1
25 TABLET ORAL DAILY
Qty: 30 TABLET | Refills: 0 | Status: SHIPPED | OUTPATIENT
Start: 2025-02-24

## 2025-02-24 RX ORDER — GABAPENTIN 800 MG/1
800 TABLET ORAL 2 TIMES DAILY
Qty: 60 TABLET | Refills: 0 | Status: SHIPPED | OUTPATIENT
Start: 2025-02-24

## 2025-02-24 RX ORDER — HEPARIN 100 UNIT/ML
500 SYRINGE INTRAVENOUS
Status: DISCONTINUED | OUTPATIENT
Start: 2025-02-24 | End: 2025-02-24 | Stop reason: HOSPADM

## 2025-02-24 RX ORDER — HEPARIN 100 UNIT/ML
500 SYRINGE INTRAVENOUS
Status: CANCELLED | OUTPATIENT
Start: 2025-02-24

## 2025-02-24 RX ORDER — DIPHENHYDRAMINE HYDROCHLORIDE 50 MG/ML
50 INJECTION INTRAMUSCULAR; INTRAVENOUS ONCE AS NEEDED
Status: CANCELLED | OUTPATIENT
Start: 2025-02-24

## 2025-02-24 RX ORDER — EPINEPHRINE 0.3 MG/.3ML
0.3 INJECTION SUBCUTANEOUS ONCE AS NEEDED
Status: CANCELLED | OUTPATIENT
Start: 2025-02-24

## 2025-02-24 RX ORDER — SODIUM CHLORIDE 0.9 % (FLUSH) 0.9 %
10 SYRINGE (ML) INJECTION
Status: DISCONTINUED | OUTPATIENT
Start: 2025-02-24 | End: 2025-02-24 | Stop reason: HOSPADM

## 2025-02-24 RX ORDER — SODIUM CHLORIDE 0.9 % (FLUSH) 0.9 %
10 SYRINGE (ML) INJECTION
Status: CANCELLED | OUTPATIENT
Start: 2025-02-24

## 2025-02-24 RX ORDER — DIPHENHYDRAMINE HYDROCHLORIDE 50 MG/ML
50 INJECTION INTRAMUSCULAR; INTRAVENOUS ONCE AS NEEDED
Status: DISCONTINUED | OUTPATIENT
Start: 2025-02-24 | End: 2025-02-24 | Stop reason: HOSPADM

## 2025-02-24 RX ORDER — EPINEPHRINE 1 MG/ML
0.3 INJECTION INTRAMUSCULAR; INTRAVENOUS; SUBCUTANEOUS ONCE AS NEEDED
Status: DISCONTINUED | OUTPATIENT
Start: 2025-02-24 | End: 2025-02-24 | Stop reason: HOSPADM

## 2025-02-24 RX ADMIN — HEPARIN 500 UNITS: 100 SYRINGE at 10:02

## 2025-02-24 RX ADMIN — ATEZOLIZUMAB 1200 MG: 1200 INJECTION, SOLUTION INTRAVENOUS at 11:02

## 2025-02-24 RX ADMIN — SODIUM CHLORIDE: 9 INJECTION, SOLUTION INTRAVENOUS at 10:02

## 2025-02-24 NOTE — PROGRESS NOTES
Reason for Follow-up:  -adenocarcinoma right upper lung lobe, S/P right VATS/right upper lobe wedge resection/completion right upper lobectomy and right middle lobectomy and regional lymph node dissection 2024, G3, pT3 pN0  -Cologuard positive  -anemia of chronic disease  -folic acid deficiency      Chief Complaint   Patient presents with    Follow-up     Patient reported no concerns          History:  Social history:   .  Lives in [place].  Has 3 children.  Does not work.  Has been smoking 1-2 pack of cigarettes daily for 51 years, since age 12; discontinued recently.  No alcohol or illicit drug abuse.      Family history:   Mother experienced some kind of intrathoracic malignancy at age 83;  from MI at age 83   Father  from prostate cancer) experienced at age 83 and probably, sarcoma) experienced at age 83)    Health maintenance:   -PCP in [place]  -says that she had screening colonoscopy performed in [place] in , and that it was unremarkable  -says that now, for positive Cologuard test, she is scheduled for colonoscopy in May 2024  -2023:  Cologuard positive  -2023:  Bilateral digital screening mammogram with tomosynthesis (comparison:  2022 mammogram, etc.):  BI-RADS: 1 negative    Oncologic/Hematologic History:  Oncology History   Primary adenocarcinoma of upper lobe of right lung   3/7/2024 Cancer Staged    Staging form: Lung, AJCC 8th Edition  - Pathologic stage from 3/7/2024: Stage IIB (pT3, pN0, cM0)     4/15/2024 Initial Diagnosis    Primary adenocarcinoma of upper lobe of right lung     2024 - 2024 Chemotherapy    Treatment Summary   Plan Name: OP NSCLC PEMETREXED + CISPLATIN Q3W  Treatment Goal: Curative  Status: Inactive  Start Date: 2024  End Date: 2024  Provider: Emery Mahoney MD  Chemotherapy: CISplatin (Platinol) 132 mg in sodium chloride 0.9% 697 mL chemo infusion, 141 mg, Intravenous, Clinic/HOD 1 time, 4  of 4 cycles  Administration: 132 mg (4/29/2024), 128 mg (6/10/2024), 128 mg (7/1/2024), 132 mg (5/20/2024)  PEMEtrexed disodium (ALIMTA) 900 mg in sodium chloride 0.9% SolP 100 mL chemo infusion, 950 mg, Intravenous, Clinic/HOD 1 time, 4 of 4 cycles  Dose modification: 375 mg/m2 (75 % of original dose 500 mg/m2, Cycle 4, Reason: Dose Not Tolerated)  Administration: 900 mg (4/29/2024), 900 mg (6/10/2024), 625 mg (7/1/2024), 900 mg (5/20/2024)     8/9/2024 -  Chemotherapy    Treatment Summary   Plan Name: OP ATEZOLIZUMAB Q3W  Treatment Goal: Curative  Status: Active  Start Date: 8/9/2024  End Date: 7/21/2025 (Planned)  Provider: Emery Mahoney MD  Chemotherapy: [No matching medication found in this treatment plan]     =========================================    64-year-old female, referred from [facility], Dr. Arnulfo Middleton, with poorly-differentiated adenocarcinoma of right lung.      Investigations reviewed:  -08/12/2022: CT chest lung screening low-dose (comparison:  09/24/2020):  Lung rads 2: Benign appearance or behavior:  Continue annual screening with LDCT in 12 months  -09/11/2023:  CT chest lung screening low-dose without contrast (comparison:  08/12/2022):  Lung rads category 4A: Suspicious; enlarging nodule right upper lobe, lobulated, < 8 mm, i.e., 7 x 6.7 mm, previously 5 mm, previously not lobulated, too small for adequate detection on PET-CT; recommend three-month follow-up)  -12/26/2023: CT chest with contrast (comparison:  09/11/2023):  Lung rads 4A: Very suspicious; continued enlargement of right upper lobe lung nodule of concern (9.5 x 8.9 mm)  -01/29/2024: FDG PET-CT (comparison:  Chest CT 12/26/2023; CT abdomen pelvis 09/04/2019):  1. Markedly FDG-avid right upper lobe soft tissue nodule, increased in size in the interval, raising concern for malignancy (smoothly marginated noncalcified soft tissue nodule lateral subpleural right upper lobe, 1.2 x 1.1 cm, maximum SUV  14, previously 1 cm x 0.9 cm).  2. No definite PET-CT findings to suggest additional right hemithoracic or more distant metastatic disease.  -02/01/2024: CT chest without contrast (comparison:  12/26/2023): Lateral right upper lobe nodule continues to enlarge in size currently measuring 11 mm concerning for malignancy. Recommend further evaluation.   -03/07/2024:  Right VATS, right upper lobe wedge resection; right upper lobectomy, right middle lobectomy, regional lymph node dissection:  1. Right lung, upper lobe, wedge resection:  Poorly-differentiated adenocarcinoma, 0.6 cm, clear margins of resection  2. Level 8R lymph node (paraesophageal), biopsy:  3 lymph nodes, negative for metastatic carcinoma    3. Level 4R lymph node (lower paratracheal), biopsy: 2 lymph nodes, negative for metastatic carcinoma  4. Level 7R lymph node (subcarinal), biopsy:  1 lymph node, negative for metastatic carcinoma  5. Level 11R lymph node (interlobar), biopsy:  2 lymph nodes, negative for metastatic carcinoma    6. Right lung, upper lobe, completion lobectomy:  -poorly-differentiated adenocarcinoma, 0.8 cm  -bronchial and vascular margins of resection negative   -1 hilar lymph node with no evidence of metastatic carcinoma  7. Level 12R lymph node (lobar), lymphadenectomy:  1 lymph node, negative for metastatic carcinoma    Synoptic report:  Total number of primary tumors: 2  S/P wedge resection  Completion lobectomy  Right lung  Separate tumor nodules (metastases) in same lobe, therefore, pT3  number of intrapulmonary metastases:  2   Tumor site:  Upper lobe of lung  Tumor size:  Total tumor size:  Greatest dimension:  0.8 cm  Invasive acinar adenocarcinoma; G3, poorly-differentiated; spread through airspaces (CHAMP), present; no visceral pleural invasion; no adjacent structures present; no known pre-surgical therapy; no LVI  All margins negative for invasive carcinoma; closest margin invasive carcinoma, bronchial vascular; distance  from invasive carcinoma closest margin,> 2 cm; margins status for noninvasive tumor, all margins negative for noninvasive tumor  Number of lymph nodes examined, 10; all regional lymph nodes negative for tumor  >>>  pT3 pN0    PD-L1 expression:  Positive: High (TPS 95%; )  Negative for EGFR, KRAS, BRAF, AL K, ROS1, RET, MET, HER2  No gene rearrangement no reportable altered splicing events identified from RNA sequencing  No reportable pathogenic variants found  MSI stable  TMB:  41.6m/MB  Fusions: Negative        Interval History 2/24/25:  Patient presents to the clinic today for a scheduled clinic visit to follow up regarding her history of lung cancer. She is due to receive C10D1 of Atezolizumab today in infusion. She has no acute compliants today. She denies fever, chills, worsening SOB (she continues to wear 2L NC), or s/s associated with infection. She denies any headaches or memory changes. She denies any abdominal pain, constipation, diarrhea, or changes with appetite. Labwork was reviewed with the patient. All future appointments were discussed.         Review of System  Review of Systems   All other systems reviewed and are negative.       Physical Exam  Physical Exam  Vitals reviewed.   Constitutional:       Appearance: Normal appearance.   HENT:      Head: Normocephalic and atraumatic.      Mouth/Throat:      Mouth: Mucous membranes are moist.   Cardiovascular:      Rate and Rhythm: Normal rate and regular rhythm.      Pulses: Normal pulses.      Heart sounds: Normal heart sounds.   Pulmonary:      Effort: Pulmonary effort is normal.      Breath sounds: Normal breath sounds.      Comments: 2L of O2 via NC  Abdominal:      General: Bowel sounds are normal.      Palpations: Abdomen is soft.   Skin:     General: Skin is warm and dry.   Neurological:      Mental Status: She is alert and oriented to person, place, and time.   Psychiatric:         Mood and Affect: Mood normal.         Behavior: Behavior  normal.         Thought Content: Thought content normal.         Judgment: Judgment normal.          Assessment:  Adenocarcinoma right upper lung lobe:   -LDCT chest without contrast 08/12/2022:  Lung rads 2  -LDCT chest without contrast 09/11/2023:  Lung rads 4A  -noncontrast chest CT 12/26/2023:  Lung rads 4A  -FDG PET-CT 01/29/2024:  Hypermetabolic right upper lobe nodule, 1.2 x 1.1 cm, maximum SUV 14, previously 1 cm x 0.9 cm; no distant metastases   -noncontrast chest CT 02/01/2024:  Right upper lobe nodule continues to enlarge, now 11 mm   -03/07/2024:  Right VATS, right upper lobe wedge resection, right upper lobectomy, right middle lobectomy, regional lymph node dissection:  -separate tumor nodules (metastases) in same lobe, right upper lung lobe, therefore, pT3 (0.8 cm and 0.6 cm, respectively); invasive acinar adenocarcinoma; G3, poorly-differentiated; no visceral pleural invasion; no LVI; margins negative; 10 regional lymph nodes negative  -pT3 pN0 M0, stage IIB  -PD-L1 expression high positive (TPS 95%; )  -negative for EGFR, KRAS, BRAF, AL K, ROS1, RET, met, HER2  -MediPort placed 04/25/2024  -S/P adjuvant cisplatin/Alimta every 3 weeks x4 cycles (04/29/2024-07/01/2024)  -restaging CTs C/A/P 05/06/2024:  No metastases   -staging brain MRI 05/08/2024: No metastases  -hemoglobin dropped to 5.2 on 07/10/2024, requiring blood transfusion  -ANC dropped to 0.55 on 06/24/2024, requiring supportive care  -07/17/2024:  ANC 0.525; hemoglobin 7.0; iron studies consistent with anemia of chronic disease/inflammation/malignancy/chemotherapy  -07/22/2024: TSH, free T4 normal  -adjuvant atezolizumab 1200 mg every 3 weeks, started 08/09/2024; cycle 2 on 08/30/2024; cycle 3 on 09/20/2024  -09/20/2024: TSH, free T4 normal  -surveillance CT chest 09/18/2024: [facility]ORY  >>>  Plan:  Primary Adenocarcinoma of upper lobe of right lung:   Continue adjuvant atezolizumab 1200 mg IV every 3 weeks x1 year (17 cycles)   Proceed  with C10D1 today in infusion   RTC with MD in 3 weeks (3/17) with labs prior (CBC/CMP/Mag level/TSH/Free T4) followed by infusion   Check baseline TSH and free T4 every 6 weeks to monitor for the possibility of immune thyroiditis with atezolizumab (next middle of March)  For surveillance, CT chest with contrast in 6 months (March 2025)-scheduled 3/3/25      Anxiety:   Anxiety regarding CT scan   Prescribed Ativan 1mg PO to be taken 30 mins prior to CT       Elevated BUN/Creatinine level:   BUN: 19.4  Creatinine level: 1.10  Encouraged to increase daily water intake. Drink half of body weight in ounces of water per day.       Monitoring on atezolizumab:  Monitor LFTs (AST, ALT, and total bilirubin; at baseline and periodically during treatment;  kidney function (serum creatinine; at baseline and periodically during treatment);  thyroid function (at baseline, periodically during treatment and as clinically indicated);  monitor blood glucose (for hyperglycemia);  Monitor closely for signs/symptoms of immune-mediated adverse reactions, including  adrenal insufficiency,  diarrhea/colitis (consider initiating or repeating infectious workup in patients with corticosteroid-refractory immune-mediated colitis to exclude alternative causes),  dermatologic toxicity,  diabetes mellitus,  hypophysitis,  ocular disorders,  thyroid disorders,  pneumonitis and other immune-mediated adverse reactions.  Monitor for signs/symptoms of infusion-related reactions.     Surveillance:  -history and physical and chest CT +/- contrast every 6 months X 3 years (03/2024-03/2027), then history and physical and low-dose noncontrast chest CT annually  -smoking cessation advice and counseling   -FDG PET-CT or brain MRI is not routinely indicated      Follow up in about 3 weeks (around 3/17/2025) for Labs, W/MD, Infusion (CBC/CMP/Mag level/TSH/Free T4) , For CT Results.

## 2025-02-24 NOTE — NURSING
"Patient here for Tecentriq C10D1. Patient had labs and provider visit. Spoke to Ace Chaney NP, about previous IV infusion visit having no blood return and cath fernando installed for 2 hours. Ace Chaney NP, states "I think patient is not getting blood return due to ice application. Access mediport but if no blood return don't install cath fernando today. Just get a peripheral IV. I discussed with patient not to use ice next time."  Mediport accessed no blood return. Peripheral IV used.   11:30 Patient tolerated treatment.   "

## 2025-03-03 ENCOUNTER — HOSPITAL ENCOUNTER (OUTPATIENT)
Dept: RADIOLOGY | Facility: HOSPITAL | Age: 65
Discharge: HOME | End: 2025-03-03
Attending: INTERNAL MEDICINE
Payer: MEDICAID

## 2025-03-03 DIAGNOSIS — C34.11 PRIMARY ADENOCARCINOMA OF UPPER LOBE OF RIGHT LUNG: ICD-10-CM

## 2025-03-03 PROCEDURE — 25500020 PHARM REV CODE 255

## 2025-03-03 PROCEDURE — 71260 CT THORAX DX C+: CPT | Mod: TC

## 2025-03-03 RX ADMIN — IOHEXOL 70 ML: 350 INJECTION, SOLUTION INTRAVENOUS at 10:03

## 2025-03-11 DIAGNOSIS — K12.31 MUCOSITIS DUE TO CHEMOTHERAPY: ICD-10-CM

## 2025-03-11 DIAGNOSIS — Z90.2 STATUS POST LOBECTOMY OF LUNG: ICD-10-CM

## 2025-03-11 DIAGNOSIS — R42 VERTIGO: ICD-10-CM

## 2025-03-11 DIAGNOSIS — G62.0 NEUROPATHY DUE TO DRUG: ICD-10-CM

## 2025-03-11 DIAGNOSIS — F41.9 ACUTE ANXIETY: ICD-10-CM

## 2025-03-11 DIAGNOSIS — R79.89 CREATININE ELEVATION: Primary | ICD-10-CM

## 2025-03-16 PROBLEM — Z80.42 FAMILY HISTORY OF PROSTATE CANCER IN FATHER: Status: ACTIVE | Noted: 2025-03-16

## 2025-03-16 RX ORDER — HEPARIN 100 UNIT/ML
500 SYRINGE INTRAVENOUS
OUTPATIENT
Start: 2025-04-07

## 2025-03-16 RX ORDER — EPINEPHRINE 0.3 MG/.3ML
0.3 INJECTION SUBCUTANEOUS ONCE AS NEEDED
Status: CANCELLED | OUTPATIENT
Start: 2025-03-17

## 2025-03-16 RX ORDER — HEPARIN 100 UNIT/ML
500 SYRINGE INTRAVENOUS
Status: CANCELLED | OUTPATIENT
Start: 2025-03-17

## 2025-03-16 RX ORDER — DIPHENHYDRAMINE HYDROCHLORIDE 50 MG/ML
50 INJECTION, SOLUTION INTRAMUSCULAR; INTRAVENOUS ONCE AS NEEDED
Status: CANCELLED | OUTPATIENT
Start: 2025-03-17

## 2025-03-16 RX ORDER — EPINEPHRINE 0.3 MG/.3ML
0.3 INJECTION SUBCUTANEOUS ONCE AS NEEDED
OUTPATIENT
Start: 2025-04-07

## 2025-03-16 RX ORDER — DIPHENHYDRAMINE HYDROCHLORIDE 50 MG/ML
50 INJECTION, SOLUTION INTRAMUSCULAR; INTRAVENOUS ONCE AS NEEDED
OUTPATIENT
Start: 2025-04-07

## 2025-03-16 RX ORDER — SODIUM CHLORIDE 0.9 % (FLUSH) 0.9 %
10 SYRINGE (ML) INJECTION
Status: CANCELLED | OUTPATIENT
Start: 2025-03-17

## 2025-03-16 RX ORDER — SODIUM CHLORIDE 0.9 % (FLUSH) 0.9 %
10 SYRINGE (ML) INJECTION
OUTPATIENT
Start: 2025-04-07

## 2025-03-16 NOTE — PROGRESS NOTES
History:  Past Medical History:   Diagnosis Date    Dizziness     GERD     Gout     High cholesterol     Insomnia     Irregular heart rhythm     Low vitamin D level     Lung cancer     Lung nodule     Mixed hyperlipidemia     Neuropathy     Obesity     Seasonal allergies     Sleep apnea     cipap    Stroke 2012    Vertigo      Past Surgical History:   Procedure Laterality Date    APPENDECTOMY       SECTION      CHOLECYSTECTOMY      COLONOSCOPY      COLONOSCOPY N/A 2024    Procedure: COLONOSCOPY;  Surgeon: Terry Ugarte MD;  Location: [facility] ENDOSCOPY;  Service: Endoscopy;  Laterality: N/A;    HERNIA REPAIR      INSERTION OF TUNNELED CENTRAL VENOUS CATHETER (CVC) WITH SUBCUTANEOUS PORT N/A 2024    Procedure: [id number];  Surgeon: Luz Elena Elder MD;  Location: [facility] OR;  Service: General;  Laterality: N/A;    LOBECTOMY Right 3/7/2024    Procedure: LOBECTOMY;  Surgeon: Naty Middleton MD;  Location: [facility] OR;  Service: Thoracic;  Laterality: Right;  upper and middle lobectomy    THORACOSCOPIC WEDGE RESECTION OF LUNG Right 3/7/2024    Procedure: VATS, WITH WEDGE RESECTION, LUNG;  Surgeon: Naty Middleton MD;  Location: [facility] OR;  Service: Thoracic;  Laterality: Right;  RIGHT VATS // POSS LOBECTOMY    THORACOTOMY Right 3/7/2024    Procedure: THORACOTOMY;  Surgeon: Naty Middleton MD;  Location: [facility] OR;  Service: Thoracic;  Laterality: Right;  converted to open at 1011      Social History     Socioeconomic History    Marital status:    Tobacco Use    Smoking status: Former     Average packs/day: 2.0 packs/day for 15.0 years (30.0 ttl pk-yrs)     Types: Cigarettes     Start date: 2023     Quit date: 1972     Years since quittin.2    Smokeless tobacco: Never    Tobacco comments:     Smoked since age 12; quit 2023   Substance and Sexual Activity    Alcohol use: Never    Drug use: Never    Sexual activity: Yes     Partners: Male      Family History   Problem  Relation Name Age of Onset    Cancer Mother Alesia     Heart disease Mother Alesia     Breast cancer Mother Alesia     Cancer Father Epi       Reason for Follow-up:  -adenocarcinoma right upper lung lobe, S/P right VATS/right upper lobe wedge resection/completion right upper lobectomy and right middle lobectomy and regional lymph node dissection 03/07/2024, G3, pT3 pN0  -Cologuard positive  -anemia of chronic disease  -folic acid deficiency    History of Present Illness:   No chief complaint on file.      Oncologic/Hematologic History:  Oncology History   Primary adenocarcinoma of upper lobe of right lung   3/7/2024 Cancer Staged    Staging form: Lung, AJCC 8th Edition  - Pathologic stage from 3/7/2024: Stage IIB (pT3, pN0, cM0)     4/15/2024 Initial Diagnosis    Primary adenocarcinoma of upper lobe of right lung     4/29/2024 - 7/2/2024 Chemotherapy    Treatment Summary   Plan Name: OP NSCLC PEMETREXED + CISPLATIN Q3W  Treatment Goal: Curative  Status: Inactive  Start Date: 4/29/2024  End Date: 7/2/2024  Provider: Emery Mahoney MD  Chemotherapy: CISplatin (Platinol) 132 mg in sodium chloride 0.9% 697 mL chemo infusion, 141 mg, Intravenous, Clinic/HOD 1 time, 4 of 4 cycles  Administration: 132 mg (4/29/2024), 128 mg (6/10/2024), 128 mg (7/1/2024), 132 mg (5/20/2024)  PEMEtrexed disodium (ALIMTA) 900 mg in sodium chloride 0.9% SolP 100 mL chemo infusion, 950 mg, Intravenous, Clinic/HOD 1 time, 4 of 4 cycles  Dose modification: 375 mg/m2 (75 % of original dose 500 mg/m2, Cycle 4, Reason: Dose Not Tolerated)  Administration: 900 mg (4/29/2024), 900 mg (6/10/2024), 625 mg (7/1/2024), 900 mg (5/20/2024)     8/9/2024 -  Chemotherapy    Treatment Summary   Plan Name: OP ATEZOLIZUMAB Q3W  Treatment Goal: Curative  Status: Active  Start Date: 8/9/2024  End Date: 7/21/2025 (Planned)  Provider: Emery Mahoney MD  Chemotherapy: [No matching medication found in this treatment plan]     Past medical history: Dizziness, GERD,  gout, dyslipidemia, insomnia, irregular heart rhythm, vitamin-D deficiency, mixed dyslipidemia, neuropathy, obesity, seasonal allergies, sleep apnea, CVA, vertigo  Procedure/surgical history:  Appendectomy, , cholecystectomy, colonoscopy 2024, hernia repair, MediPort placed 2024, right lung lobectomy 2024 (thoracotomy)  Social history:  .  Lives in [place].  Has 3 children.  Does not work.  Has been smoking 1-2 pack of cigarettes daily for 51 years, since age 12; discontinued recently.  No alcohol or illicit drug abuse.    Family history:   Mother experienced some kind of intrathoracic malignancy at age 83;  from MI at age 83   Father  from prostate cancer) experienced at age 83 and probably, sarcoma) experienced at age 83)  Health maintenance:   -PCP in [place]  -says that she had screening colonoscopy performed in [place] in , and that it was unremarkable  -says that now, for positive Cologuard test, she is scheduled for colonoscopy in May 2024  -2023:  Cologuard positive  -2023:  Bilateral digital screening mammogram with tomosynthesis (comparison:  2022 mammogram, etc.):  BI-RADS: 1 negative  -colonoscopy 2024 (positive Cologuard test): Diverticulosis sigmoid colon and descending colon; no biopsies taken; colonoscopy in 5 years for surveillance      63-year-old female, referred from [facility], Dr. Arnulfo Middleton, with poorly-differentiated adenocarcinoma of right lung.  Please refer to assessment and plan section for details.    2024:  Pleasant lady who presents for initial medical oncology consultation, accompanied by .  In no acute discomfort.  After lobectomy, has required supplemental oxygen via nasal cannula at 2 liters/minute.  This is being handled by home healthcare team.  Prior to surgery, she never required supplemental oxygen.  Prior to surgery, she never experienced significant  cough, sputum production, or dyspnea.  Great appetite.  Mild exertional dyspnea.  ECOG 1.    No unusual headaches or focal neurological symptoms.  No hemoptysis, fevers, or chills.  No abdominal pain, nausea, vomiting, change in bowel habits, or GI bleeding.  No anorexia or unintentional weight loss.  Smoked 1-2 packs of cigarettes daily for 51 years since age 12; says that she discontinued smoking after lung surgery.    Interval History:  FILGRASTIM (Neupogen) 480 MCG   OP ATEZOLIZUMAB Q3W     10/10/2024:   -07/22/2024: Hemoglobin 7.7; MCV normal; serum iron normal, TIBC normal; ferritin 1216, elevated; transferrin saturation 34%, normal; folic acid level 6.1, low; B12 level 1995, elevated; retic count 1.9%; haptoglobin normal; , elevated  (Anemia of chronic disease/inflammation/malignancy)  -07/22/2024: Started folic acid 1 mg daily  -hemoglobin dropped to 6.8 on 08/08/2024, requiring PRBC x2 units  -07/22/2024: TSH, free T4 normal  -09/20/2024: TSH, free T4 normal  -adjuvant atezolizumab 1200 mg every 3 weeks, started 08/09/2024; cycle 2 on 08/30/2024; cycle 3 on 09/20/2024  -09/18/2024:  Surveillance CT chest with contrast (comparison: CT C/A/P 0 05/06/2024):  No evidence of local recurrence or metastatic disease in chest  -10/10/2024: Hemoglobin 10.4, stable; MCV 98.2, rest of CBC essentially unremarkable; creatinine 1.14, stable; rest of CMP unremarkable  Presents for a follow-up visit, accompanied by a male family member.  In no acute discomfort.  Has been on oxygen ever since diagnosis of lung cancer.  Says that she does not smoke anymore.  She smoked 1-2 pack of cigarettes daily for 51 years, since age 12.  Great appetite.  Denies unusual headaches, focal neurological symptoms, significant chest pain/cough/hemoptysis/mucous production/dyspnea, abdominal pain, nausea, vomiting, etc..  No new lumps or lymphadenopathy.  No anorexia or unintentional weight loss.  No recurrent bouts of pneumonia or  bronchitis.  No immune related adverse events with ongoing consolidation therapy with Tecentriq in the form of immune pneumonitis, myocarditis, hepatitis, nephritis, endocrinopathies, dermatitis, colitis, vision impairment, allergic reactions, etc..  ECOG 1.  Ambulates with the help of walker.    03/17/2025:   -adjuvant atezolizumab:  Cycle 4 on 10/11/2024; cycle 5 on 10/31/2024; cycle 6 on 11/21/2024; cycle 7 on 12/16/2024; cycle 8 on 01/06/2025; cycle 9 on 02/03/2025; cycle 10 on 02/24/2025  -03/03/2025: Surveillance CT chest with contrast (comparison: CT 09/18/2024):  No detrimental change identified since 09/18/2024  -TSH and free T4 normal on 02/03/2025, 01/06/2025, 10/31/2024, 09/20/2024, etc.  -12/12/2024: Colonoscopy (indication:  Positive Cologuard test):  Diverticulosis sigmoid colon and descending colon; no biopsies taken (repeat colonoscopy in 5 years for surveillance)      There is no immunization history on file for this patient.    Review of patient's allergies indicates:   Allergen Reactions    Codeine Anxiety and Other (See Comments)     Medications:  Current Outpatient Medications on File Prior to Visit   Medication Sig Dispense Refill    allopurinoL (ZYLOPRIM) 100 MG tablet Take 100 mg by mouth once daily.      ALPRAZolam (XANAX) 0.5 MG tablet Take 1 tablet (0.5 mg total) by mouth once as needed for Anxiety. Take at 8:30 am on day of scans (Patient not taking: Reported on 2/24/2025) 1 tablet 0    aspirin (ECOTRIN) 81 MG EC tablet Take 81 mg by mouth once daily.      atorvastatin (LIPITOR) 40 MG tablet Take 40 mg by mouth once daily.      carvediloL (COREG) 12.5 MG tablet Take 12.5 mg by mouth 2 (two) times daily.      colchicine (COLCRYS) 0.6 mg tablet Take 0.6 mg by mouth once daily.      dexAMETHasone (DECADRON) 4 MG Tab Take Dexamethasone 8mg daily the day before treatment, treatment day, and 3 days after. (Patient not taking: Reported on 2/24/2025) 120 tablet 3     diphenhydrAMINE-aluminum-magnesium hydroxide-simethicone-LIDOcaine viscous HCl 2% Swish and spit 15 mLs every 4 (four) hours as needed (oral mucositis). (Patient not taking: Reported on 2/24/2025) 300 each 2    ENTRESTO 24-26 mg per tablet Take 1 tablet by mouth 2 (two) times daily.      ergocalciferol (ERGOCALCIFEROL) 50,000 unit Cap Take 50,000 Units by mouth every 7 days.      folic acid (FOLVITE) 1 MG tablet Take 1 tablet (1,000 mcg total) by mouth once daily. 30 tablet 2    furosemide (LASIX) 40 MG tablet Take 40 mg by mouth once daily.      gabapentin (NEURONTIN) 800 MG tablet Take 1 tablet (800 mg total) by mouth 2 (two) times daily. 60 tablet 0    ibuprofen (ADVIL,MOTRIN) 800 MG tablet       linaCLOtide (LINZESS) 145 mcg Cap capsule Take 290 mcg by mouth as needed.      LORazepam (ATIVAN) 1 MG tablet Take 1 tablet (1 mg total) by mouth once. for 1 dose 1 tablet 0    meclizine (ANTIVERT) 25 mg tablet Take 1 tablet (25 mg total) by mouth Daily. 30 tablet 0    megestroL (MEGACE) 40 MG Tab Take 1 tablet (40 mg total) by mouth 4 (four) times daily. (Patient not taking: Reported on 2/24/2025.) 120 tablet 2    mirtazapine (REMERON) 30 MG tablet Take 30 mg by mouth every evening.      montelukast (SINGULAIR) 10 mg tablet Take 10 mg by mouth once daily.      nortriptyline (PAMELOR) 25 MG capsule Take 25 mg by mouth Daily.      ondansetron (ZOFRAN) 4 MG tablet Take 1 tablet (4 mg total) by mouth daily as needed for Nausea. (Patient not taking: Reported on 2/24/2025) 30 tablet 1    pantoprazole (PROTONIX) 40 MG tablet Take 40 mg by mouth once daily.      polyethylene glycol (MOVIPREP) 100-7.5-2.691 gram solution Take as directed prior to colonoscopy (Patient not taking: Reported on 2/24/2025) 1 kit 0    potassium chloride (K-TAB) 20 mEq Take 1 tablet (20 mEq total) by mouth 2 (two) times daily. 28 tablet 0    prochlorperazine (COMPAZINE) 10 MG tablet Take 1 tablet (10 mg total) by mouth 4 (four) times daily. (Patient  not taking: Reported on 2/24/2025) 30 tablet 1     Current Facility-Administered Medications on File Prior to Visit   Medication Dose Route Frequency Provider Last Rate Last Admin    0.9%  NaCl infusion (for blood administration)   Intravenous Once Emery Mahoney MD        0.9% NaCl 100 mL flush bag   Intravenous PRN Emery Mahoney MD        alteplase injection 2 mg  2 mg Intra-Catheter PRN Emery Mahoney MD   2 mg at 10/11/24 1037    diphenhydrAMINE injection 50 mg  50 mg Intravenous Once PRN Emery Mahoney MD        EPINEPHrine injection 0.3 mg  0.3 mg Intramuscular Once PRN Emery Mahoney MD        heparin, porcine (PF) 100 unit/mL injection flush 500 Units  500 Units Intravenous PRN Emery Mahoney MD        hydrocortisone sodium succinate injection 100 mg  100 mg Intravenous Once PRN Emery Mahoney MD        lactated ringers infusion   Intravenous Continuous Aline Muniz MD 10 mL/hr at 04/26/24 0608 New Bag at 04/26/24 0608    sodium chloride 0.9% flush 10 mL  10 mL Intravenous PRN Emery Mahoney MD         Review of Systems:   All systems reviewed and found to be negative except for the symptoms detailed above    Physical Examination:   VITAL SIGNS:   There were no vitals filed for this visit.    GENERAL:  In no apparent distress.    HEAD:  No signs of head trauma.  EYES:  Pupils are equal.  Extraocular motions intact.    EARS:  Hearing grossly intact.  MOUTH:  Oropharynx is normal.   NECK:  No adenopathy, no JVD.     CHEST:  Chest with clear breath sounds bilaterally.  No wheezes, rales, rhonchi.    CARDIAC:  Regular rate and rhythm.  S1 and S2, without murmurs, gallops, rubs.  VASCULAR:  No Edema.  Peripheral pulses normal and equal in all extremities.  ABDOMEN:  Soft, without detectable tenderness.  No sign of distention.  No   rebound or guarding, and no masses palpated.   Bowel Sounds normal.  MUSCULOSKELETAL:  Good range of motion of all major joints. Extremities without  clubbing, cyanosis or edema.    NEUROLOGIC EXAM:  Alert and oriented x 3.  No focal sensory or strength deficits.   Speech normal.  Follows commands.  PSYCHIATRIC:  Mood normal.  -04/16/2024:  In no acute discomfort.  On supplemental oxygen via nasal cannula at 2 liters/minute.  Has required supplemental oxygen after lobectomy.  Prior to lobectomy, never required supplemental oxygen.  Oxygen is being handled by home healthcare team.  No cervical lymphadenopathy.    Assessment:  Problem List Items Addressed This Visit    None    Orders for 10/10/2024:   Continue Tecentriq every 3 weeks for a total of 17 cycles   Check TSH and free T4 every 6 weeks  Surveillance CT chest with contrast in March 2025  Refer to GI for colonoscopy for evaluation of positive Cologuard test   Follow-up with NP in 3 weeks     Above discussed at length with the patient.  All questions answered.  Discussed labs and scans and gave her copies of relevant results.  She understands and agrees with this plan.  =================================    # Adenocarcinoma right upper lung lobe:  -LDCT chest without contrast 08/12/2022:  Lung rads 2  -LDCT chest without contrast 09/11/2023:  Lung rads 4A  -noncontrast chest CT 12/26/2023:  Lung rads 4A  -FDG PET-CT 01/29/2024:  Hypermetabolic right upper lobe nodule, 1.2 x 1.1 cm, maximum SUV 14, previously 1 cm x 0.9 cm; no distant metastases   -noncontrast chest CT 02/01/2024:  Right upper lobe nodule continues to enlarge, now 11 mm   -03/07/2024:  Right VATS, right upper lobe wedge resection, right upper lobectomy, right middle lobectomy, regional lymph node dissection:  -separate tumor nodules (metastases) in same lobe, right upper lung lobe, therefore, pT3 (0.8 cm and 0.6 cm, respectively); invasive acinar adenocarcinoma; G3, poorly-differentiated; no visceral pleural invasion; no LVI; margins negative; 10 regional lymph nodes negative  -pT3 pN0 M0, stage IIB  -PD-L1 expression high positive (TPS 95%;  )  -negative for EGFR, KRAS, BRAF, AL K, ROS1, RET, met, HER2  >>>  Plan of adjuvant systemic therapy:  # cisplatin/Alimta every 3 weeks x4 cycles; followed by  # atezolizumab x1 year  >>>  -MediPort placed 04/25/2024  -S/P adjuvant cisplatin/Alimta every 3 weeks x4 cycles (04/29/2024-07/01/2024)  -restaging CTs C/A/P 05/06/2024:  No metastases   -staging brain MRI 05/08/2024: No metastases  -hemoglobin dropped to 5.2 on 07/10/2024, requiring blood transfusion  -ANC dropped to 0.55 on 06/24/2024, requiring supportive care  -07/17/2024:  ANC 0.525; hemoglobin 7.0; iron studies consistent with anemia of chronic disease/inflammation/malignancy/chemotherapy  -07/22/2024: TSH, free T4 normal  -adjuvant atezolizumab 1200 mg every 3 weeks, started 08/09/2024; cycle 2 on 08/30/2024; cycle 3 on 09/20/2024  -09/20/2024: TSH, free T4 normal  -surveillance CT chest 09/18/2024: MALLORY 6 months (September)  -adjuvant atezolizumab: Cycle 4 on 10/11/2024; cycle 10 on 02/24/2025  -surveillance CT chest 03/03/2025:  MALLORY  -TSH and free T4 normal on 02/03/2025, 01/06/2025, 10/31/2024, 09/20/2024, etc.  >>>  Plan:  -continue adjuvant atezolizumab 1200 mg IV every 3 weeks x1 year (17 cycles)   -monitor for immune related adverse events with atezolizumab  -check baseline TSH and free T4 every 6 weeks to monitor for the possibility of immune thyroiditis with atezolizumab  -continue surveillance (see below)  -for surveillance, noncontrast CT chest in    Monitoring on atezolizumab:  Monitor LFTs (AST, ALT, and total bilirubin; at baseline and periodically during treatment;  kidney function (serum creatinine; at baseline and periodically during treatment);  thyroid function (at baseline, periodically during treatment and as clinically indicated);  monitor blood glucose (for hyperglycemia);  Monitor closely for signs/symptoms of immune-mediated adverse reactions, including  adrenal insufficiency,  diarrhea/colitis (consider initiating or  repeating infectious workup in patients with corticosteroid-refractory immune-mediated colitis to exclude alternative causes),  dermatologic toxicity,  diabetes mellitus,  hypophysitis,  ocular disorders,  thyroid disorders,  pneumonitis and other immune-mediated adverse reactions.  Monitor for signs/symptoms of infusion-related reactions.     Surveillance for lung cancer:  -history and physical and chest CT +/- contrast every 6 months X 3 years (03/2024-03/2027), then history and physical and low-dose noncontrast chest CT annually  -smoking cessation advice and counseling   -FDG PET-CT or brain MRI is not routinely indicated    # Severe anemia:   (Anemia of chronic disease/inflammation/malignancy)  -07/22/2024: Hemoglobin 7.7; MCV normal (anemia of chronic disease/inflammation/malignancy)  -07/22/2024: Started on folic acid 1 mg daily  -08/08/2024: Hemoglobin dropped to 6.8, requiring PRBC x3 units  -hemoglobin: 9.3 on 09/20/2024; 9.3 on 08/30/2024; 9.7 on 08/19/2024, etc.    # Cologuard positive:  -08/17/2023: Cologuard positive  -colonoscopy 12/12/2024 (positive Cologuard test): Diverticulosis sigmoid colon and descending colon; no biopsies taken; colonoscopy in 5 years for surveillance  >>>  -surveillance colonoscopy in 5 years (around 12/2029)       Follow-up:  No follow-ups on file.

## 2025-03-17 ENCOUNTER — INFUSION (OUTPATIENT)
Dept: INFUSION THERAPY | Facility: HOSPITAL | Age: 65
End: 2025-03-17
Attending: INTERNAL MEDICINE
Payer: MEDICAID

## 2025-03-17 ENCOUNTER — OFFICE VISIT (OUTPATIENT)
Dept: HEMATOLOGY/ONCOLOGY | Facility: CLINIC | Age: 65
End: 2025-03-17
Attending: INTERNAL MEDICINE
Payer: MEDICAID

## 2025-03-17 VITALS
TEMPERATURE: 98 F | SYSTOLIC BLOOD PRESSURE: 111 MMHG | DIASTOLIC BLOOD PRESSURE: 71 MMHG | OXYGEN SATURATION: 100 % | RESPIRATION RATE: 18 BRPM | WEIGHT: 172.81 LBS | HEIGHT: 61 IN | HEART RATE: 72 BPM | BODY MASS INDEX: 32.63 KG/M2

## 2025-03-17 VITALS
DIASTOLIC BLOOD PRESSURE: 65 MMHG | HEIGHT: 61 IN | WEIGHT: 172.81 LBS | SYSTOLIC BLOOD PRESSURE: 134 MMHG | TEMPERATURE: 98 F | BODY MASS INDEX: 32.63 KG/M2 | HEART RATE: 74 BPM | OXYGEN SATURATION: 100 % | RESPIRATION RATE: 20 BRPM

## 2025-03-17 DIAGNOSIS — D63.8 ANEMIA OF CHRONIC DISEASE: ICD-10-CM

## 2025-03-17 DIAGNOSIS — C34.11 PRIMARY ADENOCARCINOMA OF UPPER LOBE OF RIGHT LUNG: Primary | ICD-10-CM

## 2025-03-17 DIAGNOSIS — C34.11 PRIMARY ADENOCARCINOMA OF UPPER LOBE OF RIGHT LUNG: ICD-10-CM

## 2025-03-17 DIAGNOSIS — E53.8 FOLIC ACID DEFICIENCY: ICD-10-CM

## 2025-03-17 DIAGNOSIS — Z29.89 IMMUNOTHERAPY ENCOUNTER: ICD-10-CM

## 2025-03-17 DIAGNOSIS — Z80.42 FAMILY HISTORY OF PROSTATE CANCER IN FATHER: ICD-10-CM

## 2025-03-17 DIAGNOSIS — R19.5 POSITIVE COLORECTAL CANCER SCREENING USING COLOGUARD TEST: ICD-10-CM

## 2025-03-17 DIAGNOSIS — Z90.2 STATUS POST LOBECTOMY OF LUNG: Primary | ICD-10-CM

## 2025-03-17 LAB
ALBUMIN SERPL-MCNC: 4 G/DL (ref 3.4–4.8)
ALBUMIN/GLOB SERPL: 1.1 RATIO (ref 1.1–2)
ALP SERPL-CCNC: 125 UNIT/L (ref 40–150)
ALT SERPL-CCNC: 14 UNIT/L (ref 0–55)
ANION GAP SERPL CALC-SCNC: 10 MEQ/L
AST SERPL-CCNC: 15 UNIT/L (ref 5–34)
BILIRUB SERPL-MCNC: 0.6 MG/DL
BUN SERPL-MCNC: 20.9 MG/DL (ref 9.8–20.1)
CALCIUM SERPL-MCNC: 9.6 MG/DL (ref 8.4–10.2)
CHLORIDE SERPL-SCNC: 99 MMOL/L (ref 98–107)
CO2 SERPL-SCNC: 32 MMOL/L (ref 23–31)
CREAT SERPL-MCNC: 1.16 MG/DL (ref 0.55–1.02)
CREAT/UREA NIT SERPL: 18
GFR SERPLBLD CREATININE-BSD FMLA CKD-EPI: 53 ML/MIN/1.73/M2
GLOBULIN SER-MCNC: 3.5 GM/DL (ref 2.4–3.5)
GLUCOSE SERPL-MCNC: 121 MG/DL (ref 82–115)
MAGNESIUM SERPL-MCNC: 2.1 MG/DL (ref 1.6–2.6)
POTASSIUM SERPL-SCNC: 3.8 MMOL/L (ref 3.5–5.1)
PROT SERPL-MCNC: 7.5 GM/DL (ref 5.8–7.6)
SODIUM SERPL-SCNC: 141 MMOL/L (ref 136–145)
T4 FREE SERPL-MCNC: 1.08 NG/DL (ref 0.7–1.48)
TSH SERPL-ACNC: 2.1 UIU/ML (ref 0.35–4.94)

## 2025-03-17 PROCEDURE — 25000003 PHARM REV CODE 250: Performed by: INTERNAL MEDICINE

## 2025-03-17 PROCEDURE — 99215 OFFICE O/P EST HI 40 MIN: CPT | Mod: S$PBB,,, | Performed by: INTERNAL MEDICINE

## 2025-03-17 PROCEDURE — 96413 CHEMO IV INFUSION 1 HR: CPT

## 2025-03-17 PROCEDURE — 4010F ACE/ARB THERAPY RXD/TAKEN: CPT | Mod: CPTII,,, | Performed by: INTERNAL MEDICINE

## 2025-03-17 PROCEDURE — 1159F MED LIST DOCD IN RCRD: CPT | Mod: CPTII,,, | Performed by: INTERNAL MEDICINE

## 2025-03-17 PROCEDURE — 3074F SYST BP LT 130 MM HG: CPT | Mod: CPTII,,, | Performed by: INTERNAL MEDICINE

## 2025-03-17 PROCEDURE — 63600175 PHARM REV CODE 636 W HCPCS: Performed by: INTERNAL MEDICINE

## 2025-03-17 PROCEDURE — 1160F RVW MEDS BY RX/DR IN RCRD: CPT | Mod: CPTII,,, | Performed by: INTERNAL MEDICINE

## 2025-03-17 PROCEDURE — 99215 OFFICE O/P EST HI 40 MIN: CPT | Mod: PBBFAC | Performed by: INTERNAL MEDICINE

## 2025-03-17 PROCEDURE — 84439 ASSAY OF FREE THYROXINE: CPT | Performed by: INTERNAL MEDICINE

## 2025-03-17 PROCEDURE — 84443 ASSAY THYROID STIM HORMONE: CPT | Performed by: INTERNAL MEDICINE

## 2025-03-17 PROCEDURE — 3078F DIAST BP <80 MM HG: CPT | Mod: CPTII,,, | Performed by: INTERNAL MEDICINE

## 2025-03-17 PROCEDURE — 3008F BODY MASS INDEX DOCD: CPT | Mod: CPTII,,, | Performed by: INTERNAL MEDICINE

## 2025-03-17 PROCEDURE — A4216 STERILE WATER/SALINE, 10 ML: HCPCS | Performed by: INTERNAL MEDICINE

## 2025-03-17 PROCEDURE — 80053 COMPREHEN METABOLIC PANEL: CPT | Performed by: INTERNAL MEDICINE

## 2025-03-17 PROCEDURE — 36415 COLL VENOUS BLD VENIPUNCTURE: CPT | Mod: 59 | Performed by: INTERNAL MEDICINE

## 2025-03-17 PROCEDURE — 83735 ASSAY OF MAGNESIUM: CPT | Performed by: INTERNAL MEDICINE

## 2025-03-17 RX ORDER — EPINEPHRINE 1 MG/ML
0.3 INJECTION INTRAMUSCULAR; INTRAVENOUS; SUBCUTANEOUS ONCE AS NEEDED
Status: DISCONTINUED | OUTPATIENT
Start: 2025-03-17 | End: 2025-03-17 | Stop reason: HOSPADM

## 2025-03-17 RX ORDER — HEPARIN 100 UNIT/ML
500 SYRINGE INTRAVENOUS
Status: DISCONTINUED | OUTPATIENT
Start: 2025-03-17 | End: 2025-03-17 | Stop reason: HOSPADM

## 2025-03-17 RX ORDER — SODIUM CHLORIDE 0.9 % (FLUSH) 0.9 %
10 SYRINGE (ML) INJECTION
Status: DISCONTINUED | OUTPATIENT
Start: 2025-03-17 | End: 2025-03-17 | Stop reason: HOSPADM

## 2025-03-17 RX ORDER — DIPHENHYDRAMINE HYDROCHLORIDE 50 MG/ML
50 INJECTION, SOLUTION INTRAMUSCULAR; INTRAVENOUS ONCE AS NEEDED
Status: DISCONTINUED | OUTPATIENT
Start: 2025-03-17 | End: 2025-03-17 | Stop reason: HOSPADM

## 2025-03-17 RX ADMIN — ATEZOLIZUMAB 1200 MG: 1200 INJECTION, SOLUTION INTRAVENOUS at 11:03

## 2025-03-17 RX ADMIN — HEPARIN 500 UNITS: 100 SYRINGE at 12:03

## 2025-03-17 RX ADMIN — Medication 10 ML: at 12:03

## 2025-03-17 RX ADMIN — SODIUM CHLORIDE: 9 INJECTION, SOLUTION INTRAVENOUS at 11:03

## 2025-03-17 NOTE — PROGRESS NOTES
History:  Past Medical History:   Diagnosis Date    Dizziness     GERD     Gout     High cholesterol     Insomnia     Irregular heart rhythm     Low vitamin D level     Lung cancer     Lung nodule     Mixed hyperlipidemia     Neuropathy     Obesity     Seasonal allergies     Sleep apnea     cipap    Stroke 2012    Vertigo      Past Surgical History:   Procedure Laterality Date    APPENDECTOMY       SECTION      CHOLECYSTECTOMY      COLONOSCOPY      COLONOSCOPY N/A 2024    Procedure: COLONOSCOPY;  Surgeon: Terry Ugarte MD;  Location: [facility] ENDOSCOPY;  Service: Endoscopy;  Laterality: N/A;    HERNIA REPAIR      INSERTION OF TUNNELED CENTRAL VENOUS CATHETER (CVC) WITH SUBCUTANEOUS PORT N/A 2024    Procedure: FARTIWIHZ-CSPI-B-CATH;  Surgeon: Luz Elena Elder MD;  Location: [facility] OR;  Service: General;  Laterality: N/A;    LOBECTOMY Right 3/7/2024    Procedure: LOBECTOMY;  Surgeon: Naty Middleton MD;  Location: [facility] OR;  Service: Thoracic;  Laterality: Right;  upper and middle lobectomy    THORACOSCOPIC WEDGE RESECTION OF LUNG Right 3/7/2024    Procedure: VATS, WITH WEDGE RESECTION, LUNG;  Surgeon: Naty Middleton MD;  Location: [facility] OR;  Service: Thoracic;  Laterality: Right;  RIGHT VATS // POSS LOBECTOMY    THORACOTOMY Right 3/7/2024    Procedure: THORACOTOMY;  Surgeon: Naty Middleton MD;  Location: [facility] OR;  Service: Thoracic;  Laterality: Right;  converted to open at 1011      Social History     Socioeconomic History    Marital status:    Tobacco Use    Smoking status: Former     Average packs/day: 2.0 packs/day for 15.0 years (30.0 ttl pk-yrs)     Types: Cigarettes     Start date: 2023     Quit date: 1972     Years since quittin.2    Smokeless tobacco: Never    Tobacco comments:     Smoked since age 12; quit 2023   Substance and Sexual Activity    Alcohol use: Never    Drug use: Never    Sexual activity: Yes     Partners: Male      Family History   Problem  Relation Name Age of Onset    Cancer Mother Alesia     Heart disease Mother Alesia     Breast cancer Mother Alesia     Cancer Father Epi       Reason for Follow-up:  -adenocarcinoma right upper lung lobe, S/P right VATS/right upper lobe wedge resection/completion right upper lobectomy and right middle lobectomy and regional lymph node dissection 03/07/2024, G3, pT3 pN0  -Cologuard positive  -anemia of chronic disease  -folic acid deficiency    History of Present Illness:   Status post lobectomy of lung      Oncologic/Hematologic History:  Oncology History   Primary adenocarcinoma of upper lobe of right lung   3/7/2024 Cancer Staged    Staging form: Lung, AJCC 8th Edition  - Pathologic stage from 3/7/2024: Stage IIB (pT3, pN0, cM0)     4/15/2024 Initial Diagnosis    Primary adenocarcinoma of upper lobe of right lung     4/29/2024 - 7/2/2024 Chemotherapy    Treatment Summary   Plan Name: OP NSCLC PEMETREXED + CISPLATIN Q3W  Treatment Goal: Curative  Status: Inactive  Start Date: 4/29/2024  End Date: 7/2/2024  Provider: Emery Mahoney MD  Chemotherapy: CISplatin (Platinol) 132 mg in sodium chloride 0.9% 697 mL chemo infusion, 141 mg, Intravenous, Clinic/HOD 1 time, 4 of 4 cycles  Administration: 132 mg (4/29/2024), 128 mg (6/10/2024), 128 mg (7/1/2024), 132 mg (5/20/2024)  PEMEtrexed disodium (ALIMTA) 900 mg in sodium chloride 0.9% SolP 100 mL chemo infusion, 950 mg, Intravenous, Clinic/HOD 1 time, 4 of 4 cycles  Dose modification: 375 mg/m2 (75 % of original dose 500 mg/m2, Cycle 4, Reason: Dose Not Tolerated)  Administration: 900 mg (4/29/2024), 900 mg (6/10/2024), 625 mg (7/1/2024), 900 mg (5/20/2024)     8/9/2024 -  Chemotherapy    Treatment Summary   Plan Name: OP ATEZOLIZUMAB Q3W  Treatment Goal: Curative  Status: Active  Start Date: 8/9/2024  End Date: 7/21/2025 (Planned)  Provider: Emery Mahoney MD  Chemotherapy: [No matching medication found in this treatment plan]     Past medical history: Dizziness,  GERD, gout, dyslipidemia, insomnia, irregular heart rhythm, vitamin-D deficiency, mixed dyslipidemia, neuropathy, obesity, seasonal allergies, sleep apnea, CVA, vertigo  Procedure/surgical history:  Appendectomy, , cholecystectomy, colonoscopy 2024, hernia repair, MediPort placed 2024, right lung lobectomy 2024 (thoracotomy)  Social history:  .  Lives in [place].  Has 3 children.  Does not work.  Has been smoking 1-2 pack of cigarettes daily for 51 years, since age 12; discontinued recently.  No alcohol or illicit drug abuse.    Family history:   Mother experienced some kind of intrathoracic malignancy at age 83;  from MI at age 83   Father  from prostate cancer) experienced at age 83 and probably, sarcoma) experienced at age 83)  Health maintenance:   -PCP in [place]  -says that she had screening colonoscopy performed in [place] in , and that it was unremarkable  -says that now, for positive Cologuard test, she is scheduled for colonoscopy in May 2024  -2023:  Cologuard positive  -2023:  Bilateral digital screening mammogram with tomosynthesis (comparison:  2022 mammogram, etc.):  BI-RADS: 1 negative  -colonoscopy 2024 (positive Cologuard test): Diverticulosis sigmoid colon and descending colon; no biopsies taken; colonoscopy in 5 years for surveillance      63-year-old female, referred from [facility], Dr. Arnulfo Middleton, with poorly-differentiated adenocarcinoma of right lung.  Please refer to assessment and plan section for details.    2024:  Pleasant lady who presents for initial medical oncology consultation, accompanied by .  In no acute discomfort.  After lobectomy, has required supplemental oxygen via nasal cannula at 2 liters/minute.  This is being handled by home healthcare team.  Prior to surgery, she never required supplemental oxygen.  Prior to surgery, she never experienced  significant cough, sputum production, or dyspnea.  Great appetite.  Mild exertional dyspnea.  ECOG 1.    No unusual headaches or focal neurological symptoms.  No hemoptysis, fevers, or chills.  No abdominal pain, nausea, vomiting, change in bowel habits, or GI bleeding.  No anorexia or unintentional weight loss.  Smoked 1-2 packs of cigarettes daily for 51 years since age 12; says that she discontinued smoking after lung surgery.    Interval History:  FILGRASTIM (Neupogen) 480 MCG   OP ATEZOLIZUMAB Q3W     03/17/2025:   -adjuvant atezolizumab:  Cycle 4 on 10/11/2024; cycle 5 on 10/31/2024; cycle 6 on 11/21/2024; cycle 7 on 12/16/2024; cycle 8 on 01/06/2025; cycle 9 on 02/03/2025; cycle 10 on 02/24/2025  -03/03/2025: Surveillance CT chest with contrast (comparison: CT 09/18/2024):  No detrimental change identified since 09/18/2024  -TSH and free T4 normal on 02/03/2025, 01/06/2025, 10/31/2024, 09/20/2024, etc.  -12/12/2024: Colonoscopy (indication:  Positive Cologuard test):  Diverticulosis sigmoid colon and descending colon; no biopsies taken (repeat colonoscopy in 5 years for surveillance)  -03/17/2025:  CBC unremarkable; CMP/TSH/free T4/magnesium level pending.  Presents for a follow-up visit, accompanied by her sister and another male family member.  In no acute discomfort.  On supplemental oxygen by nasal cannula. In a wheelchair.  Tolerating atezolizumab uneventfully.  Great appetite.  Stable exertional dyspnea, moderate in degree. No immune related adverse events with ongoing consolidation therapy with Tecentriq in the form of immune pneumonitis, myocarditis, hepatitis, nephritis, endocrinopathies, dermatitis, colitis, vision impairment, allergic reactions, etc..  ECOG 1.  Ambulates with the help of walker.      There is no immunization history on file for this patient.    Review of patient's allergies indicates:   Allergen Reactions    Codeine Anxiety and Other (See Comments)     Medications:  Current  Outpatient Medications on File Prior to Visit   Medication Sig Dispense Refill    allopurinoL (ZYLOPRIM) 100 MG tablet Take 100 mg by mouth once daily.      aspirin (ECOTRIN) 81 MG EC tablet Take 81 mg by mouth once daily.      carvediloL (COREG) 12.5 MG tablet Take 12.5 mg by mouth 2 (two) times daily.      colchicine (COLCRYS) 0.6 mg tablet Take 0.6 mg by mouth once daily.      ENTRESTO 24-26 mg per tablet Take 1 tablet by mouth 2 (two) times daily.      ergocalciferol (ERGOCALCIFEROL) 50,000 unit Cap Take 50,000 Units by mouth every 7 days.      folic acid (FOLVITE) 1 MG tablet Take 1 tablet (1,000 mcg total) by mouth once daily. 30 tablet 2    furosemide (LASIX) 40 MG tablet Take 40 mg by mouth once daily.      gabapentin (NEURONTIN) 800 MG tablet Take 1 tablet (800 mg total) by mouth 2 (two) times daily. 60 tablet 0    ibuprofen (ADVIL,MOTRIN) 800 MG tablet       linaCLOtide (LINZESS) 145 mcg Cap capsule Take 290 mcg by mouth as needed.      meclizine (ANTIVERT) 25 mg tablet Take 1 tablet (25 mg total) by mouth Daily. 30 tablet 0    mirtazapine (REMERON) 30 MG tablet Take 30 mg by mouth every evening.      montelukast (SINGULAIR) 10 mg tablet Take 10 mg by mouth once daily.      nortriptyline (PAMELOR) 25 MG capsule Take 25 mg by mouth Daily.      pantoprazole (PROTONIX) 40 MG tablet Take 40 mg by mouth once daily.      potassium chloride (K-TAB) 20 mEq Take 1 tablet (20 mEq total) by mouth 2 (two) times daily. 28 tablet 0    ALPRAZolam (XANAX) 0.5 MG tablet Take 1 tablet (0.5 mg total) by mouth once as needed for Anxiety. Take at 8:30 am on day of scans (Patient not taking: Reported on 2/24/2025) 1 tablet 0    atorvastatin (LIPITOR) 40 MG tablet Take 40 mg by mouth once daily.      dexAMETHasone (DECADRON) 4 MG Tab Take Dexamethasone 8mg daily the day before treatment, treatment day, and 3 days after. (Patient not taking: Reported on 3/17/2025) 120 tablet 3    diphenhydrAMINE-aluminum-magnesium  hydroxide-simethicone-LIDOcaine viscous HCl 2% Swish and spit 15 mLs every 4 (four) hours as needed (oral mucositis). (Patient not taking: Reported on 3/17/2025) 300 each 2    LORazepam (ATIVAN) 1 MG tablet Take 1 tablet (1 mg total) by mouth once. for 1 dose 1 tablet 0    megestroL (MEGACE) 40 MG Tab Take 1 tablet (40 mg total) by mouth 4 (four) times daily. (Patient not taking: Reported on 3/17/2025) 120 tablet 2    ondansetron (ZOFRAN) 4 MG tablet Take 1 tablet (4 mg total) by mouth daily as needed for Nausea. (Patient not taking: Reported on 3/17/2025) 30 tablet 1    polyethylene glycol (MOVIPREP) 100-7.5-2.691 gram solution Take as directed prior to colonoscopy (Patient not taking: Reported on 3/17/2025) 1 kit 0    prochlorperazine (COMPAZINE) 10 MG tablet Take 1 tablet (10 mg total) by mouth 4 (four) times daily. (Patient not taking: Reported on 3/17/2025) 30 tablet 1     Current Facility-Administered Medications on File Prior to Visit   Medication Dose Route Frequency Provider Last Rate Last Admin    0.9%  NaCl infusion (for blood administration)   Intravenous Once Emery Mahoney MD        0.9% NaCl 100 mL flush bag   Intravenous PRN Emery Mahoney MD        alteplase injection 2 mg  2 mg Intra-Catheter PRN Emery Mahoney MD   2 mg at 10/11/24 1037    diphenhydrAMINE injection 50 mg  50 mg Intravenous Once PRN Emery Mahoney MD        EPINEPHrine injection 0.3 mg  0.3 mg Intramuscular Once PRN Emery Mahoeny MD        heparin, porcine (PF) 100 unit/mL injection flush 500 Units  500 Units Intravenous PRN Emery Mahoney MD        hydrocortisone sodium succinate injection 100 mg  100 mg Intravenous Once PRN Emery Mahoney MD        lactated ringers infusion   Intravenous Continuous Aline Muniz MD 10 mL/hr at 04/26/24 0608 New Bag at 04/26/24 0608    sodium chloride 0.9% flush 10 mL  10 mL Intravenous PRN Emery Mahoney MD         Review of Systems:   All systems reviewed and found  to be negative except for the symptoms detailed above    Physical Examination:   VITAL SIGNS:   Vitals:    03/17/25 0856   BP: 111/71   Pulse: 72   Resp: 18   Temp: 97.5 °F (36.4 °C)       GENERAL:  In no apparent distress.    HEAD:  No signs of head trauma.  EYES:  Pupils are equal.  Extraocular motions intact.    EARS:  Hearing grossly intact.  MOUTH:  Oropharynx is normal.   NECK:  No adenopathy, no JVD.     CHEST:  Chest with clear breath sounds bilaterally.  No wheezes, rales, rhonchi.    CARDIAC:  Regular rate and rhythm.  S1 and S2, without murmurs, gallops, rubs.  VASCULAR:  No Edema.  Peripheral pulses normal and equal in all extremities.  ABDOMEN:  Soft, without detectable tenderness.  No sign of distention.  No   rebound or guarding, and no masses palpated.   Bowel Sounds normal.  MUSCULOSKELETAL:  Good range of motion of all major joints. Extremities without clubbing, cyanosis or edema.    NEUROLOGIC EXAM:  Alert and oriented x 3.  No focal sensory or strength deficits.   Speech normal.  Follows commands.  PSYCHIATRIC:  Mood normal.  -04/16/2024:  In no acute discomfort.  On supplemental oxygen via nasal cannula at 2 liters/minute.  Has required supplemental oxygen after lobectomy.  Prior to lobectomy, never required supplemental oxygen.  Oxygen is being handled by home healthcare team.  No cervical lymphadenopathy.    Assessment:  Problem List Items Addressed This Visit       Primary adenocarcinoma of upper lobe of right lung    Positive colorectal cancer screening using Cologuard test    Status post lobectomy of lung - Primary    Anemia of chronic disease    Folic acid deficiency    Family history of prostate cancer in father     Orders for 03/17/2025:   Continue Tecentriq every 3 weeks for a total of 17 cycles  Check TSH and free T4 every 6 weeks   For surveillance, noncontrast chest CT in 6 months (September 2025)  Follow-up with NP in 3 weeks    Above discussed at length with the patient.  All  questions answered.  Discussed labs and scans and gave her copies of relevant results.  She understands and agrees with this plan.  =================================    # Adenocarcinoma right upper lung lobe:  -LDCT chest without contrast 08/12/2022:  Lung rads 2  -LDCT chest without contrast 09/11/2023:  Lung rads 4A  -noncontrast chest CT 12/26/2023:  Lung rads 4A  -FDG PET-CT 01/29/2024:  Hypermetabolic right upper lobe nodule, 1.2 x 1.1 cm, maximum SUV 14, previously 1 cm x 0.9 cm; no distant metastases   -noncontrast chest CT 02/01/2024:  Right upper lobe nodule continues to enlarge, now 11 mm   -03/07/2024:  Right VATS, right upper lobe wedge resection, right upper lobectomy, right middle lobectomy, regional lymph node dissection:  -separate tumor nodules (metastases) in same lobe, right upper lung lobe, therefore, pT3 (0.8 cm and 0.6 cm, respectively); invasive acinar adenocarcinoma; G3, poorly-differentiated; no visceral pleural invasion; no LVI; margins negative; 10 regional lymph nodes negative  -pT3 pN0 M0, stage IIB  -PD-L1 expression high positive (TPS 95%; )  -negative for EGFR, KRAS, BRAF, AL K, ROS1, RET, met, HER2  >>>  Plan of adjuvant systemic therapy:  # cisplatin/Alimta every 3 weeks x4 cycles; followed by  # atezolizumab x1 year  >>>  -MediPort placed 04/25/2024  -S/P adjuvant cisplatin/Alimta every 3 weeks x4 cycles (04/29/2024-07/01/2024)  -restaging CTs C/A/P 05/06/2024:  No metastases   -staging brain MRI 05/08/2024: No metastases  -hemoglobin dropped to 5.2 on 07/10/2024, requiring blood transfusion  -ANC dropped to 0.55 on 06/24/2024, requiring supportive care  -07/17/2024:  ANC 0.525; hemoglobin 7.0; iron studies consistent with anemia of chronic disease/inflammation/malignancy/chemotherapy  -07/22/2024: TSH, free T4 normal  -adjuvant atezolizumab 1200 mg every 3 weeks, started 08/09/2024; cycle 2 on 08/30/2024; cycle 3 on 09/20/2024  -09/20/2024: TSH, free T4  normal  -surveillance CT chest 09/18/2024: MALLORY 6 months (September)  -adjuvant atezolizumab: Cycle 4 on 10/11/2024; cycle 10 on 02/24/2025  -surveillance CT chest 03/03/2025:  MALLORY  -TSH and free T4 normal on 02/03/2025, 01/06/2025, 10/31/2024, 09/20/2024, etc.  >>>  Plan:  -continue adjuvant atezolizumab 1200 mg IV every 3 weeks x1 year (17 cycles)   -monitor for immune related adverse events with atezolizumab  -check baseline TSH and free T4 every 6 weeks to monitor for the possibility of immune thyroiditis with atezolizumab  -continue surveillance (see below)  -for surveillance, noncontrast CT chest in 6 months (September 2025)    Monitoring on atezolizumab:  Monitor LFTs (AST, ALT, and total bilirubin; at baseline and periodically during treatment;  kidney function (serum creatinine; at baseline and periodically during treatment);  thyroid function (at baseline, periodically during treatment and as clinically indicated);  monitor blood glucose (for hyperglycemia);  Monitor closely for signs/symptoms of immune-mediated adverse reactions, including  adrenal insufficiency,  diarrhea/colitis (consider initiating or repeating infectious workup in patients with corticosteroid-refractory immune-mediated colitis to exclude alternative causes),  dermatologic toxicity,  diabetes mellitus,  hypophysitis,  ocular disorders,  thyroid disorders,  pneumonitis and other immune-mediated adverse reactions.  Monitor for signs/symptoms of infusion-related reactions.     Surveillance for lung cancer:  -history and physical and chest CT +/- contrast every 6 months X 3 years (03/2024-03/2027), then history and physical and low-dose noncontrast chest CT annually  -smoking cessation advice and counseling   -FDG PET-CT or brain MRI is not routinely indicated    # Severe anemia:   (Anemia of chronic disease/inflammation/malignancy)  -07/22/2024: Hemoglobin 7.7; MCV normal (anemia of chronic disease/inflammation/malignancy)  -07/22/2024:  Started on folic acid 1 mg daily  -08/08/2024: Hemoglobin dropped to 6.8, requiring PRBC x3 units  -hemoglobin: 9.3 on 09/20/2024; 9.3 on 08/30/2024; 9.7 on 08/19/2024, etc.    # Cologuard positive:  -08/17/2023: Cologuard positive  -colonoscopy 12/12/2024 (positive Cologuard test): Diverticulosis sigmoid colon and descending colon; no biopsies taken; colonoscopy in 5 years for surveillance  >>>  -surveillance colonoscopy in 5 years (around 12/2029)       Follow-up:  No follow-ups on file.

## 2025-03-17 NOTE — NURSING
Pt & spouse escorted to [facility], no complaints, labs still need to be drawn, with assist of BRITTANY Sahu, RN Supervisor, blood return obtained but unable to obatin enough blood for lab work; BRITTANY Sahu RN jean carlos blood peripherally; lab work pending; pt without complaints.    Labs resulted & within parameters for Tecentriq, messaged Dr Mahoney as follows:    Dr Mahoney - pt meets parameters for Tecentriq today, however, her BUN/CRE is slightly elevated over last time at 20.9 & 1.16 today, with CrCl 46.4, last BUN/CRE 19.4 & 1.10. Any new orders besides increase po intake of water? Thank you; his response was proceed with chemo; educated pt to increase po intake of water; pt verbalized understanding.    AVS given, pt returns 4/7/25 for labs, A Edwige, NP & next cycle of Tecentriq.    Pt requested refills for her Allopurinol & Montekulast, messaged Dr Mahoney & his staff, he responded for pt to contact her PCP, pt stated her PCP left town; informed pt Dr Mahoney sent a referral to Internal Medicine for pt to be assigned a PCP; pt verbalized understanding.

## 2025-03-20 DIAGNOSIS — G62.0 NEUROPATHY DUE TO DRUG: ICD-10-CM

## 2025-03-20 RX ORDER — GABAPENTIN 800 MG/1
800 TABLET ORAL 2 TIMES DAILY
Qty: 60 TABLET | Refills: 2 | Status: SHIPPED | OUTPATIENT
Start: 2025-03-20

## 2025-04-03 DIAGNOSIS — C34.11 PRIMARY ADENOCARCINOMA OF UPPER LOBE OF RIGHT LUNG: Primary | ICD-10-CM

## 2025-04-07 ENCOUNTER — OFFICE VISIT (OUTPATIENT)
Dept: HEMATOLOGY/ONCOLOGY | Facility: CLINIC | Age: 65
End: 2025-04-07
Payer: MEDICAID

## 2025-04-07 ENCOUNTER — INFUSION (OUTPATIENT)
Dept: INFUSION THERAPY | Facility: HOSPITAL | Age: 65
End: 2025-04-07
Attending: INTERNAL MEDICINE
Payer: MEDICAID

## 2025-04-07 VITALS
OXYGEN SATURATION: 100 % | RESPIRATION RATE: 18 BRPM | DIASTOLIC BLOOD PRESSURE: 61 MMHG | HEART RATE: 80 BPM | TEMPERATURE: 98 F | SYSTOLIC BLOOD PRESSURE: 125 MMHG | HEIGHT: 61 IN | BODY MASS INDEX: 32.96 KG/M2 | WEIGHT: 174.56 LBS

## 2025-04-07 VITALS
HEART RATE: 80 BPM | DIASTOLIC BLOOD PRESSURE: 52 MMHG | HEIGHT: 61 IN | SYSTOLIC BLOOD PRESSURE: 92 MMHG | BODY MASS INDEX: 32.97 KG/M2 | WEIGHT: 174.63 LBS | TEMPERATURE: 98 F | OXYGEN SATURATION: 99 % | RESPIRATION RATE: 17 BRPM

## 2025-04-07 DIAGNOSIS — R79.89 ELEVATED SERUM CREATININE: ICD-10-CM

## 2025-04-07 DIAGNOSIS — C34.11 PRIMARY ADENOCARCINOMA OF UPPER LOBE OF RIGHT LUNG: Primary | ICD-10-CM

## 2025-04-07 DIAGNOSIS — R42 VERTIGO: ICD-10-CM

## 2025-04-07 DIAGNOSIS — Z79.69 IMMUNODEFICIENCY DUE TO CHEMOTHERAPY: ICD-10-CM

## 2025-04-07 DIAGNOSIS — T45.1X5A IMMUNODEFICIENCY DUE TO CHEMOTHERAPY: ICD-10-CM

## 2025-04-07 DIAGNOSIS — D84.821 IMMUNODEFICIENCY DUE TO CHEMOTHERAPY: ICD-10-CM

## 2025-04-07 PROCEDURE — 99213 OFFICE O/P EST LOW 20 MIN: CPT | Mod: PBBFAC,25

## 2025-04-07 PROCEDURE — 3074F SYST BP LT 130 MM HG: CPT | Mod: CPTII,,,

## 2025-04-07 PROCEDURE — 96413 CHEMO IV INFUSION 1 HR: CPT

## 2025-04-07 PROCEDURE — 3008F BODY MASS INDEX DOCD: CPT | Mod: CPTII,,,

## 2025-04-07 PROCEDURE — 63600175 PHARM REV CODE 636 W HCPCS: Mod: JZ,TB | Performed by: INTERNAL MEDICINE

## 2025-04-07 PROCEDURE — 1160F RVW MEDS BY RX/DR IN RCRD: CPT | Mod: CPTII,,,

## 2025-04-07 PROCEDURE — 1159F MED LIST DOCD IN RCRD: CPT | Mod: CPTII,,,

## 2025-04-07 PROCEDURE — 99215 OFFICE O/P EST HI 40 MIN: CPT | Mod: S$PBB,,,

## 2025-04-07 PROCEDURE — 25000003 PHARM REV CODE 250: Performed by: INTERNAL MEDICINE

## 2025-04-07 PROCEDURE — 3078F DIAST BP <80 MM HG: CPT | Mod: CPTII,,,

## 2025-04-07 PROCEDURE — 4010F ACE/ARB THERAPY RXD/TAKEN: CPT | Mod: CPTII,,,

## 2025-04-07 RX ORDER — MECLIZINE HYDROCHLORIDE 25 MG/1
25 TABLET ORAL DAILY
Qty: 30 TABLET | Refills: 0 | Status: SHIPPED | OUTPATIENT
Start: 2025-04-07

## 2025-04-07 RX ORDER — DIPHENHYDRAMINE HYDROCHLORIDE 50 MG/ML
50 INJECTION, SOLUTION INTRAMUSCULAR; INTRAVENOUS ONCE AS NEEDED
Status: DISCONTINUED | OUTPATIENT
Start: 2025-04-07 | End: 2025-04-07 | Stop reason: HOSPADM

## 2025-04-07 RX ORDER — EPINEPHRINE 1 MG/ML
0.3 INJECTION INTRAMUSCULAR; INTRAVENOUS; SUBCUTANEOUS ONCE AS NEEDED
Status: DISCONTINUED | OUTPATIENT
Start: 2025-04-07 | End: 2025-04-07 | Stop reason: HOSPADM

## 2025-04-07 RX ORDER — HEPARIN 100 UNIT/ML
500 SYRINGE INTRAVENOUS
Status: DISCONTINUED | OUTPATIENT
Start: 2025-04-07 | End: 2025-04-07 | Stop reason: HOSPADM

## 2025-04-07 RX ORDER — SODIUM CHLORIDE 0.9 % (FLUSH) 0.9 %
10 SYRINGE (ML) INJECTION
Status: DISCONTINUED | OUTPATIENT
Start: 2025-04-07 | End: 2025-04-07 | Stop reason: HOSPADM

## 2025-04-07 RX ADMIN — SODIUM CHLORIDE: 9 INJECTION, SOLUTION INTRAVENOUS at 10:04

## 2025-04-07 RX ADMIN — HEPARIN 500 UNITS: 100 SYRINGE at 12:04

## 2025-04-07 RX ADMIN — ATEZOLIZUMAB 1200 MG: 1200 INJECTION, SOLUTION INTRAVENOUS at 11:04

## 2025-04-07 NOTE — PROGRESS NOTES
Reason for Follow-up:  -adenocarcinoma right upper lung lobe, S/P right VATS/right upper lobe wedge resection/completion right upper lobectomy and right middle lobectomy and regional lymph node dissection 2024, G3, pT3 pN0  -Cologuard positive  -anemia of chronic disease  -folic acid deficiency      Chief Complaint   Patient presents with    Lung Cancer     Primary adenocarcinoma of upper lobe of (R) lung          History:  Social history:   .  Lives in [place].  Has 3 children.  Does not work.  Has been smoking 1-2 pack of cigarettes daily for 51 years, since age 12; discontinued recently.  No alcohol or illicit drug abuse.      Family history:   Mother experienced some kind of intrathoracic malignancy at age 83;  from MI at age 83   Father  from prostate cancer) experienced at age 83 and probably, sarcoma) experienced at age 83)    Health maintenance:   -PCP in [place]  -says that she had screening colonoscopy performed in [place] in , and that it was unremarkable  -says that now, for positive Cologuard test, she is scheduled for colonoscopy in May 2024  -2023:  Cologuard positive  -2023:  Bilateral digital screening mammogram with tomosynthesis (comparison:  2022 mammogram, etc.):  BI-RADS: 1 negative    Oncologic/Hematologic History:  Oncology History   Primary adenocarcinoma of upper lobe of right lung   3/7/2024 Cancer Staged    Staging form: Lung, AJCC 8th Edition  - Pathologic stage from 3/7/2024: Stage IIB (pT3, pN0, cM0)     4/15/2024 Initial Diagnosis    Primary adenocarcinoma of upper lobe of right lung     2024 - 2024 Chemotherapy    Treatment Summary   Plan Name: OP NSCLC PEMETREXED + CISPLATIN Q3W  Treatment Goal: Curative  Status: Inactive  Start Date: 2024  End Date: 2024  Provider: Emery Mahoney MD  Chemotherapy: CISplatin (Platinol) 132 mg in sodium chloride 0.9% 697 mL chemo infusion, 141 mg,  Intravenous, Clinic/HOD 1 time, 4 of 4 cycles  Administration: 132 mg (4/29/2024), 128 mg (6/10/2024), 128 mg (7/1/2024), 132 mg (5/20/2024)  PEMEtrexed disodium (ALIMTA) 900 mg in sodium chloride 0.9% SolP 100 mL chemo infusion, 950 mg, Intravenous, Clinic/HOD 1 time, 4 of 4 cycles  Dose modification: 375 mg/m2 (75 % of original dose 500 mg/m2, Cycle 4, Reason: Dose Not Tolerated)  Administration: 900 mg (4/29/2024), 900 mg (6/10/2024), 625 mg (7/1/2024), 900 mg (5/20/2024)     8/9/2024 -  Chemotherapy    Treatment Summary   Plan Name: OP ATEZOLIZUMAB Q3W  Treatment Goal: Curative  Status: Active  Start Date: 8/9/2024  End Date: 7/21/2025 (Planned)  Provider: Emery Mahoney MD  Chemotherapy: [No matching medication found in this treatment plan]     =========================================    64-year-old female, referred from [facility], Dr. Arnulfo Middleton, with poorly-differentiated adenocarcinoma of right lung.      Investigations reviewed:  -08/12/2022: CT chest lung screening low-dose (comparison:  09/24/2020):  Lung rads 2: Benign appearance or behavior:  Continue annual screening with LDCT in 12 months  -09/11/2023:  CT chest lung screening low-dose without contrast (comparison:  08/12/2022):  Lung rads category 4A: Suspicious; enlarging nodule right upper lobe, lobulated, < 8 mm, i.e., 7 x 6.7 mm, previously 5 mm, previously not lobulated, too small for adequate detection on PET-CT; recommend three-month follow-up)  -12/26/2023: CT chest with contrast (comparison:  09/11/2023):  Lung rads 4A: Very suspicious; continued enlargement of right upper lobe lung nodule of concern (9.5 x 8.9 mm)  -01/29/2024: FDG PET-CT (comparison:  Chest CT 12/26/2023; CT abdomen pelvis 09/04/2019):  1. Markedly FDG-avid right upper lobe soft tissue nodule, increased in size in the interval, raising concern for malignancy (smoothly marginated noncalcified soft tissue nodule lateral subpleural right upper  lobe, 1.2 x 1.1 cm, maximum SUV 14, previously 1 cm x 0.9 cm).  2. No definite PET-CT findings to suggest additional right hemithoracic or more distant metastatic disease.  -02/01/2024: CT chest without contrast (comparison:  12/26/2023): Lateral right upper lobe nodule continues to enlarge in size currently measuring 11 mm concerning for malignancy. Recommend further evaluation.   -03/07/2024:  Right VATS, right upper lobe wedge resection; right upper lobectomy, right middle lobectomy, regional lymph node dissection:  1. Right lung, upper lobe, wedge resection:  Poorly-differentiated adenocarcinoma, 0.6 cm, clear margins of resection  2. Level 8R lymph node (paraesophageal), biopsy:  3 lymph nodes, negative for metastatic carcinoma    3. Level 4R lymph node (lower paratracheal), biopsy: 2 lymph nodes, negative for metastatic carcinoma  4. Level 7R lymph node (subcarinal), biopsy:  1 lymph node, negative for metastatic carcinoma  5. Level 11R lymph node (interlobar), biopsy:  2 lymph nodes, negative for metastatic carcinoma    6. Right lung, upper lobe, completion lobectomy:  -poorly-differentiated adenocarcinoma, 0.8 cm  -bronchial and vascular margins of resection negative   -1 hilar lymph node with no evidence of metastatic carcinoma  7. Level 12R lymph node (lobar), lymphadenectomy:  1 lymph node, negative for metastatic carcinoma    Synoptic report:  Total number of primary tumors: 2  S/P wedge resection  Completion lobectomy  Right lung  Separate tumor nodules (metastases) in same lobe, therefore, pT3  number of intrapulmonary metastases:  2   Tumor site:  Upper lobe of lung  Tumor size:  Total tumor size:  Greatest dimension:  0.8 cm  Invasive acinar adenocarcinoma; G3, poorly-differentiated; spread through airspaces (CHAMP), present; no visceral pleural invasion; no adjacent structures present; no known pre-surgical therapy; no LVI  All margins negative for invasive carcinoma; closest margin invasive  carcinoma, bronchial vascular; distance from invasive carcinoma closest margin,> 2 cm; margins status for noninvasive tumor, all margins negative for noninvasive tumor  Number of lymph nodes examined, 10; all regional lymph nodes negative for tumor  >>>  pT3 pN0    PD-L1 expression:  Positive: High (TPS 95%; )  Negative for EGFR, KRAS, BRAF, AL K, ROS1, RET, MET, HER2  No gene rearrangement no reportable altered splicing events identified from RNA sequencing  No reportable pathogenic variants found  MSI stable  TMB:  41.6m/MB  Fusions: Negative        Interval History 4/7/25:  Patient presents to the clinic today accompanied by her  for a scheduled clinic visit to follow up regarding her history of lung cancer. She is due to receive C13D1 of Atezolizumab today in infusion. She has no acute compliants today. She denies fever, chills, worsening SOB (she continues to wear 2L NC), or s/s associated with infection. She denies any headaches or memory changes. She denies any abdominal pain, constipation, diarrhea, or changes with appetite. Labwork was reviewed with the patient. All future appointments were discussed.         Review of System  Review of Systems   All other systems reviewed and are negative.       Physical Exam  Physical Exam  Vitals reviewed.   Constitutional:       Appearance: Normal appearance.   HENT:      Head: Normocephalic and atraumatic.      Mouth/Throat:      Mouth: Mucous membranes are moist.   Cardiovascular:      Rate and Rhythm: Normal rate and regular rhythm.      Pulses: Normal pulses.      Heart sounds: Normal heart sounds.   Pulmonary:      Effort: Pulmonary effort is normal.      Breath sounds: Normal breath sounds.      Comments: 2L of O2 via NC  Abdominal:      General: Bowel sounds are normal.      Palpations: Abdomen is soft.   Skin:     General: Skin is warm and dry.   Neurological:      Mental Status: She is alert and oriented to person, place, and time.   Psychiatric:          Mood and Affect: Mood normal.         Behavior: Behavior normal.         Thought Content: Thought content normal.         Judgment: Judgment normal.          Assessment:  Adenocarcinoma right upper lung lobe:   -LDCT chest without contrast 08/12/2022:  Lung rads 2  -LDCT chest without contrast 09/11/2023:  Lung rads 4A  -noncontrast chest CT 12/26/2023:  Lung rads 4A  -FDG PET-CT 01/29/2024:  Hypermetabolic right upper lobe nodule, 1.2 x 1.1 cm, maximum SUV 14, previously 1 cm x 0.9 cm; no distant metastases   -noncontrast chest CT 02/01/2024:  Right upper lobe nodule continues to enlarge, now 11 mm   -03/07/2024:  Right VATS, right upper lobe wedge resection, right upper lobectomy, right middle lobectomy, regional lymph node dissection:  -separate tumor nodules (metastases) in same lobe, right upper lung lobe, therefore, pT3 (0.8 cm and 0.6 cm, respectively); invasive acinar adenocarcinoma; G3, poorly-differentiated; no visceral pleural invasion; no LVI; margins negative; 10 regional lymph nodes negative  -pT3 pN0 M0, stage IIB  -PD-L1 expression high positive (TPS 95%; )  -negative for EGFR, KRAS, BRAF, AL K, ROS1, RET, met, HER2  -MediPort placed 04/25/2024  -S/P adjuvant cisplatin/Alimta every 3 weeks x4 cycles (04/29/2024-07/01/2024)  -restaging CTs C/A/P 05/06/2024:  No metastases   -staging brain MRI 05/08/2024: No metastases  -hemoglobin dropped to 5.2 on 07/10/2024, requiring blood transfusion  -ANC dropped to 0.55 on 06/24/2024, requiring supportive care  -07/17/2024:  ANC 0.525; hemoglobin 7.0; iron studies consistent with anemia of chronic disease/inflammation/malignancy/chemotherapy  -07/22/2024: TSH, free T4 normal  -adjuvant atezolizumab 1200 mg every 3 weeks, started 08/09/2024; cycle 2 on 08/30/2024; cycle 3 on 09/20/2024  -09/20/2024: TSH, free T4 normal  -surveillance CT chest 09/18/2024: [facility]ORY  >>>  Plan:  Primary Adenocarcinoma of upper lobe of right lung:   Continue adjuvant  atezolizumab 1200 mg IV every 3 weeks x1 year (17 cycles)   Proceed with C12D1 today in infusion   RTC with Me in 3 weeks (4/28) with labs prior (CBC/CMP/Mag level/TSH/Free T4) followed by infusion   Check baseline TSH and free T4 every 6 weeks to monitor for the possibility of immune thyroiditis with atezolizumab (next middle of March)  For surveillance, CT chest with contrast in 6 months (September 2025)      Elevated BUN/Creatinine level:   BUN: 17.8  Creatinine level: 1.28  Encouraged to increase daily water intake. Drink half of body weight in ounces of water per day.       Monitoring on atezolizumab:  Monitor LFTs (AST, ALT, and total bilirubin; at baseline and periodically during treatment;  kidney function (serum creatinine; at baseline and periodically during treatment);  thyroid function (at baseline, periodically during treatment and as clinically indicated);  monitor blood glucose (for hyperglycemia);  Monitor closely for signs/symptoms of immune-mediated adverse reactions, including  adrenal insufficiency,  diarrhea/colitis (consider initiating or repeating infectious workup in patients with corticosteroid-refractory immune-mediated colitis to exclude alternative causes),  dermatologic toxicity,  diabetes mellitus,  hypophysitis,  ocular disorders,  thyroid disorders,  pneumonitis and other immune-mediated adverse reactions.  Monitor for signs/symptoms of infusion-related reactions.     Surveillance:  -history and physical and chest CT +/- contrast every 6 months X 3 years (03/2024-03/2027), then history and physical and low-dose noncontrast chest CT annually  -smoking cessation advice and counseling   -FDG PET-CT or brain MRI is not routinely indicated      No follow-ups on file.

## 2025-04-07 NOTE — NURSING
1017 Patient is here for labs, NP visit & C12 Tecentriq q 3 wks. She voices no c/o.      1215  Tecentriq 1200mgs IV given via R chest mediport.

## 2025-04-17 ENCOUNTER — HOSPITAL ENCOUNTER (OUTPATIENT)
Dept: RADIOLOGY | Facility: HOSPITAL | Age: 65
Discharge: HOME | End: 2025-04-17
Payer: MEDICAID

## 2025-04-17 DIAGNOSIS — Z12.31 OTHER SCREENING MAMMOGRAM: ICD-10-CM

## 2025-04-17 PROCEDURE — 77063 BREAST TOMOSYNTHESIS BI: CPT | Mod: TC

## 2025-04-23 DIAGNOSIS — C34.11 PRIMARY ADENOCARCINOMA OF UPPER LOBE OF RIGHT LUNG: Primary | ICD-10-CM

## 2025-04-25 ENCOUNTER — TELEPHONE (OUTPATIENT)
Dept: HEMATOLOGY/ONCOLOGY | Facility: CLINIC | Age: 65
End: 2025-04-25
Payer: MEDICAID

## 2025-04-25 NOTE — TELEPHONE ENCOUNTER
Called patient to confirm appointment for 4/28/25. Patient did not answer the phone and was unable to leave a voice message. Called patient daughter Maria Antonia Nunez. Patient daughter did not answer the phone and was unable to leave a voice message.

## 2025-04-28 ENCOUNTER — INFUSION (OUTPATIENT)
Dept: INFUSION THERAPY | Facility: HOSPITAL | Age: 65
End: 2025-04-28
Attending: INTERNAL MEDICINE
Payer: MEDICAID

## 2025-04-28 ENCOUNTER — OFFICE VISIT (OUTPATIENT)
Dept: HEMATOLOGY/ONCOLOGY | Facility: CLINIC | Age: 65
End: 2025-04-28
Payer: MEDICAID

## 2025-04-28 VITALS
SYSTOLIC BLOOD PRESSURE: 99 MMHG | WEIGHT: 176.38 LBS | DIASTOLIC BLOOD PRESSURE: 64 MMHG | RESPIRATION RATE: 20 BRPM | TEMPERATURE: 98 F | OXYGEN SATURATION: 100 % | HEIGHT: 60 IN | HEART RATE: 72 BPM | BODY MASS INDEX: 34.63 KG/M2

## 2025-04-28 DIAGNOSIS — Z99.81 OXYGEN DEPENDENT: ICD-10-CM

## 2025-04-28 DIAGNOSIS — T45.1X5A IMMUNODEFICIENCY DUE TO CHEMOTHERAPY: ICD-10-CM

## 2025-04-28 DIAGNOSIS — C34.11 PRIMARY ADENOCARCINOMA OF UPPER LOBE OF RIGHT LUNG: Primary | ICD-10-CM

## 2025-04-28 DIAGNOSIS — D84.821 IMMUNODEFICIENCY DUE TO CHEMOTHERAPY: ICD-10-CM

## 2025-04-28 DIAGNOSIS — Z79.69 IMMUNODEFICIENCY DUE TO CHEMOTHERAPY: ICD-10-CM

## 2025-04-28 PROCEDURE — 63600175 PHARM REV CODE 636 W HCPCS

## 2025-04-28 PROCEDURE — 3008F BODY MASS INDEX DOCD: CPT | Mod: CPTII,,,

## 2025-04-28 PROCEDURE — 1160F RVW MEDS BY RX/DR IN RCRD: CPT | Mod: CPTII,,,

## 2025-04-28 PROCEDURE — 4010F ACE/ARB THERAPY RXD/TAKEN: CPT | Mod: CPTII,,,

## 2025-04-28 PROCEDURE — A4216 STERILE WATER/SALINE, 10 ML: HCPCS

## 2025-04-28 PROCEDURE — 3074F SYST BP LT 130 MM HG: CPT | Mod: CPTII,,,

## 2025-04-28 PROCEDURE — 25000003 PHARM REV CODE 250

## 2025-04-28 PROCEDURE — 99215 OFFICE O/P EST HI 40 MIN: CPT | Mod: S$PBB,,,

## 2025-04-28 PROCEDURE — 99215 OFFICE O/P EST HI 40 MIN: CPT | Mod: PBBFAC,25

## 2025-04-28 PROCEDURE — 1159F MED LIST DOCD IN RCRD: CPT | Mod: CPTII,,,

## 2025-04-28 PROCEDURE — 96413 CHEMO IV INFUSION 1 HR: CPT

## 2025-04-28 PROCEDURE — 3078F DIAST BP <80 MM HG: CPT | Mod: CPTII,,,

## 2025-04-28 RX ORDER — SODIUM CHLORIDE 0.9 % (FLUSH) 0.9 %
10 SYRINGE (ML) INJECTION
Status: CANCELLED | OUTPATIENT
Start: 2025-04-28

## 2025-04-28 RX ORDER — EPINEPHRINE 1 MG/ML
0.3 INJECTION INTRAMUSCULAR; INTRAVENOUS; SUBCUTANEOUS ONCE AS NEEDED
Status: DISCONTINUED | OUTPATIENT
Start: 2025-04-28 | End: 2025-04-28 | Stop reason: HOSPADM

## 2025-04-28 RX ORDER — HEPARIN 100 UNIT/ML
500 SYRINGE INTRAVENOUS
Status: DISCONTINUED | OUTPATIENT
Start: 2025-04-28 | End: 2025-04-28 | Stop reason: HOSPADM

## 2025-04-28 RX ORDER — EPINEPHRINE 0.3 MG/.3ML
0.3 INJECTION SUBCUTANEOUS ONCE AS NEEDED
Status: CANCELLED | OUTPATIENT
Start: 2025-04-28

## 2025-04-28 RX ORDER — DIPHENHYDRAMINE HYDROCHLORIDE 50 MG/ML
50 INJECTION, SOLUTION INTRAMUSCULAR; INTRAVENOUS ONCE AS NEEDED
Status: DISCONTINUED | OUTPATIENT
Start: 2025-04-28 | End: 2025-04-28 | Stop reason: HOSPADM

## 2025-04-28 RX ORDER — SODIUM CHLORIDE 0.9 % (FLUSH) 0.9 %
10 SYRINGE (ML) INJECTION
Status: DISCONTINUED | OUTPATIENT
Start: 2025-04-28 | End: 2025-04-28 | Stop reason: HOSPADM

## 2025-04-28 RX ORDER — DIPHENHYDRAMINE HYDROCHLORIDE 50 MG/ML
50 INJECTION, SOLUTION INTRAMUSCULAR; INTRAVENOUS ONCE AS NEEDED
Status: CANCELLED | OUTPATIENT
Start: 2025-04-28

## 2025-04-28 RX ORDER — HEPARIN 100 UNIT/ML
500 SYRINGE INTRAVENOUS
Status: CANCELLED | OUTPATIENT
Start: 2025-04-28

## 2025-04-28 RX ADMIN — SODIUM CHLORIDE: 9 INJECTION, SOLUTION INTRAVENOUS at 09:04

## 2025-04-28 RX ADMIN — SODIUM CHLORIDE, PRESERVATIVE FREE 10 ML: 5 INJECTION INTRAVENOUS at 10:04

## 2025-04-28 RX ADMIN — ATEZOLIZUMAB 1200 MG: 1200 INJECTION, SOLUTION INTRAVENOUS at 10:04

## 2025-04-28 RX ADMIN — HEPARIN 500 UNITS: 100 SYRINGE at 10:04

## 2025-04-28 NOTE — PROGRESS NOTES
Reason for Follow-up:  -adenocarcinoma right upper lung lobe, S/P right VATS/right upper lobe wedge resection/completion right upper lobectomy and right middle lobectomy and regional lymph node dissection 2024, G3, pT3 pN0  -Cologuard positive  -anemia of chronic disease  -folic acid deficiency      Chief Complaint   Patient presents with    Follow-up     Patient reports no new concerns as of today.          History:  Social history:   .  Lives in [place].  Has 3 children.  Does not work.  Has been smoking 1-2 pack of cigarettes daily for 51 years, since age 12; discontinued recently.  No alcohol or illicit drug abuse.      Family history:   Mother experienced some kind of intrathoracic malignancy at age 83;  from MI at age 83   Father  from prostate cancer) experienced at age 83 and probably, sarcoma) experienced at age 83)    Health maintenance:   -PCP in [place]  -says that she had screening colonoscopy performed in [place] in , and that it was unremarkable  -says that now, for positive Cologuard test, she is scheduled for colonoscopy in May 2024  -2023:  Cologuard positive  -2023:  Bilateral digital screening mammogram with tomosynthesis (comparison:  2022 mammogram, etc.):  BI-RADS: 1 negative    Oncologic/Hematologic History:  Oncology History   Primary adenocarcinoma of upper lobe of right lung   3/7/2024 Cancer Staged    Staging form: Lung, AJCC 8th Edition  - Pathologic stage from 3/7/2024: Stage IIB (pT3, pN0, cM0)     4/15/2024 Initial Diagnosis    Primary adenocarcinoma of upper lobe of right lung     2024 - 2024 Chemotherapy    Treatment Summary   Plan Name: OP NSCLC PEMETREXED + CISPLATIN Q3W  Treatment Goal: Curative  Status: Inactive  Start Date: 2024  End Date: 2024  Provider: Emery Mahoney MD  Chemotherapy: CISplatin (Platinol) 132 mg in sodium chloride 0.9% 697 mL chemo infusion, 141 mg, Intravenous,  Clinic/HOD 1 time, 4 of 4 cycles  Administration: 132 mg (4/29/2024), 128 mg (6/10/2024), 128 mg (7/1/2024), 132 mg (5/20/2024)  PEMEtrexed disodium (ALIMTA) 900 mg in sodium chloride 0.9% SolP 100 mL chemo infusion, 950 mg, Intravenous, Clinic/HOD 1 time, 4 of 4 cycles  Dose modification: 375 mg/m2 (75 % of original dose 500 mg/m2, Cycle 4, Reason: Dose Not Tolerated)  Administration: 900 mg (4/29/2024), 900 mg (6/10/2024), 625 mg (7/1/2024), 900 mg (5/20/2024)     8/9/2024 -  Chemotherapy    Treatment Summary   Plan Name: OP ATEZOLIZUMAB Q3W  Treatment Goal: Curative  Status: Active  Start Date: 8/9/2024  End Date: 7/21/2025 (Planned)  Provider: Emery Mahoney MD  Chemotherapy: [No matching medication found in this treatment plan]     =========================================    64-year-old female, referred from [facility], Dr. Arnulfo Middleton, with poorly-differentiated adenocarcinoma of right lung.      Investigations reviewed:  -08/12/2022: CT chest lung screening low-dose (comparison:  09/24/2020):  Lung rads 2: Benign appearance or behavior:  Continue annual screening with LDCT in 12 months  -09/11/2023:  CT chest lung screening low-dose without contrast (comparison:  08/12/2022):  Lung rads category 4A: Suspicious; enlarging nodule right upper lobe, lobulated, < 8 mm, i.e., 7 x 6.7 mm, previously 5 mm, previously not lobulated, too small for adequate detection on PET-CT; recommend three-month follow-up)  -12/26/2023: CT chest with contrast (comparison:  09/11/2023):  Lung rads 4A: Very suspicious; continued enlargement of right upper lobe lung nodule of concern (9.5 x 8.9 mm)  -01/29/2024: FDG PET-CT (comparison:  Chest CT 12/26/2023; CT abdomen pelvis 09/04/2019):  1. Markedly FDG-avid right upper lobe soft tissue nodule, increased in size in the interval, raising concern for malignancy (smoothly marginated noncalcified soft tissue nodule lateral subpleural right upper lobe, 1.2 x  1.1 cm, maximum SUV 14, previously 1 cm x 0.9 cm).  2. No definite PET-CT findings to suggest additional right hemithoracic or more distant metastatic disease.  -02/01/2024: CT chest without contrast (comparison:  12/26/2023): Lateral right upper lobe nodule continues to enlarge in size currently measuring 11 mm concerning for malignancy. Recommend further evaluation.   -03/07/2024:  Right VATS, right upper lobe wedge resection; right upper lobectomy, right middle lobectomy, regional lymph node dissection:  1. Right lung, upper lobe, wedge resection:  Poorly-differentiated adenocarcinoma, 0.6 cm, clear margins of resection  2. Level 8R lymph node (paraesophageal), biopsy:  3 lymph nodes, negative for metastatic carcinoma    3. Level 4R lymph node (lower paratracheal), biopsy: 2 lymph nodes, negative for metastatic carcinoma  4. Level 7R lymph node (subcarinal), biopsy:  1 lymph node, negative for metastatic carcinoma  5. Level 11R lymph node (interlobar), biopsy:  2 lymph nodes, negative for metastatic carcinoma    6. Right lung, upper lobe, completion lobectomy:  -poorly-differentiated adenocarcinoma, 0.8 cm  -bronchial and vascular margins of resection negative   -1 hilar lymph node with no evidence of metastatic carcinoma  7. Level 12R lymph node (lobar), lymphadenectomy:  1 lymph node, negative for metastatic carcinoma    Synoptic report:  Total number of primary tumors: 2  S/P wedge resection  Completion lobectomy  Right lung  Separate tumor nodules (metastases) in same lobe, therefore, pT3  number of intrapulmonary metastases:  2   Tumor site:  Upper lobe of lung  Tumor size:  Total tumor size:  Greatest dimension:  0.8 cm  Invasive acinar adenocarcinoma; G3, poorly-differentiated; spread through airspaces (CHAMP), present; no visceral pleural invasion; no adjacent structures present; no known pre-surgical therapy; no LVI  All margins negative for invasive carcinoma; closest margin invasive carcinoma, bronchial  vascular; distance from invasive carcinoma closest margin,> 2 cm; margins status for noninvasive tumor, all margins negative for noninvasive tumor  Number of lymph nodes examined, 10; all regional lymph nodes negative for tumor  >>>  pT3 pN0    PD-L1 expression:  Positive: High (TPS 95%; )  Negative for EGFR, KRAS, BRAF, AL K, ROS1, RET, MET, HER2  No gene rearrangement no reportable altered splicing events identified from RNA sequencing  No reportable pathogenic variants found  MSI stable  TMB:  41.6m/MB  Fusions: Negative        Interval History 4/28/25:  Patient presents to the clinic today accompanied by her  for a scheduled clinic visit to follow up regarding her history of lung cancer. She is due to receive C14D1 of Atezolizumab today in infusion. She has no acute compliants today. She denies fever, chills, worsening SOB (she continues to wear 2L NC), or s/s associated with infection. She denies any headaches or memory changes. She denies any abdominal pain, constipation, diarrhea, or changes with appetite. Labwork was reviewed with the patient. All future appointments were discussed.         Review of System  Review of Systems   All other systems reviewed and are negative.       Physical Exam  Physical Exam  Vitals reviewed.   Constitutional:       Appearance: Normal appearance.   HENT:      Head: Normocephalic and atraumatic.      Mouth/Throat:      Mouth: Mucous membranes are moist.   Cardiovascular:      Rate and Rhythm: Normal rate and regular rhythm.      Pulses: Normal pulses.      Heart sounds: Normal heart sounds.   Pulmonary:      Effort: Pulmonary effort is normal.      Breath sounds: Normal breath sounds.      Comments: 2L of O2 via NC  Abdominal:      General: Bowel sounds are normal.      Palpations: Abdomen is soft.   Skin:     General: Skin is warm and dry.   Neurological:      Mental Status: She is alert and oriented to person, place, and time.   Psychiatric:         Mood and  Affect: Mood normal.         Behavior: Behavior normal.         Thought Content: Thought content normal.         Judgment: Judgment normal.        Assessment:  Adenocarcinoma right upper lung lobe:   -LDCT chest without contrast 08/12/2022:  Lung rads 2  -LDCT chest without contrast 09/11/2023:  Lung rads 4A  -noncontrast chest CT 12/26/2023:  Lung rads 4A  -FDG PET-CT 01/29/2024:  Hypermetabolic right upper lobe nodule, 1.2 x 1.1 cm, maximum SUV 14, previously 1 cm x 0.9 cm; no distant metastases   -noncontrast chest CT 02/01/2024:  Right upper lobe nodule continues to enlarge, now 11 mm   -03/07/2024:  Right VATS, right upper lobe wedge resection, right upper lobectomy, right middle lobectomy, regional lymph node dissection:  -separate tumor nodules (metastases) in same lobe, right upper lung lobe, therefore, pT3 (0.8 cm and 0.6 cm, respectively); invasive acinar adenocarcinoma; G3, poorly-differentiated; no visceral pleural invasion; no LVI; margins negative; 10 regional lymph nodes negative  -pT3 pN0 M0, stage IIB  -PD-L1 expression high positive (TPS 95%; )  -negative for EGFR, KRAS, BRAF, AL K, ROS1, RET, met, HER2  -MediPort placed 04/25/2024  -S/P adjuvant cisplatin/Alimta every 3 weeks x4 cycles (04/29/2024-07/01/2024)  -restaging CTs C/A/P 05/06/2024:  No metastases   -staging brain MRI 05/08/2024: No metastases  -hemoglobin dropped to 5.2 on 07/10/2024, requiring blood transfusion  -ANC dropped to 0.55 on 06/24/2024, requiring supportive care  -07/17/2024:  ANC 0.525; hemoglobin 7.0; iron studies consistent with anemia of chronic disease/inflammation/malignancy/chemotherapy  -07/22/2024: TSH, free T4 normal  -adjuvant atezolizumab 1200 mg every 3 weeks, started 08/09/2024; cycle 2 on 08/30/2024; cycle 3 on 09/20/2024  -09/20/2024: TSH, free T4 normal  -surveillance CT chest 09/18/2024: [facility]  >>>  Plan:  Primary Adenocarcinoma of upper lobe of right lung:   Continue adjuvant atezolizumab 1200 mg IV  every 3 weeks x1 year (17 cycles)   Proceed with C14D1 today in infusion   RTC with Me in 3 weeks (5/19) with labs prior (CBC/CMP/Mag level/TSH/Free T4) followed by infusion   Check baseline TSH and free T4 every 6 weeks to monitor for the possibility of immune thyroiditis with atezolizumab (next middle of March)  For surveillance, CT chest with contrast in 6 months (September 2025)          Monitoring on atezolizumab:  Monitor LFTs (AST, ALT, and total bilirubin; at baseline and periodically during treatment;  kidney function (serum creatinine; at baseline and periodically during treatment);  thyroid function (at baseline, periodically during treatment and as clinically indicated);  monitor blood glucose (for hyperglycemia);  Monitor closely for signs/symptoms of immune-mediated adverse reactions, including  adrenal insufficiency,  diarrhea/colitis (consider initiating or repeating infectious workup in patients with corticosteroid-refractory immune-mediated colitis to exclude alternative causes),  dermatologic toxicity,  diabetes mellitus,  hypophysitis,  ocular disorders,  thyroid disorders,  pneumonitis and other immune-mediated adverse reactions.  Monitor for signs/symptoms of infusion-related reactions.     Surveillance:  -history and physical and chest CT +/- contrast every 6 months X 3 years (03/2024-03/2027), then history and physical and low-dose noncontrast chest CT annually  -smoking cessation advice and counseling   -FDG PET-CT or brain MRI is not routinely indicated      No follow-ups on file.

## 2025-05-10 DIAGNOSIS — R42 VERTIGO: ICD-10-CM

## 2025-05-12 DIAGNOSIS — R42 VERTIGO: ICD-10-CM

## 2025-05-12 RX ORDER — MECLIZINE HYDROCHLORIDE 25 MG/1
25 TABLET ORAL DAILY
Qty: 30 TABLET | Refills: 0 | Status: SHIPPED | OUTPATIENT
Start: 2025-05-12

## 2025-05-12 RX ORDER — MECLIZINE HYDROCHLORIDE 25 MG/1
25 TABLET ORAL
Qty: 30 TABLET | Refills: 0 | OUTPATIENT
Start: 2025-05-12

## 2025-05-15 DIAGNOSIS — C34.11 PRIMARY ADENOCARCINOMA OF UPPER LOBE OF RIGHT LUNG: Primary | ICD-10-CM

## 2025-05-19 ENCOUNTER — INFUSION (OUTPATIENT)
Dept: INFUSION THERAPY | Facility: HOSPITAL | Age: 65
End: 2025-05-19
Attending: INTERNAL MEDICINE
Payer: MEDICAID

## 2025-05-19 ENCOUNTER — OFFICE VISIT (OUTPATIENT)
Dept: HEMATOLOGY/ONCOLOGY | Facility: CLINIC | Age: 65
End: 2025-05-19
Payer: MEDICAID

## 2025-05-19 VITALS
HEART RATE: 72 BPM | BODY MASS INDEX: 34.39 KG/M2 | HEIGHT: 60 IN | SYSTOLIC BLOOD PRESSURE: 96 MMHG | WEIGHT: 175.19 LBS | TEMPERATURE: 99 F | DIASTOLIC BLOOD PRESSURE: 50 MMHG | OXYGEN SATURATION: 100 % | RESPIRATION RATE: 16 BRPM

## 2025-05-19 VITALS — SYSTOLIC BLOOD PRESSURE: 101 MMHG | DIASTOLIC BLOOD PRESSURE: 58 MMHG | HEART RATE: 68 BPM

## 2025-05-19 DIAGNOSIS — T45.1X5A IMMUNODEFICIENCY DUE TO CHEMOTHERAPY: ICD-10-CM

## 2025-05-19 DIAGNOSIS — Z79.69 IMMUNODEFICIENCY DUE TO CHEMOTHERAPY: ICD-10-CM

## 2025-05-19 DIAGNOSIS — R11.0 NAUSEA: ICD-10-CM

## 2025-05-19 DIAGNOSIS — C34.11 PRIMARY ADENOCARCINOMA OF UPPER LOBE OF RIGHT LUNG: Primary | ICD-10-CM

## 2025-05-19 DIAGNOSIS — D84.821 IMMUNODEFICIENCY DUE TO CHEMOTHERAPY: ICD-10-CM

## 2025-05-19 DIAGNOSIS — Z76.89 ESTABLISHING CARE WITH NEW DOCTOR, ENCOUNTER FOR: ICD-10-CM

## 2025-05-19 PROCEDURE — 25000003 PHARM REV CODE 250

## 2025-05-19 PROCEDURE — 63600175 PHARM REV CODE 636 W HCPCS

## 2025-05-19 PROCEDURE — 96413 CHEMO IV INFUSION 1 HR: CPT

## 2025-05-19 PROCEDURE — 1160F RVW MEDS BY RX/DR IN RCRD: CPT | Mod: CPTII,,,

## 2025-05-19 PROCEDURE — 3074F SYST BP LT 130 MM HG: CPT | Mod: CPTII,,,

## 2025-05-19 PROCEDURE — A4216 STERILE WATER/SALINE, 10 ML: HCPCS

## 2025-05-19 PROCEDURE — 4010F ACE/ARB THERAPY RXD/TAKEN: CPT | Mod: CPTII,,,

## 2025-05-19 PROCEDURE — 3078F DIAST BP <80 MM HG: CPT | Mod: CPTII,,,

## 2025-05-19 PROCEDURE — 99214 OFFICE O/P EST MOD 30 MIN: CPT | Mod: PBBFAC,25

## 2025-05-19 PROCEDURE — 1159F MED LIST DOCD IN RCRD: CPT | Mod: CPTII,,,

## 2025-05-19 PROCEDURE — 99215 OFFICE O/P EST HI 40 MIN: CPT | Mod: S$PBB,,,

## 2025-05-19 PROCEDURE — 3008F BODY MASS INDEX DOCD: CPT | Mod: CPTII,,,

## 2025-05-19 RX ORDER — HEPARIN 100 UNIT/ML
500 SYRINGE INTRAVENOUS
Status: DISCONTINUED | OUTPATIENT
Start: 2025-05-19 | End: 2025-05-19 | Stop reason: HOSPADM

## 2025-05-19 RX ORDER — DIPHENHYDRAMINE HYDROCHLORIDE 50 MG/ML
50 INJECTION, SOLUTION INTRAMUSCULAR; INTRAVENOUS ONCE AS NEEDED
Status: CANCELLED | OUTPATIENT
Start: 2025-05-19

## 2025-05-19 RX ORDER — EPINEPHRINE 1 MG/ML
0.3 INJECTION INTRAMUSCULAR; INTRAVENOUS; SUBCUTANEOUS ONCE AS NEEDED
Status: DISCONTINUED | OUTPATIENT
Start: 2025-05-19 | End: 2025-05-19 | Stop reason: HOSPADM

## 2025-05-19 RX ORDER — EPINEPHRINE 0.3 MG/.3ML
0.3 INJECTION SUBCUTANEOUS ONCE AS NEEDED
Status: CANCELLED | OUTPATIENT
Start: 2025-05-19

## 2025-05-19 RX ORDER — ONDANSETRON 4 MG/1
4 TABLET, ORALLY DISINTEGRATING ORAL EVERY 6 HOURS PRN
Qty: 62 TABLET | Refills: 2 | Status: SHIPPED | OUTPATIENT
Start: 2025-05-19

## 2025-05-19 RX ORDER — HEPARIN 100 UNIT/ML
500 SYRINGE INTRAVENOUS
Status: CANCELLED | OUTPATIENT
Start: 2025-05-19

## 2025-05-19 RX ORDER — SODIUM CHLORIDE 0.9 % (FLUSH) 0.9 %
10 SYRINGE (ML) INJECTION
Status: CANCELLED | OUTPATIENT
Start: 2025-05-19

## 2025-05-19 RX ORDER — DIPHENHYDRAMINE HYDROCHLORIDE 50 MG/ML
50 INJECTION, SOLUTION INTRAMUSCULAR; INTRAVENOUS ONCE AS NEEDED
Status: DISCONTINUED | OUTPATIENT
Start: 2025-05-19 | End: 2025-05-19 | Stop reason: HOSPADM

## 2025-05-19 RX ORDER — SODIUM CHLORIDE 0.9 % (FLUSH) 0.9 %
10 SYRINGE (ML) INJECTION
Status: DISCONTINUED | OUTPATIENT
Start: 2025-05-19 | End: 2025-05-19 | Stop reason: HOSPADM

## 2025-05-19 RX ADMIN — SODIUM CHLORIDE, PRESERVATIVE FREE 10 ML: 5 INJECTION INTRAVENOUS at 11:05

## 2025-05-19 RX ADMIN — ATEZOLIZUMAB 1200 MG: 1200 INJECTION, SOLUTION INTRAVENOUS at 11:05

## 2025-05-19 RX ADMIN — HEPARIN 500 UNITS: 100 SYRINGE at 11:05

## 2025-05-19 RX ADMIN — SODIUM CHLORIDE: 9 INJECTION, SOLUTION INTRAVENOUS at 10:05

## 2025-05-19 NOTE — PROGRESS NOTES
Reason for Follow-up:  -adenocarcinoma right upper lung lobe, S/P right VATS/right upper lobe wedge resection/completion right upper lobectomy and right middle lobectomy and regional lymph node dissection 2024, G3, pT3 pN0  -Cologuard positive  -anemia of chronic disease  -folic acid deficiency      Chief Complaint   Patient presents with    Follow-up     Patient reported having SOB sometimes and nausea.          History:  Social history:   .  Lives in [place].  Has 3 children.  Does not work.  Has been smoking 1-2 pack of cigarettes daily for 51 years, since age 12; discontinued recently.  No alcohol or illicit drug abuse.      Family history:   Mother experienced some kind of intrathoracic malignancy at age 83;  from MI at age 83   Father  from prostate cancer) experienced at age 83 and probably, sarcoma) experienced at age 83)    Health maintenance:   -PCP in [place]  -says that she had screening colonoscopy performed in [place] in , and that it was unremarkable  -says that now, for positive Cologuard test, she is scheduled for colonoscopy in May 2024  -2023:  Cologuard positive  -2023:  Bilateral digital screening mammogram with tomosynthesis (comparison:  2022 mammogram, etc.):  BI-RADS: 1 negative    Oncologic/Hematologic History:  Oncology History   Primary adenocarcinoma of upper lobe of right lung   3/7/2024 Cancer Staged    Staging form: Lung, AJCC 8th Edition  - Pathologic stage from 3/7/2024: Stage IIB (pT3, pN0, cM0)     4/15/2024 Initial Diagnosis    Primary adenocarcinoma of upper lobe of right lung     2024 - 2024 Chemotherapy    Treatment Summary   Plan Name: OP NSCLC PEMETREXED + CISPLATIN Q3W  Treatment Goal: Curative  Status: Inactive  Start Date: 2024  End Date: 2024  Provider: Emery Mahoney MD  Chemotherapy: CISplatin (Platinol) 132 mg in sodium chloride 0.9% 697 mL chemo infusion, 141 mg, Intravenous,  Clinic/HOD 1 time, 4 of 4 cycles  Administration: 132 mg (4/29/2024), 128 mg (6/10/2024), 128 mg (7/1/2024), 132 mg (5/20/2024)  PEMEtrexed disodium (ALIMTA) 900 mg in sodium chloride 0.9% SolP 100 mL chemo infusion, 950 mg, Intravenous, Clinic/HOD 1 time, 4 of 4 cycles  Dose modification: 375 mg/m2 (75 % of original dose 500 mg/m2, Cycle 4, Reason: Dose Not Tolerated)  Administration: 900 mg (4/29/2024), 900 mg (6/10/2024), 625 mg (7/1/2024), 900 mg (5/20/2024)     8/9/2024 -  Chemotherapy    Treatment Summary   Plan Name: OP ATEZOLIZUMAB Q3W  Treatment Goal: Curative  Status: Active  Start Date: 8/9/2024  End Date: 7/21/2025 (Planned)  Provider: Emery Mahoney MD  Chemotherapy: [No matching medication found in this treatment plan]     =========================================    64-year-old female, referred from [facility], Dr. Arnulfo Middleton, with poorly-differentiated adenocarcinoma of right lung.      Investigations reviewed:  -08/12/2022: CT chest lung screening low-dose (comparison:  09/24/2020):  Lung rads 2: Benign appearance or behavior:  Continue annual screening with LDCT in 12 months  -09/11/2023:  CT chest lung screening low-dose without contrast (comparison:  08/12/2022):  Lung rads category 4A: Suspicious; enlarging nodule right upper lobe, lobulated, < 8 mm, i.e., 7 x 6.7 mm, previously 5 mm, previously not lobulated, too small for adequate detection on PET-CT; recommend three-month follow-up)  -12/26/2023: CT chest with contrast (comparison:  09/11/2023):  Lung rads 4A: Very suspicious; continued enlargement of right upper lobe lung nodule of concern (9.5 x 8.9 mm)  -01/29/2024: FDG PET-CT (comparison:  Chest CT 12/26/2023; CT abdomen pelvis 09/04/2019):  1. Markedly FDG-avid right upper lobe soft tissue nodule, increased in size in the interval, raising concern for malignancy (smoothly marginated noncalcified soft tissue nodule lateral subpleural right upper lobe, 1.2 x  1.1 cm, maximum SUV 14, previously 1 cm x 0.9 cm).  2. No definite PET-CT findings to suggest additional right hemithoracic or more distant metastatic disease.  -02/01/2024: CT chest without contrast (comparison:  12/26/2023): Lateral right upper lobe nodule continues to enlarge in size currently measuring 11 mm concerning for malignancy. Recommend further evaluation.   -03/07/2024:  Right VATS, right upper lobe wedge resection; right upper lobectomy, right middle lobectomy, regional lymph node dissection:  1. Right lung, upper lobe, wedge resection:  Poorly-differentiated adenocarcinoma, 0.6 cm, clear margins of resection  2. Level 8R lymph node (paraesophageal), biopsy:  3 lymph nodes, negative for metastatic carcinoma    3. Level 4R lymph node (lower paratracheal), biopsy: 2 lymph nodes, negative for metastatic carcinoma  4. Level 7R lymph node (subcarinal), biopsy:  1 lymph node, negative for metastatic carcinoma  5. Level 11R lymph node (interlobar), biopsy:  2 lymph nodes, negative for metastatic carcinoma    6. Right lung, upper lobe, completion lobectomy:  -poorly-differentiated adenocarcinoma, 0.8 cm  -bronchial and vascular margins of resection negative   -1 hilar lymph node with no evidence of metastatic carcinoma  7. Level 12R lymph node (lobar), lymphadenectomy:  1 lymph node, negative for metastatic carcinoma    Synoptic report:  Total number of primary tumors: 2  S/P wedge resection  Completion lobectomy  Right lung  Separate tumor nodules (metastases) in same lobe, therefore, pT3  number of intrapulmonary metastases:  2   Tumor site:  Upper lobe of lung  Tumor size:  Total tumor size:  Greatest dimension:  0.8 cm  Invasive acinar adenocarcinoma; G3, poorly-differentiated; spread through airspaces (CHAMP), present; no visceral pleural invasion; no adjacent structures present; no known pre-surgical therapy; no LVI  All margins negative for invasive carcinoma; closest margin invasive carcinoma, bronchial  vascular; distance from invasive carcinoma closest margin,> 2 cm; margins status for noninvasive tumor, all margins negative for noninvasive tumor  Number of lymph nodes examined, 10; all regional lymph nodes negative for tumor  >>>  pT3 pN0    PD-L1 expression:  Positive: High (TPS 95%; )  Negative for EGFR, KRAS, BRAF, AL K, ROS1, RET, MET, HER2  No gene rearrangement no reportable altered splicing events identified from RNA sequencing  No reportable pathogenic variants found  MSI stable  TMB:  41.6m/MB  Fusions: Negative        Interval History 5/19/25:  Patient presents to the clinic today accompanied by her  for a scheduled clinic visit to follow up regarding her history of lung cancer. She is due to receive C14D1 of Atezolizumab today in infusion. She has no acute compliants today. She denies fever, chills, worsening SOB (she continues to wear 2L NC), or s/s associated with infection. She denies any headaches or memory changes. She denies any abdominal pain, constipation, diarrhea, or changes with appetite.  She reports having occasional nausea episodes in his requesting a refill on her Zofran.    Labwork was reviewed with the patient. All future appointments were discussed.         Review of System  Review of Systems   All other systems reviewed and are negative.       Physical Exam  Physical Exam  Vitals reviewed.   Constitutional:       Appearance: Normal appearance.   HENT:      Head: Normocephalic and atraumatic.      Mouth/Throat:      Mouth: Mucous membranes are moist.   Cardiovascular:      Rate and Rhythm: Normal rate and regular rhythm.      Pulses: Normal pulses.      Heart sounds: Normal heart sounds.   Pulmonary:      Effort: Pulmonary effort is normal.      Breath sounds: Normal breath sounds.      Comments: 2L of O2 via NC  Abdominal:      General: Bowel sounds are normal.      Palpations: Abdomen is soft.   Skin:     General: Skin is warm and dry.   Neurological:      Mental  Status: She is alert and oriented to person, place, and time.   Psychiatric:         Mood and Affect: Mood normal.         Behavior: Behavior normal.         Thought Content: Thought content normal.         Judgment: Judgment normal.          Assessment:  Adenocarcinoma right upper lung lobe:   -LDCT chest without contrast 08/12/2022:  Lung rads 2  -LDCT chest without contrast 09/11/2023:  Lung rads 4A  -noncontrast chest CT 12/26/2023:  Lung rads 4A  -FDG PET-CT 01/29/2024:  Hypermetabolic right upper lobe nodule, 1.2 x 1.1 cm, maximum SUV 14, previously 1 cm x 0.9 cm; no distant metastases   -noncontrast chest CT 02/01/2024:  Right upper lobe nodule continues to enlarge, now 11 mm   -03/07/2024:  Right VATS, right upper lobe wedge resection, right upper lobectomy, right middle lobectomy, regional lymph node dissection:  -separate tumor nodules (metastases) in same lobe, right upper lung lobe, therefore, pT3 (0.8 cm and 0.6 cm, respectively); invasive acinar adenocarcinoma; G3, poorly-differentiated; no visceral pleural invasion; no LVI; margins negative; 10 regional lymph nodes negative  -pT3 pN0 M0, stage IIB  -PD-L1 expression high positive (TPS 95%; )  -negative for EGFR, KRAS, BRAF, AL K, ROS1, RET, met, HER2  -MediPort placed 04/25/2024  -S/P adjuvant cisplatin/Alimta every 3 weeks x4 cycles (04/29/2024-07/01/2024)  -restaging CTs C/A/P 05/06/2024:  No metastases   -staging brain MRI 05/08/2024: No metastases  -hemoglobin dropped to 5.2 on 07/10/2024, requiring blood transfusion  -ANC dropped to 0.55 on 06/24/2024, requiring supportive care  -07/17/2024:  ANC 0.525; hemoglobin 7.0; iron studies consistent with anemia of chronic disease/inflammation/malignancy/chemotherapy  -07/22/2024: TSH, free T4 normal  -adjuvant atezolizumab 1200 mg every 3 weeks, started 08/09/2024; cycle 2 on 08/30/2024; cycle 3 on 09/20/2024  -09/20/2024: TSH, free T4 normal  -surveillance CT chest 09/18/2024:  [facility]  >>>  Plan:  Primary Adenocarcinoma of upper lobe of right lung:   Continue adjuvant atezolizumab 1200 mg IV every 3 weeks x1 year (17 cycles)   Proceed with C14D1 today in infusion   RTC with Me in 3 weeks (6/9) with labs prior (CBC/CMP/Mag level) followed by infusion   Check baseline TSH and free T4 every 6 weeks to monitor for the possibility of immune thyroiditis with atezolizumab (next early July)  For surveillance, CT chest with contrast in 6 months (September 2025)    Nausea:   Refill sent on Zofran ODT-refill sent to pharmacy today    Establishing care:   Referral sent to internal medicine to establish care       Elevated BUN/Creatinine level:   BUN 33.9  Creatinine level 1.95  Instructed patient to increase the amount of water she drinks daily  We will continue to monitor      Monitoring on atezolizumab:  Monitor LFTs (AST, ALT, and total bilirubin; at baseline and periodically during treatment;  kidney function (serum creatinine; at baseline and periodically during treatment);  thyroid function (at baseline, periodically during treatment and as clinically indicated);  monitor blood glucose (for hyperglycemia);  Monitor closely for signs/symptoms of immune-mediated adverse reactions, including  adrenal insufficiency,  diarrhea/colitis (consider initiating or repeating infectious workup in patients with corticosteroid-refractory immune-mediated colitis to exclude alternative causes),  dermatologic toxicity,  diabetes mellitus,  hypophysitis,  ocular disorders,  thyroid disorders,  pneumonitis and other immune-mediated adverse reactions.  Monitor for signs/symptoms of infusion-related reactions.     Surveillance:  -history and physical and chest CT +/- contrast every 6 months X 3 years (03/2024-03/2027), then history and physical and low-dose noncontrast chest CT annually  -smoking cessation advice and counseling   -FDG PET-CT or brain MRI is not routinely indicated      No follow-ups on file.

## 2025-05-22 DIAGNOSIS — E53.8 FOLATE DEFICIENCY: ICD-10-CM

## 2025-05-23 RX ORDER — FOLIC ACID 1 MG/1
1000 TABLET ORAL DAILY
Qty: 30 TABLET | Refills: 2 | Status: SHIPPED | OUTPATIENT
Start: 2025-05-23

## 2025-06-05 ENCOUNTER — OFFICE VISIT (OUTPATIENT)
Dept: INTERNAL MEDICINE | Facility: CLINIC | Age: 65
End: 2025-06-05
Payer: MEDICAID

## 2025-06-05 VITALS
BODY MASS INDEX: 35.34 KG/M2 | OXYGEN SATURATION: 96 % | WEIGHT: 180 LBS | HEART RATE: 61 BPM | RESPIRATION RATE: 16 BRPM | HEIGHT: 60 IN | DIASTOLIC BLOOD PRESSURE: 66 MMHG | TEMPERATURE: 98 F | SYSTOLIC BLOOD PRESSURE: 108 MMHG

## 2025-06-05 DIAGNOSIS — E53.8 FOLIC ACID DEFICIENCY: ICD-10-CM

## 2025-06-05 DIAGNOSIS — R19.5 POSITIVE COLORECTAL CANCER SCREENING USING COLOGUARD TEST: ICD-10-CM

## 2025-06-05 DIAGNOSIS — Z99.81 O2 DEPENDENT: ICD-10-CM

## 2025-06-05 DIAGNOSIS — Z00.00 WELL ADULT EXAM: Primary | ICD-10-CM

## 2025-06-05 DIAGNOSIS — J30.1 SEASONAL ALLERGIC RHINITIS DUE TO POLLEN: ICD-10-CM

## 2025-06-05 DIAGNOSIS — Z13.6 SCREENING FOR HYPERTENSION: ICD-10-CM

## 2025-06-05 DIAGNOSIS — R94.4 DECREASED GFR: ICD-10-CM

## 2025-06-05 DIAGNOSIS — K21.9 GASTROESOPHAGEAL REFLUX DISEASE, UNSPECIFIED WHETHER ESOPHAGITIS PRESENT: ICD-10-CM

## 2025-06-05 DIAGNOSIS — G47.00 INSOMNIA, UNSPECIFIED TYPE: ICD-10-CM

## 2025-06-05 DIAGNOSIS — E87.6 HYPOKALEMIA: ICD-10-CM

## 2025-06-05 DIAGNOSIS — D63.8 ANEMIA OF CHRONIC DISEASE: ICD-10-CM

## 2025-06-05 DIAGNOSIS — M10.9 GOUT, UNSPECIFIED CAUSE, UNSPECIFIED CHRONICITY, UNSPECIFIED SITE: ICD-10-CM

## 2025-06-05 DIAGNOSIS — C34.11 PRIMARY ADENOCARCINOMA OF UPPER LOBE OF RIGHT LUNG: ICD-10-CM

## 2025-06-05 DIAGNOSIS — Z76.89 ESTABLISHING CARE WITH NEW DOCTOR, ENCOUNTER FOR: ICD-10-CM

## 2025-06-05 PROCEDURE — 99215 OFFICE O/P EST HI 40 MIN: CPT | Mod: PBBFAC | Performed by: NURSE PRACTITIONER

## 2025-06-05 RX ORDER — MONTELUKAST SODIUM 10 MG/1
10 TABLET ORAL DAILY
Qty: 90 TABLET | Refills: 1 | Status: SHIPPED | OUTPATIENT
Start: 2025-06-05

## 2025-06-05 RX ORDER — NORTRIPTYLINE HYDROCHLORIDE 25 MG/1
25 CAPSULE ORAL DAILY
Qty: 90 CAPSULE | Refills: 1 | Status: SHIPPED | OUTPATIENT
Start: 2025-06-05

## 2025-06-05 RX ORDER — PANTOPRAZOLE SODIUM 40 MG/1
40 TABLET, DELAYED RELEASE ORAL DAILY
Qty: 90 TABLET | Refills: 1 | Status: SHIPPED | OUTPATIENT
Start: 2025-06-05

## 2025-06-05 RX ORDER — ALLOPURINOL 100 MG/1
100 TABLET ORAL DAILY
Qty: 90 TABLET | Refills: 1 | Status: SHIPPED | OUTPATIENT
Start: 2025-06-05

## 2025-06-05 RX ORDER — POTASSIUM CHLORIDE 1500 MG/1
20 TABLET, EXTENDED RELEASE ORAL 2 TIMES DAILY
Qty: 28 TABLET | Refills: 2 | Status: SHIPPED | OUTPATIENT
Start: 2025-06-05

## 2025-06-09 ENCOUNTER — OFFICE VISIT (OUTPATIENT)
Dept: HEMATOLOGY/ONCOLOGY | Facility: CLINIC | Age: 65
End: 2025-06-09
Attending: INTERNAL MEDICINE
Payer: MEDICAID

## 2025-06-09 ENCOUNTER — INFUSION (OUTPATIENT)
Dept: INFUSION THERAPY | Facility: HOSPITAL | Age: 65
End: 2025-06-09
Attending: INTERNAL MEDICINE
Payer: MEDICAID

## 2025-06-09 VITALS
BODY MASS INDEX: 35.77 KG/M2 | SYSTOLIC BLOOD PRESSURE: 101 MMHG | HEART RATE: 69 BPM | HEIGHT: 60 IN | RESPIRATION RATE: 17 BRPM | WEIGHT: 182.19 LBS | OXYGEN SATURATION: 99 % | DIASTOLIC BLOOD PRESSURE: 63 MMHG | TEMPERATURE: 98 F

## 2025-06-09 VITALS
RESPIRATION RATE: 20 BRPM | OXYGEN SATURATION: 100 % | SYSTOLIC BLOOD PRESSURE: 110 MMHG | DIASTOLIC BLOOD PRESSURE: 54 MMHG | TEMPERATURE: 98 F | HEART RATE: 70 BPM

## 2025-06-09 DIAGNOSIS — C34.11 PRIMARY ADENOCARCINOMA OF UPPER LOBE OF RIGHT LUNG: Primary | ICD-10-CM

## 2025-06-09 DIAGNOSIS — R42 VERTIGO: ICD-10-CM

## 2025-06-09 DIAGNOSIS — D84.821 IMMUNODEFICIENCY DUE TO CHEMOTHERAPY: ICD-10-CM

## 2025-06-09 DIAGNOSIS — K12.31 MUCOSITIS DUE TO CHEMOTHERAPY: ICD-10-CM

## 2025-06-09 DIAGNOSIS — T45.1X5A IMMUNODEFICIENCY DUE TO CHEMOTHERAPY: ICD-10-CM

## 2025-06-09 DIAGNOSIS — F41.9 ACUTE ANXIETY: ICD-10-CM

## 2025-06-09 DIAGNOSIS — Z79.69 IMMUNODEFICIENCY DUE TO CHEMOTHERAPY: ICD-10-CM

## 2025-06-09 DIAGNOSIS — R79.89 ELEVATED SERUM CREATININE: ICD-10-CM

## 2025-06-09 DIAGNOSIS — G62.0 NEUROPATHY DUE TO DRUG: ICD-10-CM

## 2025-06-09 DIAGNOSIS — Z99.81 OXYGEN DEPENDENT: ICD-10-CM

## 2025-06-09 DIAGNOSIS — Z90.2 STATUS POST LOBECTOMY OF LUNG: ICD-10-CM

## 2025-06-09 LAB
ALBUMIN SERPL-MCNC: 3.7 G/DL (ref 3.4–4.8)
ALBUMIN/GLOB SERPL: 1.1 RATIO (ref 1.1–2)
ALP SERPL-CCNC: 122 UNIT/L (ref 40–150)
ALT SERPL-CCNC: 11 UNIT/L (ref 0–55)
ANION GAP SERPL CALC-SCNC: 12 MEQ/L
AST SERPL-CCNC: 10 UNIT/L (ref 11–45)
BASOPHILS # BLD AUTO: 0 X10(3)/MCL
BASOPHILS NFR BLD AUTO: 0 %
BILIRUB SERPL-MCNC: 0.5 MG/DL
BUN SERPL-MCNC: 16.9 MG/DL (ref 9.8–20.1)
CALCIUM SERPL-MCNC: 9.1 MG/DL (ref 8.4–10.2)
CHLORIDE SERPL-SCNC: 100 MMOL/L (ref 98–107)
CO2 SERPL-SCNC: 29 MMOL/L (ref 23–31)
CREAT SERPL-MCNC: 1.17 MG/DL (ref 0.55–1.02)
CREAT/UREA NIT SERPL: 14
EOSINOPHIL # BLD AUTO: 0 X10(3)/MCL (ref 0–0.9)
EOSINOPHIL NFR BLD AUTO: 0 %
ERYTHROCYTE [DISTWIDTH] IN BLOOD BY AUTOMATED COUNT: 13.2 % (ref 11.5–17)
GFR SERPLBLD CREATININE-BSD FMLA CKD-EPI: 52 ML/MIN/1.73/M2
GLOBULIN SER-MCNC: 3.4 GM/DL (ref 2.4–3.5)
GLUCOSE SERPL-MCNC: 124 MG/DL (ref 82–115)
HCT VFR BLD AUTO: 34.5 % (ref 37–47)
HGB BLD-MCNC: 11.1 G/DL (ref 12–16)
IMM GRANULOCYTES # BLD AUTO: 0.04 X10(3)/MCL (ref 0–0.04)
IMM GRANULOCYTES NFR BLD AUTO: 0.5 %
LYMPHOCYTES # BLD AUTO: 1.71 X10(3)/MCL (ref 0.6–4.6)
LYMPHOCYTES NFR BLD AUTO: 23 %
MAGNESIUM SERPL-MCNC: 2.1 MG/DL (ref 1.6–2.6)
MCH RBC QN AUTO: 31 PG (ref 27–31)
MCHC RBC AUTO-ENTMCNC: 32.2 G/DL (ref 33–36)
MCV RBC AUTO: 96.4 FL (ref 80–94)
MONOCYTES # BLD AUTO: 0.71 X10(3)/MCL (ref 0.1–1.3)
MONOCYTES NFR BLD AUTO: 9.5 %
NEUTROPHILS # BLD AUTO: 4.99 X10(3)/MCL (ref 2.1–9.2)
NEUTROPHILS NFR BLD AUTO: 67 %
NRBC BLD AUTO-RTO: 0 %
PLATELET # BLD AUTO: 248 X10(3)/MCL (ref 130–400)
PMV BLD AUTO: 8.9 FL (ref 7.4–10.4)
POTASSIUM SERPL-SCNC: 3.7 MMOL/L (ref 3.5–5.1)
PROT SERPL-MCNC: 7.1 GM/DL (ref 5.8–7.6)
RBC # BLD AUTO: 3.58 X10(6)/MCL (ref 4.2–5.4)
SODIUM SERPL-SCNC: 141 MMOL/L (ref 136–145)
WBC # BLD AUTO: 7.45 X10(3)/MCL (ref 4.5–11.5)

## 2025-06-09 PROCEDURE — 83735 ASSAY OF MAGNESIUM: CPT

## 2025-06-09 PROCEDURE — 25000003 PHARM REV CODE 250

## 2025-06-09 PROCEDURE — 99215 OFFICE O/P EST HI 40 MIN: CPT | Mod: S$PBB,,,

## 2025-06-09 PROCEDURE — 1159F MED LIST DOCD IN RCRD: CPT | Mod: CPTII,,,

## 2025-06-09 PROCEDURE — A4216 STERILE WATER/SALINE, 10 ML: HCPCS

## 2025-06-09 PROCEDURE — 80053 COMPREHEN METABOLIC PANEL: CPT

## 2025-06-09 PROCEDURE — 3078F DIAST BP <80 MM HG: CPT | Mod: CPTII,,,

## 2025-06-09 PROCEDURE — 85025 COMPLETE CBC W/AUTO DIFF WBC: CPT

## 2025-06-09 PROCEDURE — 3008F BODY MASS INDEX DOCD: CPT | Mod: CPTII,,,

## 2025-06-09 PROCEDURE — 63600175 PHARM REV CODE 636 W HCPCS: Mod: JZ,TB

## 2025-06-09 PROCEDURE — 4010F ACE/ARB THERAPY RXD/TAKEN: CPT | Mod: CPTII,,,

## 2025-06-09 PROCEDURE — 1160F RVW MEDS BY RX/DR IN RCRD: CPT | Mod: CPTII,,,

## 2025-06-09 PROCEDURE — 99215 OFFICE O/P EST HI 40 MIN: CPT | Mod: PBBFAC,25

## 2025-06-09 PROCEDURE — 3074F SYST BP LT 130 MM HG: CPT | Mod: CPTII,,,

## 2025-06-09 PROCEDURE — 96413 CHEMO IV INFUSION 1 HR: CPT

## 2025-06-09 RX ORDER — HEPARIN 100 UNIT/ML
500 SYRINGE INTRAVENOUS
Status: DISCONTINUED | OUTPATIENT
Start: 2025-06-09 | End: 2025-06-09 | Stop reason: HOSPADM

## 2025-06-09 RX ORDER — SODIUM CHLORIDE 0.9 % (FLUSH) 0.9 %
10 SYRINGE (ML) INJECTION
Status: CANCELLED | OUTPATIENT
Start: 2025-06-09

## 2025-06-09 RX ORDER — SODIUM CHLORIDE 0.9 % (FLUSH) 0.9 %
10 SYRINGE (ML) INJECTION
Status: DISCONTINUED | OUTPATIENT
Start: 2025-06-09 | End: 2025-06-09 | Stop reason: HOSPADM

## 2025-06-09 RX ORDER — HEPARIN 100 UNIT/ML
500 SYRINGE INTRAVENOUS
Status: CANCELLED | OUTPATIENT
Start: 2025-06-09

## 2025-06-09 RX ORDER — EPINEPHRINE 1 MG/ML
0.3 INJECTION INTRAMUSCULAR; INTRAVENOUS; SUBCUTANEOUS ONCE AS NEEDED
Status: DISCONTINUED | OUTPATIENT
Start: 2025-06-09 | End: 2025-06-09 | Stop reason: HOSPADM

## 2025-06-09 RX ORDER — DIPHENHYDRAMINE HYDROCHLORIDE 50 MG/ML
50 INJECTION, SOLUTION INTRAMUSCULAR; INTRAVENOUS ONCE AS NEEDED
Status: CANCELLED | OUTPATIENT
Start: 2025-06-09

## 2025-06-09 RX ORDER — EPINEPHRINE 0.3 MG/.3ML
0.3 INJECTION SUBCUTANEOUS ONCE AS NEEDED
Status: CANCELLED | OUTPATIENT
Start: 2025-06-09

## 2025-06-09 RX ORDER — DIPHENHYDRAMINE HYDROCHLORIDE 50 MG/ML
50 INJECTION, SOLUTION INTRAMUSCULAR; INTRAVENOUS ONCE AS NEEDED
Status: DISCONTINUED | OUTPATIENT
Start: 2025-06-09 | End: 2025-06-09 | Stop reason: HOSPADM

## 2025-06-09 RX ADMIN — ATEZOLIZUMAB 1200 MG: 1200 INJECTION, SOLUTION INTRAVENOUS at 11:06

## 2025-06-09 RX ADMIN — HEPARIN 500 UNITS: 100 SYRINGE at 11:06

## 2025-06-09 RX ADMIN — Medication 10 ML: at 11:06

## 2025-06-09 RX ADMIN — SODIUM CHLORIDE: 9 INJECTION, SOLUTION INTRAVENOUS at 10:06

## 2025-06-09 NOTE — PROGRESS NOTES
Reason for Follow-up:  -adenocarcinoma right upper lung lobe, S/P right VATS/right upper lobe wedge resection/completion right upper lobectomy and right middle lobectomy and regional lymph node dissection 2024, G3, pT3 pN0  -Cologuard positive  -anemia of chronic disease  -folic acid deficiency      Chief Complaint   Patient presents with    Lung Cancer     Primary adenocarcinoma of upper lobe of right lung          History:  Social history:   .  Lives in [place].  Has 3 children.  Does not work.  Has been smoking 1-2 pack of cigarettes daily for 51 years, since age 12; discontinued recently.  No alcohol or illicit drug abuse.      Family history:   Mother experienced some kind of intrathoracic malignancy at age 83;  from MI at age 83   Father  from prostate cancer) experienced at age 83 and probably, sarcoma) experienced at age 83)    Health maintenance:   -PCP in [place]  -says that she had screening colonoscopy performed in [place] in , and that it was unremarkable  -says that now, for positive Cologuard test, she is scheduled for colonoscopy in May 2024  -2023:  Cologuard positive  -2023:  Bilateral digital screening mammogram with tomosynthesis (comparison:  2022 mammogram, etc.):  BI-RADS: 1 negative    Oncologic/Hematologic History:  Oncology History   Primary adenocarcinoma of upper lobe of right lung   3/7/2024 Cancer Staged    Staging form: Lung, AJCC 8th Edition  - Pathologic stage from 3/7/2024: Stage IIB (pT3, pN0, cM0)     4/15/2024 Initial Diagnosis    Primary adenocarcinoma of upper lobe of right lung     2024 - 2024 Chemotherapy    Treatment Summary   Plan Name: OP NSCLC PEMETREXED + CISPLATIN Q3W  Treatment Goal: Curative  Status: Inactive  Start Date: 2024  End Date: 2024  Provider: Emery Mahoney MD  Chemotherapy: CISplatin (Platinol) 132 mg in sodium chloride 0.9% 697 mL chemo infusion, 141 mg,  Intravenous, Clinic/HOD 1 time, 4 of 4 cycles  Administration: 132 mg (4/29/2024), 128 mg (6/10/2024), 128 mg (7/1/2024), 132 mg (5/20/2024)  PEMEtrexed disodium (ALIMTA) 900 mg in sodium chloride 0.9% SolP 100 mL chemo infusion, 950 mg, Intravenous, Clinic/HOD 1 time, 4 of 4 cycles  Dose modification: 375 mg/m2 (75 % of original dose 500 mg/m2, Cycle 4, Reason: Dose Not Tolerated)  Administration: 900 mg (4/29/2024), 900 mg (6/10/2024), 625 mg (7/1/2024), 900 mg (5/20/2024)     8/9/2024 -  Chemotherapy    Treatment Summary   Plan Name: OP ATEZOLIZUMAB Q3W  Treatment Goal: Curative  Status: Active  Start Date: 8/9/2024  End Date: 7/21/2025 (Planned)  Provider: Emery Mahoney MD  Chemotherapy: [No matching medication found in this treatment plan]     =========================================    64-year-old female, referred from [facility], Dr. Arnulfo Middleton, with poorly-differentiated adenocarcinoma of right lung.      Investigations reviewed:  -08/12/2022: CT chest lung screening low-dose (comparison:  09/24/2020):  Lung rads 2: Benign appearance or behavior:  Continue annual screening with LDCT in 12 months  -09/11/2023:  CT chest lung screening low-dose without contrast (comparison:  08/12/2022):  Lung rads category 4A: Suspicious; enlarging nodule right upper lobe, lobulated, < 8 mm, i.e., 7 x 6.7 mm, previously 5 mm, previously not lobulated, too small for adequate detection on PET-CT; recommend three-month follow-up)  -12/26/2023: CT chest with contrast (comparison:  09/11/2023):  Lung rads 4A: Very suspicious; continued enlargement of right upper lobe lung nodule of concern (9.5 x 8.9 mm)  -01/29/2024: FDG PET-CT (comparison:  Chest CT 12/26/2023; CT abdomen pelvis 09/04/2019):  1. Markedly FDG-avid right upper lobe soft tissue nodule, increased in size in the interval, raising concern for malignancy (smoothly marginated noncalcified soft tissue nodule lateral subpleural right upper  lobe, 1.2 x 1.1 cm, maximum SUV 14, previously 1 cm x 0.9 cm).  2. No definite PET-CT findings to suggest additional right hemithoracic or more distant metastatic disease.  -02/01/2024: CT chest without contrast (comparison:  12/26/2023): Lateral right upper lobe nodule continues to enlarge in size currently measuring 11 mm concerning for malignancy. Recommend further evaluation.   -03/07/2024:  Right VATS, right upper lobe wedge resection; right upper lobectomy, right middle lobectomy, regional lymph node dissection:  1. Right lung, upper lobe, wedge resection:  Poorly-differentiated adenocarcinoma, 0.6 cm, clear margins of resection  2. Level 8R lymph node (paraesophageal), biopsy:  3 lymph nodes, negative for metastatic carcinoma    3. Level 4R lymph node (lower paratracheal), biopsy: 2 lymph nodes, negative for metastatic carcinoma  4. Level 7R lymph node (subcarinal), biopsy:  1 lymph node, negative for metastatic carcinoma  5. Level 11R lymph node (interlobar), biopsy:  2 lymph nodes, negative for metastatic carcinoma    6. Right lung, upper lobe, completion lobectomy:  -poorly-differentiated adenocarcinoma, 0.8 cm  -bronchial and vascular margins of resection negative   -1 hilar lymph node with no evidence of metastatic carcinoma  7. Level 12R lymph node (lobar), lymphadenectomy:  1 lymph node, negative for metastatic carcinoma    Synoptic report:  Total number of primary tumors: 2  S/P wedge resection  Completion lobectomy  Right lung  Separate tumor nodules (metastases) in same lobe, therefore, pT3  number of intrapulmonary metastases:  2   Tumor site:  Upper lobe of lung  Tumor size:  Total tumor size:  Greatest dimension:  0.8 cm  Invasive acinar adenocarcinoma; G3, poorly-differentiated; spread through airspaces (CHAMP), present; no visceral pleural invasion; no adjacent structures present; no known pre-surgical therapy; no LVI  All margins negative for invasive carcinoma; closest margin invasive  carcinoma, bronchial vascular; distance from invasive carcinoma closest margin,> 2 cm; margins status for noninvasive tumor, all margins negative for noninvasive tumor  Number of lymph nodes examined, 10; all regional lymph nodes negative for tumor  >>>  pT3 pN0    PD-L1 expression:  Positive: High (TPS 95%; )  Negative for EGFR, KRAS, BRAF, AL K, ROS1, RET, MET, HER2  No gene rearrangement no reportable altered splicing events identified from RNA sequencing  No reportable pathogenic variants found  MSI stable  TMB:  41.6m/MB  Fusions: Negative        Interval History 6/9/25:  Patient presents to the clinic today accompanied by her  for a scheduled clinic visit to follow up regarding her history of lung cancer. She is due to receive C15D1 of Atezolizumab today in infusion. She has no acute compliants today. She denies fever, chills, worsening SOB (she continues to wear 2L NC), or s/s associated with infection. She denies any headaches or memory changes. She denies any abdominal pain, constipation, diarrhea, or changes with appetite. Labwork was reviewed with the patient. All future appointments were discussed.         Review of System  Review of Systems   All other systems reviewed and are negative.       Physical Exam  Physical Exam  Vitals reviewed.   Constitutional:       Appearance: Normal appearance.   HENT:      Head: Normocephalic and atraumatic.      Mouth/Throat:      Mouth: Mucous membranes are moist.   Cardiovascular:      Rate and Rhythm: Normal rate and regular rhythm.      Pulses: Normal pulses.      Heart sounds: Normal heart sounds.   Pulmonary:      Effort: Pulmonary effort is normal.      Breath sounds: Normal breath sounds.      Comments: 2L of O2 via NC  Abdominal:      General: Bowel sounds are normal.      Palpations: Abdomen is soft.   Skin:     General: Skin is warm and dry.   Neurological:      Mental Status: She is alert and oriented to person, place, and time.   Psychiatric:          Mood and Affect: Mood normal.         Behavior: Behavior normal.         Thought Content: Thought content normal.         Judgment: Judgment normal.        Assessment:  Adenocarcinoma right upper lung lobe:   -LDCT chest without contrast 08/12/2022:  Lung rads 2  -LDCT chest without contrast 09/11/2023:  Lung rads 4A  -noncontrast chest CT 12/26/2023:  Lung rads 4A  -FDG PET-CT 01/29/2024:  Hypermetabolic right upper lobe nodule, 1.2 x 1.1 cm, maximum SUV 14, previously 1 cm x 0.9 cm; no distant metastases   -noncontrast chest CT 02/01/2024:  Right upper lobe nodule continues to enlarge, now 11 mm   -03/07/2024:  Right VATS, right upper lobe wedge resection, right upper lobectomy, right middle lobectomy, regional lymph node dissection:  -separate tumor nodules (metastases) in same lobe, right upper lung lobe, therefore, pT3 (0.8 cm and 0.6 cm, respectively); invasive acinar adenocarcinoma; G3, poorly-differentiated; no visceral pleural invasion; no LVI; margins negative; 10 regional lymph nodes negative  -pT3 pN0 M0, stage IIB  -PD-L1 expression high positive (TPS 95%; )  -negative for EGFR, KRAS, BRAF, AL K, ROS1, RET, met, HER2  -MediPort placed 04/25/2024  -S/P adjuvant cisplatin/Alimta every 3 weeks x4 cycles (04/29/2024-07/01/2024)  -restaging CTs C/A/P 05/06/2024:  No metastases   -staging brain MRI 05/08/2024: No metastases  -hemoglobin dropped to 5.2 on 07/10/2024, requiring blood transfusion  -ANC dropped to 0.55 on 06/24/2024, requiring supportive care  -07/17/2024:  ANC 0.525; hemoglobin 7.0; iron studies consistent with anemia of chronic disease/inflammation/malignancy/chemotherapy  -07/22/2024: TSH, free T4 normal  -adjuvant atezolizumab 1200 mg every 3 weeks, started 08/09/2024; cycle 2 on 08/30/2024; cycle 3 on 09/20/2024  -09/20/2024: TSH, free T4 normal  -surveillance CT chest 09/18/2024: [facility]  >>>  Plan:  Primary Adenocarcinoma of upper lobe of right lung:   Continue adjuvant  atezolizumab 1200 mg IV every 3 weeks x1 year (17 cycles)   Proceed with C15D1 today in infusion   RTC with Me in 3 weeks (6/30) with labs prior (CBC/CMP/Mag level, TSH/Free T4) followed by infusion   Check baseline TSH and free T4 every 6 weeks to monitor for the possibility of immune thyroiditis with atezolizumab (next early July)  For surveillance, CT chest with contrast in 6 months (September 2025)      Elevated BUN/Creatinine level:   BUN 16.9  Creatinine level 1.17  This has improved since the last visit.   We will continue to monitor      Monitoring on atezolizumab:  Monitor LFTs (AST, ALT, and total bilirubin; at baseline and periodically during treatment;  kidney function (serum creatinine; at baseline and periodically during treatment);  thyroid function (at baseline, periodically during treatment and as clinically indicated);  monitor blood glucose (for hyperglycemia);  Monitor closely for signs/symptoms of immune-mediated adverse reactions, including  adrenal insufficiency,  diarrhea/colitis (consider initiating or repeating infectious workup in patients with corticosteroid-refractory immune-mediated colitis to exclude alternative causes),  dermatologic toxicity,  diabetes mellitus,  hypophysitis,  ocular disorders,  thyroid disorders,  pneumonitis and other immune-mediated adverse reactions.  Monitor for signs/symptoms of infusion-related reactions.     Surveillance:  -history and physical and chest CT +/- contrast every 6 months X 3 years (03/2024-03/2027), then history and physical and low-dose noncontrast chest CT annually  -smoking cessation advice and counseling   -FDG PET-CT or brain MRI is not routinely indicated      No follow-ups on file.

## 2025-06-09 NOTE — NURSING
A JOLANTA Chaney escorted pt wearing own O2 and using rolling walker accompanied by spouse; labs within parameters, right port accessed.

## 2025-06-30 ENCOUNTER — OFFICE VISIT (OUTPATIENT)
Dept: HEMATOLOGY/ONCOLOGY | Facility: CLINIC | Age: 65
End: 2025-06-30
Payer: MEDICAID

## 2025-06-30 ENCOUNTER — INFUSION (OUTPATIENT)
Dept: INFUSION THERAPY | Facility: HOSPITAL | Age: 65
End: 2025-06-30
Attending: INTERNAL MEDICINE
Payer: MEDICAID

## 2025-06-30 VITALS
WEIGHT: 179.63 LBS | TEMPERATURE: 98 F | SYSTOLIC BLOOD PRESSURE: 96 MMHG | DIASTOLIC BLOOD PRESSURE: 55 MMHG | HEIGHT: 60 IN | HEART RATE: 70 BPM | OXYGEN SATURATION: 100 % | BODY MASS INDEX: 35.27 KG/M2 | RESPIRATION RATE: 16 BRPM

## 2025-06-30 VITALS
TEMPERATURE: 97 F | DIASTOLIC BLOOD PRESSURE: 62 MMHG | RESPIRATION RATE: 20 BRPM | HEART RATE: 72 BPM | OXYGEN SATURATION: 100 % | SYSTOLIC BLOOD PRESSURE: 113 MMHG

## 2025-06-30 DIAGNOSIS — C34.11 PRIMARY ADENOCARCINOMA OF UPPER LOBE OF RIGHT LUNG: Primary | ICD-10-CM

## 2025-06-30 DIAGNOSIS — D84.821 IMMUNODEFICIENCY DUE TO CHEMOTHERAPY: ICD-10-CM

## 2025-06-30 DIAGNOSIS — Z79.69 IMMUNODEFICIENCY DUE TO CHEMOTHERAPY: ICD-10-CM

## 2025-06-30 DIAGNOSIS — R42 VERTIGO: ICD-10-CM

## 2025-06-30 DIAGNOSIS — Z99.81 OXYGEN DEPENDENT: ICD-10-CM

## 2025-06-30 DIAGNOSIS — T45.1X5A IMMUNODEFICIENCY DUE TO CHEMOTHERAPY: ICD-10-CM

## 2025-06-30 DIAGNOSIS — R79.89 ELEVATED SERUM CREATININE: ICD-10-CM

## 2025-06-30 PROCEDURE — A4216 STERILE WATER/SALINE, 10 ML: HCPCS

## 2025-06-30 PROCEDURE — 1160F RVW MEDS BY RX/DR IN RCRD: CPT | Mod: CPTII,,,

## 2025-06-30 PROCEDURE — 96415 CHEMO IV INFUSION ADDL HR: CPT

## 2025-06-30 PROCEDURE — 1159F MED LIST DOCD IN RCRD: CPT | Mod: CPTII,,,

## 2025-06-30 PROCEDURE — 3078F DIAST BP <80 MM HG: CPT | Mod: CPTII,,,

## 2025-06-30 PROCEDURE — 3074F SYST BP LT 130 MM HG: CPT | Mod: CPTII,,,

## 2025-06-30 PROCEDURE — 99215 OFFICE O/P EST HI 40 MIN: CPT | Mod: PBBFAC,25

## 2025-06-30 PROCEDURE — 25000003 PHARM REV CODE 250

## 2025-06-30 PROCEDURE — 4010F ACE/ARB THERAPY RXD/TAKEN: CPT | Mod: CPTII,,,

## 2025-06-30 PROCEDURE — 99215 OFFICE O/P EST HI 40 MIN: CPT | Mod: S$PBB,,,

## 2025-06-30 PROCEDURE — 63600175 PHARM REV CODE 636 W HCPCS

## 2025-06-30 PROCEDURE — 3008F BODY MASS INDEX DOCD: CPT | Mod: CPTII,,,

## 2025-06-30 RX ORDER — HEPARIN 100 UNIT/ML
500 SYRINGE INTRAVENOUS
Status: CANCELLED | OUTPATIENT
Start: 2025-06-30

## 2025-06-30 RX ORDER — HEPARIN 100 UNIT/ML
500 SYRINGE INTRAVENOUS
Status: DISCONTINUED | OUTPATIENT
Start: 2025-06-30 | End: 2025-06-30 | Stop reason: HOSPADM

## 2025-06-30 RX ORDER — DIPHENHYDRAMINE HYDROCHLORIDE 50 MG/ML
50 INJECTION, SOLUTION INTRAMUSCULAR; INTRAVENOUS ONCE AS NEEDED
Status: CANCELLED | OUTPATIENT
Start: 2025-06-30

## 2025-06-30 RX ORDER — SODIUM CHLORIDE 0.9 % (FLUSH) 0.9 %
10 SYRINGE (ML) INJECTION
Status: CANCELLED | OUTPATIENT
Start: 2025-06-30

## 2025-06-30 RX ORDER — EPINEPHRINE 1 MG/ML
0.3 INJECTION INTRAMUSCULAR; INTRAVENOUS; SUBCUTANEOUS ONCE AS NEEDED
Status: DISCONTINUED | OUTPATIENT
Start: 2025-06-30 | End: 2025-06-30 | Stop reason: HOSPADM

## 2025-06-30 RX ORDER — DIPHENHYDRAMINE HYDROCHLORIDE 50 MG/ML
50 INJECTION, SOLUTION INTRAMUSCULAR; INTRAVENOUS ONCE AS NEEDED
Status: DISCONTINUED | OUTPATIENT
Start: 2025-06-30 | End: 2025-06-30 | Stop reason: HOSPADM

## 2025-06-30 RX ORDER — MECLIZINE HYDROCHLORIDE 25 MG/1
25 TABLET ORAL DAILY
Qty: 30 TABLET | Refills: 0 | Status: SHIPPED | OUTPATIENT
Start: 2025-06-30

## 2025-06-30 RX ORDER — EPINEPHRINE 0.3 MG/.3ML
0.3 INJECTION SUBCUTANEOUS ONCE AS NEEDED
Status: CANCELLED | OUTPATIENT
Start: 2025-06-30

## 2025-06-30 RX ORDER — SODIUM CHLORIDE 0.9 % (FLUSH) 0.9 %
10 SYRINGE (ML) INJECTION
Status: DISCONTINUED | OUTPATIENT
Start: 2025-06-30 | End: 2025-06-30 | Stop reason: HOSPADM

## 2025-06-30 RX ADMIN — Medication 10 ML: at 10:06

## 2025-06-30 RX ADMIN — ATEZOLIZUMAB 1200 MG: 1200 INJECTION, SOLUTION INTRAVENOUS at 10:06

## 2025-06-30 RX ADMIN — HEPARIN 500 UNITS: 100 SYRINGE at 10:06

## 2025-06-30 NOTE — PROGRESS NOTES
Reason for Follow-up:  -adenocarcinoma right upper lung lobe, S/P right VATS/right upper lobe wedge resection/completion right upper lobectomy and right middle lobectomy and regional lymph node dissection 2024, G3, pT3 pN0  -Cologuard positive  -anemia of chronic disease  -folic acid deficiency      Chief Complaint   Patient presents with    Follow-up     Patient reported no concerns today.          History:  Social history:   .  Lives in [place].  Has 3 children.  Does not work.  Has been smoking 1-2 pack of cigarettes daily for 51 years, since age 12; discontinued recently.  No alcohol or illicit drug abuse.      Family history:   Mother experienced some kind of intrathoracic malignancy at age 83;  from MI at age 83   Father  from prostate cancer) experienced at age 83 and probably, sarcoma) experienced at age 83)    Health maintenance:   -PCP in [place]  -says that she had screening colonoscopy performed in [place] in , and that it was unremarkable  -says that now, for positive Cologuard test, she is scheduled for colonoscopy in May 2024  -2023:  Cologuard positive  -2023:  Bilateral digital screening mammogram with tomosynthesis (comparison:  2022 mammogram, etc.):  BI-RADS: 1 negative    Oncologic/Hematologic History:  Oncology History   Primary adenocarcinoma of upper lobe of right lung   3/7/2024 Cancer Staged    Staging form: Lung, AJCC 8th Edition  - Pathologic stage from 3/7/2024: Stage IIB (pT3, pN0, cM0)     4/15/2024 Initial Diagnosis    Primary adenocarcinoma of upper lobe of right lung     2024 - 2024 Chemotherapy    Treatment Summary   Plan Name: OP NSCLC PEMETREXED + CISPLATIN Q3W  Treatment Goal: Curative  Status: Inactive  Start Date: 2024  End Date: 2024  Provider: Emery Mahoney MD  Chemotherapy: CISplatin (Platinol) 132 mg in sodium chloride 0.9% 697 mL chemo infusion, 141 mg, Intravenous, Clinic/HOD 1  time, 4 of 4 cycles  Administration: 132 mg (4/29/2024), 128 mg (6/10/2024), 128 mg (7/1/2024), 132 mg (5/20/2024)  PEMEtrexed disodium (ALIMTA) 900 mg in sodium chloride 0.9% SolP 100 mL chemo infusion, 950 mg, Intravenous, Clinic/HOD 1 time, 4 of 4 cycles  Dose modification: 375 mg/m2 (75 % of original dose 500 mg/m2, Cycle 4, Reason: Dose Not Tolerated)  Administration: 900 mg (4/29/2024), 900 mg (6/10/2024), 625 mg (7/1/2024), 900 mg (5/20/2024)     8/9/2024 -  Chemotherapy    Treatment Summary   Plan Name: OP ATEZOLIZUMAB Q3W  Treatment Goal: Curative  Status: Active  Start Date: 8/9/2024  End Date: 7/21/2025 (Planned)  Provider: Emery Mahoney MD  Chemotherapy: [No matching medication found in this treatment plan]     =========================================    64-year-old female, referred from [facility], Dr. Arnulfo Middleton, with poorly-differentiated adenocarcinoma of right lung.      Investigations reviewed:  -08/12/2022: CT chest lung screening low-dose (comparison:  09/24/2020):  Lung rads 2: Benign appearance or behavior:  Continue annual screening with LDCT in 12 months  -09/11/2023:  CT chest lung screening low-dose without contrast (comparison:  08/12/2022):  Lung rads category 4A: Suspicious; enlarging nodule right upper lobe, lobulated, < 8 mm, i.e., 7 x 6.7 mm, previously 5 mm, previously not lobulated, too small for adequate detection on PET-CT; recommend three-month follow-up)  -12/26/2023: CT chest with contrast (comparison:  09/11/2023):  Lung rads 4A: Very suspicious; continued enlargement of right upper lobe lung nodule of concern (9.5 x 8.9 mm)  -01/29/2024: FDG PET-CT (comparison:  Chest CT 12/26/2023; CT abdomen pelvis 09/04/2019):  1. Markedly FDG-avid right upper lobe soft tissue nodule, increased in size in the interval, raising concern for malignancy (smoothly marginated noncalcified soft tissue nodule lateral subpleural right upper lobe, 1.2 x 1.1 cm,  maximum SUV 14, previously 1 cm x 0.9 cm).  2. No definite PET-CT findings to suggest additional right hemithoracic or more distant metastatic disease.  -02/01/2024: CT chest without contrast (comparison:  12/26/2023): Lateral right upper lobe nodule continues to enlarge in size currently measuring 11 mm concerning for malignancy. Recommend further evaluation.   -03/07/2024:  Right VATS, right upper lobe wedge resection; right upper lobectomy, right middle lobectomy, regional lymph node dissection:  1. Right lung, upper lobe, wedge resection:  Poorly-differentiated adenocarcinoma, 0.6 cm, clear margins of resection  2. Level 8R lymph node (paraesophageal), biopsy:  3 lymph nodes, negative for metastatic carcinoma    3. Level 4R lymph node (lower paratracheal), biopsy: 2 lymph nodes, negative for metastatic carcinoma  4. Level 7R lymph node (subcarinal), biopsy:  1 lymph node, negative for metastatic carcinoma  5. Level 11R lymph node (interlobar), biopsy:  2 lymph nodes, negative for metastatic carcinoma    6. Right lung, upper lobe, completion lobectomy:  -poorly-differentiated adenocarcinoma, 0.8 cm  -bronchial and vascular margins of resection negative   -1 hilar lymph node with no evidence of metastatic carcinoma  7. Level 12R lymph node (lobar), lymphadenectomy:  1 lymph node, negative for metastatic carcinoma    Synoptic report:  Total number of primary tumors: 2  S/P wedge resection  Completion lobectomy  Right lung  Separate tumor nodules (metastases) in same lobe, therefore, pT3  number of intrapulmonary metastases:  2   Tumor site:  Upper lobe of lung  Tumor size:  Total tumor size:  Greatest dimension:  0.8 cm  Invasive acinar adenocarcinoma; G3, poorly-differentiated; spread through airspaces (CHAMP), present; no visceral pleural invasion; no adjacent structures present; no known pre-surgical therapy; no LVI  All margins negative for invasive carcinoma; closest margin invasive carcinoma, bronchial  vascular; distance from invasive carcinoma closest margin,> 2 cm; margins status for noninvasive tumor, all margins negative for noninvasive tumor  Number of lymph nodes examined, 10; all regional lymph nodes negative for tumor  >>>  pT3 pN0    PD-L1 expression:  Positive: High (TPS 95%; )  Negative for EGFR, KRAS, BRAF, AL K, ROS1, RET, MET, HER2  No gene rearrangement no reportable altered splicing events identified from RNA sequencing  No reportable pathogenic variants found  MSI stable  TMB:  41.6m/MB  Fusions: Negative        Interval History 6/30/25:  Patient presents to the clinic today accompanied by her  for a scheduled clinic visit to follow up regarding her history of lung cancer. She is due to receive C17D1 of Atezolizumab today in infusion. She has no acute compliants today. She denies fever, chills, worsening SOB (she continues to wear 2L NC), or s/s associated with infection. She denies any headaches or memory changes. She denies any abdominal pain, constipation, diarrhea, or changes with appetite. Labwork was reviewed with the patient. All future appointments were discussed.         Review of System  Review of Systems   All other systems reviewed and are negative.       Physical Exam  Vitals reviewed.   Constitutional:       Appearance: Normal appearance.   HENT:      Head: Normocephalic and atraumatic.      Mouth/Throat:      Mouth: Mucous membranes are moist.   Cardiovascular:      Rate and Rhythm: Normal rate and regular rhythm.      Pulses: Normal pulses.      Heart sounds: Normal heart sounds.   Pulmonary:      Effort: Pulmonary effort is normal.      Breath sounds: Normal breath sounds.      Comments: 2L of O2 via NC  Abdominal:      General: Bowel sounds are normal.      Palpations: Abdomen is soft.   Skin:     General: Skin is warm and dry.   Neurological:      Mental Status: She is alert and oriented to person, place, and time.   Psychiatric:         Mood and Affect: Mood  normal.         Behavior: Behavior normal.         Thought Content: Thought content normal.         Judgment: Judgment normal.        Assessment:  Adenocarcinoma right upper lung lobe:   -LDCT chest without contrast 08/12/2022:  Lung rads 2  -LDCT chest without contrast 09/11/2023:  Lung rads 4A  -noncontrast chest CT 12/26/2023:  Lung rads 4A  -FDG PET-CT 01/29/2024:  Hypermetabolic right upper lobe nodule, 1.2 x 1.1 cm, maximum SUV 14, previously 1 cm x 0.9 cm; no distant metastases   -noncontrast chest CT 02/01/2024:  Right upper lobe nodule continues to enlarge, now 11 mm   -03/07/2024:  Right VATS, right upper lobe wedge resection, right upper lobectomy, right middle lobectomy, regional lymph node dissection:  -separate tumor nodules (metastases) in same lobe, right upper lung lobe, therefore, pT3 (0.8 cm and 0.6 cm, respectively); invasive acinar adenocarcinoma; G3, poorly-differentiated; no visceral pleural invasion; no LVI; margins negative; 10 regional lymph nodes negative  -pT3 pN0 M0, stage IIB  -PD-L1 expression high positive (TPS 95%; )  -negative for EGFR, KRAS, BRAF, AL K, ROS1, RET, met, HER2  -MediPort placed 04/25/2024  -S/P adjuvant cisplatin/Alimta every 3 weeks x4 cycles (04/29/2024-07/01/2024)  -restaging CTs C/A/P 05/06/2024:  No metastases   -staging brain MRI 05/08/2024: No metastases  -hemoglobin dropped to 5.2 on 07/10/2024, requiring blood transfusion  -ANC dropped to 0.55 on 06/24/2024, requiring supportive care  -07/17/2024:  ANC 0.525; hemoglobin 7.0; iron studies consistent with anemia of chronic disease/inflammation/malignancy/chemotherapy  -07/22/2024: TSH, free T4 normal  -adjuvant atezolizumab 1200 mg every 3 weeks, started 08/09/2024; cycle 2 on 08/30/2024; cycle 3 on 09/20/2024  -09/20/2024: TSH, free T4 normal  -surveillance CT chest 09/18/2024: [facility]ORY  >>>  Plan:  Primary Adenocarcinoma of upper lobe of right lung:   Continue adjuvant atezolizumab 1200 mg IV every 3 weeks  x1 year (17 cycles)   Proceed with C17D1 today in infusion   RTC with Me in 3 weeks (7/21) with labs prior (CBC/CMP/Mag level) followed by infusion   Check baseline TSH and free T4 every 6 weeks to monitor for the possibility of immune thyroiditis with atezolizumab (next early July)  For surveillance, CT chest with contrast in 6 months (September 2025)      Elevated BUN/Creatinine level:   BUN 18.8  Creatinine level 1.28  We will continue to monitor      Monitoring on atezolizumab:  Monitor LFTs (AST, ALT, and total bilirubin; at baseline and periodically during treatment;  kidney function (serum creatinine; at baseline and periodically during treatment);  thyroid function (at baseline, periodically during treatment and as clinically indicated);  monitor blood glucose (for hyperglycemia);  Monitor closely for signs/symptoms of immune-mediated adverse reactions, including  adrenal insufficiency,  diarrhea/colitis (consider initiating or repeating infectious workup in patients with corticosteroid-refractory immune-mediated colitis to exclude alternative causes),  dermatologic toxicity,  diabetes mellitus,  hypophysitis,  ocular disorders,  thyroid disorders,  pneumonitis and other immune-mediated adverse reactions.  Monitor for signs/symptoms of infusion-related reactions.     Surveillance:  -history and physical and chest CT +/- contrast every 6 months X 3 years (03/2024-03/2027), then history and physical and low-dose noncontrast chest CT annually  -smoking cessation advice and counseling   -FDG PET-CT or brain MRI is not routinely indicated      No follow-ups on file.

## 2025-06-30 NOTE — Clinical Note
· Proceed with C16D1 today in infusion  · RTC with Me in 3 weeks (7/21) with labs prior (CBC/CMP/Mag level) followed by infusion

## 2025-07-18 ENCOUNTER — TELEPHONE (OUTPATIENT)
Dept: HEMATOLOGY/ONCOLOGY | Facility: CLINIC | Age: 65
End: 2025-07-18
Payer: MEDICAID

## 2025-07-19 DIAGNOSIS — G62.0 NEUROPATHY DUE TO DRUG: ICD-10-CM

## 2025-07-21 ENCOUNTER — INFUSION (OUTPATIENT)
Dept: INFUSION THERAPY | Facility: HOSPITAL | Age: 65
End: 2025-07-21
Attending: INTERNAL MEDICINE
Payer: MEDICAID

## 2025-07-21 ENCOUNTER — OFFICE VISIT (OUTPATIENT)
Dept: HEMATOLOGY/ONCOLOGY | Facility: CLINIC | Age: 65
End: 2025-07-21
Payer: MEDICAID

## 2025-07-21 VITALS
RESPIRATION RATE: 18 BRPM | DIASTOLIC BLOOD PRESSURE: 53 MMHG | HEART RATE: 74 BPM | BODY MASS INDEX: 35.66 KG/M2 | OXYGEN SATURATION: 99 % | WEIGHT: 181.63 LBS | HEIGHT: 60 IN | SYSTOLIC BLOOD PRESSURE: 90 MMHG | TEMPERATURE: 98 F

## 2025-07-21 VITALS
DIASTOLIC BLOOD PRESSURE: 53 MMHG | BODY MASS INDEX: 35.66 KG/M2 | OXYGEN SATURATION: 99 % | RESPIRATION RATE: 18 BRPM | SYSTOLIC BLOOD PRESSURE: 105 MMHG | TEMPERATURE: 98 F | HEART RATE: 71 BPM | HEIGHT: 60 IN | WEIGHT: 181.63 LBS

## 2025-07-21 DIAGNOSIS — R79.89 ELEVATED SERUM CREATININE: ICD-10-CM

## 2025-07-21 DIAGNOSIS — Z79.69 IMMUNODEFICIENCY DUE TO CHEMOTHERAPY: ICD-10-CM

## 2025-07-21 DIAGNOSIS — T45.1X5A IMMUNODEFICIENCY DUE TO CHEMOTHERAPY: ICD-10-CM

## 2025-07-21 DIAGNOSIS — D84.821 IMMUNODEFICIENCY DUE TO CHEMOTHERAPY: ICD-10-CM

## 2025-07-21 DIAGNOSIS — C34.11 PRIMARY ADENOCARCINOMA OF UPPER LOBE OF RIGHT LUNG: Primary | ICD-10-CM

## 2025-07-21 DIAGNOSIS — Z99.81 OXYGEN DEPENDENT: ICD-10-CM

## 2025-07-21 DIAGNOSIS — G62.0 NEUROPATHY DUE TO DRUG: ICD-10-CM

## 2025-07-21 PROCEDURE — 63600175 PHARM REV CODE 636 W HCPCS

## 2025-07-21 PROCEDURE — 3078F DIAST BP <80 MM HG: CPT | Mod: CPTII,,,

## 2025-07-21 PROCEDURE — 3074F SYST BP LT 130 MM HG: CPT | Mod: CPTII,,,

## 2025-07-21 PROCEDURE — A4216 STERILE WATER/SALINE, 10 ML: HCPCS

## 2025-07-21 PROCEDURE — 99215 OFFICE O/P EST HI 40 MIN: CPT | Mod: S$PBB,,,

## 2025-07-21 PROCEDURE — 96413 CHEMO IV INFUSION 1 HR: CPT

## 2025-07-21 PROCEDURE — 99215 OFFICE O/P EST HI 40 MIN: CPT | Mod: PBBFAC,25

## 2025-07-21 PROCEDURE — 3008F BODY MASS INDEX DOCD: CPT | Mod: CPTII,,,

## 2025-07-21 PROCEDURE — 4010F ACE/ARB THERAPY RXD/TAKEN: CPT | Mod: CPTII,,,

## 2025-07-21 PROCEDURE — 25000003 PHARM REV CODE 250

## 2025-07-21 RX ORDER — DIPHENHYDRAMINE HYDROCHLORIDE 50 MG/ML
50 INJECTION, SOLUTION INTRAMUSCULAR; INTRAVENOUS ONCE AS NEEDED
Status: CANCELLED | OUTPATIENT
Start: 2025-07-21

## 2025-07-21 RX ORDER — EPINEPHRINE 0.3 MG/.3ML
0.3 INJECTION SUBCUTANEOUS ONCE AS NEEDED
Status: CANCELLED | OUTPATIENT
Start: 2025-07-21

## 2025-07-21 RX ORDER — GABAPENTIN 800 MG/1
800 TABLET ORAL 2 TIMES DAILY
Qty: 60 TABLET | Refills: 0 | OUTPATIENT
Start: 2025-07-21

## 2025-07-21 RX ORDER — GABAPENTIN 800 MG/1
800 TABLET ORAL 2 TIMES DAILY
Qty: 60 TABLET | Refills: 2 | Status: SHIPPED | OUTPATIENT
Start: 2025-07-21 | End: 2025-07-23 | Stop reason: SDUPTHER

## 2025-07-21 RX ORDER — SODIUM CHLORIDE 0.9 % (FLUSH) 0.9 %
10 SYRINGE (ML) INJECTION
Status: CANCELLED | OUTPATIENT
Start: 2025-07-21

## 2025-07-21 RX ORDER — SODIUM CHLORIDE 0.9 % (FLUSH) 0.9 %
10 SYRINGE (ML) INJECTION
Status: DISCONTINUED | OUTPATIENT
Start: 2025-07-21 | End: 2025-07-21 | Stop reason: HOSPADM

## 2025-07-21 RX ORDER — HEPARIN 100 UNIT/ML
500 SYRINGE INTRAVENOUS
Status: CANCELLED | OUTPATIENT
Start: 2025-07-21

## 2025-07-21 RX ORDER — HEPARIN 100 UNIT/ML
500 SYRINGE INTRAVENOUS
Status: DISCONTINUED | OUTPATIENT
Start: 2025-07-21 | End: 2025-07-21 | Stop reason: HOSPADM

## 2025-07-21 RX ORDER — EPINEPHRINE 1 MG/ML
0.3 INJECTION INTRAMUSCULAR; INTRAVENOUS; SUBCUTANEOUS ONCE AS NEEDED
Status: DISCONTINUED | OUTPATIENT
Start: 2025-07-21 | End: 2025-07-21 | Stop reason: HOSPADM

## 2025-07-21 RX ORDER — DIPHENHYDRAMINE HYDROCHLORIDE 50 MG/ML
50 INJECTION, SOLUTION INTRAMUSCULAR; INTRAVENOUS ONCE AS NEEDED
Status: DISCONTINUED | OUTPATIENT
Start: 2025-07-21 | End: 2025-07-21 | Stop reason: HOSPADM

## 2025-07-21 RX ADMIN — SODIUM CHLORIDE: 9 INJECTION, SOLUTION INTRAVENOUS at 10:07

## 2025-07-21 RX ADMIN — Medication 10 ML: at 11:07

## 2025-07-21 RX ADMIN — ATEZOLIZUMAB 1200 MG: 1200 INJECTION, SOLUTION INTRAVENOUS at 11:07

## 2025-07-21 RX ADMIN — HEPARIN 500 UNITS: 100 SYRINGE at 11:07

## 2025-07-21 NOTE — PROGRESS NOTES
Reason for Follow-up:  -adenocarcinoma right upper lung lobe, S/P right VATS/right upper lobe wedge resection/completion right upper lobectomy and right middle lobectomy and regional lymph node dissection 2024, G3, pT3 pN0  -Cologuard positive  -anemia of chronic disease  -folic acid deficiency      No chief complaint on file.         History:  Social history:   .  [place]ives [place].  Has 3 children.  Does not work.  Has been smoking 1-2 pack of cigarettes daily for 51 years, since age 12; discontinued recently.  No alcohol or illicit drug abuse.      Family history:   Mother experienced some kind of intrathoracic malignancy at age 83;  from MI at age 83   Father  from prostate cancer) experienced at age 83 and probably, sarcoma) experienced at age 83)    Health maintenance:   -PCP [place]  -says that she had screening colonoscopy performed in [place] in , and that it was unremarkable  -says that now, for positive Cologuard test, she is scheduled for colonoscopy in May 2024  -2023:  Cologuard positive  -2023:  Bilateral digital screening mammogram with tomosynthesis (comparison:  2022 mammogram, etc.):  BI-RADS: 1 negative    Oncologic/Hematologic History:  Oncology History   Primary adenocarcinoma of upper lobe of right lung   3/7/2024 Cancer Staged    Staging form: Lung, AJCC 8th Edition  - Pathologic stage from 3/7/2024: Stage IIB (pT3, pN0, cM0)     4/15/2024 Initial Diagnosis    Primary adenocarcinoma of upper lobe of right lung     2024 - 2024 Chemotherapy    Treatment Summary   Plan Name: OP NSCLC PEMETREXED + CISPLATIN Q3W  Treatment Goal: Curative  Status: Inactive  Start Date: 2024  End Date: 2024  Provider: Emery Mahoney MD  Chemotherapy: CISplatin (Platinol) 132 mg in sodium chloride 0.9% 697 mL chemo infusion, 141 mg, Intravenous, Clinic/HOD 1 time, 4 of 4 cycles  Administration: 132 mg (2024), 128 mg  (6/10/2024), 128 mg (7/1/2024), 132 mg (5/20/2024)  PEMEtrexed disodium (ALIMTA) 900 mg in sodium chloride 0.9% SolP 100 mL chemo infusion, 950 mg, Intravenous, Clinic/HOD 1 time, 4 of 4 cycles  Dose modification: 375 mg/m2 (75 % of original dose 500 mg/m2, Cycle 4, Reason: Dose Not Tolerated)  Administration: 900 mg (4/29/2024), 900 mg (6/10/2024), 625 mg (7/1/2024), 900 mg (5/20/2024)     8/9/2024 -  Chemotherapy    Treatment Summary   Plan Name: OP ATEZOLIZUMAB Q3W  Treatment Goal: Curative  Status: Active  Start Date: 8/9/2024  End Date: 7/21/2025 (Planned)  Provider: Emery Mahoney MD  Chemotherapy: [No matching medication found in this treatment plan]     =========================================    64-year-old female, referred from [facility], Dr. Arnulfo Middleton, with poorly-differentiated adenocarcinoma of right lung.      Investigations reviewed:  -08/12/2022: CT chest lung screening low-dose (comparison:  09/24/2020):  Lung rads 2: Benign appearance or behavior:  Continue annual screening with LDCT in 12 months  -09/11/2023:  CT chest lung screening low-dose without contrast (comparison:  08/12/2022):  Lung rads category 4A: Suspicious; enlarging nodule right upper lobe, lobulated, < 8 mm, i.e., 7 x 6.7 mm, previously 5 mm, previously not lobulated, too small for adequate detection on PET-CT; recommend three-month follow-up)  -12/26/2023: CT chest with contrast (comparison:  09/11/2023):  Lung rads 4A: Very suspicious; continued enlargement of right upper lobe lung nodule of concern (9.5 x 8.9 mm)  -01/29/2024: FDG PET-CT (comparison:  Chest CT 12/26/2023; CT abdomen pelvis 09/04/2019):  1. Markedly FDG-avid right upper lobe soft tissue nodule, increased in size in the interval, raising concern for malignancy (smoothly marginated noncalcified soft tissue nodule lateral subpleural right upper lobe, 1.2 x 1.1 cm, maximum SUV 14, previously 1 cm x 0.9 cm).  2. No definite PET-CT  findings to suggest additional right hemithoracic or more distant metastatic disease.  -02/01/2024: CT chest without contrast (comparison:  12/26/2023): Lateral right upper lobe nodule continues to enlarge in size currently measuring 11 mm concerning for malignancy. Recommend further evaluation.   -03/07/2024:  Right VATS, right upper lobe wedge resection; right upper lobectomy, right middle lobectomy, regional lymph node dissection:  1. Right lung, upper lobe, wedge resection:  Poorly-differentiated adenocarcinoma, 0.6 cm, clear margins of resection  2. Level 8R lymph node (paraesophageal), biopsy:  3 lymph nodes, negative for metastatic carcinoma    3. Level 4R lymph node (lower paratracheal), biopsy: 2 lymph nodes, negative for metastatic carcinoma  4. Level 7R lymph node (subcarinal), biopsy:  1 lymph node, negative for metastatic carcinoma  5. Level 11R lymph node (interlobar), biopsy:  2 lymph nodes, negative for metastatic carcinoma    6. Right lung, upper lobe, completion lobectomy:  -poorly-differentiated adenocarcinoma, 0.8 cm  -bronchial and vascular margins of resection negative   -1 hilar lymph node with no evidence of metastatic carcinoma  7. Level 12R lymph node (lobar), lymphadenectomy:  1 lymph node, negative for metastatic carcinoma    Synoptic report:  Total number of primary tumors: 2  S/P wedge resection  Completion lobectomy  Right lung  Separate tumor nodules (metastases) in same lobe, therefore, pT3  number of intrapulmonary metastases:  2   Tumor site:  Upper lobe of lung  Tumor size:  Total tumor size:  Greatest dimension:  0.8 cm  Invasive acinar adenocarcinoma; G3, poorly-differentiated; spread through airspaces (CHAMP), present; no visceral pleural invasion; no adjacent structures present; no known pre-surgical therapy; no LVI  All margins negative for invasive carcinoma; closest margin invasive carcinoma, bronchial vascular; distance from invasive carcinoma closest margin,> 2 cm; margins  status for noninvasive tumor, all margins negative for noninvasive tumor  Number of lymph nodes examined, 10; all regional lymph nodes negative for tumor  >>>  pT3 pN0    PD-L1 expression:  Positive: High (TPS 95%; )  Negative for EGFR, KRAS, BRAF, AL K, ROS1, RET, MET, HER2  No gene rearrangement no reportable altered splicing events identified from RNA sequencing  No reportable pathogenic variants found  MSI stable  TMB:  41.6m/MB  Fusions: Negative        Interval History 7/21/25:  Patient presents to the clinic today accompanied by her  for a scheduled clinic visit to follow up regarding her history of lung cancer. She is due to receive C17D1 of Atezolizumab today in infusion. She has no acute compliants today. She denies fever, chills, worsening SOB (she continues to wear 2L NC), or s/s associated with infection. She denies any headaches or memory changes. She denies any abdominal pain, constipation, diarrhea, or changes with appetite. Labwork was reviewed with the patient. All future appointments were discussed.         Review of System  Review of Systems   All other systems reviewed and are negative.       Physical Exam  Vitals reviewed.   Constitutional:       Appearance: Normal appearance.   HENT:      Head: Normocephalic and atraumatic.      Mouth/Throat:      Mouth: Mucous membranes are moist.   Cardiovascular:      Rate and Rhythm: Normal rate and regular rhythm.      Pulses: Normal pulses.      Heart sounds: Normal heart sounds.   Pulmonary:      Effort: Pulmonary effort is normal.      Breath sounds: Normal breath sounds.      Comments: 2L of O2 via NC  Abdominal:      General: Bowel sounds are normal.      Palpations: Abdomen is soft.   Skin:     General: Skin is warm and dry.   Neurological:      Mental Status: She is alert and oriented to person, place, and time.   Psychiatric:         Mood and Affect: Mood normal.         Behavior: Behavior normal.         Thought Content: Thought  content normal.         Judgment: Judgment normal.          Assessment:  Adenocarcinoma right upper lung lobe:   -LDCT chest without contrast 08/12/2022:  Lung rads 2  -LDCT chest without contrast 09/11/2023:  Lung rads 4A  -noncontrast chest CT 12/26/2023:  Lung rads 4A  -FDG PET-CT 01/29/2024:  Hypermetabolic right upper lobe nodule, 1.2 x 1.1 cm, maximum SUV 14, previously 1 cm x 0.9 cm; no distant metastases   -noncontrast chest CT 02/01/2024:  Right upper lobe nodule continues to enlarge, now 11 mm   -03/07/2024:  Right VATS, right upper lobe wedge resection, right upper lobectomy, right middle lobectomy, regional lymph node dissection:  -separate tumor nodules (metastases) in same lobe, right upper lung lobe, therefore, pT3 (0.8 cm and 0.6 cm, respectively); invasive acinar adenocarcinoma; G3, poorly-differentiated; no visceral pleural invasion; no LVI; margins negative; 10 regional lymph nodes negative  -pT3 pN0 M0, stage IIB  -PD-L1 expression high positive (TPS 95%; )  -negative for EGFR, KRAS, BRAF, AL K, ROS1, RET, met, HER2  -MediPort placed 04/25/2024  -S/P adjuvant cisplatin/Alimta every 3 weeks x4 cycles (04/29/2024-07/01/2024)  -restaging CTs C/A/P 05/06/2024:  No metastases   -staging brain MRI 05/08/2024: No metastases  -hemoglobin dropped to 5.2 on 07/10/2024, requiring blood transfusion  -ANC dropped to 0.55 on 06/24/2024, requiring supportive care  -07/17/2024:  ANC 0.525; hemoglobin 7.0; iron studies consistent with anemia of chronic disease/inflammation/malignancy/chemotherapy  -07/22/2024: TSH, free T4 normal  -adjuvant atezolizumab 1200 mg every 3 weeks, started 08/09/2024; cycle 2 on 08/30/2024; cycle 3 on 09/20/2024  -09/20/2024: TSH, free T4 normal  -surveillance CT chest 09/18/2024: [facility]  >>>  Plan:  Primary Adenocarcinoma of upper lobe of right lung:   Continue adjuvant atezolizumab 1200 mg IV every 3 weeks x1 year (17 cycles)   Proceed with C17D1 today in infusion   RTC with MD  in 3 weeks for completion of chemotherapy with labs prior (CBC/CMP/Mag level/TSH/Free T4)   Check baseline TSH and free T4 every 6 weeks to monitor for the possibility of immune thyroiditis with atezolizumab (next early July)  For surveillance, CT chest with contrast in 6 months (September 2025)      Elevated BUN/Creatinine level:   BUN 21.5  Creatinine level 1.15  We will continue to monitor      Monitoring on atezolizumab:  Monitor LFTs (AST, ALT, and total bilirubin; at baseline and periodically during treatment;  kidney function (serum creatinine; at baseline and periodically during treatment);  thyroid function (at baseline, periodically during treatment and as clinically indicated);  monitor blood glucose (for hyperglycemia);  Monitor closely for signs/symptoms of immune-mediated adverse reactions, including  adrenal insufficiency,  diarrhea/colitis (consider initiating or repeating infectious workup in patients with corticosteroid-refractory immune-mediated colitis to exclude alternative causes),  dermatologic toxicity,  diabetes mellitus,  hypophysitis,  ocular disorders,  thyroid disorders,  pneumonitis and other immune-mediated adverse reactions.  Monitor for signs/symptoms of infusion-related reactions.     Surveillance:  -history and physical and chest CT +/- contrast every 6 months X 3 years (03/2024-03/2027), then history and physical and low-dose noncontrast chest CT annually  -smoking cessation advice and counseling   -FDG PET-CT or brain MRI is not routinely indicated      No follow-ups on file.

## 2025-07-21 NOTE — NURSING
1030 Patient is here for labs, NP visit , C17 (last dose ) Tecentriq. She voices no c/o.     1155 Tecentriq given via [employer].

## 2025-07-23 DIAGNOSIS — G62.0 NEUROPATHY DUE TO DRUG: ICD-10-CM

## 2025-07-24 RX ORDER — GABAPENTIN 800 MG/1
800 TABLET ORAL 2 TIMES DAILY
Qty: 60 TABLET | Refills: 2 | Status: SHIPPED | OUTPATIENT
Start: 2025-07-24

## 2025-08-01 DIAGNOSIS — R42 VERTIGO: ICD-10-CM

## 2025-08-04 RX ORDER — MECLIZINE HYDROCHLORIDE 25 MG/1
25 TABLET ORAL DAILY
Qty: 30 TABLET | Refills: 0 | Status: SHIPPED | OUTPATIENT
Start: 2025-08-04

## 2025-08-13 ENCOUNTER — OFFICE VISIT (OUTPATIENT)
Dept: HEMATOLOGY/ONCOLOGY | Facility: CLINIC | Age: 65
End: 2025-08-13
Attending: INTERNAL MEDICINE
Payer: MEDICAID

## 2025-08-13 VITALS
SYSTOLIC BLOOD PRESSURE: 95 MMHG | OXYGEN SATURATION: 95 % | HEART RATE: 74 BPM | WEIGHT: 182.19 LBS | HEIGHT: 60 IN | BODY MASS INDEX: 35.77 KG/M2 | DIASTOLIC BLOOD PRESSURE: 53 MMHG | TEMPERATURE: 98 F | RESPIRATION RATE: 20 BRPM

## 2025-08-13 DIAGNOSIS — Z99.81 O2 DEPENDENT: Primary | ICD-10-CM

## 2025-08-13 DIAGNOSIS — R19.5 POSITIVE COLORECTAL CANCER SCREENING USING COLOGUARD TEST: ICD-10-CM

## 2025-08-13 DIAGNOSIS — D12.8 ADENOMATOUS RECTAL POLYP: ICD-10-CM

## 2025-08-13 DIAGNOSIS — D12.5 ADENOMATOUS POLYP OF SIGMOID COLON: ICD-10-CM

## 2025-08-13 DIAGNOSIS — Z80.42 FAMILY HISTORY OF PROSTATE CANCER IN FATHER: ICD-10-CM

## 2025-08-13 DIAGNOSIS — C34.11 PRIMARY ADENOCARCINOMA OF UPPER LOBE OF RIGHT LUNG: ICD-10-CM

## 2025-08-13 DIAGNOSIS — E53.8 FOLIC ACID DEFICIENCY: ICD-10-CM

## 2025-08-13 DIAGNOSIS — R94.4 DECREASED GFR: ICD-10-CM

## 2025-08-13 DIAGNOSIS — D63.8 ANEMIA OF CHRONIC DISEASE: ICD-10-CM

## 2025-08-13 DIAGNOSIS — Z90.2 STATUS POST LOBECTOMY OF LUNG: ICD-10-CM

## 2025-08-13 DIAGNOSIS — K57.30 DIVERTICULOSIS LARGE INTESTINE W/O PERFORATION OR ABSCESS W/O BLEEDING: ICD-10-CM

## 2025-08-13 PROCEDURE — 99213 OFFICE O/P EST LOW 20 MIN: CPT | Mod: PBBFAC | Performed by: INTERNAL MEDICINE

## 2025-08-13 PROCEDURE — 4010F ACE/ARB THERAPY RXD/TAKEN: CPT | Mod: CPTII,,, | Performed by: INTERNAL MEDICINE

## 2025-08-13 PROCEDURE — 99214 OFFICE O/P EST MOD 30 MIN: CPT | Mod: S$PBB,,, | Performed by: INTERNAL MEDICINE

## 2025-08-13 PROCEDURE — 3078F DIAST BP <80 MM HG: CPT | Mod: CPTII,,, | Performed by: INTERNAL MEDICINE

## 2025-08-13 PROCEDURE — 1159F MED LIST DOCD IN RCRD: CPT | Mod: CPTII,,, | Performed by: INTERNAL MEDICINE

## 2025-08-13 PROCEDURE — 3008F BODY MASS INDEX DOCD: CPT | Mod: CPTII,,, | Performed by: INTERNAL MEDICINE

## 2025-08-13 PROCEDURE — 1160F RVW MEDS BY RX/DR IN RCRD: CPT | Mod: CPTII,,, | Performed by: INTERNAL MEDICINE

## 2025-08-13 PROCEDURE — 3074F SYST BP LT 130 MM HG: CPT | Mod: CPTII,,, | Performed by: INTERNAL MEDICINE

## 2025-08-28 DIAGNOSIS — E53.8 FOLATE DEFICIENCY: ICD-10-CM

## 2025-08-28 RX ORDER — FOLIC ACID 1 MG/1
1000 TABLET ORAL DAILY
Qty: 30 TABLET | Refills: 2 | Status: SHIPPED | OUTPATIENT
Start: 2025-08-28

## (undated) DEVICE — GLOVE SENSICARE PI GRN 7.5

## (undated) DEVICE — CLIP LIGATING MEDIUM

## (undated) DEVICE — Device

## (undated) DEVICE — SUT MONOCRYL PLUS UD 3-0 27

## (undated) DEVICE — DRAPE STERI INCISE 23X23IN

## (undated) DEVICE — SUT VICRYL 2-0 36 CT-1

## (undated) DEVICE — SUT 3-0 VICRYL / SH (J416)

## (undated) DEVICE — KIT SURGICAL TURNOVER

## (undated) DEVICE — GLOVE PROTEXIS BLUE LATEX 7

## (undated) DEVICE — TROCAR ENDOPATH EXCEL DILATING

## (undated) DEVICE — GOWN SMARTSLEEVE AAMI LVL4 XXL

## (undated) DEVICE — COVER SITE-RITE PROBE 96IN

## (undated) DEVICE — GLOVE SIGNATURE MICRO LTX 7

## (undated) DEVICE — NDL HYPO REG 25G X 1 1/2

## (undated) DEVICE — CONTAINER SPECIMEN OR STER 4OZ

## (undated) DEVICE — BLADE SURG STAINLESS STEEL #11

## (undated) DEVICE — KIT SURGICAL COLON .25 1.1OZ

## (undated) DEVICE — MANIFOLD 4 PORT

## (undated) DEVICE — CATH THORACIC 28FR ST

## (undated) DEVICE — SYR 10CC LUER LOCK

## (undated) DEVICE — PENCIL ELECSURG ROCKER 15FT

## (undated) DEVICE — CUSHION  WC FOAM 20X20X.75IN

## (undated) DEVICE — GLOVE PROTEXIS PI SYN SURG 7.5

## (undated) DEVICE — CATH ON-Q EXPANSION 5

## (undated) DEVICE — GLOVE SENSICARE PI GRN 6

## (undated) DEVICE — GLOVE SENSICARE PI GRN 7

## (undated) DEVICE — ADHESIVE DERMABOND ADVANCED

## (undated) DEVICE — DECANTER FLUID TRNSF WHITE 9IN

## (undated) DEVICE — GOWN POLY REINF BRTH SLV XL

## (undated) DEVICE — SUT MCRYL PLUS 4-0 PS2 27IN

## (undated) DEVICE — CORD LAP 10 DISP

## (undated) DEVICE — BAG TISS RETRV MONARCH 10MM

## (undated) DEVICE — RELOAD TRI-STAPLE 2.0 30MM

## (undated) DEVICE — STAPLE TRI-STAPLE 2.0 45MM BLK

## (undated) DEVICE — TRAY CATH FOL SIL URIMTR 16FR

## (undated) DEVICE — GLOVE SIGNATURE MICRO LTX 6.5

## (undated) DEVICE — GLOVE PROTEXIS HYDROGEL SZ6.5

## (undated) DEVICE — SUT VICRYL 1 OB 36 CTX

## (undated) DEVICE — GEL AQUASONIC 100 STERILE20GM

## (undated) DEVICE — SOL NACL IRR 1000ML BTL

## (undated) DEVICE — SUT ETHBND XTRA 1 OS-8 30IN

## (undated) DEVICE — SOL CLEARIFY VISUALIZATION LAP

## (undated) DEVICE — ELECTRODE PATIENT RETURN DISP

## (undated) DEVICE — SPONGE LAP 18X18 PREWASHED

## (undated) DEVICE — STAPLER ENDO GIA ULT UNIV 12MM

## (undated) DEVICE — TUNNLER W/SHEATH DISPOSABLE

## (undated) DEVICE — STAPLER GIA HANDLE STD

## (undated) DEVICE — TUNNELER SHEATH ON-Q 11GX8IN

## (undated) DEVICE — SUT VICRYL #2 TP-1 2-27IN

## (undated) DEVICE — SPONGE GAUZE 16PLY 4X4

## (undated) DEVICE — SYR DISP LL 5CC

## (undated) DEVICE — DRESSING TELFA + BARR 4X6IN

## (undated) DEVICE — GLOVE PROTEXIS NEOPRN SZ8

## (undated) DEVICE — DRAPE C-ARM COVER EZ 36X28IN

## (undated) DEVICE — GLOVE SIGNATURE MICRO LTX 6

## (undated) DEVICE — APPLICATOR CHLORAPREP ORN 26ML

## (undated) DEVICE — DRAIN CHEST DRY SUCTION

## (undated) DEVICE — KIT ANTIFOG W/SPONG & FLUID